# Patient Record
Sex: FEMALE | Race: WHITE | Employment: OTHER | URBAN - METROPOLITAN AREA
[De-identification: names, ages, dates, MRNs, and addresses within clinical notes are randomized per-mention and may not be internally consistent; named-entity substitution may affect disease eponyms.]

---

## 2018-03-12 ENCOUNTER — OFFICE VISIT (OUTPATIENT)
Dept: INTERNAL MEDICINE | Age: 58
End: 2018-03-12
Payer: COMMERCIAL

## 2018-03-12 VITALS
WEIGHT: 135.4 LBS | BODY MASS INDEX: 26.58 KG/M2 | OXYGEN SATURATION: 93 % | HEART RATE: 100 BPM | SYSTOLIC BLOOD PRESSURE: 100 MMHG | HEIGHT: 60 IN | DIASTOLIC BLOOD PRESSURE: 60 MMHG

## 2018-03-12 DIAGNOSIS — L72.3 SEBACEOUS CYST: ICD-10-CM

## 2018-03-12 DIAGNOSIS — F41.1 GAD (GENERALIZED ANXIETY DISORDER): ICD-10-CM

## 2018-03-12 DIAGNOSIS — F17.218 NICOTINE DEPENDENCE, CIGARETTES, WITH OTHER NICOTINE-INDUCED DISORDERS: ICD-10-CM

## 2018-03-12 DIAGNOSIS — G47.00 INSOMNIA, UNSPECIFIED TYPE: ICD-10-CM

## 2018-03-12 DIAGNOSIS — F32.0 MILD SINGLE CURRENT EPISODE OF MAJOR DEPRESSIVE DISORDER (HCC): ICD-10-CM

## 2018-03-12 DIAGNOSIS — Z12.31 SCREENING MAMMOGRAM, ENCOUNTER FOR: ICD-10-CM

## 2018-03-12 DIAGNOSIS — J43.8 OTHER EMPHYSEMA (HCC): Primary | ICD-10-CM

## 2018-03-12 DIAGNOSIS — E78.00 PURE HYPERCHOLESTEROLEMIA: ICD-10-CM

## 2018-03-12 DIAGNOSIS — K21.9 GASTROESOPHAGEAL REFLUX DISEASE WITHOUT ESOPHAGITIS: ICD-10-CM

## 2018-03-12 DIAGNOSIS — G62.9 NEUROPATHY: ICD-10-CM

## 2018-03-12 DIAGNOSIS — Z72.0 TOBACCO ABUSE: ICD-10-CM

## 2018-03-12 PROBLEM — F32.A DEPRESSION: Status: ACTIVE | Noted: 2018-03-12

## 2018-03-12 PROBLEM — M51.37 DDD (DEGENERATIVE DISC DISEASE), LUMBOSACRAL: Status: ACTIVE | Noted: 2018-03-12

## 2018-03-12 PROBLEM — M51.379 DDD (DEGENERATIVE DISC DISEASE), LUMBOSACRAL: Status: ACTIVE | Noted: 2018-03-12

## 2018-03-12 PROCEDURE — G0296 VISIT TO DETERM LDCT ELIG: HCPCS | Performed by: FAMILY MEDICINE

## 2018-03-12 PROCEDURE — 3023F SPIROM DOC REV: CPT | Performed by: FAMILY MEDICINE

## 2018-03-12 PROCEDURE — G8926 SPIRO NO PERF OR DOC: HCPCS | Performed by: FAMILY MEDICINE

## 2018-03-12 PROCEDURE — 3014F SCREEN MAMMO DOC REV: CPT | Performed by: FAMILY MEDICINE

## 2018-03-12 PROCEDURE — G8419 CALC BMI OUT NRM PARAM NOF/U: HCPCS | Performed by: FAMILY MEDICINE

## 2018-03-12 PROCEDURE — G8427 DOCREV CUR MEDS BY ELIG CLIN: HCPCS | Performed by: FAMILY MEDICINE

## 2018-03-12 PROCEDURE — 99204 OFFICE O/P NEW MOD 45 MIN: CPT | Performed by: FAMILY MEDICINE

## 2018-03-12 PROCEDURE — 3017F COLORECTAL CA SCREEN DOC REV: CPT | Performed by: FAMILY MEDICINE

## 2018-03-12 PROCEDURE — 4004F PT TOBACCO SCREEN RCVD TLK: CPT | Performed by: FAMILY MEDICINE

## 2018-03-12 PROCEDURE — G8484 FLU IMMUNIZE NO ADMIN: HCPCS | Performed by: FAMILY MEDICINE

## 2018-03-12 RX ORDER — BUSPIRONE HYDROCHLORIDE 15 MG/1
15 TABLET ORAL 3 TIMES DAILY
COMMUNITY
Start: 2018-01-19 | End: 2018-05-02 | Stop reason: SDUPTHER

## 2018-03-12 RX ORDER — ATORVASTATIN CALCIUM 20 MG/1
20 TABLET, FILM COATED ORAL DAILY
COMMUNITY
Start: 2018-02-05 | End: 2018-05-30 | Stop reason: SDUPTHER

## 2018-03-12 RX ORDER — GABAPENTIN 300 MG/1
300 CAPSULE ORAL 3 TIMES DAILY
COMMUNITY
Start: 2018-02-05 | End: 2018-06-14

## 2018-03-12 RX ORDER — PAROXETINE HYDROCHLORIDE 40 MG/1
40 TABLET, FILM COATED ORAL EVERY MORNING
COMMUNITY
Start: 2018-01-19 | End: 2018-05-02 | Stop reason: SDUPTHER

## 2018-03-12 RX ORDER — BUPROPION HYDROCHLORIDE 100 MG/1
TABLET ORAL
COMMUNITY
Start: 2018-02-05 | End: 2018-03-12

## 2018-03-12 RX ORDER — OMEPRAZOLE 20 MG/1
20 CAPSULE, DELAYED RELEASE ORAL DAILY
COMMUNITY
Start: 2018-02-05 | End: 2018-06-14

## 2018-03-12 RX ORDER — TRAZODONE HYDROCHLORIDE 100 MG/1
100 TABLET ORAL NIGHTLY
COMMUNITY
Start: 2018-01-19 | End: 2018-05-02 | Stop reason: SDUPTHER

## 2018-03-12 ASSESSMENT — PATIENT HEALTH QUESTIONNAIRE - PHQ9
SUM OF ALL RESPONSES TO PHQ QUESTIONS 1-9: 2
SUM OF ALL RESPONSES TO PHQ9 QUESTIONS 1 & 2: 2
2. FEELING DOWN, DEPRESSED OR HOPELESS: 1
1. LITTLE INTEREST OR PLEASURE IN DOING THINGS: 1

## 2018-03-12 ASSESSMENT — ENCOUNTER SYMPTOMS
CHEST TIGHTNESS: 0
COUGH: 1
APNEA: 0
ROS SKIN COMMENTS: CYST

## 2018-03-12 NOTE — PROGRESS NOTES
Assessment:  Dari Carreon was seen today for establish care, cough and cyst.    Diagnoses and all orders for this visit:    Neuropathy ([facility] [address]  Stable, controlled  Plan: continue current medications  Anticipate EMG down the line as needed, patient has history of lumbosacral arthritis, might be related to that as well    Other emphysema (HCC)  Daily inhaler + rescue inhaler  Proair+ Advair  PFT ordered   Chest CT   Advair increased to 500 from 250    Pure hypercholesterolemia  Lipitor 20 mg  Stable, controlled  Plan: continue current medications    Mild single current episode of major depressive disorder ([address]  [address]  Paxil 40 mg + Buspar 15mg  No concerns at this moment. Stable, controlled  Plan: continue current medications    CARTER (generalized anxiety disorder)  [address]  Paxil 40 mg + Buspar 15mg  No concerns at this moment. As above    Insomnia, unspecified type  Trazodone 100 mg  As above    Gastroesophageal reflux disease without esophagitis  Omeprazole 20 mg  No changes in diet  Stable, controlled  Plan: continue current medications  I will request her colonoscopy results, may anticipate endoscopy down the line    Depression, unspecified depression type  [address]   Paxil 40 mg + Buspar 15mg  No concerns at this moment. As above    Sebaceous cyst   had a lengthy discussion with patient about treatment, it's side effects, the diagnosis & etiology of symptoms, along with management and expectations of outcome with the recommended treatment. Patient verbalized understanding. Recommended to monitor for now    Tobacco abuse  Discussed smoking cessation  Discussed lung cancer screening, order placed    Orders Placed This Encounter   Procedures    CT Lung Screening     : 1960, patient is 61 y.o. No order of CT LUNG SCREENING is found. Standing Status:   Future     Standing Expiration Date:   3/12/2019     Order Specific Question:   Patient age is 50-69 years? Answer:    Yes

## 2018-03-13 ENCOUNTER — TELEPHONE (OUTPATIENT)
Dept: INTERNAL MEDICINE | Age: 58
End: 2018-03-13

## 2018-03-13 NOTE — TELEPHONE ENCOUNTER
Pt called about breathing machine for COPD, pt forgot to get it yesterday. Pt wants to know if this is available in the office, and if so when can she come pick it up? Please advise.

## 2018-03-16 DIAGNOSIS — J43.8 OTHER EMPHYSEMA (HCC): Primary | ICD-10-CM

## 2018-03-16 NOTE — TELEPHONE ENCOUNTER
Pt picked up nebulizer in office. Rx needs to be signed off (set to print) and faxed to [facility] (in [place] to-do box). Pt also needs a refill of albuterol for the nebulize (she does have some at home but will run out).  - to Aldair Thomas MD out of office on Fridays

## 2018-04-09 ENCOUNTER — OFFICE VISIT (OUTPATIENT)
Dept: INTERNAL MEDICINE | Age: 58
End: 2018-04-09
Payer: COMMERCIAL

## 2018-04-09 ENCOUNTER — TELEPHONE (OUTPATIENT)
Dept: INTERNAL MEDICINE | Age: 58
End: 2018-04-09

## 2018-04-09 ENCOUNTER — HOSPITAL ENCOUNTER (OUTPATIENT)
Age: 58
Setting detail: SPECIMEN
Discharge: HOME | End: 2018-04-09
Payer: COMMERCIAL

## 2018-04-09 VITALS
SYSTOLIC BLOOD PRESSURE: 108 MMHG | WEIGHT: 139 LBS | BODY MASS INDEX: 27.29 KG/M2 | HEIGHT: 60 IN | HEART RATE: 86 BPM | DIASTOLIC BLOOD PRESSURE: 64 MMHG

## 2018-04-09 DIAGNOSIS — Z23 NEED FOR PNEUMOCOCCAL VACCINATION: ICD-10-CM

## 2018-04-09 DIAGNOSIS — K21.9 GASTROESOPHAGEAL REFLUX DISEASE WITHOUT ESOPHAGITIS: ICD-10-CM

## 2018-04-09 DIAGNOSIS — E78.00 PURE HYPERCHOLESTEROLEMIA: ICD-10-CM

## 2018-04-09 DIAGNOSIS — R10.2 PELVIC PAIN IN FEMALE: ICD-10-CM

## 2018-04-09 DIAGNOSIS — Z00.00 ANNUAL PHYSICAL EXAM: ICD-10-CM

## 2018-04-09 DIAGNOSIS — Z12.4 SCREENING FOR CERVICAL CANCER: ICD-10-CM

## 2018-04-09 DIAGNOSIS — Z12.31 SCREENING MAMMOGRAM, ENCOUNTER FOR: ICD-10-CM

## 2018-04-09 DIAGNOSIS — Z00.00 ANNUAL PHYSICAL EXAM: Primary | ICD-10-CM

## 2018-04-09 LAB
ALBUMIN SERPL-MCNC: 4.6 G/DL (ref 3.9–4.9)
ALP BLD-CCNC: 114 U/L (ref 40–130)
ALT SERPL-CCNC: 19 U/L (ref 0–33)
ANION GAP SERPL CALCULATED.3IONS-SCNC: 13 MEQ/L (ref 7–13)
AST SERPL-CCNC: 16 U/L (ref 0–35)
BASOPHILS ABSOLUTE: 0 K/UL (ref 0–0.2)
BASOPHILS RELATIVE PERCENT: 0.4 %
BILIRUB SERPL-MCNC: 0.3 MG/DL (ref 0–1.2)
BUN BLDV-MCNC: 7 MG/DL (ref 6–20)
CALCIUM SERPL-MCNC: 9.5 MG/DL (ref 8.6–10.2)
CHLORIDE BLD-SCNC: 101 MEQ/L (ref 98–107)
CHOLESTEROL, TOTAL: 173 MG/DL (ref 0–199)
CO2: 29 MEQ/L (ref 22–29)
CREAT SERPL-MCNC: 0.89 MG/DL (ref 0.5–0.9)
EOSINOPHILS ABSOLUTE: 0.3 K/UL (ref 0–0.7)
EOSINOPHILS RELATIVE PERCENT: 3.5 %
GFR AFRICAN AMERICAN: >60
GFR NON-AFRICAN AMERICAN: >60
GLOBULIN: 2 G/DL (ref 2.3–3.5)
GLUCOSE BLD-MCNC: 83 MG/DL (ref 74–109)
HCT VFR BLD CALC: 39 % (ref 37–47)
HDLC SERPL-MCNC: 56 MG/DL (ref 40–59)
HEMOGLOBIN: 12.9 G/DL (ref 12–16)
LDL CHOLESTEROL CALCULATED: 86 MG/DL (ref 0–129)
LYMPHOCYTES ABSOLUTE: 2.5 K/UL (ref 1–4.8)
LYMPHOCYTES RELATIVE PERCENT: 25.9 %
MCH RBC QN AUTO: 30.2 PG (ref 27–31.3)
MCHC RBC AUTO-ENTMCNC: 33.1 % (ref 33–37)
MCV RBC AUTO: 91.2 FL (ref 82–100)
MONOCYTES ABSOLUTE: 0.7 K/UL (ref 0.2–0.8)
MONOCYTES RELATIVE PERCENT: 7.8 %
NEUTROPHILS ABSOLUTE: 5.9 K/UL (ref 1.4–6.5)
NEUTROPHILS RELATIVE PERCENT: 62.4 %
PDW BLD-RTO: 15.9 % (ref 11.5–14.5)
PLATELET # BLD: 244 K/UL (ref 130–400)
POTASSIUM SERPL-SCNC: 4 MEQ/L (ref 3.5–5.1)
RBC # BLD: 4.28 M/UL (ref 4.2–5.4)
SODIUM BLD-SCNC: 143 MEQ/L (ref 132–144)
TOTAL PROTEIN: 6.6 G/DL (ref 6.4–8.1)
TRIGL SERPL-MCNC: 157 MG/DL (ref 0–200)
WBC # BLD: 9.5 K/UL (ref 4.8–10.8)

## 2018-04-09 PROCEDURE — 90732 PPSV23 VACC 2 YRS+ SUBQ/IM: CPT | Performed by: FAMILY MEDICINE

## 2018-04-09 PROCEDURE — G0009 ADMIN PNEUMOCOCCAL VACCINE: HCPCS | Performed by: FAMILY MEDICINE

## 2018-04-09 PROCEDURE — 80061 LIPID PANEL: CPT

## 2018-04-09 PROCEDURE — 36415 COLL VENOUS BLD VENIPUNCTURE: CPT | Performed by: FAMILY MEDICINE

## 2018-04-09 PROCEDURE — 87624 HPV HI-RISK TYP POOLED RSLT: CPT

## 2018-04-09 PROCEDURE — 99396 PREV VISIT EST AGE 40-64: CPT | Performed by: FAMILY MEDICINE

## 2018-04-09 PROCEDURE — 85025 COMPLETE CBC W/AUTO DIFF WBC: CPT

## 2018-04-09 PROCEDURE — 80053 COMPREHEN METABOLIC PANEL: CPT

## 2018-04-09 ASSESSMENT — ENCOUNTER SYMPTOMS
EYE PAIN: 0
EYE ITCHING: 0
CHOKING: 0
CHEST TIGHTNESS: 0
APNEA: 0
EYE DISCHARGE: 0

## 2018-04-16 LAB
HPV COMMENT: NORMAL
HPV TYPE 16: NOT DETECTED
HPV TYPE 18: NOT DETECTED
HPVOH (OTHER TYPES): NOT DETECTED

## 2018-04-23 ENCOUNTER — TELEPHONE (OUTPATIENT)
Dept: CASE MANAGEMENT | Age: 58
End: 2018-04-23

## 2018-04-25 ENCOUNTER — TELEPHONE (OUTPATIENT)
Dept: CASE MANAGEMENT | Age: 58
End: 2018-04-25

## 2018-04-25 RX ORDER — VARENICLINE TARTRATE 1 MG/1
1 TABLET, FILM COATED ORAL 2 TIMES DAILY
Qty: 60 TABLET | Refills: 3 | Status: SHIPPED | OUTPATIENT
Start: 2018-04-25 | End: 2018-10-01

## 2018-04-25 RX ORDER — VARENICLINE TARTRATE 25 MG
KIT ORAL
Qty: 1 EACH | Refills: 0 | Status: SHIPPED | OUTPATIENT
Start: 2018-04-25 | End: 2018-06-14

## 2018-05-03 RX ORDER — PAROXETINE HYDROCHLORIDE 40 MG/1
40 TABLET, FILM COATED ORAL EVERY MORNING
Qty: 30 TABLET | Refills: 3 | Status: SHIPPED | OUTPATIENT
Start: 2018-05-03 | End: 2018-10-01

## 2018-05-03 RX ORDER — BUSPIRONE HYDROCHLORIDE 15 MG/1
15 TABLET ORAL 3 TIMES DAILY
Qty: 90 TABLET | Refills: 3 | Status: SHIPPED | OUTPATIENT
Start: 2018-05-03 | End: 2018-10-01

## 2018-05-03 RX ORDER — TRAZODONE HYDROCHLORIDE 100 MG/1
100 TABLET ORAL NIGHTLY
Qty: 60 TABLET | Refills: 3 | Status: SHIPPED | OUTPATIENT
Start: 2018-05-03 | End: 2018-10-01 | Stop reason: SDUPTHER

## 2018-05-23 ENCOUNTER — HOSPITAL ENCOUNTER (OUTPATIENT)
Dept: PULMONOLOGY | Age: 58
Discharge: HOME | End: 2018-05-23
Payer: COMMERCIAL

## 2018-05-23 ENCOUNTER — TELEPHONE (OUTPATIENT)
Dept: INTERNAL MEDICINE | Age: 58
End: 2018-05-23

## 2018-05-23 DIAGNOSIS — M54.2 NECK PAIN: ICD-10-CM

## 2018-05-23 DIAGNOSIS — M54.9 BACK PAIN, UNSPECIFIED BACK LOCATION, UNSPECIFIED BACK PAIN LATERALITY, UNSPECIFIED CHRONICITY: Primary | ICD-10-CM

## 2018-05-23 PROCEDURE — 6360000002 HC RX W HCPCS: Performed by: FAMILY MEDICINE

## 2018-05-23 PROCEDURE — 94726 PLETHYSMOGRAPHY LUNG VOLUMES: CPT

## 2018-05-23 PROCEDURE — 94060 EVALUATION OF WHEEZING: CPT | Performed by: INTERNAL MEDICINE

## 2018-05-23 PROCEDURE — 94729 DIFFUSING CAPACITY: CPT | Performed by: INTERNAL MEDICINE

## 2018-05-23 PROCEDURE — 94726 PLETHYSMOGRAPHY LUNG VOLUMES: CPT | Performed by: INTERNAL MEDICINE

## 2018-05-23 PROCEDURE — 94060 EVALUATION OF WHEEZING: CPT

## 2018-05-23 PROCEDURE — 94729 DIFFUSING CAPACITY: CPT

## 2018-05-23 RX ORDER — ALBUTEROL SULFATE 2.5 MG/3ML
2.5 SOLUTION RESPIRATORY (INHALATION) ONCE
Status: COMPLETED | OUTPATIENT
Start: 2018-05-23 | End: 2018-05-23

## 2018-05-23 RX ADMIN — ALBUTEROL SULFATE 2.5 MG: 2.5 SOLUTION RESPIRATORY (INHALATION) at 10:13

## 2018-05-30 ENCOUNTER — HOSPITAL ENCOUNTER (OUTPATIENT)
Dept: WOMENS IMAGING | Age: 58
Discharge: HOME | End: 2018-06-01
Payer: COMMERCIAL

## 2018-05-30 ENCOUNTER — HOSPITAL ENCOUNTER (OUTPATIENT)
Dept: ULTRASOUND IMAGING | Age: 58
Discharge: HOME | End: 2018-06-01
Payer: COMMERCIAL

## 2018-05-30 DIAGNOSIS — Z12.31 SCREENING MAMMOGRAM, ENCOUNTER FOR: ICD-10-CM

## 2018-05-30 DIAGNOSIS — R10.2 PELVIC PAIN IN FEMALE: ICD-10-CM

## 2018-05-30 PROCEDURE — 76830 TRANSVAGINAL US NON-OB: CPT

## 2018-05-30 PROCEDURE — 76856 US EXAM PELVIC COMPLETE: CPT

## 2018-05-30 PROCEDURE — 77067 SCR MAMMO BI INCL CAD: CPT

## 2018-05-30 RX ORDER — ATORVASTATIN CALCIUM 20 MG/1
20 TABLET, FILM COATED ORAL DAILY
Qty: 90 TABLET | Refills: 3 | Status: SHIPPED | OUTPATIENT
Start: 2018-05-30 | End: 2019-05-31 | Stop reason: SDUPTHER

## 2018-05-30 RX ORDER — BUDESONIDE AND FORMOTEROL FUMARATE DIHYDRATE 160; 4.5 UG/1; UG/1
2 AEROSOL RESPIRATORY (INHALATION) 2 TIMES DAILY
Qty: 1 INHALER | Refills: 3 | Status: SHIPPED | OUTPATIENT
Start: 2018-05-30 | End: 2018-06-14

## 2018-05-31 ENCOUNTER — TELEPHONE (OUTPATIENT)
Dept: INTERNAL MEDICINE | Age: 58
End: 2018-05-31

## 2018-05-31 DIAGNOSIS — N83.202 LEFT OVARIAN CYST: Primary | ICD-10-CM

## 2018-06-14 ENCOUNTER — OFFICE VISIT (OUTPATIENT)
Dept: OBGYN CLINIC | Age: 58
End: 2018-06-14
Payer: COMMERCIAL

## 2018-06-14 ENCOUNTER — HOSPITAL ENCOUNTER (OUTPATIENT)
Dept: LAB | Age: 58
Discharge: HOME | End: 2018-06-14
Payer: COMMERCIAL

## 2018-06-14 VITALS
WEIGHT: 131 LBS | SYSTOLIC BLOOD PRESSURE: 114 MMHG | DIASTOLIC BLOOD PRESSURE: 82 MMHG | HEIGHT: 60 IN | BODY MASS INDEX: 25.72 KG/M2

## 2018-06-14 DIAGNOSIS — R10.2 PELVIC PAIN IN FEMALE: Primary | ICD-10-CM

## 2018-06-14 DIAGNOSIS — N83.8 OVARIAN MASS: ICD-10-CM

## 2018-06-14 DIAGNOSIS — R10.2 PELVIC PAIN IN FEMALE: ICD-10-CM

## 2018-06-14 DIAGNOSIS — R19.09 OTHER INTRA-ABDOMINAL AND PELVIC SWELLING, MASS AND LUMP: ICD-10-CM

## 2018-06-14 LAB — CA 125: 30.8 U/ML (ref 0–35)

## 2018-06-14 PROCEDURE — 99203 OFFICE O/P NEW LOW 30 MIN: CPT | Performed by: OBSTETRICS & GYNECOLOGY

## 2018-06-14 PROCEDURE — 4004F PT TOBACCO SCREEN RCVD TLK: CPT | Performed by: OBSTETRICS & GYNECOLOGY

## 2018-06-14 PROCEDURE — 3017F COLORECTAL CA SCREEN DOC REV: CPT | Performed by: OBSTETRICS & GYNECOLOGY

## 2018-06-14 PROCEDURE — 36415 COLL VENOUS BLD VENIPUNCTURE: CPT

## 2018-06-14 PROCEDURE — G8419 CALC BMI OUT NRM PARAM NOF/U: HCPCS | Performed by: OBSTETRICS & GYNECOLOGY

## 2018-06-14 PROCEDURE — G8427 DOCREV CUR MEDS BY ELIG CLIN: HCPCS | Performed by: OBSTETRICS & GYNECOLOGY

## 2018-06-14 PROCEDURE — 86304 IMMUNOASSAY TUMOR CA 125: CPT

## 2018-06-14 RX ORDER — GABAPENTIN 300 MG/1
CAPSULE ORAL
COMMUNITY
Start: 2018-05-03 | End: 2019-01-27 | Stop reason: SDUPTHER

## 2018-06-14 RX ORDER — BUDESONIDE AND FORMOTEROL FUMARATE DIHYDRATE 160; 4.5 UG/1; UG/1
AEROSOL RESPIRATORY (INHALATION)
COMMUNITY
Start: 2018-05-30 | End: 2018-10-01

## 2018-06-14 RX ORDER — OMEPRAZOLE 20 MG/1
CAPSULE, DELAYED RELEASE ORAL
COMMUNITY
Start: 2018-05-03 | End: 2019-06-24 | Stop reason: SDUPTHER

## 2018-06-14 RX ORDER — VARENICLINE TARTRATE 1 MG/1
TABLET, FILM COATED ORAL
COMMUNITY
Start: 2018-04-25 | End: 2018-10-01

## 2018-06-14 RX ORDER — ATORVASTATIN CALCIUM 20 MG/1
TABLET, FILM COATED ORAL
COMMUNITY
Start: 2018-05-03 | End: 2018-06-14

## 2018-06-18 ASSESSMENT — ENCOUNTER SYMPTOMS
ABDOMINAL PAIN: 1
SHORTNESS OF BREATH: 0
DIARRHEA: 0
NAUSEA: 0
CONSTIPATION: 0
APNEA: 0

## 2018-06-27 ENCOUNTER — HOSPITAL ENCOUNTER (OUTPATIENT)
Dept: MRI IMAGING | Age: 58
Discharge: HOME | End: 2018-06-29
Payer: COMMERCIAL

## 2018-06-27 DIAGNOSIS — R10.2 PELVIC PAIN IN FEMALE: ICD-10-CM

## 2018-06-27 PROCEDURE — 72197 MRI PELVIS W/O & W/DYE: CPT

## 2018-06-27 PROCEDURE — A9579 GAD-BASE MR CONTRAST NOS,1ML: HCPCS | Performed by: FAMILY MEDICINE

## 2018-06-27 PROCEDURE — 6360000004 HC RX CONTRAST MEDICATION: Performed by: FAMILY MEDICINE

## 2018-06-27 RX ORDER — SODIUM CHLORIDE 0.9 % (FLUSH) 0.9 %
10 SYRINGE (ML) INJECTION 2 TIMES DAILY
Status: DISCONTINUED | OUTPATIENT
Start: 2018-06-27 | End: 2018-06-30 | Stop reason: HOSPADM

## 2018-06-27 RX ADMIN — GADOTERIDOL 15 ML: 279.3 INJECTION, SOLUTION INTRAVENOUS at 20:02

## 2018-07-05 ENCOUNTER — OFFICE VISIT (OUTPATIENT)
Dept: OBGYN CLINIC | Age: 58
End: 2018-07-05
Payer: COMMERCIAL

## 2018-07-05 VITALS
SYSTOLIC BLOOD PRESSURE: 102 MMHG | BODY MASS INDEX: 26.31 KG/M2 | DIASTOLIC BLOOD PRESSURE: 74 MMHG | HEIGHT: 60 IN | WEIGHT: 134 LBS

## 2018-07-05 DIAGNOSIS — N83.201 CYST OF RIGHT OVARY: Primary | ICD-10-CM

## 2018-07-05 PROCEDURE — G8419 CALC BMI OUT NRM PARAM NOF/U: HCPCS | Performed by: OBSTETRICS & GYNECOLOGY

## 2018-07-05 PROCEDURE — 3017F COLORECTAL CA SCREEN DOC REV: CPT | Performed by: OBSTETRICS & GYNECOLOGY

## 2018-07-05 PROCEDURE — 99212 OFFICE O/P EST SF 10 MIN: CPT | Performed by: OBSTETRICS & GYNECOLOGY

## 2018-07-05 PROCEDURE — 4004F PT TOBACCO SCREEN RCVD TLK: CPT | Performed by: OBSTETRICS & GYNECOLOGY

## 2018-07-05 PROCEDURE — G8427 DOCREV CUR MEDS BY ELIG CLIN: HCPCS | Performed by: OBSTETRICS & GYNECOLOGY

## 2018-07-05 RX ORDER — ESTRADIOL 0.1 MG/G
1 CREAM VAGINAL DAILY
Qty: 1 TUBE | Refills: 3 | Status: SHIPPED | OUTPATIENT
Start: 2018-07-05 | End: 2019-11-19

## 2018-07-05 ASSESSMENT — ENCOUNTER SYMPTOMS
APNEA: 0
ABDOMINAL PAIN: 0
CONSTIPATION: 0
SHORTNESS OF BREATH: 0

## 2018-07-05 NOTE — PROGRESS NOTES
Subjective:      Patient ID:  Abram Hackett is a 61 y.o. female with chief complaint of:  Chief Complaint   Patient presents with    Results     f/u results from labs and mri        patient presents to review MRI and labs she is now without discomfort. Feeling much better        Past Medical History:   Diagnosis Date    Anxiety     Bone spur     Cervical stenosis of spine     COPD [facility]ch[facility] obstructive pulmonary disease) ([facility] [address]     Depression     Hyperlipidemia      Past Surgical History:   Procedure Laterality Date     SECTION       Family History   Problem Relation Age of Onset    Other Mother         epilepsy     No Known Problems Father     No Known Problems Brother     No Known Problems Brother      Current Outpatient Prescriptions on File Prior to Visit   Medication Sig Dispense Refill    ADVAIR DISKUS 500-50 MCG/DOSE diskus inhaler       CHANTIX 1 MG tablet       gabapentin (NEURONTIN) 300 MG capsule       omeprazole (PRILOSEC) 20 MG delayed release capsule       PROAIR  (90 Base) MCG/ACT inhaler       SYMBICORT 160-4.5 MCG/ACT AERO       atorvastatin (LIPITOR) 20 MG tablet Take 1 tablet by mouth daily 90 tablet 3    busPIRone (BUSPAR) 15 MG tablet Take 15 mg by mouth 3 times daily 90 tablet 3    PARoxetine (PAXIL) 40 MG tablet Take 1 tablet by mouth every morning 30 tablet 3    traZODone (DESYREL) 100 MG tablet Take 1 tablet by mouth nightly 1 to 2 tablets nightly 60 tablet 3    varenicline (CHANTIX CONTINUING MONTH ASHOK) 1 MG tablet Take 1 tablet by mouth 2 times daily 60 tablet 3    albuterol (PROVENTIL) (5 MG/ML) 0.5% nebulizer solution Take 1 mL by nebulization 4 times daily as needed for Wheezing 120 each 3    Misc. Devices MISC 1 each by Does not apply route once for 1 dose Nebulizer 1 Device 0    Cholecalciferol (VITAMIN D3) 5000 units TABS Take 1 tablet by mouth daily       No current facility-administered medications on file prior to visit.     soc negative time 3  Allergies: Other and Codeine    Review of Systems   Constitutional: Negative for fatigue and fever. Respiratory: Negative for apnea and shortness of breath. Cardiovascular: Negative for chest pain and palpitations. Gastrointestinal: Negative for abdominal pain and constipation. Genitourinary: Negative for difficulty urinating, dysuria, pelvic pain, vaginal bleeding and vaginal discharge. Neurological: Negative for dizziness, weakness and light-headedness. Objective:   /74 (Site: Right Arm, Position: Sitting, Cuff Size: Large Adult)   Ht 5' (1.524 m)   Wt 134 lb (60.8 kg)   BMI 26.17 kg/m²     Physical Exam   Constitutional: She is oriented to person, place, and time. She appears well-developed and well-nourished. Abdominal: Soft. Bowel sounds are normal.   Neurological: She is alert and oriented to person, place, and time. Psychiatric: She has a normal mood and affect. Assessment:       Diagnosis Orders   1. Cyst of right ovary           Plan:      No orders of the defined types were placed in this encounter. Orders Placed This Encounter   Medications    estradiol (ESTRACE VAGINAL) 0.1 MG/GM vaginal cream     Sig: Place 1 g vaginally daily 1 gram vaginally for two weeks the 1/2 gram twice weekly     Dispense:  1 Tube     Refill:  3       Return if symptoms worsen or fail to improve.      Belkys Hooper,

## 2018-07-05 NOTE — PATIENT INSTRUCTIONS
Patient Education        Learning About Diverticulosis and Diverticulitis  What are diverticulosis and diverticulitis? In diverticulosis and diverticulitis, pouches called diverticula form in the wall of the large intestine, or colon. · In diverticulosis, the pouches do not cause any pain or other symptoms. · In diverticulitis, the pouches get inflamed or infected and cause symptoms. Doctors aren't sure what causes these pouches in the colon. But they think that a low-fiber diet may play a role. Without fiber to add bulk to the stool, the colon has to work harder than normal to push the stool forward. The pressure from this may cause pouches to form in weak spots along the colon. Some people with diverticulosis get diverticulitis. But experts don't know why this happens. What are the symptoms? · In diverticulosis, most people don't have symptoms. But pouches sometimes bleed. · In diverticulitis, symptoms may last from a few hours to a week or more. They include:  ¨ Belly pain. This is usually in the lower left side. It is sometimes worse when you move. This is the most common symptom. ¨ Fever and chills. ¨ Bloating and gas. ¨ Diarrhea or constipation. ¨ Nausea and sometimes vomiting. ¨ Not feeling like eating. How can you prevent these problems? You may be able to lower your chance of getting diverticulitis. You can do this by taking steps to prevent constipation. · Eat fruits, vegetables, beans, and whole grains every day. These foods are high in fiber. · Drink plenty of fluids (enough so that your urine is light yellow or clear like water). If you have kidney, heart, or liver disease and have to limit fluids, talk with your doctor before you increase the amount of fluids you drink. · Get at least 30 minutes of exercise on most days of the week. Walking is a good choice. You also may want to do other activities, such as running, swimming, cycling, or playing tennis or team sports.   · Take a

## 2018-07-30 NOTE — TELEPHONE ENCOUNTER
Requesting medication refill. Please approve or deny this request.    Rx requested:  Requested Prescriptions      No prescriptions requested or ordered in this encounter       Last Office Visit:   4/9/2018  Pt needs inhaler, asked to have proair called in, cant wait until tomorrow. Please sign off for the proair and send in advair.      Last Labs:       Next Visit Date:  Future Appointments  Date Time Provider Macy Veronica   10/11/2018 11:00 AM Kristin Timmons MD [facility]

## 2018-09-21 NOTE — TELEPHONE ENCOUNTER
Requesting medication refill.  Please approve or deny this request.    Rx requested:  Requested Prescriptions     Pending Prescriptions Disp Refills    PROAIR  (90 Base) MCG/ACT inhaler [Pharmacy Med Name: Yulissa Jefferson 90 MCG HFA AER AD] 8.5 g      Sig: INHALE 2 PUFFS EVERY 4 HOURS AS NEEDED       Last Office Visit:   4/9/2018    Last Labs:  4/9/2018    Next Visit Date:  Future Appointments  Date Time Provider Macy Veronica   10/2/2018 3:15 PM Markie Corcoran MD [facility]

## 2018-10-01 ENCOUNTER — OFFICE VISIT (OUTPATIENT)
Dept: INTERNAL MEDICINE | Age: 58
End: 2018-10-01
Payer: COMMERCIAL

## 2018-10-01 VITALS
BODY MASS INDEX: 26 KG/M2 | DIASTOLIC BLOOD PRESSURE: 80 MMHG | WEIGHT: 132.4 LBS | SYSTOLIC BLOOD PRESSURE: 104 MMHG | HEIGHT: 60 IN | HEART RATE: 78 BPM

## 2018-10-01 DIAGNOSIS — Z12.11 SCREEN FOR COLON CANCER: ICD-10-CM

## 2018-10-01 DIAGNOSIS — F32.A DEPRESSION, UNSPECIFIED DEPRESSION TYPE: ICD-10-CM

## 2018-10-01 DIAGNOSIS — Z72.0 TOBACCO ABUSE: ICD-10-CM

## 2018-10-01 DIAGNOSIS — G62.9 NEUROPATHY: ICD-10-CM

## 2018-10-01 DIAGNOSIS — F41.1 GAD (GENERALIZED ANXIETY DISORDER): ICD-10-CM

## 2018-10-01 DIAGNOSIS — E78.00 PURE HYPERCHOLESTEROLEMIA: Primary | ICD-10-CM

## 2018-10-01 DIAGNOSIS — G47.00 INSOMNIA, UNSPECIFIED TYPE: ICD-10-CM

## 2018-10-01 DIAGNOSIS — K21.9 GASTROESOPHAGEAL REFLUX DISEASE WITHOUT ESOPHAGITIS: ICD-10-CM

## 2018-10-01 DIAGNOSIS — K57.92 DIVERTICULITIS: ICD-10-CM

## 2018-10-01 DIAGNOSIS — J43.8 OTHER EMPHYSEMA (HCC): ICD-10-CM

## 2018-10-01 DIAGNOSIS — M51.37 DDD (DEGENERATIVE DISC DISEASE), LUMBOSACRAL: ICD-10-CM

## 2018-10-01 DIAGNOSIS — F32.0 MILD SINGLE CURRENT EPISODE OF MAJOR DEPRESSIVE DISORDER (HCC): ICD-10-CM

## 2018-10-01 PROCEDURE — 3017F COLORECTAL CA SCREEN DOC REV: CPT | Performed by: FAMILY MEDICINE

## 2018-10-01 PROCEDURE — G8427 DOCREV CUR MEDS BY ELIG CLIN: HCPCS | Performed by: FAMILY MEDICINE

## 2018-10-01 PROCEDURE — 3023F SPIROM DOC REV: CPT | Performed by: FAMILY MEDICINE

## 2018-10-01 PROCEDURE — 99215 OFFICE O/P EST HI 40 MIN: CPT | Performed by: FAMILY MEDICINE

## 2018-10-01 PROCEDURE — G8484 FLU IMMUNIZE NO ADMIN: HCPCS | Performed by: FAMILY MEDICINE

## 2018-10-01 PROCEDURE — G8926 SPIRO NO PERF OR DOC: HCPCS | Performed by: FAMILY MEDICINE

## 2018-10-01 PROCEDURE — G8419 CALC BMI OUT NRM PARAM NOF/U: HCPCS | Performed by: FAMILY MEDICINE

## 2018-10-01 PROCEDURE — 4004F PT TOBACCO SCREEN RCVD TLK: CPT | Performed by: FAMILY MEDICINE

## 2018-10-01 RX ORDER — BUSPIRONE HYDROCHLORIDE 15 MG/1
15 TABLET ORAL 3 TIMES DAILY
Qty: 90 TABLET | Refills: 3 | Status: SHIPPED | OUTPATIENT
Start: 2018-10-01 | End: 2019-02-18 | Stop reason: SDUPTHER

## 2018-10-01 RX ORDER — TRAZODONE HYDROCHLORIDE 100 MG/1
100 TABLET ORAL NIGHTLY
Qty: 60 TABLET | Refills: 3 | Status: SHIPPED | OUTPATIENT
Start: 2018-10-01 | End: 2019-02-18 | Stop reason: SDUPTHER

## 2018-10-01 RX ORDER — PAROXETINE HYDROCHLORIDE 40 MG/1
40 TABLET, FILM COATED ORAL EVERY MORNING
Qty: 30 TABLET | Refills: 3 | Status: SHIPPED | OUTPATIENT
Start: 2018-10-01 | End: 2019-01-13 | Stop reason: SDUPTHER

## 2018-10-01 RX ORDER — AMOXICILLIN AND CLAVULANATE POTASSIUM 875; 125 MG/1; MG/1
1 TABLET, FILM COATED ORAL 2 TIMES DAILY
Qty: 20 TABLET | Refills: 0 | Status: SHIPPED | OUTPATIENT
Start: 2018-10-01 | End: 2018-10-11

## 2018-10-01 ASSESSMENT — ENCOUNTER SYMPTOMS
CHEST TIGHTNESS: 0
CHOKING: 0
EYE ITCHING: 0
EYE DISCHARGE: 0
EYE PAIN: 0
APNEA: 0

## 2018-10-01 NOTE — PROGRESS NOTES
Years: 47.00    Types: Cigarettes    Start date: 5   Smokeless Tobacco    Never Used     PFT 5/23/18:  SUMMARY:  Severe obstructive pulmonary disease. There is significant  response to bronchodilator. Static lung volume study suggests  hyperinflation. Diffusion capacity is mildly impaired. Airway resistance  is increased. Flow volume loop suggests obstructive pulmonary disease. Clinical correlation is requested. Started on Advair, works well for her  She is using albuterol 4-5 times daily      Hx of cone biopsy many years ago, no recent Pap     Up to date apparently with colonoscopy, we have no results. She is not up-to-date with her Pap test, mammogram, other preventative measures. Review of Systems   Constitutional: Negative for activity change, appetite change and chills. HENT: Negative for congestion, dental problem and drooling. Eyes: Negative for pain, discharge and itching. Respiratory: Negative for apnea, choking and chest tightness. Physical Exam  Vitals:    10/01/18 1514   BP: 104/80   Pulse: 78   Body mass index is 25.86 kg/m². Physical Exam  Constitutional:  Appears well-developed and well-nourished. No distress. HENT:   Head: Normocephalic and atraumatic. Right Ear: External ear normal.   Left Ear: External ear normal.   Nose: Nose normal.   Eyes: Conjunctivae and EOM are normal.  Right eye exhibits no discharge. Left eye exhibits no discharge. No scleral icterus. Neck: Normal range of motion. Cardiovascular: Normal rate, regular rhythm and normal heart sounds. Pulmonary/Chest: Effort normal and breath sounds normal. No respiratory distress. no wheezes. no rales. no tenderness. Musculoskeletal: Normal range of motion. Neurological: alert. Skin: not diaphoretic. Psychiatric: normal mood and affect. behavior is normal. Judgment and thought content normal.   Nursing note and vitals reviewed.       Assessment:  Jeannine Murray was seen today for hyperlipidemia,

## 2018-10-12 ENCOUNTER — CLINICAL DOCUMENTATION (OUTPATIENT)
Dept: CASE MANAGEMENT | Age: 58
End: 2018-10-12

## 2018-10-26 ENCOUNTER — HOSPITAL ENCOUNTER (OUTPATIENT)
Dept: CT IMAGING | Age: 58
Discharge: HOME | End: 2018-10-28
Payer: COMMERCIAL

## 2018-10-26 DIAGNOSIS — F17.218 NICOTINE DEPENDENCE, CIGARETTES, WITH OTHER NICOTINE-INDUCED DISORDERS: ICD-10-CM

## 2018-10-26 PROCEDURE — G0297 LDCT FOR LUNG CA SCREEN: HCPCS

## 2018-10-29 DIAGNOSIS — I25.84 CORONARY ARTERY CALCIFICATION: ICD-10-CM

## 2018-10-29 DIAGNOSIS — R91.1 LUNG NODULE: Primary | ICD-10-CM

## 2018-10-29 DIAGNOSIS — I25.10 CORONARY ARTERY CALCIFICATION: ICD-10-CM

## 2018-10-31 ENCOUNTER — OFFICE VISIT (OUTPATIENT)
Dept: CARDIOLOGY CLINIC | Age: 58
End: 2018-10-31
Payer: COMMERCIAL

## 2018-10-31 VITALS
RESPIRATION RATE: 20 BRPM | WEIGHT: 128.6 LBS | BODY MASS INDEX: 25.25 KG/M2 | DIASTOLIC BLOOD PRESSURE: 80 MMHG | OXYGEN SATURATION: 91 % | SYSTOLIC BLOOD PRESSURE: 122 MMHG | HEART RATE: 98 BPM | HEIGHT: 60 IN | TEMPERATURE: 97.2 F

## 2018-10-31 DIAGNOSIS — I25.10 CORONARY ARTERY CALCIFICATION OF NATIVE ARTERY: Primary | ICD-10-CM

## 2018-10-31 DIAGNOSIS — M79.605 PAIN IN BOTH LOWER EXTREMITIES: ICD-10-CM

## 2018-10-31 DIAGNOSIS — E78.00 PURE HYPERCHOLESTEROLEMIA: ICD-10-CM

## 2018-10-31 DIAGNOSIS — Z72.0 TOBACCO ABUSE: ICD-10-CM

## 2018-10-31 DIAGNOSIS — I25.84 CORONARY ARTERY CALCIFICATION OF NATIVE ARTERY: Primary | ICD-10-CM

## 2018-10-31 DIAGNOSIS — R09.89 BILATERAL CAROTID BRUITS: ICD-10-CM

## 2018-10-31 DIAGNOSIS — M79.604 PAIN IN BOTH LOWER EXTREMITIES: ICD-10-CM

## 2018-10-31 DIAGNOSIS — E78.5 DYSLIPIDEMIA: ICD-10-CM

## 2018-10-31 PROBLEM — F32.A DEPRESSION: Status: RESOLVED | Noted: 2018-03-12 | Resolved: 2018-10-31

## 2018-10-31 PROCEDURE — G8484 FLU IMMUNIZE NO ADMIN: HCPCS | Performed by: INTERNAL MEDICINE

## 2018-10-31 PROCEDURE — 99204 OFFICE O/P NEW MOD 45 MIN: CPT | Performed by: INTERNAL MEDICINE

## 2018-10-31 PROCEDURE — 3017F COLORECTAL CA SCREEN DOC REV: CPT | Performed by: INTERNAL MEDICINE

## 2018-10-31 PROCEDURE — G8427 DOCREV CUR MEDS BY ELIG CLIN: HCPCS | Performed by: INTERNAL MEDICINE

## 2018-10-31 PROCEDURE — G8419 CALC BMI OUT NRM PARAM NOF/U: HCPCS | Performed by: INTERNAL MEDICINE

## 2018-10-31 ASSESSMENT — ENCOUNTER SYMPTOMS
DIARRHEA: 0
VOICE CHANGE: 0
CHEST TIGHTNESS: 0
VOMITING: 0
SHORTNESS OF BREATH: 0
ABDOMINAL DISTENTION: 0
BLOOD IN STOOL: 0
TROUBLE SWALLOWING: 0
FACIAL SWELLING: 0
NAUSEA: 0
COLOR CHANGE: 0
WHEEZING: 0
ANAL BLEEDING: 0
APNEA: 0

## 2018-11-02 ENCOUNTER — OFFICE VISIT (OUTPATIENT)
Dept: PULMONOLOGY | Age: 58
End: 2018-11-02
Payer: COMMERCIAL

## 2018-11-02 VITALS
HEART RATE: 97 BPM | HEIGHT: 60 IN | TEMPERATURE: 97.9 F | DIASTOLIC BLOOD PRESSURE: 72 MMHG | WEIGHT: 129.6 LBS | BODY MASS INDEX: 25.45 KG/M2 | OXYGEN SATURATION: 98 % | SYSTOLIC BLOOD PRESSURE: 102 MMHG

## 2018-11-02 DIAGNOSIS — J44.9 CHRONIC OBSTRUCTIVE PULMONARY DISEASE, UNSPECIFIED COPD TYPE (HCC): ICD-10-CM

## 2018-11-02 DIAGNOSIS — Z72.0 TOBACCO ABUSE: ICD-10-CM

## 2018-11-02 DIAGNOSIS — J41.1 MUCOPURULENT CHRONIC BRONCHITIS (HCC): ICD-10-CM

## 2018-11-02 DIAGNOSIS — R91.1 LUNG NODULE: Primary | ICD-10-CM

## 2018-11-02 PROCEDURE — 99214 OFFICE O/P EST MOD 30 MIN: CPT | Performed by: INTERNAL MEDICINE

## 2018-11-02 PROCEDURE — G8599 NO ASA/ANTIPLAT THER USE RNG: HCPCS | Performed by: INTERNAL MEDICINE

## 2018-11-02 PROCEDURE — 3017F COLORECTAL CA SCREEN DOC REV: CPT | Performed by: INTERNAL MEDICINE

## 2018-11-02 PROCEDURE — G8484 FLU IMMUNIZE NO ADMIN: HCPCS | Performed by: INTERNAL MEDICINE

## 2018-11-02 PROCEDURE — 4004F PT TOBACCO SCREEN RCVD TLK: CPT | Performed by: INTERNAL MEDICINE

## 2018-11-02 PROCEDURE — G8427 DOCREV CUR MEDS BY ELIG CLIN: HCPCS | Performed by: INTERNAL MEDICINE

## 2018-11-02 PROCEDURE — G8926 SPIRO NO PERF OR DOC: HCPCS | Performed by: INTERNAL MEDICINE

## 2018-11-02 PROCEDURE — G8419 CALC BMI OUT NRM PARAM NOF/U: HCPCS | Performed by: INTERNAL MEDICINE

## 2018-11-02 PROCEDURE — 3023F SPIROM DOC REV: CPT | Performed by: INTERNAL MEDICINE

## 2018-11-08 NOTE — TELEPHONE ENCOUNTER
pharmacy requesting medication refill.  Please approve or deny this request.    Rx requested:  Requested Prescriptions     Pending Prescriptions Disp Refills    ADVAIR DISKUS 500-50 MCG/DOSE diskus inhaler [Pharmacy Med Name: Felisha Almazan 500-50 MCG BLST W/DEV] 60 each 1     Sig: Inhale 1 puff into the lungs 2 times daily       Last Office Visit:   Visit date not found    Last Labs:      Next Visit Date:  Future Appointments  Date Time Provider Macy Veronica   11/9/2018 8:30 AM ANTHONY/Valeriy Quach Final PET (MOBILE) [address] [place]   12/12/2018 11:15 AM Loco Shaikh MD [address]   12/13/2018 10:30 AM Lukas Kapadia MD [facility]   4/9/2019 2:00 PM Jose Francisco Suarez MD [facility]

## 2018-11-16 ENCOUNTER — HOSPITAL ENCOUNTER (OUTPATIENT)
Dept: CT IMAGING | Age: 58
Discharge: HOME | End: 2018-11-18
Payer: COMMERCIAL

## 2018-11-16 DIAGNOSIS — R91.1 LUNG NODULE: ICD-10-CM

## 2018-11-16 PROCEDURE — 78815 PET IMAGE W/CT SKULL-THIGH: CPT

## 2018-11-16 PROCEDURE — 3430000000 HC RX DIAGNOSTIC RADIOPHARMACEUTICAL: Performed by: INTERNAL MEDICINE

## 2018-11-16 PROCEDURE — A9552 F18 FDG: HCPCS | Performed by: INTERNAL MEDICINE

## 2018-11-16 RX ORDER — FLUDEOXYGLUCOSE F 18 200 MCI/ML
14.7 INJECTION, SOLUTION INTRAVENOUS
Status: COMPLETED | OUTPATIENT
Start: 2018-11-16 | End: 2018-11-16

## 2018-11-16 RX ADMIN — FLUDEOXYGLUCOSE F 18 14.7 MILLICURIE: 200 INJECTION, SOLUTION INTRAVENOUS at 10:02

## 2018-11-19 ENCOUNTER — TELEPHONE (OUTPATIENT)
Dept: PULMONOLOGY | Age: 58
End: 2018-11-19

## 2018-11-19 DIAGNOSIS — R91.1 LUNG NODULE: Primary | ICD-10-CM

## 2018-11-19 NOTE — TELEPHONE ENCOUNTER
Renetta Granger,  1960, clled about getting the results of her PET scan. She has to be at work by 1:00pm today. She said that you can leave a message on her phone if she is not there. Her phone number is [phone].

## 2018-11-26 ENCOUNTER — TELEPHONE (OUTPATIENT)
Dept: PULMONOLOGY | Age: 58
End: 2018-11-26

## 2018-11-27 ENCOUNTER — APPOINTMENT (OUTPATIENT)
Dept: GENERAL RADIOLOGY | Age: 58
End: 2018-11-27
Payer: COMMERCIAL

## 2018-11-27 ENCOUNTER — HOSPITAL ENCOUNTER (EMERGENCY)
Age: 58
Discharge: HOME | End: 2018-11-27
Payer: COMMERCIAL

## 2018-11-27 VITALS
WEIGHT: 130 LBS | HEIGHT: 60 IN | DIASTOLIC BLOOD PRESSURE: 58 MMHG | BODY MASS INDEX: 25.52 KG/M2 | HEART RATE: 95 BPM | OXYGEN SATURATION: 93 % | TEMPERATURE: 98.6 F | SYSTOLIC BLOOD PRESSURE: 103 MMHG | RESPIRATION RATE: 18 BRPM

## 2018-11-27 DIAGNOSIS — J44.1 COPD EXACERBATION (HCC): Primary | ICD-10-CM

## 2018-11-27 LAB
ALBUMIN SERPL-MCNC: 3.8 G/DL (ref 3.9–4.9)
ALP BLD-CCNC: 156 U/L (ref 40–130)
ALT SERPL-CCNC: 9 U/L (ref 0–33)
ANION GAP SERPL CALCULATED.3IONS-SCNC: 12 MEQ/L (ref 7–13)
AST SERPL-CCNC: 11 U/L (ref 0–35)
BILIRUB SERPL-MCNC: 0.4 MG/DL (ref 0–1.2)
BUN BLDV-MCNC: 9 MG/DL (ref 6–20)
CALCIUM SERPL-MCNC: 9.6 MG/DL (ref 8.6–10.2)
CHLORIDE BLD-SCNC: 105 MEQ/L (ref 98–107)
CO2: 25 MEQ/L (ref 22–29)
CREAT SERPL-MCNC: 0.79 MG/DL (ref 0.5–0.9)
D DIMER: 0.29 MG/L FEU (ref 0–0.5)
GFR AFRICAN AMERICAN: >60
GFR NON-AFRICAN AMERICAN: >60
GLOBULIN: 3.1 G/DL (ref 2.3–3.5)
GLUCOSE BLD-MCNC: 68 MG/DL (ref 74–109)
HCT VFR BLD CALC: 34.2 % (ref 37–47)
HEMOGLOBIN: 11.6 G/DL (ref 12–16)
MCH RBC QN AUTO: 28.9 PG (ref 27–31.3)
MCHC RBC AUTO-ENTMCNC: 34 % (ref 33–37)
MCV RBC AUTO: 84.9 FL (ref 82–100)
PDW BLD-RTO: 16.6 % (ref 11.5–14.5)
PLATELET # BLD: 313 K/UL (ref 130–400)
POTASSIUM SERPL-SCNC: 4 MEQ/L (ref 3.5–5.1)
RBC # BLD: 4.02 M/UL (ref 4.2–5.4)
SODIUM BLD-SCNC: 142 MEQ/L (ref 132–144)
TOTAL PROTEIN: 6.9 G/DL (ref 6.4–8.1)
WBC # BLD: 7.9 K/UL (ref 4.8–10.8)

## 2018-11-27 PROCEDURE — 71046 X-RAY EXAM CHEST 2 VIEWS: CPT

## 2018-11-27 PROCEDURE — 2580000003 HC RX 258: Performed by: NURSE PRACTITIONER

## 2018-11-27 PROCEDURE — 6370000000 HC RX 637 (ALT 250 FOR IP): Performed by: NURSE PRACTITIONER

## 2018-11-27 PROCEDURE — 85379 FIBRIN DEGRADATION QUANT: CPT

## 2018-11-27 PROCEDURE — 85027 COMPLETE CBC AUTOMATED: CPT

## 2018-11-27 PROCEDURE — 6360000002 HC RX W HCPCS: Performed by: NURSE PRACTITIONER

## 2018-11-27 PROCEDURE — 96375 TX/PRO/DX INJ NEW DRUG ADDON: CPT

## 2018-11-27 PROCEDURE — 96365 THER/PROPH/DIAG IV INF INIT: CPT

## 2018-11-27 PROCEDURE — 36415 COLL VENOUS BLD VENIPUNCTURE: CPT

## 2018-11-27 PROCEDURE — 80053 COMPREHEN METABOLIC PANEL: CPT

## 2018-11-27 PROCEDURE — 94640 AIRWAY INHALATION TREATMENT: CPT

## 2018-11-27 PROCEDURE — 99284 EMERGENCY DEPT VISIT MOD MDM: CPT

## 2018-11-27 RX ORDER — BENZONATATE 100 MG/1
100 CAPSULE ORAL 3 TIMES DAILY PRN
Qty: 30 CAPSULE | Refills: 0 | Status: SHIPPED | OUTPATIENT
Start: 2018-11-27 | End: 2018-12-04

## 2018-11-27 RX ORDER — BENZONATATE 100 MG/1
100 CAPSULE ORAL 3 TIMES DAILY PRN
Status: DISCONTINUED | OUTPATIENT
Start: 2018-11-27 | End: 2018-11-27 | Stop reason: HOSPADM

## 2018-11-27 RX ORDER — IPRATROPIUM BROMIDE AND ALBUTEROL SULFATE 2.5; .5 MG/3ML; MG/3ML
1 SOLUTION RESPIRATORY (INHALATION)
Status: DISCONTINUED | OUTPATIENT
Start: 2018-11-27 | End: 2018-11-27

## 2018-11-27 RX ORDER — METHYLPREDNISOLONE SODIUM SUCCINATE 125 MG/2ML
125 INJECTION, POWDER, LYOPHILIZED, FOR SOLUTION INTRAMUSCULAR; INTRAVENOUS ONCE
Status: COMPLETED | OUTPATIENT
Start: 2018-11-27 | End: 2018-11-27

## 2018-11-27 RX ORDER — PREDNISONE 50 MG/1
50 TABLET ORAL DAILY
Qty: 5 TABLET | Refills: 0 | Status: SHIPPED | OUTPATIENT
Start: 2018-11-27 | End: 2018-12-02

## 2018-11-27 RX ADMIN — CEFTRIAXONE 1 G: 1 INJECTION, POWDER, FOR SOLUTION INTRAMUSCULAR; INTRAVENOUS at 14:36

## 2018-11-27 RX ADMIN — METHYLPREDNISOLONE SODIUM SUCCINATE 125 MG: 125 INJECTION, POWDER, FOR SOLUTION INTRAMUSCULAR; INTRAVENOUS at 14:57

## 2018-11-27 RX ADMIN — BENZONATATE 100 MG: 100 CAPSULE ORAL at 13:38

## 2018-11-27 RX ADMIN — IPRATROPIUM BROMIDE AND ALBUTEROL SULFATE 1 AMPULE: .5; 3 SOLUTION RESPIRATORY (INHALATION) at 13:45

## 2018-11-27 ASSESSMENT — ENCOUNTER SYMPTOMS
COUGH: 1
TROUBLE SWALLOWING: 0
ABDOMINAL PAIN: 0
DIARRHEA: 0
CONSTIPATION: 0
VOMITING: 0
NAUSEA: 0
SORE THROAT: 0
BACK PAIN: 0
COLOR CHANGE: 0
VOICE CHANGE: 0
SHORTNESS OF BREATH: 0

## 2018-11-27 NOTE — ED PROVIDER NOTES
Neurological: Negative for dizziness, weakness, light-headedness and headaches. Psychiatric/Behavioral: Negative for agitation and behavioral problems. All other systems reviewed and are negative. Except as noted above the remainder of the review of systems was reviewed and negative. PAST MEDICAL HISTORY     Past Medical History:   Diagnosis Date    Bone spur     Cervical stenosis of spine     COPD [facility]chronic obstructive pulmonary disease) ([facility] [address]     Coronary artery calcification of native artery - aortic arch 10/31/2018    On lung screen done 10/26/18    DDD (degenerative disc disease), lumbosacral 3/12/2018    Used to see pain management     CARTER (generalized anxiety disorder) 3/12/2018    [facility]    Gastroesophageal reflux disease without esophagitis 3/12/2018    EGD over 10 years    Hyperlipidemia     Insomnia 3/12/2018    Mild single current episode of major depressive disorder ([address] 3/12/2018    Neuropathy 3/12/2018    Clinical diagnosis, EMG    Other emphysema ([facility] [address] 3/12/2018    No recent PFT    Pure hypercholesterolemia 3/12/2018    SOB (shortness of breath)     Tobacco abuse 3/12/2018       SURGICAL HISTORY       Past Surgical History:   Procedure Laterality Date     SECTION         CURRENT MEDICATIONS       Previous Medications    ADVAIR DISKUS 500-50 MCG/DOSE DISKUS INHALER    Inhale 1 puff into the lungs 2 times daily    ALBUTEROL (PROVENTIL) (5 MG/ML) 0.5% NEBULIZER SOLUTION    Take 1 mL by nebulization 4 times daily as needed for Wheezing    ATORVASTATIN (LIPITOR) 20 MG TABLET    Take 1 tablet by mouth daily    BUSPIRONE (BUSPAR) 15 MG TABLET    Take 15 mg by mouth 3 times daily    ESTRADIOL (ESTRACE VAGINAL) 0.1 MG/GM VAGINAL CREAM    Place 1 g vaginally daily 1 gram vaginally for two weeks the 1/2 gram twice weekly    GABAPENTIN (NEURONTIN) 300 MG CAPSULE        MISC.  DEVICES MISC    1 each by Does not apply route once for 1 dose Nebulizer    OMEPRAZOLE respiratory distress. She has no wheezes. She has no rales. She exhibits no tenderness. Abdominal: Soft. Bowel sounds are normal.   Musculoskeletal: Normal range of motion. Lymphadenopathy:     She has no cervical adenopathy. Neurological: She is alert and oriented to person, place, and time. Skin: Skin is warm and dry. Psychiatric: She has a normal mood and affect. Nursing note and vitals reviewed. DIAGNOSTIC RESULTS     EKG: All EKG's are interpreted by the EmergencyDepartment Physician who either signs or Co-signs this chart in the absence of a cardiologist.      RADIOLOGY:   Non-plain film images such as CT, Ultrasound and MRI are read by the radiologist. Plain radiographic images are visualized and preliminarily interpreted by the emergency physician with the below findings:    No acute cardiopulmonary disease process. Interpretation per the Radiologist below (ifavailable at the time of note entry):    XR CHEST STANDARD (2 VW)    (Results Pending)       LABS:  Labs Reviewed   CBC - Abnormal; Notable for the following:        Result Value    RBC 4.02 (*)     Hemoglobin 11.6 (*)     Hematocrit 34.2 (*)     RDW 16.6 (*)     All other components within normal limits   COMPREHENSIVE METABOLIC PANEL - Abnormal; Notable for the following:     Glucose 68 (*)     Alb 3.8 (*)     Alkaline Phosphatase 156 (*)     All other components within normal limits   D-DIMER, QUANTITATIVE       All other labs were within normal range or not returned as of this dictation. EMERGENCY DEPARTMENT COURSE and DIFFERENTIAL DIAGNOSIS/MDM:   Vitals:    Vitals:    11/27/18 1314 11/27/18 1424   BP: (!) 110/52    Pulse: 105 118   Resp: 20    Temp: 98.6 °F (37 °C)    TempSrc: Oral    SpO2: (!) 89% 92%   Weight: 130 lb (59 kg)    Height: 5' (1.524 m)        MDM    Patient presents to the ER with the above noted complaint. She is non-toxic.  Upon arrival to the ER she is hypoxic and pulse ox is 89% she was provided with

## 2018-11-28 ENCOUNTER — OFFICE VISIT (OUTPATIENT)
Dept: PULMONOLOGY | Age: 58
End: 2018-11-28
Payer: COMMERCIAL

## 2018-11-28 VITALS
OXYGEN SATURATION: 92 % | HEART RATE: 117 BPM | HEIGHT: 60 IN | TEMPERATURE: 98.6 F | SYSTOLIC BLOOD PRESSURE: 122 MMHG | WEIGHT: 131 LBS | BODY MASS INDEX: 25.72 KG/M2 | DIASTOLIC BLOOD PRESSURE: 62 MMHG

## 2018-11-28 DIAGNOSIS — B37.0 THRUSH: ICD-10-CM

## 2018-11-28 DIAGNOSIS — R05.9 COUGH: ICD-10-CM

## 2018-11-28 DIAGNOSIS — Z72.0 TOBACCO ABUSE: ICD-10-CM

## 2018-11-28 DIAGNOSIS — R91.1 LUNG NODULE: Primary | ICD-10-CM

## 2018-11-28 DIAGNOSIS — J44.9 CHRONIC OBSTRUCTIVE PULMONARY DISEASE, UNSPECIFIED COPD TYPE (HCC): ICD-10-CM

## 2018-11-28 PROCEDURE — 3017F COLORECTAL CA SCREEN DOC REV: CPT | Performed by: INTERNAL MEDICINE

## 2018-11-28 PROCEDURE — G8419 CALC BMI OUT NRM PARAM NOF/U: HCPCS | Performed by: INTERNAL MEDICINE

## 2018-11-28 PROCEDURE — G8484 FLU IMMUNIZE NO ADMIN: HCPCS | Performed by: INTERNAL MEDICINE

## 2018-11-28 PROCEDURE — 4004F PT TOBACCO SCREEN RCVD TLK: CPT | Performed by: INTERNAL MEDICINE

## 2018-11-28 PROCEDURE — G8427 DOCREV CUR MEDS BY ELIG CLIN: HCPCS | Performed by: INTERNAL MEDICINE

## 2018-11-28 PROCEDURE — G8599 NO ASA/ANTIPLAT THER USE RNG: HCPCS | Performed by: INTERNAL MEDICINE

## 2018-11-28 PROCEDURE — G8926 SPIRO NO PERF OR DOC: HCPCS | Performed by: INTERNAL MEDICINE

## 2018-11-28 PROCEDURE — 3023F SPIROM DOC REV: CPT | Performed by: INTERNAL MEDICINE

## 2018-11-28 PROCEDURE — 99214 OFFICE O/P EST MOD 30 MIN: CPT | Performed by: INTERNAL MEDICINE

## 2018-11-28 NOTE — PROGRESS NOTES
Used    Alcohol use No    Drug use: No    Sexual activity: Not Currently     Birth control/ protection: Post-menopausal     Other Topics Concern    Not on file     Social History Narrative    No narrative on file         Review of Systems      ROS: 10 organs review of system is done including general, psychological, ENT, hematological, endocrine, respiratory, cardiovascular, gastrointestinal,musculoskeletal, neurological,  allergy and Immunology is done and is otherwise negative. Current Outpatient Prescriptions   Medication Sig Dispense Refill    predniSONE (DELTASONE) 50 MG tablet Take 1 tablet by mouth daily for 5 days 5 tablet 0    benzonatate (TESSALON PERLES) 100 MG capsule Take 1 capsule by mouth 3 times daily as needed for Cough 30 capsule 0    ADVAIR DISKUS 500-50 MCG/DOSE diskus inhaler Inhale 1 puff into the lungs 2 times daily 60 each 1    tiotropium (SPIRIVA HANDIHALER) 18 MCG inhalation capsule Inhale 1 capsule into the lungs daily 30 capsule 3    PARoxetine (PAXIL) 40 MG tablet Take 1 tablet by mouth every morning 30 tablet 3    busPIRone (BUSPAR) 15 MG tablet Take 15 mg by mouth 3 times daily 90 tablet 3    traZODone (DESYREL) 100 MG tablet Take 1 tablet by mouth nightly 1 to 2 tablets nightly 60 tablet 3    PROAIR  (90 Base) MCG/ACT inhaler INHALE 2 PUFFS EVERY 4 HOURS AS NEEDED 8.5 g 1    estradiol (ESTRACE VAGINAL) 0.1 MG/GM vaginal cream Place 1 g vaginally daily 1 gram vaginally for two weeks the 1/2 gram twice weekly 1 Tube 3    gabapentin (NEURONTIN) 300 MG capsule       omeprazole (PRILOSEC) 20 MG delayed release capsule       atorvastatin (LIPITOR) 20 MG tablet Take 1 tablet by mouth daily 90 tablet 3    albuterol (PROVENTIL) (5 MG/ML) 0.5% nebulizer solution Take 1 mL by nebulization 4 times daily as needed for Wheezing 120 each 3    Misc.  Devices MISC 1 each by Does not apply route once for 1 dose Nebulizer 1 Device 0     No current facility-administered to have surgical resection. I did discuss with the patient in details process and cons of each approach, technically if this nodule was inflammatory or infectious then patient would have undergone surgery for no good reason. However malignancy is high on the list and negative biopsy we will certainly indicate proceeding with surgical biopsy. I discussed those options with patient in details, she has referral to Dr. Juanito Orona and she will make up her mind. · Ongoing smoking she is trying to quit and smoking cessation counseling discussed again with the patient,  · COPD with mild exacerbation patient currently on prednisone continue same current inhalers, patient is advised to take flu shot when current exacerbation resolved. Will refer patient to pulmonary rehabilitation  · Cough secondary to COPD exacerbation and chronic bronchitis  · Thrush secondary to inhaled corticosteroids will prescribe nystatin    Orders Placed This Encounter   Procedures    Internal Referral to [facility]     Referral Priority:   Routine     Referral Type:   Eval and Treat     Referral Reason:   Specialty Services Required     Requested Specialty:   Pulmonology     Number of Visits Requested:   1     No orders of the defined types were placed in this encounter. Discussed with patient the importance of exercise and weight control and  overall health and well-being.     Reviewed with the patient: current clinical status, medications, activities and diet.      Side effects, adverse effects of the medication prescribed today, as well as treatment plan and result expectations have been discussed with the patient who expresses understanding and desires to proceed.           Return in about 8 weeks (around 1/23/2019).       Jayden Hughes MD

## 2018-11-29 ENCOUNTER — INITIAL CONSULT (OUTPATIENT)
Dept: INTERVENTIONAL RADIOLOGY/VASCULAR | Age: 58
End: 2018-11-29
Payer: COMMERCIAL

## 2018-11-29 VITALS
SYSTOLIC BLOOD PRESSURE: 118 MMHG | WEIGHT: 131.2 LBS | RESPIRATION RATE: 14 BRPM | BODY MASS INDEX: 25.62 KG/M2 | DIASTOLIC BLOOD PRESSURE: 72 MMHG | OXYGEN SATURATION: 94 % | HEART RATE: 98 BPM

## 2018-11-29 DIAGNOSIS — Z72.0 TOBACCO ABUSE: ICD-10-CM

## 2018-11-29 DIAGNOSIS — R06.02 SOB (SHORTNESS OF BREATH): ICD-10-CM

## 2018-11-29 DIAGNOSIS — R91.1 NODULE OF LEFT LUNG: Primary | ICD-10-CM

## 2018-11-29 PROCEDURE — 3017F COLORECTAL CA SCREEN DOC REV: CPT | Performed by: NURSE PRACTITIONER

## 2018-11-29 PROCEDURE — 99204 OFFICE O/P NEW MOD 45 MIN: CPT | Performed by: NURSE PRACTITIONER

## 2018-11-29 PROCEDURE — G8419 CALC BMI OUT NRM PARAM NOF/U: HCPCS | Performed by: NURSE PRACTITIONER

## 2018-11-29 PROCEDURE — G8484 FLU IMMUNIZE NO ADMIN: HCPCS | Performed by: NURSE PRACTITIONER

## 2018-11-29 PROCEDURE — G8427 DOCREV CUR MEDS BY ELIG CLIN: HCPCS | Performed by: NURSE PRACTITIONER

## 2018-11-29 ASSESSMENT — ENCOUNTER SYMPTOMS
EYE ITCHING: 0
NAUSEA: 0
VOMITING: 0
EYE REDNESS: 0
DIARRHEA: 0
SINUS PAIN: 0
ABDOMINAL PAIN: 0
BACK PAIN: 1
EYE DISCHARGE: 1
WHEEZING: 1
CONSTIPATION: 0
EYE PAIN: 0
SHORTNESS OF BREATH: 1
COUGH: 1
SINUS PRESSURE: 0
SORE THROAT: 0
CHOKING: 1

## 2018-11-29 NOTE — PROGRESS NOTES
Paula Biswas, a female of 61 y.o. came to the office 2018. Chief Complaint   Patient presents with    Pulmonary Nodule     Pt here for consult for lung biopsy       HPI: Patient is here as consultation from Pulmonology for evaluation of left lung nodule abutting the heart. Requesting CT Guided core need Biopsy for diagnosis of mass suspicious for malignancy.      Family History   Problem Relation Age of Onset    Other Mother         epilepsy     No Known Problems Father     No Known Problems Brother     No Known Problems Brother        Past Surgical History:   Procedure Laterality Date     SECTION          Past Medical History:   Diagnosis Date    Bone spur     Cervical stenosis of spine     COPD (chronic obstructive pulmonary disease) ([address]     Coronary artery calcification of native artery - aortic arch 10/31/2018    On lung screen done 10/26/18    DDD (degenerative disc disease), lumbosacral 3/12/2018    Used to see pain management     CARTER (generalized anxiety disorder) 3/12/2018    [facility]    Gastroesophageal reflux disease without esophagitis 3/12/2018    EGD over 10 years    Hyperlipidemia     Insomnia 3/12/2018    Mild single current episode of major depressive disorder ([address] 3/12/2018    Neuropathy 3/12/2018    Clinical diagnosis, EMG    Other emphysema ([address] 3/12/2018    No recent PFT    Pure hypercholesterolemia 3/12/2018    SOB (shortness of breath)     Tobacco abuse 3/12/2018       Social History     Social History    Marital status: Single     Spouse name: N/A    Number of children: N/A    Years of education: N/A     Social History Main Topics    Smoking status: Current Every Day Smoker     Packs/day: 1.00     Years: 47.00     Types: Cigarettes     Start date:     Smokeless tobacco: Never Used    Alcohol use No    Drug use: No    Sexual activity: Not Currently     Birth control/ protection: Post-menopausal     Other Topics Concern    None Social History Narrative    None       Allergies   Allergen Reactions    Other Anaphylaxis     Sun flower seeds     Codeine Nausea And Vomiting       Current Outpatient Prescriptions on File Prior to Visit   Medication Sig Dispense Refill    nystatin (MYCOSTATIN) 951285 UNIT/ML suspension Take 5 mLs by mouth 4 times daily for 10 days Retain in mouth as long as possible 200 mL 0    predniSONE (DELTASONE) 50 MG tablet Take 1 tablet by mouth daily for 5 days 5 tablet 0    benzonatate (TESSALON PERLES) 100 MG capsule Take 1 capsule by mouth 3 times daily as needed for Cough 30 capsule 0    ADVAIR DISKUS 500-50 MCG/DOSE diskus inhaler Inhale 1 puff into the lungs 2 times daily 60 each 1    tiotropium (SPIRIVA HANDIHALER) 18 MCG inhalation capsule Inhale 1 capsule into the lungs daily 30 capsule 3    PARoxetine (PAXIL) 40 MG tablet Take 1 tablet by mouth every morning 30 tablet 3    busPIRone (BUSPAR) 15 MG tablet Take 15 mg by mouth 3 times daily 90 tablet 3    traZODone (DESYREL) 100 MG tablet Take 1 tablet by mouth nightly 1 to 2 tablets nightly 60 tablet 3    PROAIR  (90 Base) MCG/ACT inhaler INHALE 2 PUFFS EVERY 4 HOURS AS NEEDED 8.5 g 1    estradiol (ESTRACE VAGINAL) 0.1 MG/GM vaginal cream Place 1 g vaginally daily 1 gram vaginally for two weeks the 1/2 gram twice weekly 1 Tube 3    gabapentin (NEURONTIN) 300 MG capsule       omeprazole (PRILOSEC) 20 MG delayed release capsule       atorvastatin (LIPITOR) 20 MG tablet Take 1 tablet by mouth daily 90 tablet 3    albuterol (PROVENTIL) (5 MG/ML) 0.5% nebulizer solution Take 1 mL by nebulization 4 times daily as needed for Wheezing 120 each 3    Misc. Devices MISC 1 each by Does not apply route once for 1 dose Nebulizer 1 Device 0     No current facility-administered medications on file prior to visit. Review of Systems   Constitutional: Negative for activity change, appetite change, chills, fever and unexpected weight change. stridor. No respiratory distress. She has no wheezes. She has no rales. She exhibits no tenderness. Abdominal: Soft. Bowel sounds are normal. She exhibits no distension and no mass. There is no tenderness. There is no rebound and no guarding. Musculoskeletal: Normal range of motion. She exhibits no edema, tenderness or deformity. Neurological: She is alert and oriented to person, place, and time. No cranial nerve deficit. Coordination normal.   Skin: Skin is warm and dry. No rash noted. She is not diaphoretic. No erythema. No pallor. Psychiatric: She has a normal mood and affect. Her behavior is normal. Judgment and thought content normal.       ASSESSMENT AND PLAN:    Assessment:   1. Left lingula lung nodule abutting the heart. CT Guided core need Biopsy for diagnosis of mass suspicious for malignancy. Plan:   1. CT Guided core need Biopsy for diagnosis of mass suspicious for malignancy. Conscious sedation. Procedure with risks instructed to pt. Films reviewed with Dr. Alexey Rizo. Meds and labs reviewed. 2. OV follow up one week post biopsy. Thank You Dr. Kenneth Obando for referral of your patient to our practice for care.      Abraham Ash, APRN - CNP

## 2018-11-30 ENCOUNTER — SURG/PROC ORDERS (OUTPATIENT)
Dept: GENERAL RADIOLOGY | Age: 58
End: 2018-11-30

## 2018-11-30 RX ORDER — 0.9 % SODIUM CHLORIDE 0.9 %
250 INTRAVENOUS SOLUTION INTRAVENOUS
Status: CANCELLED | OUTPATIENT
Start: 2018-11-30

## 2018-11-30 RX ORDER — LIDOCAINE HYDROCHLORIDE 20 MG/ML
5 INJECTION, SOLUTION INFILTRATION; PERINEURAL
Status: CANCELLED | OUTPATIENT
Start: 2018-11-30

## 2018-11-30 RX ORDER — MIDAZOLAM HYDROCHLORIDE 1 MG/ML
2 INJECTION INTRAMUSCULAR; INTRAVENOUS
Status: CANCELLED | OUTPATIENT
Start: 2018-11-30

## 2018-11-30 RX ORDER — FENTANYL CITRATE 50 UG/ML
50 INJECTION, SOLUTION INTRAMUSCULAR; INTRAVENOUS
Status: CANCELLED | OUTPATIENT
Start: 2018-11-30

## 2018-11-30 RX ORDER — IBUPROFEN 200 MG
TABLET ORAL ONCE
Status: CANCELLED | OUTPATIENT
Start: 2018-11-30 | End: 2018-11-30

## 2018-12-04 ENCOUNTER — HOSPITAL ENCOUNTER (OUTPATIENT)
Dept: NUCLEAR MEDICINE | Age: 58
Discharge: HOME | End: 2018-12-06
Payer: COMMERCIAL

## 2018-12-04 ENCOUNTER — HOSPITAL ENCOUNTER (OUTPATIENT)
Dept: ULTRASOUND IMAGING | Age: 58
Discharge: HOME | End: 2018-12-06
Payer: COMMERCIAL

## 2018-12-04 DIAGNOSIS — Z72.0 TOBACCO ABUSE: ICD-10-CM

## 2018-12-04 DIAGNOSIS — E78.5 DYSLIPIDEMIA: ICD-10-CM

## 2018-12-04 DIAGNOSIS — R09.89 BILATERAL CAROTID BRUITS: ICD-10-CM

## 2018-12-04 DIAGNOSIS — I25.84 CORONARY ARTERY CALCIFICATION OF NATIVE ARTERY: ICD-10-CM

## 2018-12-04 DIAGNOSIS — I25.10 CORONARY ARTERY CALCIFICATION OF NATIVE ARTERY: ICD-10-CM

## 2018-12-04 DIAGNOSIS — E78.00 PURE HYPERCHOLESTEROLEMIA: ICD-10-CM

## 2018-12-07 ENCOUNTER — HOSPITAL ENCOUNTER (OUTPATIENT)
Dept: GENERAL RADIOLOGY | Age: 58
Discharge: HOME | End: 2018-12-09
Payer: COMMERCIAL

## 2018-12-07 ENCOUNTER — HOSPITAL ENCOUNTER (OUTPATIENT)
Dept: CT IMAGING | Age: 58
Discharge: HOME | End: 2018-12-09
Payer: COMMERCIAL

## 2018-12-07 VITALS
RESPIRATION RATE: 16 BRPM | SYSTOLIC BLOOD PRESSURE: 111 MMHG | DIASTOLIC BLOOD PRESSURE: 58 MMHG | OXYGEN SATURATION: 93 % | HEART RATE: 85 BPM

## 2018-12-07 DIAGNOSIS — R91.1 NODULE OF LEFT LUNG: ICD-10-CM

## 2018-12-07 PROCEDURE — 99214 OFFICE O/P EST MOD 30 MIN: CPT | Performed by: RADIOLOGY

## 2018-12-07 PROCEDURE — 77012 CT SCAN FOR NEEDLE BIOPSY: CPT

## 2018-12-07 PROCEDURE — 32405 CT NEEDLE BIOPSY LUNG PERCUTANEOUS: CPT | Performed by: RADIOLOGY

## 2018-12-07 PROCEDURE — 2500000003 HC RX 250 WO HCPCS: Performed by: RADIOLOGY

## 2018-12-07 PROCEDURE — 71046 X-RAY EXAM CHEST 2 VIEWS: CPT

## 2018-12-07 PROCEDURE — 32405 CT NEEDLE BIOPSY LUNG PERCUTANEOUS: CPT

## 2018-12-07 PROCEDURE — 71046 X-RAY EXAM CHEST 2 VIEWS: CPT | Performed by: RADIOLOGY

## 2018-12-07 PROCEDURE — 2580000003 HC RX 258: Performed by: RADIOLOGY

## 2018-12-07 PROCEDURE — 88305 TISSUE EXAM BY PATHOLOGIST: CPT

## 2018-12-07 PROCEDURE — 6360000002 HC RX W HCPCS: Performed by: RADIOLOGY

## 2018-12-07 PROCEDURE — 77012 CT SCAN FOR NEEDLE BIOPSY: CPT | Performed by: RADIOLOGY

## 2018-12-07 RX ORDER — MIDAZOLAM HYDROCHLORIDE 1 MG/ML
0.5 INJECTION INTRAMUSCULAR; INTRAVENOUS PRN
Status: DISCONTINUED | OUTPATIENT
Start: 2018-12-07 | End: 2018-12-10 | Stop reason: HOSPADM

## 2018-12-07 RX ORDER — LIDOCAINE HYDROCHLORIDE 20 MG/ML
5 INJECTION, SOLUTION INFILTRATION; PERINEURAL PRN
Status: DISCONTINUED | OUTPATIENT
Start: 2018-12-07 | End: 2018-12-10 | Stop reason: HOSPADM

## 2018-12-07 RX ORDER — SODIUM CHLORIDE 9 MG/ML
INJECTION, SOLUTION INTRAVENOUS CONTINUOUS
Status: ACTIVE | OUTPATIENT
Start: 2018-12-07 | End: 2018-12-07

## 2018-12-07 RX ORDER — FENTANYL CITRATE 50 UG/ML
50 INJECTION, SOLUTION INTRAMUSCULAR; INTRAVENOUS PRN
Status: DISCONTINUED | OUTPATIENT
Start: 2018-12-07 | End: 2018-12-10 | Stop reason: HOSPADM

## 2018-12-07 RX ADMIN — FENTANYL CITRATE 50 MCG: 50 INJECTION, SOLUTION INTRAMUSCULAR; INTRAVENOUS at 10:49

## 2018-12-07 RX ADMIN — MIDAZOLAM HYDROCHLORIDE 0.5 MG: 1 INJECTION, SOLUTION INTRAMUSCULAR; INTRAVENOUS at 11:02

## 2018-12-07 RX ADMIN — SODIUM CHLORIDE: 9 INJECTION, SOLUTION INTRAVENOUS at 10:41

## 2018-12-07 RX ADMIN — FENTANYL CITRATE 50 MCG: 50 INJECTION, SOLUTION INTRAMUSCULAR; INTRAVENOUS at 11:03

## 2018-12-07 RX ADMIN — LIDOCAINE HYDROCHLORIDE 10 ML: 20 INJECTION, SOLUTION INFILTRATION; PERINEURAL at 10:55

## 2018-12-07 RX ADMIN — MIDAZOLAM HYDROCHLORIDE 0.5 MG: 1 INJECTION, SOLUTION INTRAMUSCULAR; INTRAVENOUS at 10:50

## 2018-12-07 ASSESSMENT — ENCOUNTER SYMPTOMS
ABDOMINAL PAIN: 0
BACK PAIN: 0
VOMITING: 0
GASTROINTESTINAL NEGATIVE: 1
CONSTIPATION: 0
DIARRHEA: 0
EYES NEGATIVE: 1
PHOTOPHOBIA: 0
WHEEZING: 0
NAUSEA: 0
SHORTNESS OF BREATH: 0
COUGH: 1

## 2018-12-07 ASSESSMENT — PAIN SCALES - GENERAL
PAINLEVEL_OUTOF10: 0
PAINLEVEL_OUTOF10: 0
PAINLEVEL_OUTOF10: 5

## 2018-12-07 NOTE — PRE SEDATION
Sedation Pre-Procedure Note    Patient Name: Galo Dennis   YOB: 1960  Room/Bed: Room/bed info not found  Medical Record Number: [id number]  Date: 2018   Time: 9:17 AM       Indication:  Lung nodule    Consent: I have discussed with the patient and/or the patient representative the indication, alternatives, and the possible risks and/or complications of the planned procedure and the anesthesia methods. The patient and/or patient representative appear to understand and agree to proceed. Vital Signs:   Vitals:    18 0831   BP:    Pulse:    Resp: 15   SpO2: 94%       Past Medical History:   has a past medical history of Bone spur; Cervical stenosis of spine; COPD (chronic obstructive pulmonary disease) ([address] Coronary artery calcification of native artery - aortic arch; DDD (degenerative disc disease), lumbosacral; CARTER (generalized anxiety disorder); Gastroesophageal reflux disease without esophagitis; Hyperlipidemia; Insomnia; Mild single current episode of major depressive disorder ([address] Neuropathy; Other emphysema ([address] Pure hypercholesterolemia; SOB (shortness of breath); and Tobacco abuse. Past Surgical History:   has a past surgical history that includes  section. Medications:   Scheduled Meds:   Continuous Infusions:    sodium chloride       PRN Meds: fentanNYL, midazolam, lidocaine  Home Meds:   Prior to Admission medications    Medication Sig Start Date End Date Taking?  Authorizing Provider   PARoxetine (PAXIL) 40 MG tablet Take 1 tablet by mouth every morning 10/1/18  Yes Angela Guy MD   busPIRone (BUSPAR) 15 MG tablet Take 15 mg by mouth 3 times daily 10/1/18  Yes Angela Guy MD   traZODone (DESYREL) 100 MG tablet Take 1 tablet by mouth nightly 1 to 2 tablets nightly 10/1/18  Yes Angela Guy MD   estradiol (ESTRACE VAGINAL) 0.1 MG/GM vaginal cream Place 1 g vaginally daily 1 gram vaginally for two weeks the 1/2 gram twice weekly 18  Yes Aime Almanzar

## 2018-12-07 NOTE — H&P
Respiratory: Positive for cough. Negative for shortness of breath and wheezing. Cardiovascular: Negative. Negative for chest pain, palpitations and leg swelling. Gastrointestinal: Negative. Negative for abdominal pain, constipation, diarrhea, nausea and vomiting. Genitourinary: Negative. Negative for dysuria, flank pain, frequency, hematuria and urgency. Musculoskeletal: Negative. Negative for back pain and neck pain. Skin: Negative. Negative for rash. Allergic/Immunologic: Negative for environmental allergies. Neurological: Negative. Negative for dizziness, tremors, weakness and headaches. Hematological: Does not bruise/bleed easily. Psychiatric/Behavioral: Negative. Negative for hallucinations and suicidal ideas. The patient is not nervous/anxious. OBJECTIVE:  BP (!) 134/57   Pulse 88   Resp 15   SpO2 94%     Physical Exam   Constitutional: She is oriented to person, place, and time. She appears well-developed and well-nourished. No distress. HENT:   Head: Normocephalic and atraumatic. Right Ear: External ear normal.   Left Ear: External ear normal.   Mouth/Throat: Oropharynx is clear and moist. No oropharyngeal exudate. Eyes: Pupils are equal, round, and reactive to light. Conjunctivae are normal. Right eye exhibits no discharge. Left eye exhibits no discharge. No scleral icterus. Neck: No JVD present. No tracheal deviation present. No thyromegaly present. Cardiovascular: Normal rate, regular rhythm and normal heart sounds. Exam reveals no gallop and no friction rub. No murmur heard. Pulmonary/Chest: Effort normal and breath sounds normal. No stridor. No respiratory distress. She has no wheezes. She has no rales. She exhibits no tenderness. Abdominal: Soft. Bowel sounds are normal. She exhibits no distension and no mass. There is no tenderness. There is no rebound and no guarding. Musculoskeletal: She exhibits no edema, tenderness or deformity.

## 2018-12-07 NOTE — SEDATION DOCUMENTATION
Dr Christy Melgoza reviewed scans. 1045 Time out done      1049 Fentanyl 50mcg and Versed 0.5mg IV given for sedation. 1051 Site marked, prepped with Chloraprep and draped with sterile drapes. 1055 Skin numbed with Lidocaine 2% 5ml. Dr Christy Melgoza coaching pt to take small breath in and hold it, fluoro images taken, numbing needle adjusted to desired trajectory. 1105 Fentanyl 50mcg and Versed 0.5mg IV given for sedation. Pt still very awake and c/o shoulder pain from having arms up. 1104 CT Fluoro guidance being used to place 19 g x 10 cm introducer. Site of left lung nodule is next to the heart, precise placement of the introducer is needed. 200 Dr Christy Melgoza still trying to get introducer into correct position. 1122Patient tolerating well. Introducer placed. Awdiono core biopsy needle 20 g x 15cm used to obtain a total of 1 sample. Sample placed into Formalin. Pt immediately began coughing and did cough up shot glass size of blood onto gown. Pt stayed calm. Blood patch administered as introducer needle was withdrawn. Neosporin and bandaid applied. Post scans done, no immediate complications identified. 1125 Patient tolerated well and moved self back onto cart and taken to CT holding for recovery. Bandaid dry and intact. Denies pain. Pt will have CXR at 36 and may D/C if no complications seen and after Dr Tito Evans reviews images.

## 2018-12-10 ENCOUNTER — TELEPHONE (OUTPATIENT)
Dept: PULMONOLOGY | Age: 58
End: 2018-12-10

## 2018-12-11 ENCOUNTER — HOSPITAL ENCOUNTER (OUTPATIENT)
Dept: NON INVASIVE DIAGNOSTICS | Age: 58
Discharge: HOME | End: 2018-12-11
Payer: COMMERCIAL

## 2018-12-11 DIAGNOSIS — Z72.0 TOBACCO ABUSE: ICD-10-CM

## 2018-12-11 DIAGNOSIS — E78.00 PURE HYPERCHOLESTEROLEMIA: ICD-10-CM

## 2018-12-11 DIAGNOSIS — I25.84 CORONARY ARTERY CALCIFICATION OF NATIVE ARTERY: ICD-10-CM

## 2018-12-11 DIAGNOSIS — E78.5 DYSLIPIDEMIA: ICD-10-CM

## 2018-12-11 DIAGNOSIS — I25.10 CORONARY ARTERY CALCIFICATION OF NATIVE ARTERY: ICD-10-CM

## 2018-12-13 ENCOUNTER — HOSPITAL ENCOUNTER (OUTPATIENT)
Dept: GENERAL RADIOLOGY | Age: 58
Discharge: HOME | End: 2018-12-15
Payer: COMMERCIAL

## 2018-12-13 ENCOUNTER — OFFICE VISIT (OUTPATIENT)
Dept: PULMONOLOGY | Age: 58
End: 2018-12-13
Payer: COMMERCIAL

## 2018-12-13 VITALS
OXYGEN SATURATION: 96 % | SYSTOLIC BLOOD PRESSURE: 114 MMHG | WEIGHT: 131 LBS | DIASTOLIC BLOOD PRESSURE: 72 MMHG | HEART RATE: 98 BPM | BODY MASS INDEX: 25.58 KG/M2 | TEMPERATURE: 98.2 F

## 2018-12-13 DIAGNOSIS — R04.2 HEMOPTYSIS: ICD-10-CM

## 2018-12-13 DIAGNOSIS — K21.9 GASTROESOPHAGEAL REFLUX DISEASE WITHOUT ESOPHAGITIS: ICD-10-CM

## 2018-12-13 DIAGNOSIS — Z72.0 TOBACCO ABUSE: ICD-10-CM

## 2018-12-13 DIAGNOSIS — J44.9 CHRONIC OBSTRUCTIVE PULMONARY DISEASE, UNSPECIFIED COPD TYPE (HCC): ICD-10-CM

## 2018-12-13 DIAGNOSIS — R91.1 LUNG NODULE: Primary | ICD-10-CM

## 2018-12-13 PROCEDURE — G8419 CALC BMI OUT NRM PARAM NOF/U: HCPCS | Performed by: INTERNAL MEDICINE

## 2018-12-13 PROCEDURE — 3017F COLORECTAL CA SCREEN DOC REV: CPT | Performed by: INTERNAL MEDICINE

## 2018-12-13 PROCEDURE — G8427 DOCREV CUR MEDS BY ELIG CLIN: HCPCS | Performed by: INTERNAL MEDICINE

## 2018-12-13 PROCEDURE — 71046 X-RAY EXAM CHEST 2 VIEWS: CPT

## 2018-12-13 PROCEDURE — 4004F PT TOBACCO SCREEN RCVD TLK: CPT | Performed by: INTERNAL MEDICINE

## 2018-12-13 PROCEDURE — G8484 FLU IMMUNIZE NO ADMIN: HCPCS | Performed by: INTERNAL MEDICINE

## 2018-12-13 PROCEDURE — G8599 NO ASA/ANTIPLAT THER USE RNG: HCPCS | Performed by: INTERNAL MEDICINE

## 2018-12-13 PROCEDURE — 99214 OFFICE O/P EST MOD 30 MIN: CPT | Performed by: INTERNAL MEDICINE

## 2018-12-13 PROCEDURE — G8926 SPIRO NO PERF OR DOC: HCPCS | Performed by: INTERNAL MEDICINE

## 2018-12-13 PROCEDURE — 3023F SPIROM DOC REV: CPT | Performed by: INTERNAL MEDICINE

## 2018-12-13 RX ORDER — VARENICLINE TARTRATE 1 MG/1
1 TABLET, FILM COATED ORAL 2 TIMES DAILY
COMMUNITY
End: 2019-11-19

## 2018-12-13 NOTE — PROGRESS NOTES
Current Every Day Smoker     Packs/day: 1.00     Years: 47.00     Types: Cigarettes     Start date: 5    Smokeless tobacco: Never Used    Alcohol use No    Drug use: No    Sexual activity: Not Currently     Birth control/ protection: Post-menopausal     Other Topics Concern    Not on file     Social History Narrative    No narrative on file         Review of Systems      ROS: 10 organs review of system is done including general, psychological, ENT, hematological, endocrine, respiratory, cardiovascular, gastrointestinal,musculoskeletal, neurological,  allergy and Immunology is done and is otherwise negative. Current Outpatient Prescriptions   Medication Sig Dispense Refill    varenicline (CHANTIX) 1 MG tablet Take 1 mg by mouth 2 times daily      ADVAIR DISKUS 500-50 MCG/DOSE diskus inhaler Inhale 1 puff into the lungs 2 times daily 60 each 1    tiotropium (SPIRIVA HANDIHALER) 18 MCG inhalation capsule Inhale 1 capsule into the lungs daily 30 capsule 3    PARoxetine (PAXIL) 40 MG tablet Take 1 tablet by mouth every morning 30 tablet 3    busPIRone (BUSPAR) 15 MG tablet Take 15 mg by mouth 3 times daily 90 tablet 3    traZODone (DESYREL) 100 MG tablet Take 1 tablet by mouth nightly 1 to 2 tablets nightly 60 tablet 3    PROAIR  (90 Base) MCG/ACT inhaler INHALE 2 PUFFS EVERY 4 HOURS AS NEEDED 8.5 g 1    estradiol (ESTRACE VAGINAL) 0.1 MG/GM vaginal cream Place 1 g vaginally daily 1 gram vaginally for two weeks the 1/2 gram twice weekly 1 Tube 3    gabapentin (NEURONTIN) 300 MG capsule       omeprazole (PRILOSEC) 20 MG delayed release capsule       atorvastatin (LIPITOR) 20 MG tablet Take 1 tablet by mouth daily 90 tablet 3    albuterol (PROVENTIL) (5 MG/ML) 0.5% nebulizer solution Take 1 mL by nebulization 4 times daily as needed for Wheezing 120 each 3    Misc.  Devices MISC 1 each by Does not apply route once for 1 dose Nebulizer 1 Device 0     No current facility-administered lesion of the biopsy. I recommended referral to thoracic surgery and consideration for which resection. Other option would be to monitor with serial CT scans. She already missed 2 appointments with Dr. Claudia Bryant. She does not wish to proceed with surgery at this time. She prefers to do close monitoring. I will repeat another CT in January and will follow up at that time and reevaluate. · Smoking cessation counseling more than 10 minutes done again, offered Chantix she has it at home and she will consider resuming it and quit smoking by [place]. I again reviewed with her START steps to quit smoking she understand and will try. · Hemoptysis postbiopsy expect improvement with time  · GERD symptoms controlled      Orders Placed This Encounter   Procedures    CT CHEST WO CONTRAST     Standing Status:   Future     Standing Expiration Date:   12/13/2019     Order Specific Question:   Reason for exam:     Answer:   lung nodule    XR CHEST STANDARD (2 VW)     Standing Status:   Future     Number of Occurrences:   1     Standing Expiration Date:   12/13/2019     Order Specific Question:   Reason for exam:     Answer:   hemoptysis     No orders of the defined types were placed in this encounter. Discussed with patient the importance of exercise and weight control and  overall health and well-being.     Reviewed with the patient: current clinical status, medications, activities and diet.      Side effects, adverse effects of the medication prescribed today, as well as treatment plan and result expectations have been discussed with the patient who expresses understanding and desires to proceed.         Return in about 6 weeks (around 1/24/2019).       Michael Swanson MD

## 2019-01-08 ENCOUNTER — TELEPHONE (OUTPATIENT)
Dept: PULMONOLOGY | Age: 59
End: 2019-01-08

## 2019-01-08 DIAGNOSIS — R91.1 LUNG NODULE: Primary | ICD-10-CM

## 2019-01-13 DIAGNOSIS — F32.0 MILD SINGLE CURRENT EPISODE OF MAJOR DEPRESSIVE DISORDER (HCC): ICD-10-CM

## 2019-01-13 DIAGNOSIS — F32.A DEPRESSION, UNSPECIFIED DEPRESSION TYPE: ICD-10-CM

## 2019-01-13 DIAGNOSIS — F41.1 GAD (GENERALIZED ANXIETY DISORDER): ICD-10-CM

## 2019-01-13 RX ORDER — PAROXETINE HYDROCHLORIDE 40 MG/1
40 TABLET, FILM COATED ORAL EVERY MORNING
Qty: 90 TABLET | Refills: 3 | Status: SHIPPED | OUTPATIENT
Start: 2019-01-13 | End: 2020-01-29

## 2019-01-27 RX ORDER — GABAPENTIN 300 MG/1
CAPSULE ORAL
Qty: 90 CAPSULE | Refills: 0 | Status: SHIPPED | OUTPATIENT
Start: 2019-01-27 | End: 2019-02-18 | Stop reason: SDUPTHER

## 2019-02-08 LAB
ABO/RH: NORMAL
ANION GAP SERPL CALCULATED.3IONS-SCNC: 12 MMOL/L (ref 10–20)
ANION GAP SERPL CALCULATED.3IONS-SCNC: 13 MMOL/L (ref 10–20)
ANTIBODY SCREEN: NORMAL
BICARBONATE: 24 MMOL/L (ref 21–32)
BICARBONATE: 28 MMOL/L (ref 21–32)
BUN / CREAT RATIO: 14 (ref 5–25)
BUN / CREAT RATIO: 16 (ref 5–25)
CALCIUM SERPL-MCNC: 8.6 MG/DL (ref 8.6–10.3)
CALCIUM SERPL-MCNC: 9.4 MG/DL (ref 8.6–10.3)
CHLORIDE BLD-SCNC: 104 MMOL/L (ref 98–107)
CHLORIDE BLD-SCNC: 104 MMOL/L (ref 98–107)
CREAT SERPL-MCNC: 0.9 MG/DL (ref 0.5–1.05)
CREAT SERPL-MCNC: 0.93 MG/DL (ref 0.5–1.05)
ERYTHROCYTE [DISTWIDTH] IN BLOOD BY AUTOMATED COUNT: 14.9 % (ref 12–15.4)
ERYTHROCYTE [DISTWIDTH] IN BLOOD BY AUTOMATED COUNT: 15 % (ref 12–15.4)
ERYTHROCYTE [DISTWIDTH] IN BLOOD BY AUTOMATED COUNT: 48.2 FL (ref 39.3–48.6)
ERYTHROCYTE [DISTWIDTH] IN BLOOD BY AUTOMATED COUNT: 49.9 FL (ref 39.3–48.6)
GFR CALCULATED: >60
GFR CALCULATED: >60
GLUCOSE: 246 MG/DL (ref 70–100)
GLUCOSE: 95 MG/DL (ref 70–100)
HCT VFR BLD CALC: 36.1 % (ref 36.5–46.6)
HCT VFR BLD CALC: 38.2 % (ref 36.5–46.6)
HEMOGLOBIN: 11.6 G/DL (ref 11.8–15.3)
HEMOGLOBIN: 12.5 G/DL (ref 11.8–15.3)
INR BLD: 0.94 (ref 0.9–1.1)
MCH RBC QN AUTO: 28.9 PG (ref 27.5–33)
MCH RBC QN AUTO: 29 PG (ref 27.5–33)
MCHC RBC AUTO-ENTMCNC: 32.1 G/DL (ref 30.1–35)
MCHC RBC AUTO-ENTMCNC: 32.7 G/DL (ref 30.1–35)
MCV RBC AUTO: 88.2 FL (ref 85.4–100)
MCV RBC AUTO: 90.3 FL (ref 85.4–100)
NUCLEATED RBCS: 0 /100{WBCS}
NUCLEATED RBCS: 0 /100{WBCS}
PARTIAL THROMBOPLASTIN TIME: 48.9 SEC (ref 28–38)
PLATELET # BLD: 265 10*3/UL (ref 155–404)
PLATELET # BLD: 277 10*3/UL (ref 155–404)
PMV BLD AUTO: 10.3 FL (ref 9.9–12.1)
PMV BLD AUTO: 10.6 FL (ref 9.9–12.1)
POTASSIUM SERPL-SCNC: 3.9 MMOL/L (ref 3.5–5.1)
POTASSIUM SERPL-SCNC: 4.6 MMOL/L (ref 3.5–5.1)
PROTHROMBIN TIME: 10.6 SEC (ref 9.7–12.7)
RBC: 4 10*6/UL (ref 3.85–5.1)
RBC: 4.33 10*6/UL (ref 3.85–5.1)
RBCS COUNTED: 0 10*3/UL
RBCS COUNTED: 0 10*3/UL
SODIUM BLD-SCNC: 136 MMOL/L (ref 136–145)
SODIUM BLD-SCNC: 140 MMOL/L (ref 136–145)
UREA NITROGEN: 13 MG/DL (ref 6–23)
UREA NITROGEN: 14 MG/DL (ref 6–23)
WBC: 13.4 10*3/UL (ref 4.4–9.9)
WBC: 8.2 10*3/UL (ref 4.4–9.9)

## 2019-02-09 LAB
ANION GAP SERPL CALCULATED.3IONS-SCNC: 12 MMOL/L (ref 10–20)
BICARBONATE: 28 MMOL/L (ref 21–32)
BUN / CREAT RATIO: 16 (ref 5–25)
CALCIUM SERPL-MCNC: 8.8 MG/DL (ref 8.6–10.3)
CHLORIDE BLD-SCNC: 105 MMOL/L (ref 98–107)
CREAT SERPL-MCNC: 1.01 MG/DL (ref 0.5–1.05)
ERYTHROCYTE [DISTWIDTH] IN BLOOD BY AUTOMATED COUNT: 15.3 % (ref 12–15.4)
ERYTHROCYTE [DISTWIDTH] IN BLOOD BY AUTOMATED COUNT: 52.3 FL (ref 39.3–48.6)
GFR CALCULATED: 56
GLUCOSE: 125 MG/DL (ref 70–100)
HCT VFR BLD CALC: 34.2 % (ref 36.5–46.6)
HEMOGLOBIN: 10.5 G/DL (ref 11.8–15.3)
MAGNESIUM: 1.8 MG/DL (ref 1.6–2.4)
MCH RBC QN AUTO: 28.5 PG (ref 27.5–33)
MCHC RBC AUTO-ENTMCNC: 30.7 G/DL (ref 30.1–35)
MCV RBC AUTO: 92.7 FL (ref 85.4–100)
NUCLEATED RBCS: 0 /100{WBCS}
PLATELET # BLD: 215 10*3/UL (ref 155–404)
PMV BLD AUTO: 10.8 FL (ref 9.9–12.1)
POTASSIUM SERPL-SCNC: 4.1 MMOL/L (ref 3.5–5.1)
RBC: 3.69 10*6/UL (ref 3.85–5.1)
RBCS COUNTED: 0 10*3/UL
SODIUM BLD-SCNC: 141 MMOL/L (ref 136–145)
UREA NITROGEN: 16 MG/DL (ref 6–23)
WBC: 9.8 10*3/UL (ref 4.4–9.9)

## 2019-02-10 LAB
A/G RATIO: 1.5 (ref 0.9–2.4)
ALBUMIN: 3.2 G/DL (ref 3.4–5)
ALP BLD-CCNC: 96 U/L (ref 45–117)
ALT SERPL-CCNC: 8 U/L (ref 7–45)
ANION GAP SERPL CALCULATED.3IONS-SCNC: 10 MMOL/L (ref 10–20)
AST SERPL-CCNC: 11 U/L (ref 13–39)
BASOPHILS # BLD: 0.1 % (ref 0.1–1.2)
BASOPHILS ABSOLUTE: 0.01 10*3/UL (ref 0.01–0.07)
BICARBONATE: 28 MMOL/L (ref 21–32)
BILIRUB SERPL-MCNC: 0.5 MG/DL (ref 0–1.2)
BUN / CREAT RATIO: 13 (ref 5–25)
CALCIUM SERPL-MCNC: 8.6 MG/DL (ref 8.6–10.3)
CHLORIDE BLD-SCNC: 106 MMOL/L (ref 98–107)
CREAT SERPL-MCNC: 0.85 MG/DL (ref 0.5–1.05)
EOSINOPHIL # BLD: 3.7 % (ref 0–8.1)
EOSINOPHILS ABSOLUTE: 0.28 10*3/UL (ref 0.04–0.5)
ERYTHROCYTE [DISTWIDTH] IN BLOOD BY AUTOMATED COUNT: 15.3 % (ref 12–15.4)
ERYTHROCYTE [DISTWIDTH] IN BLOOD BY AUTOMATED COUNT: 52.3 FL (ref 39.3–48.6)
GFR CALCULATED: >60
GLUCOSE: 117 MG/DL (ref 70–100)
HCT VFR BLD CALC: 30.7 % (ref 36.5–46.6)
HEMOGLOBIN: 9.6 G/DL (ref 11.8–15.3)
IMMATURE GRANS (ABS): 0.02 10*3/UL (ref 0–0.21)
IMMATURE GRANULOCYTES: 0.3 %
LYMPHOCYTES # BLD: 26 % (ref 15.7–50.5)
LYMPHOCYTES ABSOLUTE: 1.97 10*3/UL (ref 0.4–2.84)
MAGNESIUM: 2.1 MG/DL (ref 1.6–2.4)
MCH RBC QN AUTO: 28.8 PG (ref 27.5–33)
MCHC RBC AUTO-ENTMCNC: 31.3 G/DL (ref 30.1–35)
MCV RBC AUTO: 92.2 FL (ref 85.4–100)
MONOCYTES # BLD: 9 % (ref 4.8–12.7)
MONOCYTES ABSOLUTE: 0.68 10*3/UL (ref 0.25–0.83)
NEUTROPHILS ABSOLUTE: 4.63 10*3/UL (ref 1.95–6.85)
NEUTROPHILS: 60.9 % (ref 36.8–73.2)
NUCLEATED RBCS: 0 /100{WBCS}
PLATELET # BLD: 186 10*3/UL (ref 155–404)
PMV BLD AUTO: 10.3 FL (ref 9.9–12.1)
POTASSIUM SERPL-SCNC: 3.7 MMOL/L (ref 3.5–5.1)
RBC: 3.33 10*6/UL (ref 3.85–5.1)
RBCS COUNTED: 0 10*3/UL
SODIUM BLD-SCNC: 140 MMOL/L (ref 136–145)
TOTAL PROTEIN: 5.4 G/DL (ref 6.4–8.2)
UREA NITROGEN: 11 MG/DL (ref 6–23)
WBC: 7.6 10*3/UL (ref 4.4–9.9)

## 2019-02-18 DIAGNOSIS — F32.A DEPRESSION, UNSPECIFIED DEPRESSION TYPE: ICD-10-CM

## 2019-02-18 DIAGNOSIS — J43.8 OTHER EMPHYSEMA (HCC): ICD-10-CM

## 2019-02-18 DIAGNOSIS — F41.1 GAD (GENERALIZED ANXIETY DISORDER): ICD-10-CM

## 2019-02-18 DIAGNOSIS — G47.00 INSOMNIA, UNSPECIFIED TYPE: ICD-10-CM

## 2019-02-18 DIAGNOSIS — F32.0 MILD SINGLE CURRENT EPISODE OF MAJOR DEPRESSIVE DISORDER (HCC): ICD-10-CM

## 2019-02-18 LAB — PATHOLOGY REPORT: NORMAL

## 2019-02-18 RX ORDER — BUSPIRONE HYDROCHLORIDE 15 MG/1
TABLET ORAL
Qty: 90 TABLET | Refills: 3 | Status: SHIPPED | OUTPATIENT
Start: 2019-02-18 | End: 2019-07-18 | Stop reason: SDUPTHER

## 2019-02-18 RX ORDER — GABAPENTIN 300 MG/1
CAPSULE ORAL
Qty: 90 CAPSULE | Refills: 3 | Status: SHIPPED | OUTPATIENT
Start: 2019-02-18 | End: 2019-08-15 | Stop reason: SDUPTHER

## 2019-02-18 RX ORDER — TRAZODONE HYDROCHLORIDE 100 MG/1
TABLET ORAL
Qty: 60 TABLET | Refills: 3 | Status: SHIPPED | OUTPATIENT
Start: 2019-02-18 | End: 2019-07-18 | Stop reason: SDUPTHER

## 2019-02-18 RX ORDER — TIOTROPIUM BROMIDE 18 UG/1
18 CAPSULE ORAL; RESPIRATORY (INHALATION) DAILY
Qty: 30 CAPSULE | Refills: 3 | Status: SHIPPED | OUTPATIENT
Start: 2019-02-18 | End: 2019-04-11 | Stop reason: ALTCHOICE

## 2019-03-29 LAB — FECAL BLOOD IMMUNOCHEMICAL TEST: NORMAL

## 2019-04-10 ENCOUNTER — OFFICE VISIT (OUTPATIENT)
Dept: INTERNAL MEDICINE | Age: 59
End: 2019-04-10
Payer: COMMERCIAL

## 2019-04-10 VITALS
HEART RATE: 84 BPM | BODY MASS INDEX: 22.71 KG/M2 | WEIGHT: 133 LBS | TEMPERATURE: 98.4 F | OXYGEN SATURATION: 96 % | SYSTOLIC BLOOD PRESSURE: 90 MMHG | HEIGHT: 64 IN | DIASTOLIC BLOOD PRESSURE: 60 MMHG

## 2019-04-10 DIAGNOSIS — Z72.0 TOBACCO ABUSE: ICD-10-CM

## 2019-04-10 DIAGNOSIS — J44.9 CHRONIC OBSTRUCTIVE PULMONARY DISEASE, UNSPECIFIED COPD TYPE (HCC): ICD-10-CM

## 2019-04-10 DIAGNOSIS — D3A.090 CARCINOID TUMOR OF LEFT LUNG: ICD-10-CM

## 2019-04-10 DIAGNOSIS — E78.00 PURE HYPERCHOLESTEROLEMIA: Primary | ICD-10-CM

## 2019-04-10 DIAGNOSIS — K21.9 GASTROESOPHAGEAL REFLUX DISEASE WITHOUT ESOPHAGITIS: ICD-10-CM

## 2019-04-10 DIAGNOSIS — F32.0 MILD SINGLE CURRENT EPISODE OF MAJOR DEPRESSIVE DISORDER (HCC): ICD-10-CM

## 2019-04-10 DIAGNOSIS — Z12.11 SCREEN FOR COLON CANCER: ICD-10-CM

## 2019-04-10 DIAGNOSIS — F41.1 GAD (GENERALIZED ANXIETY DISORDER): ICD-10-CM

## 2019-04-10 DIAGNOSIS — Z12.39 SCREENING FOR BREAST CANCER: ICD-10-CM

## 2019-04-10 DIAGNOSIS — G62.9 NEUROPATHY: ICD-10-CM

## 2019-04-10 PROCEDURE — G8926 SPIRO NO PERF OR DOC: HCPCS | Performed by: FAMILY MEDICINE

## 2019-04-10 PROCEDURE — 4004F PT TOBACCO SCREEN RCVD TLK: CPT | Performed by: FAMILY MEDICINE

## 2019-04-10 PROCEDURE — G8599 NO ASA/ANTIPLAT THER USE RNG: HCPCS | Performed by: FAMILY MEDICINE

## 2019-04-10 PROCEDURE — 3023F SPIROM DOC REV: CPT | Performed by: FAMILY MEDICINE

## 2019-04-10 PROCEDURE — 99215 OFFICE O/P EST HI 40 MIN: CPT | Performed by: FAMILY MEDICINE

## 2019-04-10 PROCEDURE — G8420 CALC BMI NORM PARAMETERS: HCPCS | Performed by: FAMILY MEDICINE

## 2019-04-10 PROCEDURE — G8427 DOCREV CUR MEDS BY ELIG CLIN: HCPCS | Performed by: FAMILY MEDICINE

## 2019-04-10 PROCEDURE — 3017F COLORECTAL CA SCREEN DOC REV: CPT | Performed by: FAMILY MEDICINE

## 2019-04-10 ASSESSMENT — ENCOUNTER SYMPTOMS
CHEST TIGHTNESS: 0
APNEA: 0
CHOKING: 0

## 2019-04-10 NOTE — PROGRESS NOTES
Patient: dEa Condon    YOB: 1960    Date: 4/10/19       Patient Active Problem List    Diagnosis Date Noted    Other emphysema ([facility] [address] 03/12/2018     Priority: High    Pure hypercholesterolemia 03/12/2018     Priority: High    Mild single current episode of major depressive disorder ([facility] 03/12/2018     Priority: Medium     Overview Note:     [address] CARTER (generalized anxiety disorder) 03/12/2018     Priority: Medium     Overview Note:     [facility]      Gastroesophageal reflux disease without esophagitis 03/12/2018     Priority: Medium     Overview Note:     EGD over 10 years      Neuropathy 03/12/2018     Priority: Medium     Overview Note:     Clinical diagnosis, EMG      DDD (degenerative disc disease), lumbosacral 03/12/2018     Priority: Medium     Overview Note:     Used to see pain management       Insomnia 03/12/2018     Priority: Low    Carcinoid tumor of left lung 04/10/2019     Overview Note:     2/2019 Left VATS Wedge resection early stage atypical carcinoid tumor  Pathology T1bn0 atypical carcinoid tumor       Coronary artery calcification of native artery - aortic arch 10/31/2018     Overview Note:     On lung screen done 10/26/18      COPD (chronic obstructive pulmonary disease) (HCC)     Hyperlipidemia     SOB (shortness of breath)     Tobacco abuse 03/12/2018     Past Medical History:   Diagnosis Date    Bone spur     Cervical stenosis of spine     COPD (chronic obstructive pulmonary disease) ([facility]     Coronary artery calcification of native artery - aortic arch 10/31/2018    On lung screen done 10/26/18    DDD (degenerative disc disease), lumbosacral 3/12/2018    Used to see pain management     CARTER (generalized anxiety disorder) 3/12/2018    [facility]    Gastroesophageal reflux disease without esophagitis 3/12/2018    EGD over 10 years    Hyperlipidemia     Insomnia 3/12/2018    Mild single current episode of major depressive disorder ([facility] 3/12/2018    Neuropathy 3/12/2018    Clinical diagnosis, EMG    Other [facility] 3/12/2018    No recent PFT    Pure hypercholesterolemia 3/12/2018    SOB (shortness of breath)     Tobacco abuse 3/12/2018     Past Surgical History:   Procedure Laterality Date     SECTION      LUNG BIOPSY Left 2018    CT guided Left Lung Biopsy by Dr Graciela Fonseca History     Socioeconomic History    Marital status: Single     Spouse name: Not on file    Number of children: Not on file    Years of education: Not on file    Highest education level: Not on file   Occupational History    Not on file   Social Needs    Financial resource strain: Not on file    Food insecurity:     Worry: Not on file     Inability: Not on file    Transportation needs:     Medical: Not on file     Non-medical: Not on file   Tobacco Use    Smoking status: Current Every Day Smoker     Packs/day: 1.00     Years: 47.00     Pack years: 47.00     Types: Cigarettes     Start date:     Smokeless tobacco: Never Used   Substance and Sexual Activity    Alcohol use: No    Drug use: No    Sexual activity: Not Currently     Birth control/protection: Post-menopausal   Lifestyle    Physical activity:     Days per week: Not on file     Minutes per session: Not on file    Stress: Not on file   Relationships    Social connections:     Talks on phone: Not on file     Gets together: Not on file     Attends [employer]oration[employer] service: Not on file     Active member of club or organization: Not on file     Attends meetings of clubs or organizations: Not on file     Relationship status: Not on file    Intimate partner violence:     Fear of current or ex partner: Not on file     Emotionally abused: Not on file     Physically abused: Not on file     Forced sexual activity: Not on file   Other Topics Concern    Not on file   Social History Narrative    Not on file     Family History   Problem Relation Age of Onset    Other Mother         epilepsy     No Known Problems Father     No Known Problems Brother     No Known Problems Brother      Current Outpatient Medications on File Prior to Visit   Medication Sig Dispense Refill    gabapentin (NEURONTIN) 300 MG capsule TAKE 1 CAPSULE BY MOUTH THREE TIMES DAILY 90 capsule 3    traZODone (DESYREL) 100 MG tablet take 1-2 tablets by mouth nightly 60 tablet 3    SPIRIVA HANDIHALER 18 MCG inhalation capsule Inhale 1 capsule into the lungs daily 30 capsule 3    busPIRone (BUSPAR) 15 MG tablet Take 1 tablet by mouth three times daily 90 tablet 3    PROAIR  (90 Base) MCG/ACT inhaler INHALE 2 PUFFS EVERY 4 HOURS AS NEEDED 8.5 g 3    ADVAIR DISKUS 500-50 MCG/DOSE diskus inhaler USE 1 INHALATION by mouth TWICE DAILY- ( RINSE MOUTH AFTER EACH USE) 1 Inhaler 3    PARoxetine (PAXIL) 40 MG tablet Take 1 tablet by mouth every morning 90 tablet 3    varenicline (CHANTIX) 1 MG tablet Take 1 mg by mouth 2 times daily      estradiol (ESTRACE VAGINAL) 0.1 MG/GM vaginal cream Place 1 g vaginally daily 1 gram vaginally for two weeks the 1/2 gram twice weekly 1 Tube 3    omeprazole (PRILOSEC) 20 MG delayed release capsule       atorvastatin (LIPITOR) 20 MG tablet Take 1 tablet by mouth daily 90 tablet 3    albuterol (PROVENTIL) (5 MG/ML) 0.5% nebulizer solution Take 1 mL by nebulization 4 times daily as needed for Wheezing 120 each 3    Misc. Devices MISC 1 each by Does not apply route once for 1 dose Nebulizer 1 Device 0     No current facility-administered medications on file prior to visit. Allergies   Allergen Reactions    Other Anaphylaxis     Sun flower seeds     Codeine Nausea And Vomiting       Chief Complaint   Patient presents with    Hyperlipidemia     6MO. F/U     Anxiety     CARTER f/u    Back Pain     Back, hip, and leg pain    Other     Wants handicap plaq. HPI:    Hyperlipidemia:  No new myalgias or GI upset on atorvastatin (Lipitor).        No results found for: LABA1C  Lab Results   Component Value Date    CREATININE 0.85 02/10/2019     Lab Results   Component Value Date    ALT 8 02/10/2019    AST 11 (L) 02/10/2019     Lab Results   Component Value Date    CHOL 173 04/09/2018    TRIG 157 04/09/2018    HDL 56 04/09/2018    LDLCALC 86 04/09/2018        Diabetes and Hypertension Visit Information    BP Readings from Last 3 Encounters:   04/10/19 90/60   12/13/18 114/72   12/07/18 (!) 111/58          Have you seen any other physician or provider since your last visit? Yes - Records Obtained  Have you had any other diagnostic tests since your last visit? Yes - Records Obtained  Have you been seen in the emergency room and/or had an admission to a hospital since we last saw you? Yes      Patient Care Team:  Hao Steele MD as PCP - General (Family Medicine)  Matilda Rob RN as Nurse Navigator           Health Maintenance   Topic Date Due    Hepatitis C screen  1960    HIV screen  12/31/1974    DTaP/Tdap/Td vaccine (1 - Tdap) 12/31/1978    Colon cancer screen colonoscopy  12/31/2009    Shingles Vaccine (1 of 2) 10/01/2019 (Originally 12/31/2009)    Low dose CT lung screening  12/07/2019    Breast cancer screen  05/30/2020    Lipid screen  04/09/2023    Cervical cancer screen  04/10/2023    Flu vaccine  Completed    Pneumococcal 0-64 years Vaccine  Completed       Lung nodule, carcinoid tumor of left lung  Seeing pulmonary  Left lower Lobe lung nodule PET positive, concerning for malignancy. Biopsy is negative.    Then still sent for resection, and mass was + for malignancy   Repeat CT due in July  Left VATS Wedge resection early stage atypical carcinoid tumor  Pathology T1bn0 atypical carcinoid tumor     Tobacco abuse  Social History     Tobacco Use   Smoking Status Current Every Day Smoker    Packs/day: 1.00    Years: 47.00    Pack years: 47.00    Types: Cigarettes    Start date: 1970   Smokeless Tobacco Never Used   she is still smoking GERD  She does have a past medical history of acid reflux  She is currently on Prilosec which works well for this  She did have an endoscopy possibly over 10 years ago  She has no current symptoms regarding her acid reflux     CARTER, Depression, Insomnia  Patient currently being treated for depression, insomnia, generalized anxiety disorder. Baldemar Hill denies any suicidal ideation or homicidal ideation. Baldemar Hill is not seeing anyone at the [address] for this.  No medication side effects     Neuropathy  Patient also has a history of peripheral neuropathy.  No EMG was done, it was a clinical diagnosis and she was started on gabapentin.  No current medication side effects, medication seems to be working well for her. COPD  Patient has a long-standing history of smoking, she has tried to cut back recently but could only get down to half a pack  She does have a 30+ pack year history of smoking  Productive cough for several months  Coughing up white/clear sputum  Takes Advair / Proair/spiriva daily  Does not feel that inhalers are working all that well  Denies congestion, runny nose, fever or fever like symptoms  Pulmonary function test many many years ago  She is not coughing or spitting up blood and she does not have any unintentional weight loss    PFT 5/23/18:  SUMMARY:  Severe obstructive pulmonary disease. Glenna Herminia is significant  response to bronchodilator.  Static lung volume study suggests  hyperinflation.  Diffusion capacity is mildly impaired.  Airway resistance  is increased.  Flow volume loop suggests obstructive pulmonary disease. Clinical correlation is requested.     Started on Advair, works well for her  She is using albuterol 4-5 times daily     Up to date apparently with colonoscopy, we have no results. Baldemar Hill is not up-to-date with her Pap test, mammogram, other preventative measures. Review of Systems   Constitutional: Negative for activity change, appetite change and chills.    HENT: Negative for congestion, dental problem and drooling. Respiratory: Negative for apnea, choking and chest tightness. Genitourinary: Negative for difficulty urinating, dyspareunia and dysuria. Physical Exam  Vitals:    04/10/19 1420   BP: 90/60   Pulse:    Temp:    SpO2:    Body mass index is 22.83 kg/m². Physical Exam  Constitutional:  Appears well-developed and well-nourished. No distress. HENT:   Head: Normocephalic and atraumatic. Right Ear: External ear normal.   Left Ear: External ear normal.   Nose: Nose normal.   Eyes: Conjunctivae and EOM are normal.  Right eye exhibits no discharge. Left eye exhibits no discharge. No scleral icterus. Neck: Normal range of motion. Cardiovascular: Normal rate, regular rhythm and normal heart sounds. No murmur heard. Pulmonary/Chest: Effort normal and breath sounds normal. No respiratory distress. no wheezes. no rales. no tenderness. Musculoskeletal: Normal range of motion. Neurological: alert. Skin: not diaphoretic. Psychiatric: normal mood and affect. behavior is normal. Judgment and thought content normal.   Nursing note and vitals reviewed. Assessment:  Marti Devi was seen today for hyperlipidemia, anxiety, back pain and other.     Diagnoses and all orders for this visit:    Pure hypercholesterolemia  Stable, controlled  Plan: continue current medications    Carcinoid tumor of left lung  S/p resection  Doing well  Still smoking    Tobacco abuse  Discussed smoking cessation    Gastroesophageal reflux disease without esophagitis  Stable, controlled  Plan: continue current medications    CARTER (generalized anxiety disorder)  Mild single current episode of major depressive disorder (HCC)  Stable, controlled  Plan: continue current medications    Neuropathy  Stable, controlled  Plan: continue current medications    Chronic obstructive pulmonary disease, unspecified COPD type ([address]  Stable, controlled  Plan: continue current medications    Screening for breast cancer  -     BORIS DIGITAL SCREEN W OR WO CAD BILATERAL; Future    Screen for colon cancer  -     COLOGUARD; Future          Orders Placed This Encounter   Procedures    COLOGUARD     This test is performed by an external laboratory and is used for result attachment only. It is required that this order requisition be faxed to: [facility] @@ [phone]. See [email or web address] for further information. Standing Status:   Future     Standing Expiration Date:   4/10/2020    BORIS DIGITAL SCREEN W OR WO CAD BILATERAL     Standing Status:   Future     Standing Expiration Date:   6/10/2020      I personally reviewed all recent labs and imaging pertaining to conditions mentioned above in assessment/plan in [facility] and care everywhere, discussed results with patient in office    HTN / Hyperlipidemia Counseling  Patient was counseled regarding disease risks and adopting healthy behaviors. Patient was provided education materials (or meal plan) to assist with self management. Patient was provided log (or received log during previous visit) to record blood pressure and/or food intake. Patient was instructed to keep log up-to-date and to always bring log to all office visits. Reviewed the following concerns and recommendations with the patient:    Lifestyle modifications in the management of hypertension:  1. Weight reduction    -Maintain normal body weight (BMI, 18.5 to 24.9 kg/m2)  2. Adopt DASH eating plan    - Consume a diet rich in fruits, vegetables, and low-fat dairy products with a  reduced content of saturated and total fat   3. Dietary sodium reduction    - Reduce dietary sodium intake to no more than 100 meq/day (2.4 g sodium or 6  g sodium chloride)   4. Physical activity    - Engage in regular aerobic physical activity such as brisk walking (at least 30  minutes per day, most days of the week)   5.  Moderation of alcohol consumption    - Limit consumption to no more than 2 drinks per day in most men

## 2019-04-11 ENCOUNTER — OFFICE VISIT (OUTPATIENT)
Dept: PULMONOLOGY | Age: 59
End: 2019-04-11
Payer: COMMERCIAL

## 2019-04-11 VITALS
OXYGEN SATURATION: 95 % | WEIGHT: 132.4 LBS | HEART RATE: 79 BPM | SYSTOLIC BLOOD PRESSURE: 120 MMHG | TEMPERATURE: 98.4 F | BODY MASS INDEX: 22.61 KG/M2 | HEIGHT: 64 IN | DIASTOLIC BLOOD PRESSURE: 80 MMHG

## 2019-04-11 DIAGNOSIS — J44.9 CHRONIC OBSTRUCTIVE PULMONARY DISEASE, UNSPECIFIED COPD TYPE (HCC): ICD-10-CM

## 2019-04-11 DIAGNOSIS — C7A.090 ATYPICAL CARCINOID LUNG TUMOR (HCC): Primary | ICD-10-CM

## 2019-04-11 DIAGNOSIS — Z72.0 TOBACCO ABUSE: ICD-10-CM

## 2019-04-11 PROCEDURE — G8427 DOCREV CUR MEDS BY ELIG CLIN: HCPCS | Performed by: INTERNAL MEDICINE

## 2019-04-11 PROCEDURE — G8420 CALC BMI NORM PARAMETERS: HCPCS | Performed by: INTERNAL MEDICINE

## 2019-04-11 PROCEDURE — 3023F SPIROM DOC REV: CPT | Performed by: INTERNAL MEDICINE

## 2019-04-11 PROCEDURE — 99213 OFFICE O/P EST LOW 20 MIN: CPT | Performed by: INTERNAL MEDICINE

## 2019-04-11 PROCEDURE — G8926 SPIRO NO PERF OR DOC: HCPCS | Performed by: INTERNAL MEDICINE

## 2019-04-11 PROCEDURE — G8599 NO ASA/ANTIPLAT THER USE RNG: HCPCS | Performed by: INTERNAL MEDICINE

## 2019-04-11 PROCEDURE — 4004F PT TOBACCO SCREEN RCVD TLK: CPT | Performed by: INTERNAL MEDICINE

## 2019-04-11 PROCEDURE — 3017F COLORECTAL CA SCREEN DOC REV: CPT | Performed by: INTERNAL MEDICINE

## 2019-04-11 NOTE — PROGRESS NOTES
Subjective:     Storm Solorio is a 60 y.o. female whocomplains today of:     Chief Complaint   Patient presents with    Follow-up     Lung Nodule       HPI  Patient presents for lung nodule, S/P wedge resection and found to have atypical carcinoid tumor ,   SOB occasional mainly with exertion, 200 feet she has to stop. No chest pain, and + cough productive of dark yellow phlegm, no fever  No lower ext swelling   Left leg hurts but this is chronic problem     Smoke 6 cig a day     On Spiriva and Advair but not compliant     Allergies:   Other and Codeine     Past Medical History:   Diagnosis Date    Bone spur     Cervical stenosis of spine     COPD (chronic obstructive pulmonary disease) ([address]     Coronary artery calcification of native artery - aortic arch 10/31/2018    On lung screen done 10/26/18    DDD (degenerative disc disease), lumbosacral 3/12/2018    Used to see pain management     CARTER (generalized anxiety disorder) 3/12/2018    [facility]    Gastroesophageal reflux disease without esophagitis 3/12/2018    EGD over 10 years    Hyperlipidemia     Insomnia 3/12/2018    Mild single current episode of major depressive disorder ([address] 3/12/2018    Neuropathy 3/12/2018    Clinical diagnosis, EMG    Other emphysema ([address] 3/12/2018    No recent PFT    Pure hypercholesterolemia 3/12/2018    SOB (shortness of breath)     Tobacco abuse 3/12/2018     Past Surgical History:   Procedure Laterality Date     SECTION      LUNG BIOPSY Left 2018    CT guided Left Lung Biopsy by Dr Jannie Muniz     Family History   Problem Relation Age of Onset    Other Mother         epilepsy     No Known Problems Father     No Known Problems Brother     No Known Problems Brother      Social History     Socioeconomic History    Marital status: Single     Spouse name: Not on file    Number of children: Not on file    Years of education: Not on file    Highest education level: Not on file   Occupational History    Not on file   Social Needs    Financial resource strain: Not on file    Food insecurity:     Worry: Not on file     Inability: Not on file    Transportation needs:     Medical: Not on file     Non-medical: Not on file   Tobacco Use    Smoking status: Current Every Day Smoker     Packs/day: 1.00     Years: 47.00     Pack years: 47.00     Types: Cigarettes     Start date: 1970    Smokeless tobacco: Never Used   Substance and Sexual Activity    Alcohol use: No    Drug use: No    Sexual activity: Not Currently     Birth control/protection: Post-menopausal   Lifestyle    Physical activity:     Days per week: Not on file     Minutes per session: Not on file    Stress: Not on file   Relationships    Social connections:     Talks on phone: Not on file     Gets together: Not on file     Attends [employer] service: Not on file     Active member of club or organization: Not on file     Attends meetings of clubs or organizations: Not on file     Relationship status: Not on file    Intimate partner violence:     Fear of current or ex partner: Not on file     Emotionally abused: Not on file     Physically abused: Not on file     Forced sexual activity: Not on file   Other Topics Concern    Not on file   Social History Narrative    Not on file         Review of Systems      ROS: 10 organs review of system is done including general, psychological, ENT, hematological, endocrine, respiratory, cardiovascular, gastrointestinal,musculoskeletal, neurological,  allergy and Immunology is done and is otherwise negative.     Current Outpatient Medications   Medication Sig Dispense Refill    gabapentin (NEURONTIN) 300 MG capsule TAKE 1 CAPSULE BY MOUTH THREE TIMES DAILY 90 capsule 3    traZODone (DESYREL) 100 MG tablet take 1-2 tablets by mouth nightly 60 tablet 3    SPIRIVA HANDIHALER 18 MCG inhalation capsule Inhale 1 capsule into the lungs daily 30 capsule 3    busPIRone (BUSPAR) 15 MG tablet Take 1 tablet by mouth three times daily 90 tablet 3    PROAIR  (90 Base) MCG/ACT inhaler INHALE 2 PUFFS EVERY 4 HOURS AS NEEDED 8.5 g 3    ADVAIR DISKUS 500-50 MCG/DOSE diskus inhaler USE 1 INHALATION by mouth TWICE DAILY- ( RINSE MOUTH AFTER EACH USE) 1 Inhaler 3    PARoxetine (PAXIL) 40 MG tablet Take 1 tablet by mouth every morning 90 tablet 3    varenicline (CHANTIX) 1 MG tablet Take 1 mg by mouth 2 times daily      estradiol (ESTRACE VAGINAL) 0.1 MG/GM vaginal cream Place 1 g vaginally daily 1 gram vaginally for two weeks the 1/2 gram twice weekly 1 Tube 3    omeprazole (PRILOSEC) 20 MG delayed release capsule       atorvastatin (LIPITOR) 20 MG tablet Take 1 tablet by mouth daily 90 tablet 3    albuterol (PROVENTIL) (5 MG/ML) 0.5% nebulizer solution Take 1 mL by nebulization 4 times daily as needed for Wheezing 120 each 3    Misc. Devices MISC 1 each by Does not apply route once for 1 dose Nebulizer 1 Device 0     No current facility-administered medications for this visit. Objective:     Vitals:    04/11/19 1429   BP: 120/80   Site: Right Upper Arm   Pulse: 79   Temp: 98.4 °F (36.9 °C)   TempSrc: Tympanic   SpO2: 95%   Weight: 132 lb 6.4 oz (60.1 kg)   Height: 5' 4\" (1.626 m)         Physical Exam   Constitutional: She is oriented to person, place, and time. She appears well-developed and well-nourished. No distress. HENT:   Head: Normocephalic and atraumatic. Eyes: Pupils are equal, round, and reactive to light. Conjunctivae are normal.   Neck: Normal range of motion. Neck supple. Cardiovascular: Normal rate and regular rhythm. Exam reveals no gallop and no friction rub. No murmur heard. Pulmonary/Chest: Effort normal and breath sounds normal. No respiratory distress. She has no wheezes. She has no rales. She exhibits no tenderness. Abdominal: Soft. She exhibits no distension. There is no tenderness. There is no rebound. Musculoskeletal: She exhibits no edema or tenderness. Lymphadenopathy:     She has no cervical adenopathy. Neurological: She is alert and oriented to person, place, and time. Skin: Skin is warm and dry. She is not diaphoretic. No erythema. Psychiatric: She has a normal mood and affect. Judgment normal.     Imaging studies reviewed by me CT chest shows left upper lobe lung nodule  Lab results reviewed in chart  PFT FEV1 46%       Assessment and Plan       Diagnosis Orders   1. Atypical carcinoid lung tumor ([facility]     2. Chronic obstructive pulmonary disease, unspecified COPD type ([facility]  Fluticasone-Umeclidin-Vilant 100-62.5-25 MCG/INH AEPB   3. Tobacco abuse       · Lung nodule status post wedge resection, atypical carcinoid tumor currently follows up with thoracic surgery at [facility], she has CAT scan planned for  July this year. I will see patient in August  · COPD symptoms not well controlled, she is noncompliant, I would change inhalers to trelegy, stop Spiriva and Advair for now, patient is instructed to gargle swish and spit after each use of inhaled corticosteroid, compliance emphasized. · Ongoing smoking and smoking cessation counseling 5-10 minutes done again. She will try. No orders of the defined types were placed in this encounter. Orders Placed This Encounter   Medications    Fluticasone-Umeclidin-Vilant 100-62.5-25 MCG/INH AEPB     Sig: Inhale 1 puff into the lungs daily     Dispense:  1 each     Refill:  3     Discussed with patient the importance of exercise and weight control and  overall health and well-being.     Reviewed with the patient: current clinical status, medications, activities and diet.      Side effects, adverse effects of the medication prescribed today, as well as treatment plan and result expectations have been discussed with the patient who expresses understanding and desires to proceed.           Return in about 4 months (around 8/11/2019).       Sven Bryan MD EMS

## 2019-04-19 DIAGNOSIS — Z12.11 COLON CANCER SCREENING: Primary | ICD-10-CM

## 2019-05-01 NOTE — TELEPHONE ENCOUNTER
Requesting medication refill.  Please approve or deny this request.    Rx requested:  Requested Prescriptions     Pending Prescriptions Disp Refills    CHANTIX CONTINUING MONTH ASHOK 1 MG tablet [Pharmacy Med Name: James Goodman 1 MG TABLET] 60 tablet 3     Sig: Take 1 tablet by mouth 2 times daily       Last Office Visit:   4-10-19    Last Labs:       Next Visit Date:  Future Appointments   Date Time Provider Macy Veronica   5/31/2019  1:30 PM [facility] RO[facility] [address]   8/13/2019  2:30 PM Irma Hamlin MD [facility]   10/10/2019  1:00 PM Serenity Johnson MD [facility]

## 2019-05-06 RX ORDER — VARENICLINE TARTRATE 1 MG/1
TABLET, FILM COATED ORAL
Qty: 60 TABLET | Refills: 3 | Status: SHIPPED | OUTPATIENT
Start: 2019-05-06 | End: 2019-11-19

## 2019-05-31 RX ORDER — ATORVASTATIN CALCIUM 20 MG/1
20 TABLET, FILM COATED ORAL DAILY
Qty: 90 TABLET | Refills: 3 | Status: SHIPPED | OUTPATIENT
Start: 2019-05-31 | End: 2020-04-29 | Stop reason: SDUPTHER

## 2019-06-20 ENCOUNTER — TELEPHONE (OUTPATIENT)
Dept: INTERNAL MEDICINE | Age: 59
End: 2019-06-20

## 2019-06-20 DIAGNOSIS — M54.2 CERVICALGIA: Primary | ICD-10-CM

## 2019-06-20 NOTE — TELEPHONE ENCOUNTER
Patient would like a referral to neuro because he neck hurts and is progressively getting worse. She states she has cervical stenosis and she has tried shots and physical therapy but they have not helped.

## 2019-06-26 RX ORDER — OMEPRAZOLE 20 MG/1
20 CAPSULE, DELAYED RELEASE ORAL DAILY
Qty: 30 CAPSULE | Refills: 3 | Status: SHIPPED | OUTPATIENT
Start: 2019-06-26 | End: 2019-11-05 | Stop reason: SDUPTHER

## 2019-07-18 DIAGNOSIS — G47.00 INSOMNIA, UNSPECIFIED TYPE: ICD-10-CM

## 2019-07-18 DIAGNOSIS — F32.A DEPRESSION, UNSPECIFIED DEPRESSION TYPE: ICD-10-CM

## 2019-07-18 DIAGNOSIS — F41.1 GAD (GENERALIZED ANXIETY DISORDER): ICD-10-CM

## 2019-07-18 DIAGNOSIS — F32.0 MILD SINGLE CURRENT EPISODE OF MAJOR DEPRESSIVE DISORDER (HCC): ICD-10-CM

## 2019-07-23 RX ORDER — TRAZODONE HYDROCHLORIDE 100 MG/1
TABLET ORAL
Qty: 60 TABLET | Refills: 3 | Status: SHIPPED | OUTPATIENT
Start: 2019-07-23 | End: 2020-04-13 | Stop reason: SDUPTHER

## 2019-07-23 RX ORDER — BUSPIRONE HYDROCHLORIDE 15 MG/1
TABLET ORAL
Qty: 90 TABLET | Refills: 3 | Status: SHIPPED | OUTPATIENT
Start: 2019-07-23 | End: 2020-06-03

## 2019-08-19 RX ORDER — GABAPENTIN 300 MG/1
CAPSULE ORAL
Qty: 90 CAPSULE | Refills: 3 | Status: SHIPPED | OUTPATIENT
Start: 2019-08-19 | End: 2020-04-29

## 2019-09-11 ENCOUNTER — APPOINTMENT (OUTPATIENT)
Dept: GENERAL RADIOLOGY | Age: 59
End: 2019-09-11
Payer: MEDICARE

## 2019-09-11 ENCOUNTER — HOSPITAL ENCOUNTER (EMERGENCY)
Age: 59
Discharge: HOME | End: 2019-09-11
Attending: EMERGENCY MEDICINE
Payer: MEDICARE

## 2019-09-11 VITALS
TEMPERATURE: 98.2 F | HEART RATE: 91 BPM | WEIGHT: 135 LBS | OXYGEN SATURATION: 95 % | SYSTOLIC BLOOD PRESSURE: 140 MMHG | BODY MASS INDEX: 26.5 KG/M2 | HEIGHT: 60 IN | RESPIRATION RATE: 18 BRPM | DIASTOLIC BLOOD PRESSURE: 65 MMHG

## 2019-09-11 DIAGNOSIS — M79.671 RIGHT FOOT PAIN: Primary | ICD-10-CM

## 2019-09-11 LAB
BASOPHILS ABSOLUTE: 0 K/UL (ref 0–0.2)
BASOPHILS RELATIVE PERCENT: 0.3 %
EOSINOPHILS ABSOLUTE: 0.2 K/UL (ref 0–0.7)
EOSINOPHILS RELATIVE PERCENT: 3.4 %
HCT VFR BLD CALC: 35 % (ref 37–47)
HEMOGLOBIN: 11.7 G/DL (ref 12–16)
LYMPHOCYTES ABSOLUTE: 2 K/UL (ref 1–4.8)
LYMPHOCYTES RELATIVE PERCENT: 36.7 %
MCH RBC QN AUTO: 29.2 PG (ref 27–31.3)
MCHC RBC AUTO-ENTMCNC: 33.4 % (ref 33–37)
MCV RBC AUTO: 87.5 FL (ref 82–100)
MONOCYTES ABSOLUTE: 0.4 K/UL (ref 0.2–0.8)
MONOCYTES RELATIVE PERCENT: 7.6 %
NEUTROPHILS ABSOLUTE: 2.8 K/UL (ref 1.4–6.5)
NEUTROPHILS RELATIVE PERCENT: 52 %
PDW BLD-RTO: 16.5 % (ref 11.5–14.5)
PLATELET # BLD: 257 K/UL (ref 130–400)
RBC # BLD: 4 M/UL (ref 4.2–5.4)
URIC ACID, SERUM: 4.6 MG/DL (ref 2.4–5.7)
WBC # BLD: 5.5 K/UL (ref 4.8–10.8)

## 2019-09-11 PROCEDURE — 99283 EMERGENCY DEPT VISIT LOW MDM: CPT

## 2019-09-11 PROCEDURE — 36415 COLL VENOUS BLD VENIPUNCTURE: CPT

## 2019-09-11 PROCEDURE — 84550 ASSAY OF BLOOD/URIC ACID: CPT

## 2019-09-11 PROCEDURE — 6370000000 HC RX 637 (ALT 250 FOR IP): Performed by: EMERGENCY MEDICINE

## 2019-09-11 PROCEDURE — 6360000002 HC RX W HCPCS: Performed by: EMERGENCY MEDICINE

## 2019-09-11 PROCEDURE — 73630 X-RAY EXAM OF FOOT: CPT

## 2019-09-11 PROCEDURE — 85025 COMPLETE CBC W/AUTO DIFF WBC: CPT

## 2019-09-11 RX ORDER — TRAMADOL HYDROCHLORIDE 50 MG/1
50 TABLET ORAL EVERY 8 HOURS PRN
Qty: 15 TABLET | Refills: 0 | Status: SHIPPED | OUTPATIENT
Start: 2019-09-11 | End: 2019-09-16

## 2019-09-11 RX ORDER — DEXAMETHASONE 4 MG/1
4 TABLET ORAL ONCE
Status: COMPLETED | OUTPATIENT
Start: 2019-09-11 | End: 2019-09-11

## 2019-09-11 RX ORDER — PREDNISONE 10 MG/1
10 TABLET ORAL DAILY
Qty: 5 TABLET | Refills: 0 | Status: SHIPPED | OUTPATIENT
Start: 2019-09-11 | End: 2019-09-16

## 2019-09-11 RX ORDER — INDOMETHACIN 25 MG/1
25 CAPSULE ORAL ONCE
Status: COMPLETED | OUTPATIENT
Start: 2019-09-11 | End: 2019-09-11

## 2019-09-11 RX ADMIN — INDOMETHACIN 25 MG: 25 CAPSULE ORAL at 16:11

## 2019-09-11 RX ADMIN — DEXAMETHASONE 4 MG: 4 TABLET ORAL at 16:11

## 2019-09-11 ASSESSMENT — ENCOUNTER SYMPTOMS
SINUS PRESSURE: 0
EYE DISCHARGE: 0
WHEEZING: 0
CHEST TIGHTNESS: 0
CHOKING: 0
EYE PAIN: 0
BLOOD IN STOOL: 0
TROUBLE SWALLOWING: 0
COUGH: 0
SORE THROAT: 0
SHORTNESS OF BREATH: 0
STRIDOR: 0
ABDOMINAL PAIN: 0
EYE REDNESS: 0
VOMITING: 0
FACIAL SWELLING: 0
CONSTIPATION: 0
DIARRHEA: 0
BACK PAIN: 0
VOICE CHANGE: 0

## 2019-09-11 ASSESSMENT — PAIN DESCRIPTION - PAIN TYPE
TYPE: ACUTE PAIN

## 2019-09-11 ASSESSMENT — PAIN - FUNCTIONAL ASSESSMENT: PAIN_FUNCTIONAL_ASSESSMENT: PREVENTS OR INTERFERES WITH MANY ACTIVE NOT PASSIVE ACTIVITIES

## 2019-09-11 ASSESSMENT — PAIN SCALES - GENERAL
PAINLEVEL_OUTOF10: 7

## 2019-09-11 ASSESSMENT — PAIN DESCRIPTION - DESCRIPTORS
DESCRIPTORS: SHARP;THROBBING
DESCRIPTORS: ACHING
DESCRIPTORS: ACHING

## 2019-09-11 ASSESSMENT — PAIN DESCRIPTION - ONSET
ONSET: GRADUAL
ONSET: GRADUAL

## 2019-09-11 ASSESSMENT — PAIN DESCRIPTION - LOCATION
LOCATION: FOOT

## 2019-09-11 ASSESSMENT — PAIN DESCRIPTION - ORIENTATION
ORIENTATION: RIGHT

## 2019-09-11 ASSESSMENT — PAIN DESCRIPTION - FREQUENCY
FREQUENCY: CONTINUOUS
FREQUENCY: INTERMITTENT
FREQUENCY: CONTINUOUS

## 2019-09-11 ASSESSMENT — PAIN DESCRIPTION - PROGRESSION
CLINICAL_PROGRESSION: NOT CHANGED
CLINICAL_PROGRESSION: GRADUALLY WORSENING

## 2019-09-11 NOTE — ED TRIAGE NOTES
Patient presents to ED with c/o right foot pain that started about a week ago. Denies injury.  Suspects she may have a gout flare which she had approx 20 years ago

## 2019-09-11 NOTE — ED PROVIDER NOTES
[facility] ED  eMERGENCY dEPARTMENT eNCOUnter      Pt Name: Brian Pinto  MRN: [id number]  [place] 1960  Date of evaluation: 9/11/2019  Provider: Haleigh Silvestre MD    [address] [place] [address]       Chief Complaint   Patient presents with    Foot Pain     Rt foot x 1 week       HISTORY OF PRESENT ILLNESS   (Location/Symptom, Timing/Onset,Context/Setting, Quality, Duration, Modifying Factors, Severity)  Note limiting factors. Brian Pinto is a 60 y.o. female who presents to the emergency department patient with a history of COPD anxiety disorder arthritis hyperlipidemia and gout patient comes with increasing pain to the right for the last 1 week no injury no follow-up insect bite, no numbness or tingling to the toes patient has slightly more into the big toe    HPI    NursingNotes were reviewed. REVIEW OF SYSTEMS    (2-9 systems for level 4, 10 or more for level 5)     Review of Systems   Constitutional: Negative. Negative for activity change and fever. HENT: Negative for congestion, drooling, facial swelling, mouth sores, nosebleeds, sinus pressure, sore throat, trouble swallowing and voice change. Eyes: Negative for pain, discharge, redness and visual disturbance. Respiratory: Negative for cough, choking, chest tightness, shortness of breath, wheezing and stridor. Cardiovascular: Negative for chest pain, palpitations and leg swelling. Gastrointestinal: Negative for abdominal pain, blood in stool, constipation, diarrhea and vomiting. Endocrine: Negative for cold intolerance, polyphagia and polyuria. Genitourinary: Negative for dysuria, flank pain, frequency, genital sores and urgency. Musculoskeletal: Positive for arthralgias, joint swelling and myalgias. Negative for back pain, neck pain and neck stiffness. Skin: Negative for pallor and rash. Neurological: Negative for tremors, seizures, syncope, weakness, numbness and headaches. Hematological: Negative for adenopathy.  Does 100-62.5-25 MCG/INH AEPB    Inhale 1 puff into the lungs daily    GABAPENTIN (NEURONTIN) 300 MG CAPSULE    TAKE 1 CAPSULE BY MOUTH THREE TIMES DAILY    MISC.  DEVICES MISC    1 each by Does not apply route once for 1 dose Nebulizer    OMEPRAZOLE (PRILOSEC) 20 MG DELAYED RELEASE CAPSULE    Take 1 capsule by mouth Daily    PAROXETINE (PAXIL) 40 MG TABLET    Take 1 tablet by mouth every morning    PROAIR  (90 BASE) MCG/ACT INHALER    INHALE 2 PUFFS EVERY 4 HOURS AS NEEDED    PROAIR  (90 BASE) MCG/ACT INHALER    INHALE 2 PUFFS EVERY 4 HOURS AS NEEDED    TRAZODONE (DESYREL) 100 MG TABLET    TAKE 1 TO 2 TABLETS BY MOUTH NIGHTLY    VARENICLINE (CHANTIX) 1 MG TABLET    Take 1 mg by mouth 2 times daily       ALLERGIES     Other and Codeine    FAMILY HISTORY       Family History   Problem Relation Age of Onset    Other Mother         epilepsy     No Known Problems Father     No Known Problems Brother     No Known Problems Brother           SOCIAL HISTORY       Social History     Socioeconomic History    Marital status: Single     Spouse name: None    Number of children: None    Years of education: None    Highest education level: None   Occupational History    None   Social Needs    Financial resource strain: None    Food insecurity:     Worry: None     Inability: None    Transportation needs:     Medical: None     Non-medical: None   Tobacco Use    Smoking status: Current Every Day Smoker     Packs/day: 1.00     Years: 47.00     Pack years: 47.00     Types: Cigarettes     Start date: 1970    Smokeless tobacco: Never Used   Substance and Sexual Activity    Alcohol use: No    Drug use: No    Sexual activity: Not Currently     Birth control/protection: Post-menopausal   Lifestyle    Physical activity:     Days per week: None     Minutes per session: None    Stress: None   Relationships    Social connections:     Talks on phone: None     Gets together: None     Attends Yazdanism service: None Active member of club or organization: None     Attends meetings of clubs or organizations: None     Relationship status: None    Intimate partner violence:     Fear of current or ex partner: None     Emotionally abused: None     Physically abused: None     Forced sexual activity: None   Other Topics Concern    None   Social History Narrative    None       SCREENINGS    Elly Coma Scale  Eye Opening: Spontaneous  Best Verbal Response: Oriented  Best Motor Response: Obeys commands  Nichols Coma Scale Score: 15 @FLOW([id number])@      PHYSICAL EXAM    (up to 7 for level 4, 8 or more for level 5)     ED Triage Vitals [09/11/19 1544]   BP Temp Temp Source Pulse Resp SpO2 Height Weight   (!) 140/65 98.2 °F (36.8 °C) Oral 91 18 95 % 5' (1.524 m) 135 lb (61.2 kg)       Physical Exam   Constitutional: She is oriented to person, place, and time. She appears well-developed and well-nourished. Active alert cooperative patient nontoxic appearance afebrile   HENT:   Head: Normocephalic and atraumatic. Right Ear: External ear normal.   Left Ear: External ear normal.   Nose: Nose normal.   Mouth/Throat: Oropharynx is clear and moist.   Eyes: Pupils are equal, round, and reactive to light. EOM are normal.   Neck: Normal range of motion. Neck supple. Cardiovascular: Normal rate, regular rhythm, normal heart sounds and intact distal pulses. Exam reveals no gallop. No murmur heard. Pulmonary/Chest: Breath sounds normal. No respiratory distress. She has no wheezes. Abdominal: Soft. Bowel sounds are normal. She exhibits no mass. There is no rebound. Musculoskeletal: Normal range of motion. She exhibits edema and tenderness. She exhibits no deformity.    Timing of the right foot patient has no cellulitis diffuse tenderness to the dorsum of the fourth slightly more to the right big toe minimal warmth noted the big toe finding consistent with the mild gouty attack Norvasc is intact   Neurological: She is alert and oriented to person, place, and time. No cranial nerve deficit. She exhibits normal muscle tone. Skin: Skin is warm. No rash noted. No erythema. Psychiatric: Her behavior is normal. Thought content normal.   Nursing note and vitals reviewed. DIAGNOSTIC RESULTS     EKG: All EKG's are interpreted by the Emergency Department Physician who either signs or Co-signsthis chart in the absence of a cardiologist.        RADIOLOGY:   Newt Cava such as CT, Ultrasound and MRI are read by the radiologist. Plain radiographic images are visualized and preliminarily interpreted by the emergency physician with the below findings:        Interpretation per the Radiologist below, if available at the time ofthis note:    XR FOOT RIGHT (MIN 3 VIEWS)    (Results Pending)         ED BEDSIDE ULTRASOUND:   Performed by ED Physician - none    LABS:  Labs Reviewed   CBC WITH AUTO DIFFERENTIAL - Abnormal; Notable for the following components:       Result Value    RBC 4.00 (*)     Hemoglobin 11.7 (*)     Hematocrit 35.0 (*)     RDW 16.5 (*)     All other components within normal limits   URIC ACID       All other labs were within normal range or not returned as of this dictation. EMERGENCY DEPARTMENT COURSE and DIFFERENTIAL DIAGNOSIS/MDM:   Vitals:    Vitals:    09/11/19 1544   BP: (!) 140/65   Pulse: 91   Resp: 18   Temp: 98.2 °F (36.8 °C)   TempSrc: Oral   SpO2: 95%   Weight: 135 lb (61.2 kg)   Height: 5' (1.524 m)           MDM    CRITICAL CARE TIME   Total Critical Care time was  minutes, excluding separately reportableprocedures. There was a high probability of clinicallysignificant/life threatening deterioration in the patient's condition which required my urgent intervention. CONSULTS:  None    PROCEDURES:  Unless otherwise noted below, none     Procedures    FINAL IMPRESSION      1. Right foot pain          DISPOSITION/PLAN   DISPOSITION        PATIENT REFERRED TO:  No follow-up provider specified.     DISCHARGE

## 2019-11-05 RX ORDER — OMEPRAZOLE 20 MG/1
20 CAPSULE, DELAYED RELEASE ORAL DAILY
Qty: 30 CAPSULE | Refills: 3 | Status: SHIPPED | OUTPATIENT
Start: 2019-11-05 | End: 2020-03-30

## 2019-11-19 ENCOUNTER — OFFICE VISIT (OUTPATIENT)
Dept: INTERNAL MEDICINE | Age: 59
End: 2019-11-19
Payer: MEDICARE

## 2019-11-19 VITALS
WEIGHT: 138.4 LBS | SYSTOLIC BLOOD PRESSURE: 110 MMHG | OXYGEN SATURATION: 94 % | BODY MASS INDEX: 27.17 KG/M2 | HEIGHT: 60 IN | DIASTOLIC BLOOD PRESSURE: 60 MMHG | HEART RATE: 64 BPM

## 2019-11-19 DIAGNOSIS — Z12.39 SCREENING FOR BREAST CANCER: ICD-10-CM

## 2019-11-19 DIAGNOSIS — R20.2 FACIAL TINGLING SENSATION: Primary | ICD-10-CM

## 2019-11-19 DIAGNOSIS — D3A.090 CARCINOID TUMOR OF LEFT LUNG: ICD-10-CM

## 2019-11-19 DIAGNOSIS — Z12.31 ENCOUNTER FOR SCREENING MAMMOGRAM FOR MALIGNANT NEOPLASM OF BREAST: ICD-10-CM

## 2019-11-19 DIAGNOSIS — Z12.11 SCREEN FOR COLON CANCER: ICD-10-CM

## 2019-11-19 PROCEDURE — 3017F COLORECTAL CA SCREEN DOC REV: CPT | Performed by: FAMILY MEDICINE

## 2019-11-19 PROCEDURE — 99214 OFFICE O/P EST MOD 30 MIN: CPT | Performed by: FAMILY MEDICINE

## 2019-11-19 PROCEDURE — G8419 CALC BMI OUT NRM PARAM NOF/U: HCPCS | Performed by: FAMILY MEDICINE

## 2019-11-19 PROCEDURE — 4004F PT TOBACCO SCREEN RCVD TLK: CPT | Performed by: FAMILY MEDICINE

## 2019-11-19 PROCEDURE — G8427 DOCREV CUR MEDS BY ELIG CLIN: HCPCS | Performed by: FAMILY MEDICINE

## 2019-11-19 PROCEDURE — G8482 FLU IMMUNIZE ORDER/ADMIN: HCPCS | Performed by: FAMILY MEDICINE

## 2019-11-19 PROCEDURE — G8599 NO ASA/ANTIPLAT THER USE RNG: HCPCS | Performed by: FAMILY MEDICINE

## 2019-11-19 ASSESSMENT — ENCOUNTER SYMPTOMS
EYE ITCHING: 0
EYE DISCHARGE: 0
EYE PAIN: 0

## 2019-11-26 ENCOUNTER — HOSPITAL ENCOUNTER (OUTPATIENT)
Dept: WOMENS IMAGING | Age: 59
Discharge: HOME | End: 2019-11-28
Payer: MEDICARE

## 2019-11-26 ENCOUNTER — HOSPITAL ENCOUNTER (OUTPATIENT)
Dept: CT IMAGING | Age: 59
Discharge: HOME | End: 2019-11-28
Payer: MEDICARE

## 2019-11-26 DIAGNOSIS — Z12.39 SCREENING FOR BREAST CANCER: ICD-10-CM

## 2019-11-26 DIAGNOSIS — Z12.31 ENCOUNTER FOR SCREENING MAMMOGRAM FOR MALIGNANT NEOPLASM OF BREAST: ICD-10-CM

## 2019-11-26 DIAGNOSIS — R20.2 FACIAL TINGLING SENSATION: ICD-10-CM

## 2019-11-26 PROCEDURE — 70450 CT HEAD/BRAIN W/O DYE: CPT

## 2019-11-26 PROCEDURE — 77067 SCR MAMMO BI INCL CAD: CPT

## 2019-11-29 PROBLEM — R93.89 ABNORMAL CT OF THE CHEST: Status: ACTIVE | Noted: 2019-11-29

## 2019-12-16 DIAGNOSIS — Z12.11 SCREEN FOR COLON CANCER: ICD-10-CM

## 2019-12-30 ENCOUNTER — OFFICE VISIT (OUTPATIENT)
Dept: INTERNAL MEDICINE | Age: 59
End: 2019-12-30
Payer: MEDICARE

## 2019-12-30 VITALS
WEIGHT: 135 LBS | SYSTOLIC BLOOD PRESSURE: 100 MMHG | HEART RATE: 90 BPM | DIASTOLIC BLOOD PRESSURE: 70 MMHG | BODY MASS INDEX: 26.37 KG/M2 | OXYGEN SATURATION: 94 %

## 2019-12-30 DIAGNOSIS — J44.9 COPD WITH CHRONIC BRONCHITIS AND EMPHYSEMA (HCC): ICD-10-CM

## 2019-12-30 DIAGNOSIS — E78.00 PURE HYPERCHOLESTEROLEMIA: Primary | ICD-10-CM

## 2019-12-30 DIAGNOSIS — E78.00 PURE HYPERCHOLESTEROLEMIA: ICD-10-CM

## 2019-12-30 DIAGNOSIS — I25.84 CORONARY ARTERY CALCIFICATION OF NATIVE ARTERY: ICD-10-CM

## 2019-12-30 DIAGNOSIS — Z72.0 TOBACCO ABUSE: ICD-10-CM

## 2019-12-30 DIAGNOSIS — D3A.090 CARCINOID TUMOR OF LEFT LUNG: ICD-10-CM

## 2019-12-30 DIAGNOSIS — F51.01 PRIMARY INSOMNIA: ICD-10-CM

## 2019-12-30 DIAGNOSIS — G62.9 NEUROPATHY: ICD-10-CM

## 2019-12-30 DIAGNOSIS — F32.0 MILD SINGLE CURRENT EPISODE OF MAJOR DEPRESSIVE DISORDER (HCC): ICD-10-CM

## 2019-12-30 DIAGNOSIS — K21.9 GASTROESOPHAGEAL REFLUX DISEASE WITHOUT ESOPHAGITIS: ICD-10-CM

## 2019-12-30 DIAGNOSIS — M51.37 DDD (DEGENERATIVE DISC DISEASE), LUMBOSACRAL: ICD-10-CM

## 2019-12-30 DIAGNOSIS — I25.10 CORONARY ARTERY CALCIFICATION OF NATIVE ARTERY: ICD-10-CM

## 2019-12-30 DIAGNOSIS — F41.1 GAD (GENERALIZED ANXIETY DISORDER): ICD-10-CM

## 2019-12-30 LAB
ALBUMIN SERPL-MCNC: 4.4 G/DL (ref 3.5–4.6)
ALP BLD-CCNC: 135 U/L (ref 40–130)
ALT SERPL-CCNC: 13 U/L (ref 0–33)
ANION GAP SERPL CALCULATED.3IONS-SCNC: 14 MEQ/L (ref 9–15)
AST SERPL-CCNC: 15 U/L (ref 0–35)
BILIRUB SERPL-MCNC: 0.4 MG/DL (ref 0.2–0.7)
BUN BLDV-MCNC: 7 MG/DL (ref 6–20)
CALCIUM SERPL-MCNC: 9.5 MG/DL (ref 8.5–9.9)
CHLORIDE BLD-SCNC: 103 MEQ/L (ref 95–107)
CHOLESTEROL, TOTAL: 155 MG/DL (ref 0–199)
CO2: 27 MEQ/L (ref 20–31)
CREAT SERPL-MCNC: 0.91 MG/DL (ref 0.5–0.9)
GFR AFRICAN AMERICAN: >60
GFR NON-AFRICAN AMERICAN: >60
GLOBULIN: 2.5 G/DL (ref 2.3–3.5)
GLUCOSE BLD-MCNC: 96 MG/DL (ref 70–99)
HDLC SERPL-MCNC: 54 MG/DL (ref 40–59)
LDL CHOLESTEROL CALCULATED: 78 MG/DL (ref 0–129)
POTASSIUM SERPL-SCNC: 4.6 MEQ/L (ref 3.4–4.9)
SODIUM BLD-SCNC: 144 MEQ/L (ref 135–144)
TOTAL PROTEIN: 6.9 G/DL (ref 6.3–8)
TRIGL SERPL-MCNC: 113 MG/DL (ref 0–150)

## 2019-12-30 PROCEDURE — G8427 DOCREV CUR MEDS BY ELIG CLIN: HCPCS | Performed by: FAMILY MEDICINE

## 2019-12-30 PROCEDURE — G8419 CALC BMI OUT NRM PARAM NOF/U: HCPCS | Performed by: FAMILY MEDICINE

## 2019-12-30 PROCEDURE — 3023F SPIROM DOC REV: CPT | Performed by: FAMILY MEDICINE

## 2019-12-30 PROCEDURE — G8926 SPIRO NO PERF OR DOC: HCPCS | Performed by: FAMILY MEDICINE

## 2019-12-30 PROCEDURE — G8599 NO ASA/ANTIPLAT THER USE RNG: HCPCS | Performed by: FAMILY MEDICINE

## 2019-12-30 PROCEDURE — G8482 FLU IMMUNIZE ORDER/ADMIN: HCPCS | Performed by: FAMILY MEDICINE

## 2019-12-30 PROCEDURE — 3017F COLORECTAL CA SCREEN DOC REV: CPT | Performed by: FAMILY MEDICINE

## 2019-12-30 PROCEDURE — 99214 OFFICE O/P EST MOD 30 MIN: CPT | Performed by: FAMILY MEDICINE

## 2019-12-30 PROCEDURE — 4004F PT TOBACCO SCREEN RCVD TLK: CPT | Performed by: FAMILY MEDICINE

## 2019-12-30 ASSESSMENT — ENCOUNTER SYMPTOMS
CHEST TIGHTNESS: 0
APNEA: 0
CHOKING: 0

## 2020-01-07 ENCOUNTER — OFFICE VISIT (OUTPATIENT)
Dept: INTERNAL MEDICINE | Age: 60
End: 2020-01-07
Payer: MEDICARE

## 2020-01-07 VITALS
OXYGEN SATURATION: 95 % | SYSTOLIC BLOOD PRESSURE: 103 MMHG | DIASTOLIC BLOOD PRESSURE: 60 MMHG | WEIGHT: 134.2 LBS | HEART RATE: 84 BPM | BODY MASS INDEX: 26.35 KG/M2 | HEIGHT: 60 IN

## 2020-01-07 DIAGNOSIS — Z11.59 NEED FOR HEPATITIS C SCREENING TEST: ICD-10-CM

## 2020-01-07 LAB — HEPATITIS C ANTIBODY INTERPRETATION: NORMAL

## 2020-01-07 PROCEDURE — G8431 POS CLIN DEPRES SCRN F/U DOC: HCPCS | Performed by: FAMILY MEDICINE

## 2020-01-07 PROCEDURE — G8482 FLU IMMUNIZE ORDER/ADMIN: HCPCS | Performed by: FAMILY MEDICINE

## 2020-01-07 PROCEDURE — G0296 VISIT TO DETERM LDCT ELIG: HCPCS | Performed by: FAMILY MEDICINE

## 2020-01-07 PROCEDURE — G0438 PPPS, INITIAL VISIT: HCPCS | Performed by: FAMILY MEDICINE

## 2020-01-07 PROCEDURE — 3017F COLORECTAL CA SCREEN DOC REV: CPT | Performed by: FAMILY MEDICINE

## 2020-01-07 ASSESSMENT — PATIENT HEALTH QUESTIONNAIRE - PHQ9: SUM OF ALL RESPONSES TO PHQ QUESTIONS 1-9: 20

## 2020-01-07 ASSESSMENT — LIFESTYLE VARIABLES: HOW OFTEN DO YOU HAVE A DRINK CONTAINING ALCOHOL: 0

## 2020-01-07 NOTE — PROGRESS NOTES
reveals recent and remote memory intact. Patient's complete Health Risk Assessment and screening values have been reviewed and are found in Flowsheets. The following problems were reviewed today and where indicated follow up appointments were made and/or referrals ordered. Positive Risk Factor Screenings with Interventions:     Substance Abuse:  Social History     Tobacco History     Smoking Status  Current Every Day Smoker Smoking Start Date  1/1/1970 Smoking Frequency  1 pack/day for 52 years (52 pk yrs) Smoking Tobacco Type  Cigarettes    Smokeless Tobacco Use  Never Used          Alcohol History     Alcohol Use Status  No          Drug Use     Drug Use Status  No          Sexual Activity     Sexually Active  Not Currently Birth Control/Protection  Post-menopausal               Audit Questionnaire: Screen for Alcohol Misuse  How often do you have a drink containing alcohol?: Never  Substance Abuse Interventions:  · Tobacco abuse:  tobacco cessation tips and resources provided    General Health:  General  In general, how would you say your health is?: Fair  In the past 7 days, have you experienced any of the following?  New or Increased Pain, New or Increased Fatigue, Loneliness, Social Isolation, Stress or Anger?: (!) Loneliness, Social Isolation, Stress  Do you get the social and emotional support that you need?: Yes(medication)  Do you have a Living Will?: (!) No  General Health Risk Interventions:  · Loneliness: patient declines any further intervention for this issue  · Social isolation: patient declines any further intervention for this issue  · Stress: relaxation techniques discussed  · No Living Will: patient declined    Health Habits/Nutrition:  Health Habits/Nutrition  Do you exercise for at least 20 minutes 2-3 times per week?: (!) No  Have you lost any weight without trying in the past 3 months?: No  Do you eat fewer than 2 meals per day?: (!) Yes  Have you seen a dentist within the past year?:  Influenza, Quadv, IM, PF (6 mo and older Fluzone, Flulaval, Fluarix, and 3 yrs and older Afluria) 09/02/2015, 12/04/2018    Influenza, Margie Drilling, Recombinant, IM PF (Flublok 18 yrs and older) 10/22/2019    Pneumococcal Polysaccharide ([id number]) 04/09/2018        Health Maintenance   Topic Date Due    Annual Wellness Visit (AWV)  05/29/2019    Low dose CT lung screening  12/07/2019    DTaP/Tdap/Td vaccine (1 - Tdap) 11/19/2020 (Originally 12/31/1970)    Shingles Vaccine (1 of 2) 11/19/2020 (Originally 12/31/2009)    Hepatitis C screen  11/19/2020 (Originally 1960)    HIV screen  11/19/2020 (Originally 12/31/1974)    Lipid screen  12/30/2020    Breast cancer screen  11/26/2021    Colon cancer screen fecal DNA test (Cologuard)  12/12/2022    Cervical cancer screen  04/10/2023    Flu vaccine  Completed    Pneumococcal 0-64 years Vaccine  Completed     Recommendations for Preventive Services Due: see orders and patient instructions/AVS.  . Recommended screening schedule for the next 5-10 years is provided to the patient in written form: see Patient Ruel Ayers was seen today for medicare awv. Diagnoses and all orders for this visit:    Positive depression screening  -     Positive Screen for Clinical Depression with a Documented Follow-up Plan     Routine general medical examination at a health care facility                  Cardiovascular Disease Risk Counseling: Assessed the patient's risk to develop cardiovascular disease and reviewed main risk factors.    Reviewed steps to reduce disease risk including:   · Quitting tobacco use, reducing amount smoked, or not starting the habit  · Making healthy food choices  · Being physically active and gradualy increasing activity levels   · Reduce weight and determine a healthy BMI goal  · Monitor blood pressure and treat if higher than 140/90 mmHg  · Maintain blood total cholesterol levels under 5 mmol/l or 190 mg/dl  · Maintain LDL cholesterol levels under 3.0 mmol/l or 115 mg/dl   · Control blood glucose levels  · Consider taking aspirin (75 mg daily), once blood pressure is controlled   Provided a follow up plan. Time spent (minutes): 2    Low Dose CT (LDCT) Lung Screening criteria met   Age 50-69   Pack year smoking >30   Still smoking or less than 15 year since quit   No sign or symptoms of lung cancer   > 11 months since last LDCT     Risks and benefits of lung cancer screening with LDCT scans discussed:    Significance of positive screen - False-positive LDCT results often occur. 95% of all positive results do not lead to a diagnosis of cancer. Usually further imaging can resolve most false-positive results; however, some patients may require invasive procedures. Over diagnosis risk - 10% to 12% of screen-detected lung cancer cases are over diagnosed--that is, the cancer would not have been detected in the patient's lifetime without the screening. Need for follow up screens annually to continue lung cancer screening effectiveness     Risks associated with radiation from annual LDCT- Radiation exposure is about the same as for a mammogram, which is about 1/3 of the annual background radiation exposure from everyday life. Starting screening at age 54 is not likely to increase cancer risk from radiation exposure. Patients with comorbidities resulting in life expectancy of < 10 years, or that would preclude treatment of an abnormality identified on CT, should not be screened due to lack of benefit.     To obtain maximal benefit from this screening, smoking cessation and long-term abstinence from smoking is critical

## 2020-01-07 NOTE — PATIENT INSTRUCTIONS
Personalized Preventive Plan for Miguel Tyler - 1/7/2020  Medicare offers a range of preventive health benefits. Some of the tests and screenings are paid in full while other may be subject to a deductible, co-insurance, and/or copay. Some of these benefits include a comprehensive review of your medical history including lifestyle, illnesses that may run in your family, and various assessments and screenings as appropriate. After reviewing your medical record and screening and assessments performed today your provider may have ordered immunizations, labs, imaging, and/or referrals for you. A list of these orders (if applicable) as well as your Preventive Care list are included within your After Visit Summary for your review. Other Preventive Recommendations:    · A preventive eye exam performed by an eye specialist is recommended every 1-2 years to screen for glaucoma; cataracts, macular degeneration, and other eye disorders. · A preventive dental visit is recommended every 6 months. · Try to get at least 150 minutes of exercise per week or 10,000 steps per day on a pedometer . · Order or download the FREE \"Exercise & Physical Activity: Your Everyday Guide\" from [employer]. Call 8-598.694.4141 or search [employer] online. · You need 0288-8788 mg of calcium and 3290-4483 IU of vitamin D per day. It is possible to meet your calcium requirement with diet alone, but a vitamin D supplement is usually necessary to meet this goal.  · When exposed to the sun, use a sunscreen that protects against both UVA and UVB radiation with an SPF of 30 or greater. Reapply every 2 to 3 hours or after sweating, drying off with a towel, or swimming. · Always wear a seat belt when traveling in a car. Always wear a helmet when riding a bicycle or motorcycle. What is lung cancer screening?   Lung cancer screening is a way in which doctors check the lungs for early signs of cancer in people who have no symptoms of lung cancer. A low-dose CT scan uses much less radiation than a normal CT scan and shows a more detailed image of the lungs than a standard X-ray. The goal of lung cancer screening is to find cancer early, before it has a chance to grow, spread, or cause problems. One large study found that smokers who were screened with low-dose CT scans were less likely to die of lung cancer than those who were screened with standard X-ray. Below is a summary of the things you need to know regarding screening for lung cancer with low-dose computed tomography (LDCT). This is a screening program that involves routine annual screening with LDCT studies of the lung. The LDCTs are done using low-dose radiation that is not thought to increase your cancer risk. If you have other serious medical conditions (other cancers, congestive heart failure) that limit your life expectancy to less than 10 years, you should not undergo lung cancer screening with LDCT. The chance is 20%-60% that the LDCT result will show abnormalities. This would require additional testing which could include repeat imaging or even invasive procedures. Most (about 95%) of \"abnormal\" LDCT results are false in the sense that no lung cancer is ultimately found. Additionally, some (about 10%) of the cancers found would not affect your life expectancy, even if undetected and untreated. If you are still smoking, the single most important thing that you can do to reduce your risk of dying of lung cancer is to quit. For this screening to be covered by Medicare and most other insurers, strict criteria must be met. If you do not meet these criteria, but still wish to undergo LDCT testing, you will be required to sign a waiver indicating your willingness to pay for the scan.

## 2020-01-15 ENCOUNTER — TELEPHONE (OUTPATIENT)
Dept: CASE MANAGEMENT | Age: 60
End: 2020-01-15

## 2020-01-29 RX ORDER — PAROXETINE HYDROCHLORIDE 40 MG/1
40 TABLET, FILM COATED ORAL EVERY MORNING
Qty: 90 TABLET | Refills: 1 | Status: SHIPPED | OUTPATIENT
Start: 2020-01-29 | End: 2020-10-01

## 2020-02-07 ENCOUNTER — HOSPITAL ENCOUNTER (EMERGENCY)
Age: 60
Discharge: HOME | End: 2020-02-07
Attending: EMERGENCY MEDICINE
Payer: MEDICARE

## 2020-02-07 ENCOUNTER — APPOINTMENT (OUTPATIENT)
Dept: GENERAL RADIOLOGY | Age: 60
End: 2020-02-07
Payer: MEDICARE

## 2020-02-07 VITALS
RESPIRATION RATE: 16 BRPM | BODY MASS INDEX: 26.5 KG/M2 | WEIGHT: 135 LBS | HEIGHT: 60 IN | SYSTOLIC BLOOD PRESSURE: 120 MMHG | DIASTOLIC BLOOD PRESSURE: 64 MMHG | OXYGEN SATURATION: 96 % | HEART RATE: 81 BPM | TEMPERATURE: 98.5 F

## 2020-02-07 PROCEDURE — 71046 X-RAY EXAM CHEST 2 VIEWS: CPT

## 2020-02-07 PROCEDURE — 99283 EMERGENCY DEPT VISIT LOW MDM: CPT

## 2020-02-07 PROCEDURE — 6370000000 HC RX 637 (ALT 250 FOR IP): Performed by: EMERGENCY MEDICINE

## 2020-02-07 PROCEDURE — 96372 THER/PROPH/DIAG INJ SC/IM: CPT

## 2020-02-07 PROCEDURE — 6360000002 HC RX W HCPCS: Performed by: EMERGENCY MEDICINE

## 2020-02-07 RX ORDER — KETOROLAC TROMETHAMINE 30 MG/ML
30 INJECTION, SOLUTION INTRAMUSCULAR; INTRAVENOUS ONCE
Status: COMPLETED | OUTPATIENT
Start: 2020-02-07 | End: 2020-02-07

## 2020-02-07 RX ORDER — AZITHROMYCIN 250 MG/1
TABLET, FILM COATED ORAL
Qty: 1 PACKET | Refills: 0 | Status: SHIPPED | OUTPATIENT
Start: 2020-02-07 | End: 2020-04-29 | Stop reason: ALTCHOICE

## 2020-02-07 RX ORDER — DIAZEPAM 5 MG/1
5 TABLET ORAL EVERY 12 HOURS PRN
Qty: 6 TABLET | Refills: 0 | Status: SHIPPED | OUTPATIENT
Start: 2020-02-07 | End: 2020-02-10

## 2020-02-07 RX ORDER — PREDNISONE 20 MG/1
60 TABLET ORAL ONCE
Status: COMPLETED | OUTPATIENT
Start: 2020-02-07 | End: 2020-02-07

## 2020-02-07 RX ORDER — PREDNISONE 50 MG/1
50 TABLET ORAL DAILY
Qty: 4 TABLET | Refills: 0 | Status: SHIPPED | OUTPATIENT
Start: 2020-02-07 | End: 2020-02-11

## 2020-02-07 RX ADMIN — PREDNISONE 60 MG: 20 TABLET ORAL at 19:14

## 2020-02-07 RX ADMIN — KETOROLAC TROMETHAMINE 30 MG: 30 INJECTION, SOLUTION INTRAMUSCULAR at 19:14

## 2020-02-07 ASSESSMENT — PAIN DESCRIPTION - DESCRIPTORS
DESCRIPTORS: ACHING
DESCRIPTORS: ACHING

## 2020-02-07 ASSESSMENT — PAIN DESCRIPTION - LOCATION
LOCATION: RIB CAGE
LOCATION: RIB CAGE

## 2020-02-07 ASSESSMENT — PAIN DESCRIPTION - PROGRESSION: CLINICAL_PROGRESSION: GRADUALLY IMPROVING

## 2020-02-07 ASSESSMENT — PAIN SCALES - GENERAL
PAINLEVEL_OUTOF10: 10
PAINLEVEL_OUTOF10: 8

## 2020-02-07 ASSESSMENT — PAIN DESCRIPTION - PAIN TYPE: TYPE: ACUTE PAIN

## 2020-02-07 ASSESSMENT — PAIN DESCRIPTION - ORIENTATION
ORIENTATION: LEFT
ORIENTATION: LEFT

## 2020-02-07 ASSESSMENT — ENCOUNTER SYMPTOMS
VOMITING: 0
COUGH: 1
SHORTNESS OF BREATH: 1
NAUSEA: 0

## 2020-02-08 NOTE — ED PROVIDER NOTES
[facility] ED  [address] [place] [address] Dr [address] [id number]  Phone: [id number] [phone] ED  eMERGENCY dEPARTMENT eNCOUnter      Pt Name: Silverio Santos  MRN: [id number]  Geetagflorena 1960  Date of evaluation: 2/7/2020  Provider: Lazaro Rashid DO    CHIEF COMPLAINT       Chief Complaint   Patient presents with    Rib Pain     felt something pop on left side with coughing today had lobe resection r/t Cancer 1 year ago    Cough         HISTORY OF PRESENT ILLNESS   (Location/Symptom, Timing/Onset,Context/Setting, Quality, Duration, Modifying Factors, Severity)  Note limiting factors. Silverio Santos is a 60 y.o. female who presents to the emergency department for the evaluation of cough and left-sided chest pain. Patient states she has had a cough for approximately 1 month. Patient states that over the course the last 1 week she developed some left-sided chest pain, it is much worse with cough but it is also present at rest.  She states it feels like it is a rib that is moved or broken. She denies hemoptysis. She denies fever. She does smoke cigarettes and she does have history of lung cancer status post partial lobectomy on the left side that was done about 1 year ago. Patient states she is not taking any medicine at home for this. Patient denies any shortness of breath that is new for her, no dyspnea on exertion. No nausea or vomiting. Nursing Notes were reviewed. REVIEW OF SYSTEMS    (2-9systems for level 4, 10 or more for level 5)     Review of Systems   Constitutional: Negative for fever. Respiratory: Positive for cough and shortness of breath. Gastrointestinal: Negative for nausea and vomiting. Musculoskeletal:        Left chest wall/rib pain   Skin: Negative for rash. Neurological: Negative for weakness. Except asnoted above the remainder of the review of systems was reviewed and negative.        PAST MEDICAL HISTORY     Past Medical History: Diagnosis Date    Abnormal CT of the chest 2019    Bone spur     Cervical stenosis of spine     COPD (chronic obstructive pulmonary disease) (HCC)     Coronary artery calcification of native artery - aortic arch 10/31/2018    On lung screen done 10/26/18    DDD (degenerative disc disease), lumbosacral 3/12/2018    Used to see pain management     CARTER (generalized anxiety disorder) 3/12/2018    [facility]    Gastroesophageal reflux disease without esophagitis 3/12/2018    EGD over 10 years    Gout     Hyperlipidemia     Insomnia 3/12/2018    Mild single current episode of major depressive disorder ([address] 3/12/2018    Neuropathy 3/12/2018    Clinical diagnosis, EMG    Other emphysema ([facility] [address] 3/12/2018    No recent PFT    Pure hypercholesterolemia 3/12/2018    SOB (shortness of breath)     Tobacco abuse 3/12/2018         SURGICAL HISTORY       Past Surgical History:   Procedure Laterality Date     SECTION      LUNG BIOPSY Left 2018    CT guided Left Lung Biopsy by Dr Julia Rehman     Previous Medications    ALBUTEROL (PROVENTIL) (5 MG/ML) 0.5% NEBULIZER SOLUTION    Take 1 mL by nebulization 4 times daily as needed for Wheezing    ATORVASTATIN (LIPITOR) 20 MG TABLET    Take 1 tablet by mouth daily    BUSPIRONE (BUSPAR) 15 MG TABLET    TAKE 1 TABLET BY MOUTH THREE TIMES DAILY    FLUTICASONE-SALMETEROL (ADVAIR) 500-50 MCG/DOSE DISKUS INHALER    Inhale 1 puff into the lungs every 12 hours    GABAPENTIN (NEURONTIN) 300 MG CAPSULE    TAKE 1 CAPSULE BY MOUTH THREE TIMES DAILY    OMEPRAZOLE (PRILOSEC) 20 MG DELAYED RELEASE CAPSULE    TAKE 1 CAPSULE BY MOUTH DAILY    PAROXETINE (PAXIL) 40 MG TABLET    TAKE 1 TABLET BY MOUTH EVERY MORNING    PROAIR  (90 BASE) MCG/ACT INHALER    INHALE 2 PUFFS EVERY 4 HOURS AS NEEDED    TRAZODONE (DESYREL) 100 MG TABLET    TAKE 1 TO 2 TABLETS BY MOUTH NIGHTLY       ALLERGIES     Other and Codeine    FAMILY HISTORY Family History   Problem Relation Age of Onset    Other Mother         epilepsy     No Known Problems Father     No Known Problems Brother     No Known Problems Brother           SOCIAL HISTORY       Social History     Socioeconomic History    Marital status: Single     Spouse name: None    Number of children: None    Years of education: None    Highest education level: None   Occupational History    None   Social Needs    Financial resource strain: None    Food insecurity:     Worry: None     Inability: None    Transportation needs:     Medical: None     Non-medical: None   Tobacco Use    Smoking status: Current Every Day Smoker     Packs/day: 1.00     Years: 47.00     Pack years: 47.00     Types: Cigarettes     Start date: 1970    Smokeless tobacco: Never Used   Substance and Sexual Activity    Alcohol use: No    Drug use: No    Sexual activity: Not Currently     Birth control/protection: Post-menopausal   Lifestyle    Physical activity:     Days per week: None     Minutes per session: None    Stress: None   Relationships    Social connections:     Talks on phone: None     Gets together: None     Attends Buddhist service: None     Active member of club or organization: None     Attends meetings of clubs or organizations: None     Relationship status: None    Intimate partner violence:     Fear of current or ex partner: None     Emotionally abused: None     Physically abused: None     Forced sexual activity: None   Other Topics Concern    None   Social History Narrative    None       SCREENINGS    Elly Coma Scale  Eye Opening: Spontaneous  Best Verbal Response: Oriented  Best Motor Response: Obeys commands  Elly Coma Scale Score: 15        PHYSICAL EXAM    (up to 7 for level 4, 8 or more for level 5)     ED Triage Vitals [02/07/20 1842]   BP Temp Temp Source Pulse Resp SpO2 Height Weight   132/64 98.5 °F (36.9 °C) Oral 86 16 98 % 5' (1.524 m) 135 lb (61.2 kg)       Physical normal and she is nontoxic. Physical examination shows that she is resting comfortable with no acute abnormalities, does have reproducible left chest wall pain. At this time I very low suspicion this represents acute coronary syndrome, pulmonary embolus, dissection, suspect most likely she has musculoskeletal chest wall pain and possibly underlying bronchitis. X-ray has been ordered and medications did get been given      DIAGNOSTIC RESULTS     LABS:  Labs Reviewed - No data to display    All other labs were within normal range or not returned as of this dictation. RADIOLOGY:  XR CHEST STANDARD (2 VW)   ED Interpretation   2 view chest x-ray shows no displaced rib fracture, no pneumothorax or pleural effusion. No obvious cardiopulmonary pathophysiology        X-ray revealed no acute cardiopulmonary pathophysiology. No displaced rib fracture. I discussed results with her, discussed antibiotics and steroids at home as well as using Motrin and Tylenol for the pain. Discussed follow-up and return to ER information    At this time the patient is without objective evidence of an acute process requiring hospitalization or inpatient management. They have remained hemodynamically stable and are stable for discharge with outpatient follow-up. Standard anticipatory guidance given to patient upon discharge. Have given them a specific time frame in which to follow-up and who to follow-up with. I have also advised them that they should return to the emergency department if they get worse, or not getting better or develop any new or concerning symptoms. Patient demonstrates understanding. PROCEDURES:  Unless otherwise noted below, none     Procedures    FINAL IMPRESSION      1.  Acute bronchitis, unspecified organism          DISPOSITION/PLAN   DISPOSITION Decision To Discharge 02/07/2020 07:14:32 PM      PATIENT REFERRED TO:  Kevin Tsang MD  [address] [phone]    In 1 week        DISCHARGE MEDICATIONS:  New Prescriptions    AZITHROMYCIN (ZITHROMAX Z-ASHOK) 250 MG TABLET    Follow directions on package labelling    PREDNISONE (DELTASONE) 50 MG TABLET    Take 1 tablet by mouth daily for 4 days          (Please note that portions of this note were completed with a voice recognition program.  Efforts were made to edit the dictations but occasionally words are mis-transcribed.)    Arcelia Beyer DO (electronically signed)  Board Certified Emergency Physician          Arcelia Beyer DO  02/07/20 1935

## 2020-03-30 RX ORDER — OMEPRAZOLE 20 MG/1
CAPSULE, DELAYED RELEASE ORAL
Qty: 30 CAPSULE | Refills: 3 | Status: SHIPPED | OUTPATIENT
Start: 2020-03-30 | End: 2020-04-29 | Stop reason: SDUPTHER

## 2020-04-14 RX ORDER — TRAZODONE HYDROCHLORIDE 100 MG/1
TABLET ORAL
Qty: 60 TABLET | Refills: 3 | Status: SHIPPED | OUTPATIENT
Start: 2020-04-14 | End: 2021-01-15 | Stop reason: SDUPTHER

## 2020-04-17 ENCOUNTER — TELEPHONE (OUTPATIENT)
Dept: PULMONOLOGY | Age: 60
End: 2020-04-17

## 2020-04-29 ENCOUNTER — VIRTUAL VISIT (OUTPATIENT)
Dept: INTERNAL MEDICINE | Age: 60
End: 2020-04-29
Payer: MEDICARE

## 2020-04-29 VITALS — BODY MASS INDEX: 26.37 KG/M2 | HEIGHT: 60 IN

## 2020-04-29 PROCEDURE — 3017F COLORECTAL CA SCREEN DOC REV: CPT | Performed by: FAMILY MEDICINE

## 2020-04-29 PROCEDURE — G8427 DOCREV CUR MEDS BY ELIG CLIN: HCPCS | Performed by: FAMILY MEDICINE

## 2020-04-29 PROCEDURE — 99214 OFFICE O/P EST MOD 30 MIN: CPT | Performed by: FAMILY MEDICINE

## 2020-04-29 RX ORDER — ATORVASTATIN CALCIUM 20 MG/1
20 TABLET, FILM COATED ORAL DAILY
Qty: 90 TABLET | Refills: 1 | Status: SHIPPED | OUTPATIENT
Start: 2020-04-29 | End: 2020-06-03

## 2020-04-29 RX ORDER — MELOXICAM 7.5 MG/1
7.5 TABLET ORAL DAILY
Qty: 30 TABLET | Refills: 0 | Status: SHIPPED | OUTPATIENT
Start: 2020-04-29 | End: 2021-07-12 | Stop reason: ALTCHOICE

## 2020-04-29 RX ORDER — OMEPRAZOLE 20 MG/1
CAPSULE, DELAYED RELEASE ORAL
Qty: 90 CAPSULE | Refills: 1 | Status: SHIPPED | OUTPATIENT
Start: 2020-04-29 | End: 2021-03-29

## 2020-04-29 NOTE — PROGRESS NOTES
abused: Not on file     Physically abused: Not on file     Forced sexual activity: Not on file   Other Topics Concern    Not on file   Social History Narrative    Not on file     Current Outpatient Medications on File Prior to Visit   Medication Sig Dispense Refill    traZODone (DESYREL) 100 MG tablet TAKE 1 TO 2 TABLETS BY MOUTH NIGHTLY 60 tablet 3    PROAIR  (90 Base) MCG/ACT inhaler INHALE 2 PUFFS EVERY 4 HOURS AS NEEDED 8.5 g 3    PARoxetine (PAXIL) 40 MG tablet TAKE 1 TABLET BY MOUTH EVERY MORNING 90 tablet 1    fluticasone-salmeterol (ADVAIR) 500-50 MCG/DOSE diskus inhaler Inhale 1 puff into the lungs every 12 hours 1 Inhaler 6    busPIRone (BUSPAR) 15 MG tablet TAKE 1 TABLET BY MOUTH THREE TIMES DAILY 90 tablet 3    albuterol (PROVENTIL) (5 MG/ML) 0.5% nebulizer solution Take 1 mL by nebulization 4 times daily as needed for Wheezing 120 each 3     No current facility-administered medications on file prior to visit. Allergies   Allergen Reactions    Other Anaphylaxis     Sun flower seeds     Codeine Nausea And Vomiting       Chief Complaint   Patient presents with    Depression     follow up    Anxiety    Discuss Medications     would like to discuss restarting gabapentin or something else to help with back pain, arthritis worsening       HPI    TELEHEALTH EVALUATION -- Audio/Visual (During XXNQM-51 public health emergency)    Due to COVID 19 outbreak, patient's office visit was converted to a virtual visit. Patient was contacted and agreed to proceed with a virtual visit via [email or web address]  The risks and benefits of converting to a virtual visit were discussed in light of the current infectious disease epidemic. Patient also understood that insurance coverage and co-pays are up to their individual insurance plans.       Pursuant to the emergency declaration under the [facility], 1135 waiver authority and the [employer] Supplemental Appropriations Act, this Virtual  Visit was conducted, with patient's consent, to reduce the patient's risk of exposure to COVID-19 and provide continuity of care for an established patient. Services were provided through a video discussion virtually to substitute for in-person clinic visit. Hyperlipidemia:  No new myalgias or GI upset on atorvastatin (Lipitor). Medication compliance: compliant all of the time. Patient is  following a low fat, low cholesterol diet. She is  exercising regularly.      Lab Results   Component Value Date    CHOL 155 12/30/2019    TRIG 113 12/30/2019    HDL 54 12/30/2019    LDLCALC 78 12/30/2019     Lab Results   Component Value Date    ALT 13 12/30/2019    AST 15 12/30/2019          Lung nodule, carcinoid tumor of left lung  Seeing pulmonary  Left lower Lobe lung nodule PET positive, concerning for malignancy.  Biopsy is negative.   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  Pathology [id number] atypical carcinoid tumor      Tobacco abuse  Social History     Tobacco Use   Smoking Status Current Every Day Smoker    Packs/day: 1.00    Years: 47.00    Pack years: 47.00    Types: Cigarettes    Start date: 1970   Smokeless Tobacco Never Used       GERD  She does have a past medical history of acid reflux  She is currently on Prilosec which works well for this  She did have an endoscopy possibly over 10 years ago  She has no current symptoms regarding her acid reflux     CARTER, Depression, Insomnia  Patient currently being treated for depression, insomnia, generalized anxiety disorder.  She denies any suicidal ideation or homicidal ideation.  She is not seeing anyone at the [facility] for this.  No medication side effects     Neuropathy  Patient also has a history of peripheral neuropathy.  No EMG was done, it was a clinical diagnosis and she was started on gabapentin.  since last visit with me and no noted difference with gabapentin use - medication was stopped.     COPD  Patient has a long-standing history of smoking, she has tried to cut back recently but could only get down to half a pack  She does have a 30+ pack year history of smoking  Productive cough for several months  Coughing up white/clear sputum  Takes Advair / Proair/spiriva daily  Does not feel that inhalers are working all that well  Denies congestion, runny nose, fever or fever like symptoms  Pulmonary function test many many years ago  She is not coughing or spitting up blood and she does not have any unintentional weight loss     PFT 5/23/18:  SUMMARY:  Severe obstructive pulmonary disease. Ariadne Dustman is significant  response to bronchodilator.  Static lung volume study suggests  hyperinflation.  Diffusion capacity is mildly impaired.  Airway resistance  is increased.  Flow volume loop suggests obstructive pulmonary disease. Clinical correlation is requested.       Symptoms:pain in middle of back, lower back, and neck  Location:as above  Quality:ache, sore  Severity:varies  Duration:years  Timing:on and off, can be constant  Context:tried PT in the past  No imaging in the past  Alleviating/aggravting factors:stiffness in the AM  Associated signs & symptoms:  Numbness/tingling in the hands and feet  It is her OA  No foot drop  No leg or arm weakness  No urinary retention  No fecal incontinence      Review of Systems  Metabolic/Endocrine: Negative for cold intolerance, heat intolerance, polydipsia and polyphagia. No unintended weight loss or weight gain. Neuro/Psychiatric: Negative for gait disturbance. Negative for psychiatric symptoms. Dermatologic: Negative for pruritus and rash. Musculoskeletal:  for bone/joint symptoms. + numbness or tingling in hands,legs and feet. Physical Exam    Nursing note reviewed. Constitutional:       General: He is not in acute distress. Appearance: Normal appearance. He is normal weight. controlled  Plan: continue current medications    Mild single current episode of major depressive disorder (HCC)  CARTER (generalized anxiety disorder)  Primary insomnia  Stable, controlled  Plan: continue current medications    Neuropathy  DDD (degenerative disc disease), lumbosacral  -     meloxicam (MOBIC) 7.5 MG tablet; Take 1 tablet by mouth daily  Off of gabapentin now  Trial of mobic      I personally reviewed all recent labs and imaging pertaining to conditions mentioned above in assessment/plan in [facility] and care everywhere, discussed results with patient in office    Return in about 3 months (around 7/29/2020) for Hyperlipidemia. Patient is aware this encounter is billable to insurance. This encounter occurred with patient at home while provider was at the office.

## 2020-06-03 NOTE — TELEPHONE ENCOUNTER
Requesting medication refill.  Please approve or deny this request.    Rx requested:  Requested Prescriptions     Pending Prescriptions Disp Refills    busPIRone (BUSPAR) 15 MG tablet [Pharmacy Med Name: buspirone 15 mg tablet] 90 tablet 1     Sig: TAKE 1 TABLET BY MOUTH THREE TIMES DAILY       Last Office Visit:   4/29/20    Last Labs:  1/7/20    Next Visit Date:  Future Appointments   Date Time Provider Macy Veronica   8/3/2020  9:45 AM Morales Means MD [facility]

## 2020-06-04 RX ORDER — BUSPIRONE HYDROCHLORIDE 15 MG/1
TABLET ORAL
Qty: 90 TABLET | Refills: 1 | Status: SHIPPED | OUTPATIENT
Start: 2020-06-04 | End: 2020-07-17

## 2020-06-04 RX ORDER — ATORVASTATIN CALCIUM 20 MG/1
20 TABLET, FILM COATED ORAL DAILY
Qty: 90 TABLET | Refills: 1 | Status: SHIPPED | OUTPATIENT
Start: 2020-06-04 | End: 2021-06-10

## 2020-07-03 NOTE — TELEPHONE ENCOUNTER
Requesting medication refill.  Please approve or deny this request.    Rx requested:  Requested Prescriptions     Pending Prescriptions Disp Refills    albuterol sulfate  (90 Base) MCG/ACT inhaler [Pharmacy Med Name: albuterol sulfate HFA 90 mcg/actuation aerosol inhaler] 8.5 g 3     Sig: INHALE 2 PUFFS EVERY 4 HOURS AS NEEDED       Last Office Visit:   4/29/20    Last Filled:  3/18    Last Labs:  1/7/2020    Next Visit Date:  Future Appointments   Date Time Provider Macy Veronica   8/3/2020  9:45 AM Do Cosby MD [facility]

## 2020-07-06 RX ORDER — ALBUTEROL SULFATE 90 UG/1
AEROSOL, METERED RESPIRATORY (INHALATION)
Qty: 8.5 G | Refills: 3 | Status: SHIPPED | OUTPATIENT
Start: 2020-07-06 | End: 2020-10-05

## 2020-07-17 RX ORDER — BUSPIRONE HYDROCHLORIDE 15 MG/1
TABLET ORAL
Qty: 270 TABLET | Refills: 1 | Status: SHIPPED | OUTPATIENT
Start: 2020-07-17 | End: 2021-02-04

## 2020-07-17 NOTE — TELEPHONE ENCOUNTER
Requesting medication refill.  Please approve or deny this request.    Rx requested:  Requested Prescriptions     Pending Prescriptions Disp Refills    busPIRone (BUSPAR) 15 MG tablet [Pharmacy Med Name: buspirone 15 mg tablet] 90 tablet 1     Sig: TAKE 1 TABLET BY MOUTH THREE TIMES DAILY       Last Office Visit:   4/29/2020    Last Filled:  6-4-20    Last Labs:  1/7/20    Next Visit Date:  Future Appointments   Date Time Provider Macy Veronica   8/3/2020  9:45 AM Mark Walker MD [facility]

## 2020-08-24 ENCOUNTER — HOSPITAL ENCOUNTER (OUTPATIENT)
Dept: CT IMAGING | Age: 60
Discharge: HOME | End: 2020-08-26
Payer: MEDICARE

## 2020-08-24 PROCEDURE — 71250 CT THORAX DX C-: CPT

## 2020-08-25 ENCOUNTER — TELEPHONE (OUTPATIENT)
Dept: CARDIOTHORACIC SURGERY | Age: 60
End: 2020-08-25

## 2020-08-28 ENCOUNTER — OFFICE VISIT (OUTPATIENT)
Dept: CARDIOTHORACIC SURGERY | Age: 60
End: 2020-08-28
Payer: MEDICARE

## 2020-08-28 VITALS
WEIGHT: 141 LBS | BODY MASS INDEX: 27.68 KG/M2 | TEMPERATURE: 96.8 F | OXYGEN SATURATION: 94 % | HEART RATE: 97 BPM | HEIGHT: 60 IN

## 2020-08-28 PROCEDURE — 4004F PT TOBACCO SCREEN RCVD TLK: CPT | Performed by: THORACIC SURGERY (CARDIOTHORACIC VASCULAR SURGERY)

## 2020-08-28 PROCEDURE — G8427 DOCREV CUR MEDS BY ELIG CLIN: HCPCS | Performed by: THORACIC SURGERY (CARDIOTHORACIC VASCULAR SURGERY)

## 2020-08-28 PROCEDURE — G8419 CALC BMI OUT NRM PARAM NOF/U: HCPCS | Performed by: THORACIC SURGERY (CARDIOTHORACIC VASCULAR SURGERY)

## 2020-08-28 PROCEDURE — 3017F COLORECTAL CA SCREEN DOC REV: CPT | Performed by: THORACIC SURGERY (CARDIOTHORACIC VASCULAR SURGERY)

## 2020-08-28 PROCEDURE — 99205 OFFICE O/P NEW HI 60 MIN: CPT | Performed by: THORACIC SURGERY (CARDIOTHORACIC VASCULAR SURGERY)

## 2020-08-28 ASSESSMENT — ENCOUNTER SYMPTOMS
WHEEZING: 0
COUGH: 0
CHOKING: 0
VOICE CHANGE: 0
TROUBLE SWALLOWING: 1
ABDOMINAL PAIN: 0
CHEST TIGHTNESS: 0
SORE THROAT: 0
VOMITING: 0
ABDOMINAL DISTENTION: 0
SHORTNESS OF BREATH: 0
STRIDOR: 0
DIARRHEA: 0
NAUSEA: 0

## 2020-08-28 NOTE — PROGRESS NOTES
Subjective:      Patient ID: Radha Tejada is a 60 y.o. female who presents today for: Follow-up carcinoid tumor  Chief Complaint   Patient presents with    New Patient       HPI  Patient is 1 year and 6 months out from a left upper lobe wedge for resection of an atypical carcinoid tumor. This was staged as a T1bN0 atypical carcinoid. She is here for her scheduled surveillance CAT scan. At her last visit she was having some pain over her left rib cage after a bout of bronchitis. This pain is still present but is lessened. She is not taking any medication for it. Over the last couple months she has noticed a fullness in her throat when she yawns or opens her mouth wide. She says it feels like things are being pushed to the right side of her neck. She also describes that when she takes her pills it feels like they go into a pouch in her throat and she has to drink water to eventually wash them down.     Past Medical History:   Diagnosis Date    Abnormal CT of the chest 2019    Bone spur     Cervical stenosis of spine     COPD (chronic obstructive pulmonary disease) (HCC)     Coronary artery calcification of native artery - aortic arch 10/31/2018    On lung screen done 10/26/18    DDD (degenerative disc disease), lumbosacral 3/12/2018    Used to see pain management     CARTER [facility]generalized anxiety disorder) 3/12/2018    [facility]    Gastroesophageal reflux disease without esophagitis 3/12/2018    EGD over 10 years    Gout     Hyperlipidemia     Insomnia 3/12/2018    Mild single current episode of major depressive disorder ([address] 3/12/2018    Neuropathy 3/12/2018    Clinical diagnosis, EMG    Other emphysema ([address] 3/12/2018    No recent PFT    Pure hypercholesterolemia 3/12/2018    SOB (shortness of breath)     Tobacco abuse 3/12/2018      Past Surgical History:   Procedure Laterality Date     SECTION      LUNG BIOPSY Left 2018    CT guided Left Lung Biopsy by Dr Cristal Cornell Yvette Pollock     Social History     Socioeconomic History    Marital status: Single     Spouse name: Not on file    Number of children: Not on file    Years of education: Not on file    Highest education level: Not on file   Occupational History    Not on file   Social Needs    Financial resource strain: Not on file    Food insecurity     Worry: Not on file     Inability: Not on file    Transportation needs     Medical: Not on file     Non-medical: Not on file   Tobacco Use    Smoking status: Current Every Day Smoker     Packs/day: 1.00     Years: 47.00     Pack years: 47.00     Types: Cigarettes     Start date: 1970    Smokeless tobacco: Never Used   Substance and Sexual Activity    Alcohol use: No    Drug use: No    Sexual activity: Not Currently     Birth control/protection: Post-menopausal   Lifestyle    Physical activity     Days per week: Not on file     Minutes per session: Not on file    Stress: Not on file   Relationships    Social connections     Talks on phone: Not on file     Gets together: Not on file     Attends Jainism service: Not on file     Active member of club or organization: Not on file     Attends meetings of clubs or organizations: Not on file     Relationship status: Not on file    Intimate partner violence     Fear of current or ex partner: Not on file     Emotionally abused: Not on file     Physically abused: Not on file     Forced sexual activity: Not on file   Other Topics Concern    Not on file   Social History Narrative    Not on file     Family History   Problem Relation Age of Onset    Other Mother         epilepsy     No Known Problems Father     No Known Problems Brother     No Known Problems Brother      Allergies   Allergen Reactions    Other Anaphylaxis     Sun flower seeds     Codeine Nausea And Vomiting     Current Outpatient Medications on File Prior to Visit   Medication Sig Dispense Refill    busPIRone (BUSPAR) 15 MG tablet TAKE 1 TABLET BY MOUTH THREE TIMES DAILY 270 tablet 1    albuterol sulfate  (90 Base) MCG/ACT inhaler INHALE 2 PUFFS EVERY 4 HOURS AS NEEDED 8.5 g 3    atorvastatin (LIPITOR) 20 MG tablet Take 1 tablet by mouth daily 90 tablet 1    meloxicam (MOBIC) 7.5 MG tablet Take 1 tablet by mouth daily 30 tablet 0    omeprazole (PRILOSEC) 20 MG delayed release capsule TAKE 1 CAPSULE BY MOUTH EVERY DAY 90 capsule 1    traZODone (DESYREL) 100 MG tablet TAKE 1 TO 2 TABLETS BY MOUTH NIGHTLY 60 tablet 3    PARoxetine (PAXIL) 40 MG tablet TAKE 1 TABLET BY MOUTH EVERY MORNING 90 tablet 1    fluticasone-salmeterol (ADVAIR) 500-50 MCG/DOSE diskus inhaler Inhale 1 puff into the lungs every 12 hours 1 Inhaler 6    albuterol (PROVENTIL) (5 MG/ML) 0.5% nebulizer solution Take 1 mL by nebulization 4 times daily as needed for Wheezing 120 each 3     No current facility-administered medications on file prior to visit. Review of Systems   Constitutional: Negative for activity change, appetite change, chills, diaphoresis, fatigue, fever and unexpected weight change. HENT: Positive for trouble swallowing. Negative for sore throat and voice change. Eyes: Negative for visual disturbance. Respiratory: Negative for cough, choking, chest tightness, shortness of breath, wheezing and stridor. Cardiovascular: Negative for chest pain, palpitations and leg swelling. Gastrointestinal: Negative for abdominal distention, abdominal pain, diarrhea, nausea and vomiting. Genitourinary: Negative for difficulty urinating. Musculoskeletal: Negative for gait problem and joint swelling. Skin: Negative for rash and wound. Neurological: Negative for dizziness, seizures and light-headedness. Hematological: Negative for adenopathy. Does not bruise/bleed easily. Psychiatric/Behavioral: Negative for behavioral problems and confusion.          Objective:   Pulse 97   Temp 96.8 °F (36 °C) (Temporal)   Ht 5' (1.524 m)   Wt 141 lb (64 kg)   LMP  (LMP Unknown)   SpO2 94%   BMI 27.54 kg/m²     Physical Exam  Constitutional:       General: She is not in acute distress. Appearance: She is not ill-appearing, toxic-appearing or diaphoretic. HENT:      Head: Normocephalic and atraumatic. Nose: Nose normal.   Eyes:      Extraocular Movements: Extraocular movements intact. Conjunctiva/sclera: Conjunctivae normal.      Pupils: Pupils are equal, round, and reactive to light. Neck:      Musculoskeletal: Neck supple. No muscular tenderness. Vascular: No carotid bruit. Cardiovascular:      Rate and Rhythm: Normal rate and regular rhythm. Heart sounds: Normal heart sounds. No murmur. No gallop. Pulmonary:      Effort: Pulmonary effort is normal. No respiratory distress. Breath sounds: Normal breath sounds. No wheezing or rales. Abdominal:      General: Abdomen is flat. Bowel sounds are normal.      Palpations: Abdomen is soft. There is no mass. Tenderness: There is no abdominal tenderness. Musculoskeletal:         General: No swelling. Right lower leg: No edema. Left lower leg: No edema. Lymphadenopathy:      Cervical: No cervical adenopathy. Skin:     General: Skin is warm and dry. Neurological:      General: No focal deficit present. Mental Status: She is alert and oriented to person, place, and time. Psychiatric:         Mood and Affect: Mood normal.         Behavior: Behavior normal.         Thought Content: Thought content normal.         Judgment: Judgment normal.               Radiographs and Laboratory Studies:     Diagnostic Imaging Studies:    I personally viewed the CAT scan of her chest and compared to her last CAT scan. I see stable postoperative changes in the left hemithorax. There is a new healed with nonunion rib fracture of the left sixth rib laterally. There is a new right lower lobe nodule posteriorly just above the diaphragm. Assessment/Plan     Patient has multiple issues. 1 she has a recent rib fracture on the left side which is directly over where she is having pain. I told her there is nothing to do at this time and less the pain worsens to where it is making her life unbearable. At that point we could discuss possible partial rib resection. New right lower lobe nodule of unknown significance. We will follow-up in 4 months with a CAT scan. New feeling of fullness in her throat with displacement of structures to the right when she opens her mouth wide. We will get a CAT scan of her neck. If appropriate we will refer her to an ENT physician. Feelings of her pills going into a pouch in her throat when she swallows. We will obtain an esophagram to rule out an esophageal diverticulum. Diagnosis Orders   1. Pulmonary nodule  CT CHEST WO CONTRAST   2. Carcinoid tumor determined by biopsy of lung  CT CHEST WO CONTRAST   3. Lump in neck  FL ESOPHAGRAM    CT SOFT TISSUE NECK W CONTRAST     Orders Placed This Encounter   Procedures    CT CHEST WO CONTRAST     Standing Status:   Future     Standing Expiration Date:   2/28/2021     Order Specific Question:   Reason for exam:     Answer:   Surveillance CAT scan for resection of left lung carcinoid, new right lower lobe nodule follow-up    FL ESOPHAGRAM     Standing Status:   Future     Standing Expiration Date:   10/28/2020     Order Specific Question:   Reason for exam:     Answer:   When patient swallows her pills she describes them going into a \" pouch\" in her throat and she has to drink water to wash it down.  CT SOFT TISSUE NECK W CONTRAST     Standing Status:   Future     Standing Expiration Date:   8/28/2021     Order Specific Question:   Reason for exam:     Answer:   When patient yawns or opens her mouth she feels a fullness in her throat that pushes things to the right. This is new over the last month. No orders of the defined types were placed in this encounter.       Return in about 4 months (around 12/28/2020) for Surveillance CAT scan.       Liz Donnelly MD

## 2020-08-28 NOTE — PATIENT INSTRUCTIONS
Patient will follow-up in 4 months with a CAT scan for a new right lower lobe nodule. She is describing fullness in her throat as well as pills sticking. We will order an esophagram and a CAT scan of the neck.

## 2020-09-04 ENCOUNTER — HOSPITAL ENCOUNTER (OUTPATIENT)
Dept: GENERAL RADIOLOGY | Age: 60
Discharge: HOME | End: 2020-09-06
Payer: MEDICARE

## 2020-09-04 PROCEDURE — 6370000000 HC RX 637 (ALT 250 FOR IP): Performed by: THORACIC SURGERY (CARDIOTHORACIC VASCULAR SURGERY)

## 2020-09-04 PROCEDURE — A4641 RADIOPHARM DX AGENT NOC: HCPCS | Performed by: THORACIC SURGERY (CARDIOTHORACIC VASCULAR SURGERY)

## 2020-09-04 PROCEDURE — 74220 X-RAY XM ESOPHAGUS 1CNTRST: CPT

## 2020-09-04 PROCEDURE — 6360000004 HC RX CONTRAST MEDICATION: Performed by: THORACIC SURGERY (CARDIOTHORACIC VASCULAR SURGERY)

## 2020-09-04 PROCEDURE — 2500000003 HC RX 250 WO HCPCS: Performed by: THORACIC SURGERY (CARDIOTHORACIC VASCULAR SURGERY)

## 2020-09-04 RX ADMIN — BARIUM SULFATE 135 ML: 960 POWDER, FOR SUSPENSION ORAL at 09:09

## 2020-09-04 RX ADMIN — BARIUM SULFATE 1 TABLET: 700 TABLET ORAL at 09:14

## 2020-09-04 RX ADMIN — ANTACID/ANTIFLATULENT 1 EACH: 380; 550; 10; 10 GRANULE, EFFERVESCENT ORAL at 09:09

## 2020-09-08 ENCOUNTER — OFFICE VISIT (OUTPATIENT)
Dept: INTERNAL MEDICINE | Age: 60
End: 2020-09-08
Payer: MEDICARE

## 2020-09-08 VITALS
WEIGHT: 142.4 LBS | HEART RATE: 87 BPM | DIASTOLIC BLOOD PRESSURE: 74 MMHG | TEMPERATURE: 98.2 F | SYSTOLIC BLOOD PRESSURE: 128 MMHG | BODY MASS INDEX: 27.96 KG/M2 | OXYGEN SATURATION: 95 % | HEIGHT: 60 IN

## 2020-09-08 PROCEDURE — 3017F COLORECTAL CA SCREEN DOC REV: CPT | Performed by: FAMILY MEDICINE

## 2020-09-08 PROCEDURE — 99214 OFFICE O/P EST MOD 30 MIN: CPT | Performed by: FAMILY MEDICINE

## 2020-09-08 PROCEDURE — 3023F SPIROM DOC REV: CPT | Performed by: FAMILY MEDICINE

## 2020-09-08 PROCEDURE — G8926 SPIRO NO PERF OR DOC: HCPCS | Performed by: FAMILY MEDICINE

## 2020-09-08 PROCEDURE — 4004F PT TOBACCO SCREEN RCVD TLK: CPT | Performed by: FAMILY MEDICINE

## 2020-09-08 PROCEDURE — G8427 DOCREV CUR MEDS BY ELIG CLIN: HCPCS | Performed by: FAMILY MEDICINE

## 2020-09-08 PROCEDURE — G8419 CALC BMI OUT NRM PARAM NOF/U: HCPCS | Performed by: FAMILY MEDICINE

## 2020-09-08 RX ORDER — NEBULIZER ACCESSORIES
1 KIT MISCELLANEOUS DAILY PRN
Qty: 1 KIT | Refills: 0 | Status: SHIPPED | OUTPATIENT
Start: 2020-09-08 | End: 2021-03-15 | Stop reason: SDUPTHER

## 2020-09-08 ASSESSMENT — ENCOUNTER SYMPTOMS
EYE PAIN: 0
CHOKING: 0
EYE ITCHING: 0
CHEST TIGHTNESS: 0
EYE DISCHARGE: 0
APNEA: 0

## 2020-09-08 NOTE — PROGRESS NOTES
Patient: Valentine Pacheco    YOB: 1960    Date: 9/8/20       Patient Active Problem List    Diagnosis Date Noted    Carcinoid tumor of left lung 04/10/2019     Overview Note:     2/2019 Left VATS Wedge resection early stage atypical carcinoid tumor  Pathology T1bn0 atypical carcinoid tumor       Coronary artery calcification of native artery - aortic arch 10/31/2018     Overview Note:     On lung screen done 10/26/18      COPD with chronic bronchitis and emphysema (HCC)     Hyperlipidemia     SOB (shortness of breath)     Pure hypercholesterolemia 03/12/2018    Mild single current episode of major depressive disorder ([address] 03/12/2018     Overview Note:     [address] CARTER (generalized anxiety disorder) 03/12/2018    Insomnia 03/12/2018    Gastroesophageal reflux disease without esophagitis 03/12/2018     Overview Note:     EGD over 10 years      Neuropathy 03/12/2018     Overview Note:     Clinical diagnosis, EMG      DDD (degenerative disc disease), lumbosacral 03/12/2018     Overview Note:     Used to see pain management       Tobacco abuse 03/12/2018     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 10/31/2018    On lung screen done 10/26/18    DDD (degenerative disc disease), lumbosacral 3/12/2018    Used to see pain management     CARTER (generalized anxiety disorder) 3/12/2018    [facility]    Gastroesophageal reflux disease without esophagitis 3/12/2018    EGD over 10 years    Gout     Hyperlipidemia     Insomnia 3/12/2018    Mild single current episode of major depressive disorder ([address] 3/12/2018    Neuropathy 3/12/2018    Clinical diagnosis, EMG    Other emphysema ([address] 3/12/2018    No recent PFT    Pure hypercholesterolemia 3/12/2018    SOB (shortness of breath)  Tobacco abuse 3/12/2018     Past Surgical History:   Procedure Laterality Date     SECTION      LUNG BIOPSY Left 2018    CT guided Left Lung Biopsy by Dr Christiano Whitley     Family History   Problem Relation Age of Onset    Other Mother         epilepsy     No Known Problems Father     No Known Problems Brother     No Known Problems Brother      Social History     Socioeconomic History    Marital status: Single     Spouse name: Not on file    Number of children: Not on file    Years of education: Not on file    Highest education level: Not on file   Occupational History    Not on file   Social Needs    Financial resource strain: Not on file    Food insecurity     Worry: Not on file     Inability: Not on file    Transportation needs     Medical: Not on file     Non-medical: Not on file   Tobacco Use    Smoking status: Current Every Day Smoker     Packs/day: 1.00     Years: 47.00     Pack years: 47.00     Types: Cigarettes     Start date:     Smokeless tobacco: Never Used   Substance and Sexual Activity    Alcohol use: No    Drug use: No    Sexual activity: Not Currently     Birth control/protection: Post-menopausal   Lifestyle    Physical activity     Days per week: Not on file     Minutes per session: Not on file    Stress: Not on file   Relationships    Social connections     Talks on phone: Not on file     Gets together: Not on file     Attends Christian service: Not on file     Active member of club or organization: Not on file     Attends meetings of clubs or organizations: Not on file     Relationship status: Not on file    Intimate partner violence     Fear of current or ex partner: Not on file     Emotionally abused: Not on file     Physically abused: Not on file     Forced sexual activity: Not on file   Other Topics Concern    Not on file   Social History Narrative    Not on file     Current Outpatient Medications on File Prior to Visit   Medication Sig Dispense Imaging Studies:    I personally viewed the CAT scan of her chest and compared to her last CAT scan. I see stable postoperative changes in the left hemithorax. There is a new healed with nonunion rib fracture of the left sixth rib laterally. There is a new right lower lobe nodule posteriorly just above the diaphragm. Repeat Ct scan in 4 months     1. THERE APPEARS TO BE DIFFICULTY WITH INITIATION OF THE SWALLOWING. CONSIDER MODIFIED BARIUM SWALLOW TO FURTHER EVALUATE.     2. THE REMAINDER OF THE BIPHASIC ESOPHAGRAM IS OTHERWISE UNREMARKABLE.      + coughing more  She feels fullness in her neck  When she yawns she feels everything move to the right   She has felt a lump in her left neck area   Ongoing for a few months  Gradually worse  Trouble swallowing liquids and solids  No fevers, no weight loss  No hematochezia or  Hematemesis      Tobacco abuse  Social History     Tobacco Use   Smoking Status Current Every Day Smoker    Packs/day: 1.00    Years: 47.00    Pack years: 47.00    Types: Cigarettes    Start date: 1970   Smokeless Tobacco Never Used       GERD  She does have a past medical history of acid reflux  She is currently on Prilosec which works well for this  She did have an endoscopy possibly over 10 years ago  She has no current symptoms regarding her acid reflux     CARTER, Depression, Insomnia  Patient currently being treated for depression, insomnia, generalized anxiety disorder.  She denies any suicidal ideation or homicidal ideation.  She is not seeing anyone at [facility] for this.  No medication side effects     Neuropathy  Patient also has a history of peripheral neuropathy.  No EMG was done, it was a clinical diagnosis and she was started on gabapentin.  since last visit with me and no noted difference with gabapentin use - medication was stopped.     COPD  Patient has a long-standing history of smoking, she has tried to cut back recently but could only get down to half a pack  She does have a 30+ pack year history of smoking  Productive cough for several months  Coughing up white/clear sputum  Takes Advair / Proair/spiriva daily  Does not feel that inhalers are working all that well  Denies congestion, runny nose, fever or fever like symptoms  Pulmonary function test many many years ago  She is not coughing or spitting up blood and she does not have any unintentional weight loss     PFT 5/23/18:  SUMMARY:  Severe obstructive pulmonary disease. Lovina Brandi is significant  response to bronchodilator.  Static lung volume study suggests  hyperinflation.  Diffusion capacity is mildly impaired.  Airway resistance  is increased.  Flow volume loop suggests obstructive pulmonary disease. Clinical correlation is requested.          Review of Systems   Constitutional: Negative for activity change, appetite change and chills. HENT: Negative for congestion, dental problem and drooling. Eyes: Negative for pain, discharge and itching. Respiratory: Negative for apnea, choking and chest tightness. Physical Exam  Vitals:    09/08/20 1453   BP: 128/74   Pulse: 87   Temp: 98.2 °F (36.8 °C)   SpO2: 95%     Constitutional: appears well-developed and well-nourished. No distress. HENT:   Head: Normocephalic and atraumatic. Right Ear: Tympanic membrane, external ear and ear canal normal.   Left Ear: Tympanic membrane, external ear and ear canal normal.   Nose: Nose normal.   Mouth/Throat: Oropharynx is clear and moist. No oropharyngeal exudate. Eyes: Conjunctivae and EOM are normal. Right eye exhibits no discharge. No scleral icterus. Neck: Normal range of motion. Neck supple. Cardiovascular: Normal rate, regular rhythm and normal heart sounds. Pulmonary/Chest: Effort normal and breath sounds normal. No respiratory distress. no wheezes. no rales. Lymphadenopathy:      no cervical adenopathy. Skin:  not diaphoretic.    Nursing note and vitals reviewed.       Assessment/Plan:  Julio was seen today for hyperlipidemia. Diagnoses and all orders for this visit:    Hyperlipidemia, unspecified hyperlipidemia type  Stable, controlled  Plan: continue current medications    Atypical carcinoid lung tumor ([address]  Cont f/u pulmonary    Neck swelling  -     CT SOFT TISSUE NECK W CONTRAST; Future  Cough  -     CT SOFT TISSUE NECK W CONTRAST; Future  Dysphagia, unspecified type  -     CT SOFT TISSUE NECK W CONTRAST; Future  Localized swelling, mass and lump, neck  -     CT SOFT TISSUE NECK W CONTRAST; Future  Check Ct neck for swelling and symptoms pt noted    Tobacco abuse  I advised patient stop smoking and counseled patient on the bad effects of tobacco use. COPD with chronic bronchitis and emphysema (HCC)  -     Respiratory Therapy Supplies (NEBULIZER/TUBING/MOUTHPIECE) KIT; 1 kit by Does not apply route daily as needed (wheezing)  Stable, controlled  Plan: continue current medications    Gastroesophageal reflux disease without esophagitis  Stable, controlled  Plan: continue current medications    CARTER (generalized anxiety disorder)  Primary insomnia  Stable, controlled  Plan: continue current medications    Neuropathy  Stable, controlled  Plan: continue current medications    I personally reviewed all recent labs and imaging pertaining to conditions mentioned above in assessment/plan in [facility] and care everywhere, discussed results with patient     HTN / Hyperlipidemia Counseling  Patient was counseled regarding disease risks and adopting healthy behaviors. Patient was provided education materials (or meal plan) to assist with self management. Patient was provided log (or received log during previous visit) to record blood pressure and/or food intake. Patient was instructed to keep log up-to-date and to always bring log to all office visits. Reviewed the following concerns and recommendations with the patient:    Lifestyle modifications in the management of hypertension:  1.  Weight reduction    -Maintain normal body weight (BMI, 18.5 to 24.9 kg/m2)  2. Adopt DASH eating plan    - Consume a diet rich in fruits, vegetables, and low-fat dairy products with a  reduced content of saturated and total fat   3. Dietary sodium reduction    - Reduce dietary sodium intake to no more than 100 meq/day (2.4 g sodium or 6  g sodium chloride)   4. Physical activity    - Engage in regular aerobic physical activity such as brisk walking (at least 30  minutes per day, most days of the week)   5. Moderation of alcohol consumption    - Limit consumption to no more than 2 drinks per day in most men and no more  than 1 drink per day in women and lighter-weight persons       Return in about 6 months (around 3/8/2021) for Hyperlipidemia, CARTER/Depression. Patient is aware this encounter is billable to insurance. This encounter occurred with patient at home while provider was at the office.

## 2020-09-11 ENCOUNTER — TELEPHONE (OUTPATIENT)
Dept: INTERNAL MEDICINE | Age: 60
End: 2020-09-11

## 2020-09-15 ENCOUNTER — HOSPITAL ENCOUNTER (OUTPATIENT)
Dept: CT IMAGING | Age: 60
Discharge: HOME | End: 2020-09-17
Payer: MEDICARE

## 2020-09-15 ENCOUNTER — HOSPITAL ENCOUNTER (OUTPATIENT)
Dept: LAB | Age: 60
Discharge: HOME | End: 2020-09-15
Payer: MEDICARE

## 2020-09-15 LAB
CREAT SERPL-MCNC: 0.9 MG/DL (ref 0.5–0.9)
GFR AFRICAN AMERICAN: >60
GFR NON-AFRICAN AMERICAN: >60

## 2020-09-15 PROCEDURE — 82565 ASSAY OF CREATININE: CPT

## 2020-09-15 PROCEDURE — 6360000004 HC RX CONTRAST MEDICATION: Performed by: FAMILY MEDICINE

## 2020-09-15 PROCEDURE — 70491 CT SOFT TISSUE NECK W/DYE: CPT

## 2020-09-15 PROCEDURE — 36415 COLL VENOUS BLD VENIPUNCTURE: CPT

## 2020-09-15 RX ADMIN — IOPAMIDOL 100 ML: 755 INJECTION, SOLUTION INTRAVENOUS at 14:15

## 2020-10-01 RX ORDER — PAROXETINE HYDROCHLORIDE 40 MG/1
40 TABLET, FILM COATED ORAL EVERY MORNING
Qty: 90 TABLET | Refills: 1 | Status: SHIPPED | OUTPATIENT
Start: 2020-10-01 | End: 2021-03-29

## 2020-10-01 NOTE — TELEPHONE ENCOUNTER
Requesting medication refill. Please approve or deny this request.    Rx requested:  Requested Prescriptions     Pending Prescriptions Disp Refills    PARoxetine (PAXIL) 40 MG tablet [Pharmacy Med Name: paroxetine 40 mg tablet] 90 tablet 1     Sig: TAKE 1 TABLET BY MOUTH EVERY MORNING       Last Office Visit:   9/8/2020        REASON LAST SEEN AND BY WHO:    HITESH Freeman    FOLLOW UP PLAN FROM LAST PCP VISIT: COPY AND PASTE FROM LAST PCP NOTE     Return in about 6 months (around 3/8/2021) for Hyperlipidemia, CARTER/Depression.          PATIENT CONTACTED FOR A FOLLOW UP APPT: YES OR NO    no    Next Visit Date:  Future Appointments   Date Time Provider Macy Veronica   12/30/2020  8:30 AM [place] [facility] ROOM 1 [facility]   12/30/2020  9:00 AM [place] [facility] ROOM 1 [facility]   12/30/2020  9:30 AM Flora Song MD [address]

## 2020-10-04 NOTE — TELEPHONE ENCOUNTER
Requesting medication refill. Please approve or deny this request.    Rx requested:  Requested Prescriptions     Pending Prescriptions Disp Refills    PROAIR  (90 Base) MCG/ACT inhaler [Pharmacy Med Name: Erik Cruz HFA 90 mcg/actuation aerosol inhaler] 8.5 g 3     Sig: INHALE 2 PUFFS EVERY 4 HOURS AS NEEDED       Last Office Visit:   9/8/2020        REASON LAST SEEN AND BY WHO:    Hyperlipidemia, ARELIS     FOLLOW UP PLAN FROM LAST PCP VISIT: COPY AND PASTE FROM LAST PCP NOTE     Return in about 6 months (around 3/8/2021) for Hyperlipidemia, CARTER/Depression.          PATIENT CONTACTED FOR A FOLLOW UP APPT: YES OR NO    no    Next Visit Date:  Future Appointments   Date Time Provider Macy Veronica   12/30/2020  8:30 AM [place] [facility] ROOM 1 [facility]   12/30/2020  9:00 AM [place] [facility] ROOM 1 [facility] CT [facility]   12/30/2020  9:30 AM Pushpa Palomo MD [address]

## 2020-12-20 NOTE — TELEPHONE ENCOUNTER
Requesting medication refill.  Please approve or deny this request.    Rx requested:  Requested Prescriptions     Pending Prescriptions Disp Refills    PROAIR  (90 Base) MCG/ACT inhaler [Pharmacy Med Name: Bob Osborne 90 mcg/actuation aerosol inhaler] 8.5 g 3     Sig: INHALE 2 PUFFS EVERY 4 HOURS AS NEEDED       Last Office Visit:   9/8/2020        REASON LAST SEEN AND BY WHO:    Dr Chapin Tovar Hyperlipidemia, unspecified hyperlipidemia type +11 more     FOLLOW UP PLAN FROM LAST PCP VISIT: COPY AND PASTE FROM LAST PCP NOTE    Return in about 6 months (around 3/8/2021) for Hyperlipidemia, CARTER/Depression.          PATIENT CONTACTED FOR A FOLLOW UP APPT: YES OR NO    no    Next Visit Date:  Future Appointments   Date Time Provider Macy Veronica   12/30/2020  8:30 AM [place] [facility] ROOM 1 [facility] CT [facility]   12/30/2020  9:00 AM [place] [facility] ROOM 1 [facility] CT [facility]   12/30/2020  9:30 AM Kel Balderas MD [address]   3/9/2021 10:45 AM Shanna Condon MD [facility]

## 2021-01-04 ENCOUNTER — OFFICE VISIT (OUTPATIENT)
Dept: CARDIOTHORACIC SURGERY | Age: 61
End: 2021-01-04
Payer: MEDICARE

## 2021-01-04 ENCOUNTER — HOSPITAL ENCOUNTER (OUTPATIENT)
Dept: CT IMAGING | Age: 61
Discharge: HOME | End: 2021-01-06
Payer: MEDICARE

## 2021-01-04 VITALS
TEMPERATURE: 96.8 F | HEIGHT: 60 IN | HEART RATE: 74 BPM | WEIGHT: 142 LBS | BODY MASS INDEX: 27.88 KG/M2 | OXYGEN SATURATION: 94 %

## 2021-01-04 DIAGNOSIS — D3A.090 CARCINOID TUMOR OF LEFT LUNG: Primary | ICD-10-CM

## 2021-01-04 DIAGNOSIS — Z85.118 HISTORY OF LUNG CANCER: Primary | ICD-10-CM

## 2021-01-04 DIAGNOSIS — Z85.118 HISTORY OF LUNG CANCER: ICD-10-CM

## 2021-01-04 PROCEDURE — G8419 CALC BMI OUT NRM PARAM NOF/U: HCPCS | Performed by: THORACIC SURGERY (CARDIOTHORACIC VASCULAR SURGERY)

## 2021-01-04 PROCEDURE — 99203 OFFICE O/P NEW LOW 30 MIN: CPT | Performed by: THORACIC SURGERY (CARDIOTHORACIC VASCULAR SURGERY)

## 2021-01-04 PROCEDURE — 3017F COLORECTAL CA SCREEN DOC REV: CPT | Performed by: THORACIC SURGERY (CARDIOTHORACIC VASCULAR SURGERY)

## 2021-01-04 PROCEDURE — 4004F PT TOBACCO SCREEN RCVD TLK: CPT | Performed by: THORACIC SURGERY (CARDIOTHORACIC VASCULAR SURGERY)

## 2021-01-04 PROCEDURE — 71250 CT THORAX DX C-: CPT

## 2021-01-04 PROCEDURE — G8484 FLU IMMUNIZE NO ADMIN: HCPCS | Performed by: THORACIC SURGERY (CARDIOTHORACIC VASCULAR SURGERY)

## 2021-01-04 PROCEDURE — G8427 DOCREV CUR MEDS BY ELIG CLIN: HCPCS | Performed by: THORACIC SURGERY (CARDIOTHORACIC VASCULAR SURGERY)

## 2021-01-04 ASSESSMENT — ENCOUNTER SYMPTOMS
DIARRHEA: 0
SHORTNESS OF BREATH: 0
VOICE CHANGE: 0
SORE THROAT: 0
VOMITING: 0
CHOKING: 0
WHEEZING: 0
NAUSEA: 0
COUGH: 1
CHEST TIGHTNESS: 0
ABDOMINAL DISTENTION: 0
TROUBLE SWALLOWING: 0
STRIDOR: 0
ABDOMINAL PAIN: 0

## 2021-01-04 NOTE — PROGRESS NOTES
Subjective:      Patient ID: Rogelio Parnell is a 63 y.o. female who presents today for: Surveillance CAT scan  Chief Complaint   Patient presents with    Follow-up       HPI  Patient is 1 year and 10 months out from a left upper lobe wedge resection for a T1bN0 atypical carcinoid. At her last visit she was complaining of some left rib pain after a bout of bronchitis. She is also complaining of some difficulty swallowing as well as a feeling of fullness in her neck when she would yawn. Since then an esophagram as well as a CAT scan of her neck did not show any evidence of masses or any other abnormality. Today in the clinic she still has that same sensation in her throat but it is not getting any worse. She denies any aspiration. Her rib pain has essentially resolved. She reports no other changes to her health. She still has her chronic unchanged cough.     Past Medical History:   Diagnosis Date    Abnormal CT of the chest 2019    Bone spur     Cervical stenosis of spine     COPD (chronic obstructive pulmonary disease) (HCC)     Coronary artery calcification of native artery - aortic arch 10/31/2018    On lung screen done 10/26/18    DDD (degenerative disc disease), lumbosacral 3/12/2018    Used to see pain management     CARTER (generalized anxiety disorder) 3/12/2018    [facility]    Gastroesophageal reflux disease without esophagitis 3/12/2018    EGD over 10 years    Gout     Hyperlipidemia     Insomnia 3/12/2018    Mild single current episode of major depressive disorder ([address] 3/12/2018    Neuropathy 3/12/2018    Clinical diagnosis, EMG    Other emphysema ([address] 3/12/2018    No recent PFT    Pure hypercholesterolemia 3/12/2018    SOB (shortness of breath)     Tobacco abuse 3/12/2018      Past Surgical History:   Procedure Laterality Date     SECTION      LUNG BIOPSY Left 2018    CT guided Left Lung Biopsy by Dr Jennifer Solorzano DISKUS 500-50 MCG/DOSE diskus inhaler Inhale 1 puff into the lungs every 12 hours 1 Inhaler 6    PARoxetine (PAXIL) 40 MG tablet TAKE 1 TABLET BY MOUTH EVERY MORNING 90 tablet 1    Respiratory Therapy Supplies (NEBULIZER/TUBING/MOUTHPIECE) KIT 1 kit by Does not apply route daily as needed (wheezing) 1 kit 0    busPIRone (BUSPAR) 15 MG tablet TAKE 1 TABLET BY MOUTH THREE TIMES DAILY 270 tablet 1    atorvastatin (LIPITOR) 20 MG tablet Take 1 tablet by mouth daily 90 tablet 1    meloxicam (MOBIC) 7.5 MG tablet Take 1 tablet by mouth daily 30 tablet 0    omeprazole (PRILOSEC) 20 MG delayed release capsule TAKE 1 CAPSULE BY MOUTH EVERY DAY 90 capsule 1    traZODone (DESYREL) 100 MG tablet TAKE 1 TO 2 TABLETS BY MOUTH NIGHTLY 60 tablet 3    albuterol (PROVENTIL) (5 MG/ML) 0.5% nebulizer solution Take 1 mL by nebulization 4 times daily as needed for Wheezing 120 each 3     No current facility-administered medications on file prior to visit. Review of Systems   Constitutional: Negative for activity change, appetite change, chills, diaphoresis, fatigue, fever and unexpected weight change. HENT: Negative for sore throat, trouble swallowing and voice change. Eyes: Negative for visual disturbance. Respiratory: Positive for cough. Negative for choking, chest tightness, shortness of breath, wheezing and stridor. Cardiovascular: Negative for chest pain, palpitations and leg swelling. Gastrointestinal: Negative for abdominal distention, abdominal pain, diarrhea, nausea and vomiting. Genitourinary: Negative for difficulty urinating. Musculoskeletal: Negative for gait problem and joint swelling. Skin: Negative for rash and wound. Neurological: Negative for dizziness, seizures and light-headedness. Hematological: Negative for adenopathy. Does not bruise/bleed easily. Psychiatric/Behavioral: Negative for behavioral problems and confusion.        Objective:   Pulse 74   Temp 96.8 °F (36 °C) (Temporal)   Ht 5' (1.524 m)   Wt 142 lb (64.4 kg)   LMP  (LMP Unknown)   SpO2 94%   BMI 27.73 kg/m²     Physical Exam  Constitutional:       General: She is not in acute distress. Appearance: She is not ill-appearing, toxic-appearing or diaphoretic. HENT:      Head: Normocephalic and atraumatic. Nose: Nose normal.   Eyes:      Extraocular Movements: Extraocular movements intact. Conjunctiva/sclera: Conjunctivae normal.      Pupils: Pupils are equal, round, and reactive to light. Neck:      Musculoskeletal: Neck supple. Vascular: No carotid bruit. Cardiovascular:      Rate and Rhythm: Normal rate and regular rhythm. Heart sounds: Normal heart sounds. No murmur. No gallop. Pulmonary:      Effort: Pulmonary effort is normal. No respiratory distress. Breath sounds: Normal breath sounds. No wheezing or rales. Abdominal:      General: Abdomen is flat. Bowel sounds are normal.      Palpations: Abdomen is soft. There is no mass. Tenderness: There is no abdominal tenderness. Musculoskeletal:         General: No swelling. Right lower leg: No edema. Left lower leg: No edema. Lymphadenopathy:      Cervical: No cervical adenopathy. Skin:     General: Skin is warm and dry. Neurological:      General: No focal deficit present. Mental Status: She is alert and oriented to person, place, and time. Psychiatric:         Mood and Affect: Mood normal.         Behavior: Behavior normal.         Thought Content: Thought content normal.         Judgment: Judgment normal.               Radiographs and Laboratory Studies:     Diagnostic Imaging Studies:    Unfortunately she was unable to get her CAT scan prior to the visit. This will be performed later today at another institution. Assessment/Plan     Stable symptoms in her neck with negative CAT scan and esophagram.  No further work-up will be needed and we will just follow her symptoms.   If at any point worsens then I would refer her to an ear nose and throat doctor. No physical signs of recurrence of her carcinoid tumor. We will await the CAT scan of her chest.  As long as this does not show any changes then I would see her in 4 months with another surveillance CAT scan. Diagnosis Orders   1. Carcinoid tumor of left lung       No orders of the defined types were placed in this encounter. No orders of the defined types were placed in this encounter. Return in about 4 months (around 5/4/2021) for Surveillance CAT scan.       Carrie Almendarez MD

## 2021-01-06 DIAGNOSIS — Z85.118 HISTORY OF LUNG CANCER: Primary | ICD-10-CM

## 2021-01-15 DIAGNOSIS — G47.00 INSOMNIA, UNSPECIFIED TYPE: ICD-10-CM

## 2021-01-15 RX ORDER — TRAZODONE HYDROCHLORIDE 100 MG/1
TABLET ORAL
Qty: 60 TABLET | Refills: 3 | Status: SHIPPED | OUTPATIENT
Start: 2021-01-15 | End: 2021-07-09

## 2021-01-15 NOTE — TELEPHONE ENCOUNTER
Requesting medication refill.  Please approve or deny this request.    Rx requested:  Requested Prescriptions     Pending Prescriptions Disp Refills    traZODone (DESYREL) 100 MG tablet 60 tablet 3     Sig: TAKE 1 TO 2 TABLETS BY MOUTH NIGHTLY       Last Office Visit, reason seen and by who:   9/8/2020 follow up Dr. Dianna Peters: Albin Levine    Return in about 6 months (around 3/8/2021) for Hyperlipidemia, CARTER/Depression      PATIENT CONTACTED FOR A FOLLOW UP APPT: YES OR NO    See below    Next Visit Date:  Future Appointments   Date Time Provider Macy Veronica   3/9/2021 10:45 AM Shannan Gold MD [address]   5/4/2021  2:00 PM [facility] ROOM 1 [facility]   5/5/2021  9:00 AM Lalo Kaur MD [address] [facility]

## 2021-02-03 DIAGNOSIS — F32.A DEPRESSION, UNSPECIFIED DEPRESSION TYPE: ICD-10-CM

## 2021-02-03 DIAGNOSIS — F32.0 MILD SINGLE CURRENT EPISODE OF MAJOR DEPRESSIVE DISORDER (HCC): ICD-10-CM

## 2021-02-03 DIAGNOSIS — F41.1 GAD (GENERALIZED ANXIETY DISORDER): ICD-10-CM

## 2021-02-03 NOTE — TELEPHONE ENCOUNTER
Requesting medication refill. Please approve or deny this request.    Rx requested:  Requested Prescriptions     Pending Prescriptions Disp Refills    busPIRone (BUSPAR) 15 MG tablet [Pharmacy Med Name: buspirone 15 mg tablet] 270 tablet 1     Sig: TAKE 1 TABLET BY Mandy Office Visit, reason seen and by who:   9/8/2020  HTN    FOLLOW UP PLAN FROM LAST VISIT: COPY AND PASTE FROM LAST NOTE       Return in about 6 months (around 3/8/2021) for Hyperlipidemia, CARTER/Depression.         PATIENT CONTACTED FOR A FOLLOW UP APPT: YES OR NO    See below    Next Visit Date:  Future Appointments   Date Time Provider Macy Veronica   3/9/2021 10:45 AM Meño Farnsworth MD [address]   3/16/2021  1:00 PM Mary Ellen Ann MD [address]   5/4/2021  2:00 PM STACY CT ROOM 1 [facility]   5/5/2021  9:00 AM Aubree Marc MD [address] [facility]

## 2021-02-04 RX ORDER — BUSPIRONE HYDROCHLORIDE 15 MG/1
TABLET ORAL
Qty: 270 TABLET | Refills: 1 | Status: SHIPPED | OUTPATIENT
Start: 2021-02-04 | End: 2021-11-05 | Stop reason: SDUPTHER

## 2021-02-22 ENCOUNTER — TELEPHONE (OUTPATIENT)
Dept: INTERNAL MEDICINE | Age: 61
End: 2021-02-22

## 2021-03-09 ENCOUNTER — OFFICE VISIT (OUTPATIENT)
Dept: INTERNAL MEDICINE | Age: 61
End: 2021-03-09
Payer: MEDICARE

## 2021-03-09 VITALS
BODY MASS INDEX: 26.5 KG/M2 | TEMPERATURE: 97.6 F | DIASTOLIC BLOOD PRESSURE: 60 MMHG | SYSTOLIC BLOOD PRESSURE: 100 MMHG | WEIGHT: 135 LBS | HEIGHT: 60 IN | HEART RATE: 97 BPM | OXYGEN SATURATION: 92 %

## 2021-03-09 DIAGNOSIS — R20.2 NUMBNESS AND TINGLING OF BOTH LOWER EXTREMITIES: ICD-10-CM

## 2021-03-09 DIAGNOSIS — F41.1 GAD (GENERALIZED ANXIETY DISORDER): ICD-10-CM

## 2021-03-09 DIAGNOSIS — R20.0 NUMBNESS AND TINGLING OF BOTH LOWER EXTREMITIES: ICD-10-CM

## 2021-03-09 DIAGNOSIS — E78.5 HYPERLIPIDEMIA, UNSPECIFIED HYPERLIPIDEMIA TYPE: ICD-10-CM

## 2021-03-09 DIAGNOSIS — F32.0 MILD SINGLE CURRENT EPISODE OF MAJOR DEPRESSIVE DISORDER (HCC): ICD-10-CM

## 2021-03-09 DIAGNOSIS — Z72.0 TOBACCO ABUSE: ICD-10-CM

## 2021-03-09 DIAGNOSIS — J44.9 COPD WITH CHRONIC BRONCHITIS AND EMPHYSEMA (HCC): ICD-10-CM

## 2021-03-09 DIAGNOSIS — K21.9 GASTROESOPHAGEAL REFLUX DISEASE WITHOUT ESOPHAGITIS: ICD-10-CM

## 2021-03-09 DIAGNOSIS — E78.5 HYPERLIPIDEMIA, UNSPECIFIED HYPERLIPIDEMIA TYPE: Primary | ICD-10-CM

## 2021-03-09 DIAGNOSIS — C7A.090 ATYPICAL CARCINOID LUNG TUMOR (HCC): ICD-10-CM

## 2021-03-09 LAB
ALBUMIN SERPL-MCNC: 4.4 G/DL (ref 3.5–4.6)
ALP BLD-CCNC: 138 U/L (ref 40–130)
ALT SERPL-CCNC: 14 U/L (ref 0–33)
ANION GAP SERPL CALCULATED.3IONS-SCNC: 14 MEQ/L (ref 9–15)
AST SERPL-CCNC: 18 U/L (ref 0–35)
BASOPHILS ABSOLUTE: 0 K/UL (ref 0–0.2)
BASOPHILS RELATIVE PERCENT: 0.4 %
BILIRUB SERPL-MCNC: 0.6 MG/DL (ref 0.2–0.7)
BUN BLDV-MCNC: 8 MG/DL (ref 8–23)
CALCIUM SERPL-MCNC: 9.5 MG/DL (ref 8.5–9.9)
CHLORIDE BLD-SCNC: 106 MEQ/L (ref 95–107)
CHOLESTEROL, TOTAL: 144 MG/DL (ref 0–199)
CO2: 24 MEQ/L (ref 20–31)
CREAT SERPL-MCNC: 0.86 MG/DL (ref 0.5–0.9)
EOSINOPHILS ABSOLUTE: 0.3 K/UL (ref 0–0.7)
EOSINOPHILS RELATIVE PERCENT: 4.7 %
FOLATE: 3.2 NG/ML (ref 7.3–26.1)
GFR AFRICAN AMERICAN: >60
GFR NON-AFRICAN AMERICAN: >60
GLOBULIN: 2.4 G/DL (ref 2.3–3.5)
GLUCOSE BLD-MCNC: 83 MG/DL (ref 70–99)
HCT VFR BLD CALC: 43 % (ref 37–47)
HDLC SERPL-MCNC: 48 MG/DL (ref 40–59)
HEMOGLOBIN: 13.9 G/DL (ref 12–16)
LDL CHOLESTEROL CALCULATED: 69 MG/DL (ref 0–129)
LYMPHOCYTES ABSOLUTE: 2.1 K/UL (ref 1–4.8)
LYMPHOCYTES RELATIVE PERCENT: 30.2 %
MCH RBC QN AUTO: 30.4 PG (ref 27–31.3)
MCHC RBC AUTO-ENTMCNC: 32.3 % (ref 33–37)
MCV RBC AUTO: 94 FL (ref 82–100)
MONOCYTES ABSOLUTE: 0.5 K/UL (ref 0.2–0.8)
MONOCYTES RELATIVE PERCENT: 8 %
NEUTROPHILS ABSOLUTE: 3.9 K/UL (ref 1.4–6.5)
NEUTROPHILS RELATIVE PERCENT: 56.7 %
PDW BLD-RTO: 16.6 % (ref 11.5–14.5)
PLATELET # BLD: 266 K/UL (ref 130–400)
POTASSIUM SERPL-SCNC: 4.2 MEQ/L (ref 3.4–4.9)
RBC # BLD: 4.58 M/UL (ref 4.2–5.4)
SODIUM BLD-SCNC: 144 MEQ/L (ref 135–144)
TOTAL PROTEIN: 6.8 G/DL (ref 6.3–8)
TRIGL SERPL-MCNC: 135 MG/DL (ref 0–150)
VITAMIN B-12: 226 PG/ML (ref 232–1245)
WBC # BLD: 6.8 K/UL (ref 4.8–10.8)

## 2021-03-09 PROCEDURE — G8484 FLU IMMUNIZE NO ADMIN: HCPCS | Performed by: FAMILY MEDICINE

## 2021-03-09 PROCEDURE — G8419 CALC BMI OUT NRM PARAM NOF/U: HCPCS | Performed by: FAMILY MEDICINE

## 2021-03-09 PROCEDURE — 4004F PT TOBACCO SCREEN RCVD TLK: CPT | Performed by: FAMILY MEDICINE

## 2021-03-09 PROCEDURE — 3017F COLORECTAL CA SCREEN DOC REV: CPT | Performed by: FAMILY MEDICINE

## 2021-03-09 PROCEDURE — 3023F SPIROM DOC REV: CPT | Performed by: FAMILY MEDICINE

## 2021-03-09 PROCEDURE — G8427 DOCREV CUR MEDS BY ELIG CLIN: HCPCS | Performed by: FAMILY MEDICINE

## 2021-03-09 PROCEDURE — G8926 SPIRO NO PERF OR DOC: HCPCS | Performed by: FAMILY MEDICINE

## 2021-03-09 PROCEDURE — 99214 OFFICE O/P EST MOD 30 MIN: CPT | Performed by: FAMILY MEDICINE

## 2021-03-09 ASSESSMENT — ENCOUNTER SYMPTOMS
APNEA: 0
CHEST TIGHTNESS: 0
EYE PAIN: 0
EYE ITCHING: 0
EYE DISCHARGE: 0
CHOKING: 0

## 2021-03-10 DIAGNOSIS — E53.8 FOLIC ACID DEFICIENCY: ICD-10-CM

## 2021-03-10 DIAGNOSIS — E53.8 VITAMIN B12 DEFICIENCY: Primary | ICD-10-CM

## 2021-03-10 RX ORDER — CYANOCOBALAMIN 1000 UG/ML
1000 INJECTION INTRAMUSCULAR; SUBCUTANEOUS
Status: SHIPPED | OUTPATIENT
Start: 2021-03-11 | End: 2021-12-06

## 2021-03-10 RX ORDER — FOLIC ACID 1 MG/1
1 TABLET ORAL DAILY
Qty: 90 TABLET | Refills: 0 | Status: SHIPPED | OUTPATIENT
Start: 2021-03-10 | End: 2022-08-01 | Stop reason: ALTCHOICE

## 2021-03-10 RX ORDER — CYANOCOBALAMIN 1000 UG/ML
1000 INJECTION INTRAMUSCULAR; SUBCUTANEOUS
Status: DISCONTINUED | OUTPATIENT
Start: 2021-03-10 | End: 2021-03-10

## 2021-03-11 ENCOUNTER — NURSE ONLY (OUTPATIENT)
Dept: INTERNAL MEDICINE | Age: 61
End: 2021-03-11
Payer: MEDICARE

## 2021-03-11 DIAGNOSIS — E53.8 VITAMIN B12 DEFICIENCY: Primary | ICD-10-CM

## 2021-03-11 PROCEDURE — 96372 THER/PROPH/DIAG INJ SC/IM: CPT | Performed by: FAMILY MEDICINE

## 2021-03-11 RX ORDER — CYANOCOBALAMIN 1000 UG/ML
1000 INJECTION INTRAMUSCULAR; SUBCUTANEOUS ONCE
Status: COMPLETED | OUTPATIENT
Start: 2021-03-11 | End: 2021-03-11

## 2021-03-11 RX ADMIN — CYANOCOBALAMIN 1000 MCG: 1000 INJECTION INTRAMUSCULAR; SUBCUTANEOUS at 10:45

## 2021-03-15 ENCOUNTER — TELEPHONE (OUTPATIENT)
Dept: INTERNAL MEDICINE | Age: 61
End: 2021-03-15

## 2021-03-15 DIAGNOSIS — J44.9 COPD WITH CHRONIC BRONCHITIS AND EMPHYSEMA (HCC): ICD-10-CM

## 2021-03-15 RX ORDER — NEBULIZER ACCESSORIES
1 KIT MISCELLANEOUS DAILY PRN
Qty: 1 KIT | Refills: 0 | Status: SHIPPED | OUTPATIENT
Start: 2021-03-15

## 2021-03-15 NOTE — TELEPHONE ENCOUNTER
Pt needs new tubing through for nebulizer DMW  She does not have anymore tubing  Requesting medication refill.  Please approve or deny this request.    Rx requested:  Requested Prescriptions     Pending Prescriptions Disp Refills    Respiratory Therapy Supplies (NEBULIZER/TUBING/MOUTHPIECE) KIT 1 kit 0     Si kit by Does not apply route daily as needed (wheezing)       Last Office Visit:   3/9/2021        REASON LAST SEEN AND BY WHO:    HYPERLIPIDEMIA     FOLLOW UP PLAN FROM LAST PCP VISIT: COPY AND PASTE FROM LAST PCP NOTE     Return in about 6 months (around 2021) for Hypertension, Hyperlipidemia        PATIENT CONTACTED FOR A FOLLOW UP APPT: YES OR NO    no    Next Visit Date:  Future Appointments   Date Time Provider Macy Veronica   3/16/2021  1:00 PM Elder Jesus MD [address]   3/24/2021  2:00 PM Cherylene Mulch, MD [address]   2021  2:00 PM [facility] ROOM 1 [facility]   2021  9:00 AM Vishnu More MD [address]   2021 10:00 AM Yasmine Otoole MD [facility]

## 2021-03-16 ENCOUNTER — OFFICE VISIT (OUTPATIENT)
Dept: PULMONOLOGY | Age: 61
End: 2021-03-16
Payer: MEDICARE

## 2021-03-16 VITALS
OXYGEN SATURATION: 94 % | WEIGHT: 134.8 LBS | SYSTOLIC BLOOD PRESSURE: 127 MMHG | RESPIRATION RATE: 16 BRPM | HEIGHT: 60 IN | DIASTOLIC BLOOD PRESSURE: 80 MMHG | TEMPERATURE: 97.3 F | HEART RATE: 90 BPM | BODY MASS INDEX: 26.46 KG/M2

## 2021-03-16 DIAGNOSIS — J44.9 CHRONIC OBSTRUCTIVE PULMONARY DISEASE, UNSPECIFIED COPD TYPE (HCC): ICD-10-CM

## 2021-03-16 DIAGNOSIS — F17.200 SMOKING: ICD-10-CM

## 2021-03-16 DIAGNOSIS — G47.30 SLEEP APNEA, UNSPECIFIED TYPE: Primary | ICD-10-CM

## 2021-03-16 DIAGNOSIS — C7A.090 ATYPICAL CARCINOID LUNG TUMOR (HCC): ICD-10-CM

## 2021-03-16 DIAGNOSIS — R09.81 NASAL CONGESTION: ICD-10-CM

## 2021-03-16 PROCEDURE — 99214 OFFICE O/P EST MOD 30 MIN: CPT | Performed by: INTERNAL MEDICINE

## 2021-03-16 PROCEDURE — G8926 SPIRO NO PERF OR DOC: HCPCS | Performed by: INTERNAL MEDICINE

## 2021-03-16 PROCEDURE — 4004F PT TOBACCO SCREEN RCVD TLK: CPT | Performed by: INTERNAL MEDICINE

## 2021-03-16 PROCEDURE — 3023F SPIROM DOC REV: CPT | Performed by: INTERNAL MEDICINE

## 2021-03-16 PROCEDURE — 3017F COLORECTAL CA SCREEN DOC REV: CPT | Performed by: INTERNAL MEDICINE

## 2021-03-16 PROCEDURE — G8427 DOCREV CUR MEDS BY ELIG CLIN: HCPCS | Performed by: INTERNAL MEDICINE

## 2021-03-16 PROCEDURE — G8484 FLU IMMUNIZE NO ADMIN: HCPCS | Performed by: INTERNAL MEDICINE

## 2021-03-16 PROCEDURE — G8419 CALC BMI OUT NRM PARAM NOF/U: HCPCS | Performed by: INTERNAL MEDICINE

## 2021-03-16 RX ORDER — NICOTINE 21 MG/24HR
1 PATCH, TRANSDERMAL 24 HOURS TRANSDERMAL EVERY 24 HOURS
Qty: 30 PATCH | Refills: 3 | Status: SHIPPED | OUTPATIENT
Start: 2021-03-16 | End: 2021-03-19 | Stop reason: SDUPTHER

## 2021-03-16 RX ORDER — FLUTICASONE FUROATE, UMECLIDINIUM BROMIDE AND VILANTEROL TRIFENATATE 200; 62.5; 25 UG/1; UG/1; UG/1
1 POWDER RESPIRATORY (INHALATION) DAILY
Qty: 1 EACH | Refills: 3 | Status: SHIPPED | OUTPATIENT
Start: 2021-03-16 | End: 2021-03-19 | Stop reason: SDUPTHER

## 2021-03-16 RX ORDER — FLUTICASONE PROPIONATE 50 MCG
1 SPRAY, SUSPENSION (ML) NASAL DAILY
Qty: 1 BOTTLE | Refills: 3 | Status: SHIPPED | OUTPATIENT
Start: 2021-03-16 | End: 2021-03-19 | Stop reason: SDUPTHER

## 2021-03-16 NOTE — PROGRESS NOTES
Subjective:     Pritesh Bonilla is a 63 y.o. female who complains today of:     Chief Complaint   Patient presents with    Follow-up     Carcinoid tumor of left lung       HPI  Patient presents for patient presents for COPD follow-up,    She has not seen me since 2019, she is currently taking Advair, she reported dyspnea on exertion, cough productive of clear/yellow phlegm, no hemoptysis, no chest pain, she has nasal congestion, no fever or chills, no lower extremity edema, she reports snoring, her significant other reports possible apnea-like events, she currently follows up with thoracic surgery for history of carcinoid tumor with atypical cells, she continues to smoke 10 cigarettes/day. No heartburn, and weight is stable. Allergies:   Other and Codeine  Past Medical History:   Diagnosis Date    Abnormal CT of the chest 2019    Bone spur     Cervical stenosis of spine     COPD (chronic obstructive pulmonary disease) (HCC)     Coronary artery calcification of native artery - aortic arch 10/31/2018    On lung screen done 10/26/18    DDD (degenerative disc disease), lumbosacral 3/12/2018    Used to see pain management     CARTER (generalized anxiety disorder) 3/12/2018    [facility]    Gastroesophageal reflux disease without esophagitis 3/12/2018    EGD over 10 years    Gout     Hyperlipidemia     Insomnia 3/12/2018    Mild single current episode of major depressive disorder ([address] 3/12/2018    Neuropathy 3/12/2018    Clinical diagnosis, EMG    Other emphysema ([address] 3/12/2018    No recent PFT    Pure hypercholesterolemia 3/12/2018    SOB (shortness of breath)     Tobacco abuse 3/12/2018     Past Surgical History:   Procedure Laterality Date     SECTION      LUNG BIOPSY Left 2018    CT guided Left Lung Biopsy by Dr Janki Núñez     Family History   Problem Relation Age of Onset    Other Mother         epilepsy     No Known Problems Father     No Known Problems Brother     No Known Problems Brother      Social History     Socioeconomic History    Marital status: Single     Spouse name: Not on file    Number of children: Not on file    Years of education: Not on file    Highest education level: Not on file   Occupational History    Not on file   Social Needs    Financial resource strain: Not on file    Food insecurity     Worry: Not on file     Inability: Not on file    Transportation needs     Medical: Not on file     Non-medical: Not on file   Tobacco Use    Smoking status: Current Every Day Smoker     Packs/day: 1.00     Years: 47.00     Pack years: 47.00     Types: Cigarettes     Start date: 1970    Smokeless tobacco: Never Used   Substance and Sexual Activity    Alcohol use: No    Drug use: No    Sexual activity: Not Currently     Birth control/protection: Post-menopausal   Lifestyle    Physical activity     Days per week: Not on file     Minutes per session: Not on file    Stress: Not on file   Relationships    Social connections     Talks on phone: Not on file     Gets together: Not on file     Attends Christianity service: Not on file     Active member of club or organization: Not on file     Attends meetings of clubs or organizations: Not on file     Relationship status: Not on file    Intimate partner violence     Fear of current or ex partner: Not on file     Emotionally abused: Not on file     Physically abused: Not on file     Forced sexual activity: Not on file   Other Topics Concern    Not on file   Social History Narrative    Not on file         Review of Systems      ROS: 10 organs review of system is done including general, psychological, ENT, hematological, endocrine, respiratory, cardiovascular, gastrointestinal,musculoskeletal, neurological,  allergy and Immunology is done and is otherwise negative.     Current Outpatient Medications   Medication Sig Dispense Refill    Fluticasone-Umeclidin-Vilant (Saqib Miller) 200-62.5-25 MCG/INH AEPB Inhale 1 puff into the lungs daily 1 each 3    fluticasone (FLONASE) 50 MCG/ACT nasal spray 1 spray by Each Nostril route daily 1 Bottle 3    nicotine (NICODERM CQ) 14 MG/24HR Place 1 patch onto the skin every 24 hours 30 patch 3    Respiratory Therapy Supplies (NEBULIZER/TUBING/MOUTHPIECE) KIT 1 kit by Does not apply route daily as needed (wheezing) 1 kit 0    folic acid (FOLVITE) 1 MG tablet Take 1 tablet by mouth daily 90 tablet 0    busPIRone (BUSPAR) 15 MG tablet TAKE 1 TABLET BY MOUTH THREE TIMES DAILY 270 tablet 1    traZODone (DESYREL) 100 MG tablet TAKE 1 TO 2 TABLETS BY MOUTH NIGHTLY 60 tablet 3    PROAIR  (90 Base) MCG/ACT inhaler INHALE 2 PUFFS EVERY 4 HOURS AS NEEDED 8.5 g 3    PARoxetine (PAXIL) 40 MG tablet TAKE 1 TABLET BY MOUTH EVERY MORNING 90 tablet 1    atorvastatin (LIPITOR) 20 MG tablet Take 1 tablet by mouth daily 90 tablet 1    meloxicam (MOBIC) 7.5 MG tablet Take 1 tablet by mouth daily 30 tablet 0    omeprazole (PRILOSEC) 20 MG delayed release capsule TAKE 1 CAPSULE BY MOUTH EVERY DAY 90 capsule 1    albuterol (PROVENTIL) (5 MG/ML) 0.5% nebulizer solution Take 1 mL by nebulization 4 times daily as needed for Wheezing 120 each 3     Current Facility-Administered Medications   Medication Dose Route Frequency Provider Last Rate Last Admin    cyanocobalamin injection 1,000 mcg  1,000 mcg Intramuscular Q30 Days Heidi Owens MD           Objective:     Vitals:    03/16/21 1304   BP: 127/80   Site: Left Upper Arm   Position: Sitting   Cuff Size: Medium Adult   Pulse: 90   Resp: 16   Temp: 97.3 °F (36.3 °C)   TempSrc: Tympanic   SpO2: 94%   Weight: 134 lb 12.8 oz (61.1 kg)   Height: 5' (1.524 m)         Physical Exam  Constitutional:       General: She is not in acute distress. Appearance: She is well-developed. She is not diaphoretic. HENT:      Head: Normocephalic and atraumatic.    Eyes:      Conjunctiva/sclera: Conjunctivae normal.      Pupils: Pupils are equal, round, and reactive to light. Neck:      Musculoskeletal: Normal range of motion and neck supple. Cardiovascular:      Rate and Rhythm: Normal rate and regular rhythm. Heart sounds: No murmur. No friction rub. No gallop. Pulmonary:      Effort: Pulmonary effort is normal. No respiratory distress. Breath sounds: Normal breath sounds. No wheezing or rales. Chest:      Chest wall: No tenderness. Abdominal:      General: There is no distension. Palpations: Abdomen is soft. Tenderness: There is no abdominal tenderness. There is no rebound. Musculoskeletal:         General: No tenderness. Right lower leg: No edema. Left lower leg: No edema. Lymphadenopathy:      Cervical: No cervical adenopathy. Skin:     General: Skin is warm and dry. Findings: No erythema. Neurological:      Mental Status: She is alert and oriented to person, place, and time. Psychiatric:         Judgment: Judgment normal.         Imaging studies reviewed by me CT chest reviewed by me stable right lower lobe lung nodules with calcification  Lab results reviewed in chart  PFT 2018, reviewed by me, FEV1 46%       Assessment and Plan       Diagnosis Orders   1. Sleep apnea, unspecified type  Sleep Study with PAP Titration   2. Chronic obstructive pulmonary disease, unspecified COPD type ([facility]  Fluticasone-Umeclidin-Vilant (TRELEGY ELLIPTA) 200-62.5-25 MCG/INH AEPB   3. Smoking  nicotine (NICODERM CQ) 14 MG/24HR   4. Atypical carcinoid lung tumor ([facility] [address]     5. Nasal congestion  fluticasone (FLONASE) 50 MCG/ACT nasal spray     · Probable LIZA, will obtain sleep study, this will allow evaluation for sleep apnea and nocturnal hypoxia.   · Severe COPD, symptoms are not controlled, will change inhalers to Trelegy Ellipta, continue as needed albuterol, and quit smoking  · Smoking cessation counseling done, will prescribe nicotine patches, patient has history of suicidal ideation, will avoid Chantix due to that.  · History of carcinoid tumor, she follows up with thoracic surgery, she has CT scheduled for July of this year  · Nasal congestion, will start Flonase      Orders Placed This Encounter   Procedures    Sleep Study with PAP Titration     Standing Status:   Future     Standing Expiration Date:   2022     Order Specific Question:   Sleep Study Titration Type     Answer:   Split Night Study (Baseline followed by PAP Titration)     Order Specific Question:   Location For Sleep Study     Answer:   [place]     Order Specific Question:   Select Sleep Lab Location     Answer:   [facility]     Orders Placed This Encounter   Medications    Fluticasone-Umeclidin-Vilant [facility] 196-78.4-12 MCG/INH AEPB     Sig: Inhale 1 puff into the lungs daily     Dispense:  1 each     Refill:  3    fluticasone (FLONASE) 50 MCG/ACT nasal spray     Si spray by Each Nostril route daily     Dispense:  1 Bottle     Refill:  3    nicotine (NICODERM CQ) 14 MG/24HR     Sig: Place 1 patch onto the skin every 24 hours     Dispense:  30 patch     Refill:  3            Discussed with patient the importance of exercise and weight control and  overall health and well-being. Reviewed with the patient: current clinical status, medications, activities and diet. Side effects, adverse effects of the medication prescribed today, as well as treatment plan and result expectations have been discussed with the patient who expresses understanding and desires to proceed. Return in about 3 months (around 2021).       Joselin Schmitt MD

## 2021-03-17 NOTE — TELEPHONE ENCOUNTER
Requesting medication refill.  Please approve or deny this request.    Rx requested:  Requested Prescriptions     Pending Prescriptions Disp Refills    PROAIR  (90 Base) MCG/ACT inhaler [Pharmacy Med Name: Angelina Artis 90 mcg/actuation aerosol inhaler] 8.5 g 1     Sig: INHALE 2 PUFFS EVERY 4 HOURS AS NEEDED       Last Office Visit, reason seen and by who:   3/9/2021   Hyperlipidemia    Depression     torrey    FOLLOW UP PLAN FROM LAST VISIT: COPY AND PASTE FROM LAST NOTE       Return in about 6 months (around 9/9/2021) for Hypertension, Hyperlipidemia      PATIENT CONTACTED FOR A FOLLOW UP APPT:   YES OR NO    See below    Next Visit Date:  Future Appointments   Date Time Provider Macy Veronica   3/24/2021  2:00 PM Shahriar Armenta  [address]   5/4/2021  2:00 PM [facility] ROOM 1 [facility]   5/5/2021  9:00 AM Bushra Horvath MD [facility]   6/16/2021  2:00 PM Magdiel Patel MD [address]   9/7/2021 10:00 AM Shena Patel MD [facility]

## 2021-03-19 DIAGNOSIS — R09.81 NASAL CONGESTION: ICD-10-CM

## 2021-03-19 DIAGNOSIS — J44.9 CHRONIC OBSTRUCTIVE PULMONARY DISEASE, UNSPECIFIED COPD TYPE (HCC): ICD-10-CM

## 2021-03-19 DIAGNOSIS — F17.200 SMOKING: ICD-10-CM

## 2021-03-19 RX ORDER — NICOTINE 21 MG/24HR
1 PATCH, TRANSDERMAL 24 HOURS TRANSDERMAL EVERY 24 HOURS
Qty: 30 PATCH | Refills: 3 | Status: SHIPPED | OUTPATIENT
Start: 2021-03-19 | End: 2022-08-01 | Stop reason: ALTCHOICE

## 2021-03-19 RX ORDER — FLUTICASONE PROPIONATE 50 MCG
1 SPRAY, SUSPENSION (ML) NASAL DAILY
Qty: 1 BOTTLE | Refills: 3 | Status: SHIPPED | OUTPATIENT
Start: 2021-03-19 | End: 2021-12-15 | Stop reason: ALTCHOICE

## 2021-03-19 RX ORDER — FLUTICASONE FUROATE, UMECLIDINIUM BROMIDE AND VILANTEROL TRIFENATATE 200; 62.5; 25 UG/1; UG/1; UG/1
1 POWDER RESPIRATORY (INHALATION) DAILY
Qty: 1 EACH | Refills: 3 | Status: SHIPPED | OUTPATIENT
Start: 2021-03-19 | End: 2022-05-09 | Stop reason: SDUPTHER

## 2021-03-24 ENCOUNTER — HOSPITAL ENCOUNTER (OUTPATIENT)
Dept: NEUROLOGY | Age: 61
Discharge: HOME | End: 2021-03-24
Payer: MEDICARE

## 2021-03-24 DIAGNOSIS — R20.2 NUMBNESS AND TINGLING OF BOTH LOWER EXTREMITIES: ICD-10-CM

## 2021-03-24 DIAGNOSIS — R20.0 NUMBNESS AND TINGLING OF BOTH LOWER EXTREMITIES: ICD-10-CM

## 2021-03-24 PROCEDURE — 95886 MUSC TEST DONE W/N TEST COMP: CPT

## 2021-03-24 PROCEDURE — 95910 NRV CNDJ TEST 7-8 STUDIES: CPT

## 2021-03-24 NOTE — PROCEDURES
[address]                      [address] [facility], [address] [facility]                             ELECTROMYOGRAM REPORT    PATIENT NAME: Herbert Cortez                     :        1960  MED REC NO:   [id number]                            ROOM:  ACCOUNT NO:   [de-identified]                           ADMIT DATE: 2021  PROVIDER:     Reina Vital MD    DATE OF EM2021    REFERRING PROVIDER:  Dr. Stevie Camacho. REASON FOR STUDY:  The patient was having pain in the neck, back, hips,  knees, and ankles. She was having paresthesia in the feet bilaterally and in the hands  also. Motor nerve conduction velocities and F-wave latencies are normal in all  the nerves tested. Distal motor latencies are normal in all the nerves tested. Distal sensory latencies are delayed in the right sural nerve and normal  in other nerves tested. Amplitude of motor response is decreased in all the nerves tested. On the concentric needle electrode examination, mild denervation changes  are present in the extensor digitorum brevis muscles bilaterally. CLINICAL INTERPRETATION:  EMG studies are showing changes of peripheral  neuropathic process, which seems axonal type . The patient needs to be  watched for causes of peripheral neuropathy such as diabetes mellitus,  hypothyroidism, exposure to toxins, autoimmune disorder, etc.    For the paresthesia, she could be tried on medications such as  topiramate, gabapentin, Topamax, etc.    If clinically indicated, we could repeat the study in a year. At this time, she does not have evidence of lumbar radiculopathy. Thank you Dr. Stevie Camacho for allowing me to see the patient. Please feel  free to call me if I can be of any further assistance regarding this  patient's evaluation.         Jasper Meigs, MD    D: 2021 14:41:14       T: 2021 14:48:44     [email or web address]  Job#: [id number]     Doc#: [id number]    CC:

## 2021-03-27 DIAGNOSIS — F32.A DEPRESSION, UNSPECIFIED DEPRESSION TYPE: ICD-10-CM

## 2021-03-27 DIAGNOSIS — F41.1 GAD (GENERALIZED ANXIETY DISORDER): ICD-10-CM

## 2021-03-27 DIAGNOSIS — K21.9 GASTROESOPHAGEAL REFLUX DISEASE WITHOUT ESOPHAGITIS: ICD-10-CM

## 2021-03-27 DIAGNOSIS — F32.0 MILD SINGLE CURRENT EPISODE OF MAJOR DEPRESSIVE DISORDER (HCC): ICD-10-CM

## 2021-03-27 NOTE — TELEPHONE ENCOUNTER
Requesting medication refill. Please approve or deny this request.    Rx requested:  Requested Prescriptions     Pending Prescriptions Disp Refills    PARoxetine (PAXIL) 40 MG tablet [Pharmacy Med Name: paroxetine 40 mg tablet] 90 tablet 1     Sig: TAKE 1 TABLET BY MOUTH EVERY MORNING    omeprazole ([address]) 20 MG delayed release capsule [Pharmacy Med Name: omeprazole 20 mg capsule,delayed release] 90 capsule 1     Sig: TAKE 1 CAPSULE BY MOUTH EVERY DAY       Last Office Visit, reason seen and by who:   3/9/2021  Depression, torrey     FOLLOW UP PLAN FROM LAST VISIT: COPY AND PASTE FROM LAST NOTE   Return in about 6 months (around 9/9/2021) for Hypertension, Hyperlipidemia.           PATIENT CONTACTED FOR A FOLLOW UP APPT:   YES OR NO    See below    Next Visit Date:  Future Appointments   Date Time Provider Macy Veronica   5/4/2021  2:00 PM [facility]   5/5/2021  9:00 AM Lalo Kaur MD [facility]   6/16/2021  2:00 PM Anselmo Hartley MD [facility]   9/7/2021 10:00 AM Shannan Gold MD [facility]

## 2021-03-29 RX ORDER — PAROXETINE HYDROCHLORIDE 40 MG/1
40 TABLET, FILM COATED ORAL EVERY MORNING
Qty: 90 TABLET | Refills: 1 | Status: SHIPPED | OUTPATIENT
Start: 2021-03-29 | End: 2021-11-10 | Stop reason: SDUPTHER

## 2021-03-29 RX ORDER — OMEPRAZOLE 20 MG/1
CAPSULE, DELAYED RELEASE ORAL
Qty: 90 CAPSULE | Refills: 1 | Status: SHIPPED | OUTPATIENT
Start: 2021-03-29 | End: 2021-06-03 | Stop reason: SDUPTHER

## 2021-04-13 ENCOUNTER — HOSPITAL ENCOUNTER (OUTPATIENT)
Dept: SLEEP CENTER | Age: 61
Discharge: HOME | End: 2021-04-15
Payer: MEDICARE

## 2021-04-13 PROCEDURE — 95810 POLYSOM 6/> YRS 4/> PARAM: CPT

## 2021-04-13 PROCEDURE — 95810 POLYSOM 6/> YRS 4/> PARAM: CPT | Performed by: INTERNAL MEDICINE

## 2021-04-22 DIAGNOSIS — G47.30 SLEEP APNEA, UNSPECIFIED TYPE: ICD-10-CM

## 2021-05-04 ENCOUNTER — HOSPITAL ENCOUNTER (OUTPATIENT)
Dept: CT IMAGING | Age: 61
Discharge: HOME | End: 2021-05-06
Payer: MEDICARE

## 2021-05-04 DIAGNOSIS — Z85.118 HISTORY OF LUNG CANCER: ICD-10-CM

## 2021-05-04 PROCEDURE — 71250 CT THORAX DX C-: CPT

## 2021-05-05 ENCOUNTER — OFFICE VISIT (OUTPATIENT)
Dept: CARDIOTHORACIC SURGERY | Age: 61
End: 2021-05-05
Payer: MEDICARE

## 2021-05-05 DIAGNOSIS — C7A.090 ATYPICAL CARCINOID LUNG TUMOR (HCC): Primary | ICD-10-CM

## 2021-05-05 DIAGNOSIS — Z85.118 HISTORY OF LUNG CANCER: Primary | ICD-10-CM

## 2021-05-05 PROCEDURE — 99422 OL DIG E/M SVC 11-20 MIN: CPT | Performed by: THORACIC SURGERY (CARDIOTHORACIC VASCULAR SURGERY)

## 2021-05-05 ASSESSMENT — ENCOUNTER SYMPTOMS
TROUBLE SWALLOWING: 0
STRIDOR: 0
COUGH: 0
WHEEZING: 0
CHOKING: 0
SHORTNESS OF BREATH: 0
VOMITING: 0
CHEST TIGHTNESS: 0
VOICE CHANGE: 0
NAUSEA: 0
ABDOMINAL PAIN: 0
SORE THROAT: 0
ABDOMINAL DISTENTION: 0
DIARRHEA: 0

## 2021-05-05 NOTE — PROGRESS NOTES
hypercholesterolemia 3/12/2018    SOB (shortness of breath)     Tobacco abuse 3/12/2018      Past Surgical History:   Procedure Laterality Date     SECTION      LUNG BIOPSY Left 2018    CT guided Left Lung Biopsy by Dr Rhonda Wick History     Socioeconomic History    Marital status: Single     Spouse name: Not on file    Number of children: Not on file    Years of education: Not on file    Highest education level: Not on file   Occupational History    Not on file   Social Needs    Financial resource strain: Not on file    Food insecurity     Worry: Not on file     Inability: Not on file    Transportation needs     Medical: Not on file     Non-medical: Not on file   Tobacco Use    Smoking status: Current Every Day Smoker     Packs/day: 1.00     Years: 47.00     Pack years: 47.00     Types: Cigarettes     Start date:     Smokeless tobacco: Never Used   Substance and Sexual Activity    Alcohol use: No    Drug use: No    Sexual activity: Not Currently     Birth control/protection: Post-menopausal   Lifestyle    Physical activity     Days per week: Not on file     Minutes per session: Not on file    Stress: Not on file   Relationships    Social connections     Talks on phone: Not on file     Gets together: Not on file     Attends Hoahaoism service: Not on file     Active member of club or organization: Not on file     Attends meetings of clubs or organizations: Not on file     Relationship status: Not on file    Intimate partner violence     Fear of current or ex partner: Not on file     Emotionally abused: Not on file     Physically abused: Not on file     Forced sexual activity: Not on file   Other Topics Concern    Not on file   Social History Narrative    Not on file     Family History   Problem Relation Age of Onset    Other Mother         epilepsy     No Known Problems Father     No Known Problems Brother     No Known Problems Brother      Allergies Allergen Reactions    Other Anaphylaxis     Sun flower seeds     Codeine Nausea And Vomiting     Current Outpatient Medications on File Prior to Visit   Medication Sig Dispense Refill    PROAIR  (90 Base) MCG/ACT inhaler INHALE 2 PUFFS EVERY 4 HOURS AS NEEDED 8.5 g 3    PARoxetine (PAXIL) 40 MG tablet TAKE 1 TABLET BY MOUTH EVERY MORNING 90 tablet 1    omeprazole (PRILOSEC) 20 MG delayed release capsule TAKE 1 CAPSULE BY MOUTH EVERY DAY 90 capsule 1    Fluticasone-Umeclidin-Vilant (TRELEGY ELLIPTA) 200-62.5-25 MCG/INH AEPB Inhale 1 puff into the lungs daily 1 each 3    fluticasone (FLONASE) 50 MCG/ACT nasal spray 1 spray by Each Nostril route daily 1 Bottle 3    nicotine (NICODERM CQ) 14 MG/24HR Place 1 patch onto the skin every 24 hours 30 patch 3    Respiratory Therapy Supplies (NEBULIZER/TUBING/MOUTHPIECE) KIT 1 kit by Does not apply route daily as needed (wheezing) 1 kit 0    folic acid (FOLVITE) 1 MG tablet Take 1 tablet by mouth daily 90 tablet 0    busPIRone (BUSPAR) 15 MG tablet TAKE 1 TABLET BY MOUTH THREE TIMES DAILY 270 tablet 1    traZODone (DESYREL) 100 MG tablet TAKE 1 TO 2 TABLETS BY MOUTH NIGHTLY 60 tablet 3    atorvastatin (LIPITOR) 20 MG tablet Take 1 tablet by mouth daily 90 tablet 1    meloxicam (MOBIC) 7.5 MG tablet Take 1 tablet by mouth daily 30 tablet 0    albuterol (PROVENTIL) (5 MG/ML) 0.5% nebulizer solution Take 1 mL by nebulization 4 times daily as needed for Wheezing 120 each 3     Current Facility-Administered Medications on File Prior to Visit   Medication Dose Route Frequency Provider Last Rate Last Admin    cyanocobalamin injection 1,000 mcg  1,000 mcg Intramuscular Q30 Days Kathy Le MD             Review of Systems   Constitutional: Negative for activity change, appetite change, chills, diaphoresis, fatigue, fever and unexpected weight change. HENT: Negative for sore throat, trouble swallowing and voice change.     Eyes: Negative for visual disturbance. Respiratory: Negative for cough, choking, chest tightness, shortness of breath, wheezing and stridor. Cardiovascular: Negative for chest pain, palpitations and leg swelling. Gastrointestinal: Negative for abdominal distention, abdominal pain, diarrhea, nausea and vomiting. Genitourinary: Negative for difficulty urinating. Musculoskeletal: Negative for gait problem and joint swelling. Skin: Negative for rash and wound. Neurological: Negative for dizziness, seizures and light-headedness. Hematological: Negative for adenopathy. Does not bruise/bleed easily. Psychiatric/Behavioral: Negative for behavioral problems and confusion. Objective:   LMP  (LMP Unknown)     Physical Exam  Not performed due to the telephone visit only. Radiographs and Laboratory Studies:     Diagnostic Imaging Studies:    I personally viewed her CAT scan and compared it to her last CAT scan. The calcified nodule in the right lower lobe is still present and unchanged. There are stable postoperative changes in the left upper lobe. I do not appreciate any chest wall or left upper quadrant abdominal wall defect. I see no evidence of new nodules or adenopathy to suggest recurrence of disease. Assessment/Plan     No evidence of recurrent atypical carcinoid in 2 years and 2 months of follow-up. I recommended follow-up CAT scan in 6 months. Bulging of the abdominal wall of unknown etiology or significance. I have told her that if this gets worse or starts having pain that she will need to come to the office for a face-to-face visit so that I can perform a physical examination. Diagnosis Orders   1. Atypical carcinoid lung tumor ([facility] [address]       No orders of the defined types were placed in this encounter. No orders of the defined types were placed in this encounter. Return in about 6 months (around 11/5/2021) for Surveillance CAT scan.       Harjeet Guillermo MD

## 2021-05-12 ENCOUNTER — VIRTUAL VISIT (OUTPATIENT)
Dept: INTERNAL MEDICINE | Age: 61
End: 2021-05-12
Payer: MEDICARE

## 2021-05-12 DIAGNOSIS — R90.89 OTHER ABNORMAL FINDINGS ON DIAGNOSTIC IMAGING OF CENTRAL NERVOUS SYSTEM: ICD-10-CM

## 2021-05-12 DIAGNOSIS — E53.8 B12 DEFICIENCY: ICD-10-CM

## 2021-05-12 DIAGNOSIS — E53.8 FOLIC ACID DEFICIENCY: ICD-10-CM

## 2021-05-12 DIAGNOSIS — G62.9 NEUROPATHY: ICD-10-CM

## 2021-05-12 DIAGNOSIS — G47.34 NOCTURNAL HYPOXIA: Primary | ICD-10-CM

## 2021-05-12 PROCEDURE — 99442 PR PHYS/QHP TELEPHONE EVALUATION 11-20 MIN: CPT | Performed by: FAMILY MEDICINE

## 2021-05-12 ASSESSMENT — ENCOUNTER SYMPTOMS
WHEEZING: 0
STRIDOR: 0
CHEST TIGHTNESS: 0
COUGH: 1
CHOKING: 0
APNEA: 0
SHORTNESS OF BREATH: 1

## 2021-05-12 NOTE — PROGRESS NOTES
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     Overview Note:     2/2019 Left VATS Wedge resection early stage atypical carcinoid tumor  Pathology [id number] atypical carcinoid tumor       Coronary artery calcification of native artery - aortic arch 10/31/2018     Overview Note:     On lung screen done 10/26/18      COPD with chronic bronchitis and emphysema (HCC)     Hyperlipidemia     SOB (shortness of breath)     Pure hypercholesterolemia 03/12/2018    Mild single current episode of major depressive disorder ([address] 03/12/2018     Overview Note:     [address] CARTER (generalized anxiety disorder) 03/12/2018    Insomnia 03/12/2018    Gastroesophageal reflux disease without esophagitis 03/12/2018     Overview Note:     EGD over 10 years      Neuropathy 03/12/2018     Overview Note:     Clinical diagnosis, EMG      DDD (degenerative disc disease), lumbosacral 03/12/2018     Overview Note:     Used to see pain management       Tobacco abuse 03/12/2018     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 10/31/2018    On lung screen done 10/26/18    DDD (degenerative disc disease), lumbosacral 3/12/2018    Used to see pain management     CARTER (generalized anxiety disorder) 3/12/2018    [facility]    Gastroesophageal reflux disease without esophagitis 3/12/2018    EGD over 10 years    Gout     Hyperlipidemia     Insomnia 3/12/2018    Mild single current episode of major depressive disorder ([address] 3/12/2018    Neuropathy 3/12/2018    Clinical diagnosis, EMG    Other emphysema ([address] 3/12/2018    No recent PFT    Pure hypercholesterolemia 3/12/2018    SOB (shortness of breath)     Tobacco abuse 3/12/2018     Past Surgical History:   Procedure Laterality Date     SECTION      LUNG BIOPSY Left 2018    CT guided Left Lung Biopsy by Dr Dennise Leos     Family History   Problem Relation Age of Onset    Other Mother         epilepsy     No Known Problems Father     No Known Problems Brother     No Known Problems Brother      Social History     Socioeconomic History    Marital status: Single     Spouse name: Not on file    Number of children: Not on file    Years of education: Not on file    Highest education level: Not on file   Occupational History    Not on file   Social Needs    Financial resource strain: Not on file    Food insecurity     Worry: Not on file     Inability: Not on file    Transportation needs     Medical: Not on file     Non-medical: Not on file   Tobacco Use    Smoking status: Current Every Day Smoker     Packs/day: 1.00     Years: 47.00     Pack years: 47.00     Types: Cigarettes     Start date:     Smokeless tobacco: Never Used   Substance and Sexual Activity    Alcohol use: No    Drug use: No    Sexual activity: Not Currently     Birth control/protection: Post-menopausal   Lifestyle    Physical activity     Days per week: Not on file     Minutes per session: Not on file    Stress: Not on file   Relationships    Social connections     Talks on phone: Not on file     Gets together: Not on file     Attends Oriental orthodox service: Not on file     Active member of club or organization: Not on file     Attends meetings of clubs or organizations: Not on file     Relationship status: Not on file    Intimate partner violence     Fear of current or ex partner: Not on file     Emotionally abused: Not on file     Physically abused: Not on file     Forced sexual activity: Not on file   Other Topics Concern    Not on file   Social History Narrative    Not on file     Current Outpatient Medications on File Prior to Visit Medication Sig Dispense Refill    WIXELA INHUB 500-50 MCG/DOSE diskus inhaler inhale 1 puff by mouth and INTO THE LUNGS every 12 hours      PROAIR  (90 Base) MCG/ACT inhaler INHALE 2 PUFFS EVERY 4 HOURS AS NEEDED 8.5 g 3    PARoxetine (PAXIL) 40 MG tablet TAKE 1 TABLET BY MOUTH EVERY MORNING 90 tablet 1    omeprazole (PRILOSEC) 20 MG delayed release capsule TAKE 1 CAPSULE BY MOUTH EVERY DAY 90 capsule 1    Fluticasone-Umeclidin-Vilant (TRELEGY ELLIPTA) 200-62.5-25 MCG/INH AEPB Inhale 1 puff into the lungs daily 1 each 3    fluticasone (FLONASE) 50 MCG/ACT nasal spray 1 spray by Each Nostril route daily 1 Bottle 3    nicotine (NICODERM CQ) 14 MG/24HR Place 1 patch onto the skin every 24 hours 30 patch 3    Respiratory Therapy Supplies (NEBULIZER/TUBING/MOUTHPIECE) KIT 1 kit by Does not apply route daily as needed (wheezing) 1 kit 0    folic acid (FOLVITE) 1 MG tablet Take 1 tablet by mouth daily 90 tablet 0    busPIRone (BUSPAR) 15 MG tablet TAKE 1 TABLET BY MOUTH THREE TIMES DAILY 270 tablet 1    traZODone (DESYREL) 100 MG tablet TAKE 1 TO 2 TABLETS BY MOUTH NIGHTLY 60 tablet 3    atorvastatin (LIPITOR) 20 MG tablet Take 1 tablet by mouth daily 90 tablet 1    meloxicam (MOBIC) 7.5 MG tablet Take 1 tablet by mouth daily 30 tablet 0    albuterol (PROVENTIL) (5 MG/ML) 0.5% nebulizer solution Take 1 mL by nebulization 4 times daily as needed for Wheezing 120 each 3     Current Facility-Administered Medications on File Prior to Visit   Medication Dose Route Frequency Provider Last Rate Last Admin    cyanocobalamin injection 1,000 mcg  1,000 mcg Intramuscular Q30 Days Yamileth Bradley MD         Allergies   Allergen Reactions    Other Anaphylaxis     Sun flower seeds     Codeine Nausea And Vomiting       Chief Complaint   Patient presents with    Results     discuss test results      TELEHEALTH EVALUATION -- Audio/Visual (During ORGJN-44 public health emergency)    Due to COVID 19 outbreak, patient's office visit was converted to a virtual visit. Patient was contacted and agreed to proceed with a virtual visit via Telephone Visit  The risks and benefits of converting to a virtual visit were discussed in light of the current infectious disease epidemic. Patient also understood that insurance coverage and co-pays are up to their individual insurance plans. Time spent with patient on the phone 13 mins    Pursuant to the emergency declaration under the [address] [place] [address] authority and the [employer] Act, this Virtual  Visit was conducted, with patient's consent, to reduce the patient's risk of exposure to COVID-19 and provide continuity of care for an established patient. Services were provided through a telephone discussion virtually to substitute for in-person clinic visit. HPI  Symptoms:leg always feel like they want to move  Location:both legs   Quality:achy  Severity:moderate  Duration:ongoing for a few years, getting worse  Timing:comes and goes, mostly at night   Context:none  Alleviating/aggravting factors:worse at night  Associated signs & symptoms:  No numbness   Tingling in left leg   She gets pain in her shins or behind her knee        EMG LE:  CLINICAL INTERPRETATION:  EMG studies are showing changes of peripheral  neuropathic process, which seems axonal type . The patient needs to be  watched for causes of peripheral neuropathy such as diabetes mellitus,  hypothyroidism, exposure to toxins, autoimmune disorder, etc.     For the paresthesia, she could be tried on medications such as  topiramate, gabapentin, Topamax, etc.     If clinically indicated, we could repeat the study in a year.     At this time, she does not have evidence of lumbar radiculopathy.     Component      Latest Ref Rng & Units 3/9/2021 3/9/2021 3/9/2021 3/9/2021          11:40 AM 11:40 AM 11:40 AM 11:40 AM   WBC      4.8 - 10.8 K/uL    6.8   RBC      4.20 - 5.40 M/uL    4.58   Hemoglobin Quant      12.0 - 16.0 g/dL    13.9   Hematocrit      37.0 - 47.0 %    43.0   MCV      82.0 - 100.0 fL    94.0   MCH      27.0 - 31.3 pg    30.4   MCHC      33.0 - 37.0 %    32.3 (L)   RDW      11.5 - 14.5 %    16.6 (H)   Platelet Count      473 - 400 K/uL    266   Neutrophils %      %    56.7   Lymphocyte %      %    30.2   Monocytes %      %    8.0   Eosinophils %      %    4.7   Basophils %      %    0.4   Neutrophils Absolute      1.4 - 6.5 K/uL    3.9   Lymphocytes Absolute      1.0 - 4.8 K/uL    2.1   Monocytes Absolute      0.2 - 0.8 K/uL    0.5   Eosinophils Absolute      0.0 - 0.7 K/uL    0.3   Basophils Absolute      0.0 - 0.2 K/uL    0.0   Sodium      135 - 144 mEq/L   144    Potassium      3.4 - 4.9 mEq/L   4.2    Chloride      95 - 107 mEq/L   106    CO2      20 - 31 mEq/L   24    Anion Gap      9 - 15 mEq/L   14    Glucose      70 - 99 mg/dL   83    BUN      8 - 23 mg/dL   8    Creatinine      0.50 - 0.90 mg/dL   0.86    GFR Non-      >60   >60.0    GFR       >60   >60.0    Calcium      8.5 - 9.9 mg/dL   9.5    Total Protein      6.3 - 8.0 g/dL   6.8    Albumin      3.5 - 4.6 g/dL   4.4    Bilirubin      0.2 - 0.7 mg/dL   0.6    Alk Phos      40 - 130 U/L   138 (H)    ALT      0 - 33 U/L   14    AST      0 - 35 U/L   18    Globulin      2.3 - 3.5 g/dL   2.4    Cholesterol, Total      0 - 199 mg/dL  144     Triglycerides      0 - 150 mg/dL  135     HDL Cholesterol      40 - 59 mg/dL  48     LDL Calculated      0 - 129 mg/dL  69     Vitamin B-12      232 - 1245 pg/mL 226 (L)      Folate      7.3 - 26.1 ng/mL 3.2 (L)        Sleep study showed no sleep apnea but severe hypoxia at night   She does not have oxygen at night at this time     Review of Systems   Constitutional: Negative for appetite change, chills, diaphoresis, fever and unexpected weight change.    Respiratory: Positive for cough and shortness of breath. Negative for apnea, choking, chest tightness, wheezing and stridor. Cardiovascular: Negative for chest pain, palpitations and leg swelling. Physical Exam    Due to this being a TeleHealth encounter, evaluation of the following organ systems is limited: Vitals/Constitutional/EENT/Resp/CV/GI//MS/Neuro/Skin/Heme-Lymph-Imm. [x] Alert  [] Oriented to person/place/time    [x] No apparent distress  [] Toxic appearing    [] Face flushed appearing [] Sclera clear  [] Lips are cyanotic      [x] Breathing appears normal  [] Appears tachypneic      [] Rash on visible skin    [] Cranial Nerves II-XII grossly intact    [] Motor grossly intact in visible upper extremities    [] Motor grossly intact in visible lower extremities    [x] Normal Mood  [] Anxious appearing    [] Depressed appearing  [] Confused appearing      [] Poor short term memory  [] Poor long term memory    [] OTHER: Patient is aware of today's time and date  Patient is aware of current pandemic situation with Covid-19  Patient is able to speaking full sentences  Patient is not SOB during speech    Assessment:  Azeb Dunham was seen today for results. Diagnoses and all orders for this visit:    Nocturnal hypoxia  Reviewed and discussed sleep study results  Start night time oxygen at 3 L    I70 deficiency  Folic acid deficiency  cont with replacement therapy  Recheck levels in 6 months    Neuropathy  -     TSH with Reflex; Future  -     T4, Free; Future  -     NEIL Screen With Reflex; Future  Other abnormal findings on diagnostic imaging of central nervous system   -     TSH with Reflex;  Future  -     T4, Free; Future  R/u thyroid or autoimmune conditions  She is already on B12/folic acid replacement therapy         Orders Placed This Encounter   Procedures    TSH with Reflex     Standing Status:   Future     Standing Expiration Date:   5/12/2022    T4, Free     Standing Status:   Future     Standing Expiration Date:   5/12/2022    NEIL Screen With Reflex     Standing Status:   Future     Standing Expiration Date:   5/12/2022         Diana Rosales MD

## 2021-05-17 ENCOUNTER — NURSE ONLY (OUTPATIENT)
Dept: INTERNAL MEDICINE | Age: 61
End: 2021-05-17
Payer: MEDICARE

## 2021-05-17 DIAGNOSIS — E53.8 B12 DEFICIENCY: Primary | ICD-10-CM

## 2021-05-17 PROCEDURE — 96372 THER/PROPH/DIAG INJ SC/IM: CPT | Performed by: PHYSICIAN ASSISTANT

## 2021-05-17 RX ADMIN — CYANOCOBALAMIN 1000 MCG: 1000 INJECTION INTRAMUSCULAR; SUBCUTANEOUS at 09:11

## 2021-06-03 ENCOUNTER — TELEPHONE (OUTPATIENT)
Dept: INTERNAL MEDICINE | Age: 61
End: 2021-06-03

## 2021-06-03 DIAGNOSIS — K21.9 GASTROESOPHAGEAL REFLUX DISEASE WITHOUT ESOPHAGITIS: ICD-10-CM

## 2021-06-03 NOTE — TELEPHONE ENCOUNTER
Called into pharmacy     Ov: 5/12/21 discuss test results.  Dr. Aileen Woods    No follow up instructions

## 2021-06-03 NOTE — TELEPHONE ENCOUNTER
Patient states her OMEPRAZOLE was sent to wrong pharmacy. Can you have it sent to [facility]    Thank you.

## 2021-06-04 RX ORDER — OMEPRAZOLE 20 MG/1
CAPSULE, DELAYED RELEASE ORAL
Qty: 90 CAPSULE | Refills: 1 | OUTPATIENT
Start: 2021-06-04 | End: 2022-06-23

## 2021-06-16 ENCOUNTER — OFFICE VISIT (OUTPATIENT)
Dept: PULMONOLOGY | Age: 61
End: 2021-06-16

## 2021-06-16 VITALS
OXYGEN SATURATION: 95 % | HEART RATE: 93 BPM | RESPIRATION RATE: 16 BRPM | SYSTOLIC BLOOD PRESSURE: 110 MMHG | DIASTOLIC BLOOD PRESSURE: 62 MMHG | BODY MASS INDEX: 25.4 KG/M2 | HEIGHT: 60 IN | TEMPERATURE: 97.1 F | WEIGHT: 129.4 LBS

## 2021-06-16 DIAGNOSIS — R09.81 NASAL CONGESTION: ICD-10-CM

## 2021-06-16 DIAGNOSIS — F17.200 SMOKING: ICD-10-CM

## 2021-06-16 DIAGNOSIS — J44.9 CHRONIC OBSTRUCTIVE PULMONARY DISEASE, UNSPECIFIED COPD TYPE (HCC): Primary | ICD-10-CM

## 2021-06-16 DIAGNOSIS — K21.9 GASTROESOPHAGEAL REFLUX DISEASE WITHOUT ESOPHAGITIS: ICD-10-CM

## 2021-06-16 PROCEDURE — 99214 OFFICE O/P EST MOD 30 MIN: CPT | Performed by: INTERNAL MEDICINE

## 2021-06-16 PROCEDURE — G8427 DOCREV CUR MEDS BY ELIG CLIN: HCPCS | Performed by: INTERNAL MEDICINE

## 2021-06-16 PROCEDURE — 3023F SPIROM DOC REV: CPT | Performed by: INTERNAL MEDICINE

## 2021-06-16 PROCEDURE — 3017F COLORECTAL CA SCREEN DOC REV: CPT | Performed by: INTERNAL MEDICINE

## 2021-06-16 PROCEDURE — 4004F PT TOBACCO SCREEN RCVD TLK: CPT | Performed by: INTERNAL MEDICINE

## 2021-06-16 PROCEDURE — G8926 SPIRO NO PERF OR DOC: HCPCS | Performed by: INTERNAL MEDICINE

## 2021-06-16 PROCEDURE — G8419 CALC BMI OUT NRM PARAM NOF/U: HCPCS | Performed by: INTERNAL MEDICINE

## 2021-06-16 RX ORDER — AZELASTINE 1 MG/ML
1 SPRAY, METERED NASAL 2 TIMES DAILY
Qty: 2 BOTTLE | Refills: 1 | Status: SHIPPED | OUTPATIENT
Start: 2021-06-16

## 2021-06-16 RX ORDER — FLUTICASONE PROPIONATE 110 UG/1
2 AEROSOL, METERED RESPIRATORY (INHALATION) 2 TIMES DAILY
Qty: 1 INHALER | Refills: 3 | Status: SHIPPED | OUTPATIENT
Start: 2021-06-16 | End: 2022-04-04

## 2021-06-16 NOTE — PROGRESS NOTES
Weight: 129 lb 6.4 oz (58.7 kg)   Height: 5' (1.524 m)         Physical Exam  Constitutional:       General: She is not in acute distress. Appearance: She is well-developed. She is not diaphoretic. HENT:      Head: Normocephalic and atraumatic. Eyes:      Conjunctiva/sclera: Conjunctivae normal.      Pupils: Pupils are equal, round, and reactive to light. Cardiovascular:      Rate and Rhythm: Normal rate and regular rhythm. Heart sounds: No murmur heard. No friction rub. No gallop. Pulmonary:      Effort: Pulmonary effort is normal. No respiratory distress. Breath sounds: Normal breath sounds. No wheezing or rales. Chest:      Chest wall: No tenderness. Abdominal:      General: There is no distension. Palpations: Abdomen is soft. Tenderness: There is no abdominal tenderness. There is no rebound. Musculoskeletal:         General: No tenderness. Cervical back: Normal range of motion and neck supple. Right lower leg: No edema. Left lower leg: No edema. Lymphadenopathy:      Cervical: No cervical adenopathy. Skin:     General: Skin is warm and dry. Findings: No erythema. Neurological:      Mental Status: She is alert and oriented to person, place, and time. Psychiatric:         Judgment: Judgment normal.         Imaging studies reviewed by me CT chest reviewed by me, central upper emphysema, atelectatic changes inferior lingula   Lab results reviewed in chart  PFT 2018, FEV1 46%      Assessment and Plan       Diagnosis Orders   1. Chronic obstructive pulmonary disease, unspecified COPD type ([facility])  fluticasone (FLOVENT HFA) 110 MCG/ACT inhaler    Spirometry Without Bronchodilator    Pulse oximetry, overnight   2. Nasal congestion  azelastine (ASTELIN) 0.1 % nasal spray   3. Smoking     4.  Gastroesophageal reflux disease without esophagitis       · COPD, moderately severe, symptoms not controlled, mainly due to smoking, will continue Trelegy Euniec, will add Flovent, smoking cessation strongly recommended, will repeat spirometry, and will check nocturnal pulse oximetry. · Nasal congestion, symptoms not controlled, continue Flonase and add azelastine  · Smoking cessation strongly recommended  · GERD symptoms controlled continue Prilosec      Orders Placed This Encounter   Procedures    Pulse oximetry, overnight     On 2 L /min O2     Standing Status:   Future     Standing Expiration Date:   2022     Scheduling Instructions: On 2 L/min O2    Spirometry Without Bronchodilator     Standing Status:   Future     Standing Expiration Date:   2022     Orders Placed This Encounter   Medications    azelastine (ASTELIN) 0.1 % nasal spray     Si spray by Nasal route 2 times daily Use in each nostril as directed     Dispense:  2 Bottle     Refill:  1    fluticasone (FLOVENT HFA) 110 MCG/ACT inhaler     Sig: Inhale 2 puffs into the lungs 2 times daily     Dispense:  1 Inhaler     Refill:  3            Discussed with patient the importance of exercise and weight control and  overall health and well-being. Reviewed with the patient: current clinical status, medications, activities and diet. Side effects, adverse effects of the medication prescribed today, as well as treatment plan and result expectations have been discussed with the patient who expresses understanding and desires to proceed. Return in about 3 months (around 2021).       Keon Dang MD

## 2021-06-17 ENCOUNTER — NURSE TRIAGE (OUTPATIENT)
Dept: OTHER | Facility: CLINIC | Age: 61
End: 2021-06-17

## 2021-06-17 ENCOUNTER — TELEPHONE (OUTPATIENT)
Dept: PULMONOLOGY | Age: 61
End: 2021-06-17

## 2021-06-17 NOTE — TELEPHONE ENCOUNTER
Pt called wanting to let you know she is scheduled 9/10/2021 for the CT Dr. Marlene Lopes ordered on 5/5/2021.

## 2021-06-17 NOTE — TELEPHONE ENCOUNTER
Received call from [facility] at [facility] with [employer]. Brief description of triage: caller was sent to me because she stated that she was SOB. Writer asked patient about this and she stated that she is no more SOB then her everyday life. She is a COPD patient. No triage needed at this time.            Reason for Disposition   Requesting regular office appointment    Protocols used: INFORMATION ONLY CALL - NO TRIAGE-ADULT-

## 2021-06-18 ENCOUNTER — NURSE ONLY (OUTPATIENT)
Dept: INTERNAL MEDICINE | Age: 61
End: 2021-06-18

## 2021-06-18 DIAGNOSIS — E53.8 B12 DEFICIENCY: Primary | ICD-10-CM

## 2021-06-18 DIAGNOSIS — G62.9 NEUROPATHY: ICD-10-CM

## 2021-06-18 DIAGNOSIS — R90.89 OTHER ABNORMAL FINDINGS ON DIAGNOSTIC IMAGING OF CENTRAL NERVOUS SYSTEM: ICD-10-CM

## 2021-06-18 LAB
T4 FREE: 1.14 NG/DL (ref 0.84–1.68)
TSH REFLEX: 3.45 UIU/ML (ref 0.44–3.86)

## 2021-06-18 RX ORDER — CYANOCOBALAMIN 1000 UG/ML
1000 INJECTION INTRAMUSCULAR; SUBCUTANEOUS ONCE
Status: SHIPPED | OUTPATIENT
Start: 2021-06-18

## 2021-06-21 LAB — ANA IGG, ELISA: NORMAL

## 2021-06-23 ENCOUNTER — HOSPITAL ENCOUNTER (OUTPATIENT)
Dept: PULMONOLOGY | Age: 61
Discharge: HOME | End: 2021-06-23
Payer: MEDICARE

## 2021-06-23 DIAGNOSIS — J44.9 CHRONIC OBSTRUCTIVE PULMONARY DISEASE, UNSPECIFIED COPD TYPE (HCC): ICD-10-CM

## 2021-06-23 PROCEDURE — 94010 BREATHING CAPACITY TEST: CPT

## 2021-06-28 NOTE — PROCEDURES
[address]                      [address], [address] [facility]                               PULMONARY FUNCTION    PATIENT NAME: Tere Gonzalez                     :        1960  MED REC NO:   [id number]                            ROOM:  ACCOUNT NO:   [de-identified]                           ADMIT DATE: 2021  PROVIDER:     Juan Roberts MD    DATE OF PROCEDURE:  2021    REQUESTING PROVIDER:  Anai Shankar MD    INTERPRETING PROVIDER:  Hany Hess. Jo Encarnacion MD    REASON FOR STUDY:  Shortness of breath and cough. INTERPRETATION:  FVC is 2.11, 75% of predicted. FEV1 is 1.32, 61% of  predicted. FEV1/FVC is 62%. FEF 25-75% is 0.76, 36% of predicted. SUMMARY:  Moderate obstructive pulmonary disease. Clinically indicated  the patient to have a complete lung volume study and diffusion capacity  for further evaluation. Clinical correlation is requested.         Joce Kelley MD    D: 2021 16:04:58       T: 2021 2:06:38     [email or web address]  Job#: [id number]     Doc#: [id number]    CC:

## 2021-06-29 PROCEDURE — 94010 BREATHING CAPACITY TEST: CPT | Performed by: INTERNAL MEDICINE

## 2021-06-29 PROCEDURE — 94729 DIFFUSING CAPACITY: CPT | Performed by: INTERNAL MEDICINE

## 2021-06-29 PROCEDURE — 94726 PLETHYSMOGRAPHY LUNG VOLUMES: CPT | Performed by: INTERNAL MEDICINE

## 2021-07-12 ENCOUNTER — VIRTUAL VISIT (OUTPATIENT)
Dept: INTERNAL MEDICINE | Age: 61
End: 2021-07-12
Payer: MEDICARE

## 2021-07-12 DIAGNOSIS — K52.9 ACUTE GASTROENTERITIS: Primary | ICD-10-CM

## 2021-07-12 PROCEDURE — G8427 DOCREV CUR MEDS BY ELIG CLIN: HCPCS | Performed by: FAMILY MEDICINE

## 2021-07-12 PROCEDURE — 99213 OFFICE O/P EST LOW 20 MIN: CPT | Performed by: FAMILY MEDICINE

## 2021-07-12 PROCEDURE — 3017F COLORECTAL CA SCREEN DOC REV: CPT | Performed by: FAMILY MEDICINE

## 2021-07-12 RX ORDER — ONDANSETRON 4 MG/1
4 TABLET, ORALLY DISINTEGRATING ORAL 3 TIMES DAILY PRN
Qty: 21 TABLET | Refills: 0 | Status: SHIPPED | OUTPATIENT
Start: 2021-07-12 | End: 2022-04-04

## 2021-07-12 SDOH — ECONOMIC STABILITY: FOOD INSECURITY: WITHIN THE PAST 12 MONTHS, THE FOOD YOU BOUGHT JUST DIDN'T LAST AND YOU DIDN'T HAVE MONEY TO GET MORE.: NEVER TRUE

## 2021-07-12 SDOH — ECONOMIC STABILITY: FOOD INSECURITY: WITHIN THE PAST 12 MONTHS, YOU WORRIED THAT YOUR FOOD WOULD RUN OUT BEFORE YOU GOT MONEY TO BUY MORE.: SOMETIMES TRUE

## 2021-07-12 ASSESSMENT — ENCOUNTER SYMPTOMS
EYE DISCHARGE: 0
EYE PAIN: 0
DIARRHEA: 1
EYE REDNESS: 0
SINUS PAIN: 0
NAUSEA: 1
VOMITING: 1

## 2021-07-12 ASSESSMENT — SOCIAL DETERMINANTS OF HEALTH (SDOH): HOW HARD IS IT FOR YOU TO PAY FOR THE VERY BASICS LIKE FOOD, HOUSING, MEDICAL CARE, AND HEATING?: SOMEWHAT HARD

## 2021-07-12 NOTE — PROGRESS NOTES
Patient: Attila Rider    YOB: 1960    Date: 7/12/21       Patient Active Problem List    Diagnosis Date Noted    Nocturnal hypoxia 53/38/0787    Folic acid deficiency 45/73/1001    Atypical carcinoid lung tumor ([address] 04/10/2019     Overview Note:     2/2019 Left VATS Wedge resection early stage atypical carcinoid tumor  Pathology T1bn0 atypical carcinoid tumor       Coronary artery calcification of native artery - aortic arch 10/31/2018     Overview Note:     On lung screen done 10/26/18      Chronic obstructive pulmonary disease (HCC)     Hyperlipidemia     SOB (shortness of breath)     Pure hypercholesterolemia 03/12/2018    Mild single current episode of major depressive disorder ([address] 03/12/2018     Overview Note:     [address] CARTER (generalized anxiety disorder) 03/12/2018    Insomnia 03/12/2018    Gastroesophageal reflux disease without esophagitis 03/12/2018     Overview Note:     EGD over 10 years      Neuropathy 03/12/2018     Overview Note:     Clinical diagnosis, EMG      DDD (degenerative disc disease), lumbosacral 03/12/2018     Overview Note:     Used to see pain management       Tobacco abuse 03/12/2018     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 10/31/2018    On lung screen done 10/26/18    DDD (degenerative disc disease), lumbosacral 3/12/2018    Used to see pain management     CARTER (generalized anxiety disorder) 3/12/2018    [facility]    Gastroesophageal reflux disease without esophagitis 3/12/2018    EGD over 10 years    Gout     Hyperlipidemia     Insomnia 3/12/2018    Mild single current episode of major depressive disorder ([address] 3/12/2018    Neuropathy 3/12/2018    Clinical diagnosis, EMG    Other emphysema ([address] 3/12/2018    No recent PFT  Fear of Current or Ex-Partner:     Emotionally Abused:     Physically Abused:     Sexually Abused:      Current Outpatient Medications on File Prior to Visit   Medication Sig Dispense Refill    traZODone (DESYREL) 100 MG tablet take 1 to 2 tablets by mouth nightly 60 tablet 0    azelastine (ASTELIN) 0.1 % nasal spray 1 spray by Nasal route 2 times daily Use in each nostril as directed 2 Bottle 1    fluticasone (FLOVENT HFA) 110 MCG/ACT inhaler Inhale 2 puffs into the lungs 2 times daily 1 Inhaler 3    atorvastatin (LIPITOR) 20 MG tablet take 1 tablet by mouth daily 90 tablet 0    omeprazole (PRILOSEC) 20 MG delayed release capsule 1 tab PO QD 90 capsule 1    PROAIR  (90 Base) MCG/ACT inhaler inhale 2 puffs by mouth and INTO THE LUNGS every 4 hours if needed 8.5 g 3    PARoxetine (PAXIL) 40 MG tablet TAKE 1 TABLET BY MOUTH EVERY MORNING 90 tablet 1    Fluticasone-Umeclidin-Vilant (TRELEGY ELLIPTA) 200-62.5-25 MCG/INH AEPB Inhale 1 puff into the lungs daily 1 each 3    fluticasone (FLONASE) 50 MCG/ACT nasal spray 1 spray by Each Nostril route daily 1 Bottle 3    nicotine (NICODERM CQ) 14 MG/24HR Place 1 patch onto the skin every 24 hours 30 patch 3    Respiratory Therapy Supplies (NEBULIZER/TUBING/MOUTHPIECE) KIT 1 kit by Does not apply route daily as needed (wheezing) 1 kit 0    folic acid (FOLVITE) 1 MG tablet Take 1 tablet by mouth daily 90 tablet 0    busPIRone (BUSPAR) 15 MG tablet TAKE 1 TABLET BY MOUTH THREE TIMES DAILY 270 tablet 1    meloxicam (MOBIC) 7.5 MG tablet Take 1 tablet by mouth daily 30 tablet 0    albuterol (PROVENTIL) (5 MG/ML) 0.5% nebulizer solution Take 1 mL by nebulization 4 times daily as needed for Wheezing 120 each 3     Current Facility-Administered Medications on File Prior to Visit   Medication Dose Route Frequency Provider Last Rate Last Admin    cyanocobalamin injection 1,000 mcg  1,000 mcg Intramuscular Once Yasmani Go MD        cyanocobalamin injection 1,000 mcg  1,000 mcg Intramuscular Q30 Days Mireya Gonzalez MD   1,000 mcg at 05/17/21 0911     Allergies   Allergen Reactions    Other Anaphylaxis     Sun flower seeds     Codeine Nausea And Vomiting       Chief Complaint   Patient presents with    Nausea     since this morning. concerned about food poisoning.  Emesis    Diarrhea     TELEHEALTH EVALUATION -- Audio/Visual (During KLSNG-05 public health emergency)    Due to COVID 19 outbreak, patient's office visit was converted to a virtual visit. Patient was contacted and agreed to proceed with a virtual visit via "[employer]y. me  The risks and benefits of converting to a virtual visit were discussed in light of the current infectious disease epidemic. Patient also understood that insurance coverage and co-pays are up to their individual insurance plans. Pursuant to the emergency declaration under the [address] [place] [address] waiver authority and the [employer] Act, this Virtual  Visit was conducted, with patient's consent, to reduce the patient's risk of exposure to COVID-19 and provide continuity of care for an established patient. Services were provided through a video discussion virtually to substitute for in-person clinic visit. HPI    Pt says when she woke up at 10 Am this morning she had nausea, vomiting, diarrhea. Some belly pain - mild cramping with BM, no fevers, no blood in stool. No sick contacts. No inciting event. Had pre-made salad for dinner last night - might have been old/bad. She is able to keep down sips of 7-up. Review of Systems   Constitutional: Positive for chills, diaphoresis and fatigue. HENT: Negative for congestion, ear pain and sinus pain. Eyes: Negative for pain, discharge and redness. Gastrointestinal: Positive for diarrhea, nausea and vomiting. Physical Exam    Nursing note reviewed.    Constitutional: General: no acute distress. Appearance: Normal appearance. normal weight. not ill-appearing, toxic-appearing or diaphoretic. HENT:      Head: Normocephalic and atraumatic. Right Ear: External ear normal.      Left Ear: External ear normal.      Nose: Nose normal.   Eyes:      General: No scleral icterus. Right eye: No discharge. Left eye: No discharge. Extraocular Movements: Extraocular movements intact. Conjunctiva/sclera: Conjunctivae normal.   Neck:      Musculoskeletal: Normal range of motion. Pulmonary:      Effort: Pulmonary effort is normal.   Musculoskeletal: Normal range of motion. Neurological:      General: No focal deficit present. Mental Status: alert. Mental status is at baseline. Psychiatric:         Attention and Perception: Attention and perception normal.         Mood and Affect: Mood and affect normal.         Speech: Speech normal.         Behavior: Behavior normal. Behavior is cooperative. Thought Content: Thought content normal.         Cognition and Memory: Memory normal.     Due to this being a TeleHealth encounter, evaluation of the following organ systems is limited: Vitals/Constitutional/EENT/Resp/CV/GI//MS/Neuro/Skin/Heme-Lymph-Imm. Assessment:  Keegan Burns was seen today for nausea, emesis, diarrhea and sweats. Diagnoses and all orders for this visit:    Acute gastroenteritis  -     ondansetron (ZOFRAN-ODT) 4 MG disintegrating tablet; Take 1 tablet by mouth 3 times daily as needed for Nausea or Vomiting   had a lengthy discussion with patient about treatment, it's side effects, the diagnosis & etiology of symptoms, along with management and expectations of outcome with the recommended treatment. Patient verbalized understanding.   Explained viral etiology to patient and parent and advised supportive care, over the counter analgesics for symptomatic therapy, tylenol/motrin for fevers, Fluids, rest  Signs of worsening infection explained, signs of dehydration explained, to seek medical attention if they occur  Given zofran for nausea  Discussed BRAT diet  Symptoms worsen -Salome Soto MD    Patient is aware this encounter is billable to insurance. This encounter occurred with patient at home while provider was at the office.

## 2021-07-12 NOTE — PATIENT INSTRUCTIONS
Family and Housing Resources    POINT [employer] of [address] [place] [address]  Call 211  or - www. Ar[employer] Consulting GroupSt. Luke's Wood River Medical CenterScores Media Group. [address] [place] [address])  Call - [phone]  www.[employer]. [employer]    [facility]  [address] [facility] [address]  Annette, [address] [place] [address] 82  [address] [facility] [address]  [phone] /[phone]    Medicaid Application  Https://benefits. [place].gov/  [phone]    Home Energy Assistance Programs (HEAP)  [address]. Lizz, 100[address] [facility]  [phone]    [facility] ( [facility])  [address] [facility], [address] [facility] ([facility])  [address], 1850 [facility]  [address] [facility]  www. [employer]    [employer]  [address] [place] [address], [address]  [address] [facility]  4215 [facility] [address] 8029 [facility] at [address] [facility]  Food and Meal Resources    [employer] of [address] [place] [address]  Call 211 or  www. [employer]Pet    [facility] Clinic[facility]. [address] [facility])  Call - [phone]  www.[employer]. net      [employer] / [employer] on [facility]  [address]   [phone]  *regular & [address]. [address], [address]  [phone]  *regular & special diets  60+ [facility] on [address]  1400 [facility]. [place], [facility] [address]  [phone], ext. [phone] [facility] on 7450 [facility].   [place], [address] [facility]  [phone]  *regular & special diets  [address], [address] Vicente and Ember only    [address] [facility] on [facility] 40  [address]  [place], [place] Dejon Hou 1154 100 [facility], [address] [place] [address] Road  [phone]  [address]isha [address] only    [address] on [address] [place]. [place], [address] [facility]  [phone]  61 + and/or disables      [address] [facility] [address] [facility])  Call - [phone]  www.[employer]. Passman    [employer] of [address] [place] [address]  Call 211 or  wwwChartio. [facility] [address]. [place], [address] [facility]  [phone]  *adults only with no insurance    [address]  [address] [place] [address] [address]  [phone]    Medicaid Application  https://benefits. [place].gov/  1-794-586-833-703-5321      [facility] [address] [place] [address], [address] [place] [address]  [phone]    Patient Assistance Programs(PAP)  www.needLogoproedsBitfone Corporation  *search by medication to see if assistance is available  Elderly Care    [employer] of [address] [place] [address]  Call 211 or  www. [employer]. [address] [place] [address])  Call - [phone]  www.[employer]. Passman    Adult Protective Services   [address]  Direct Line - [phone]  24/7 [facility] Hospital[facility] hotline - [phone]    [address] [place] [address] on 900 [facility] ([facility]GF[facility]Q)  [phone]      [address] [place] [address])   Assistance with accurate and unbiased information about Medicare  [phone]    [facility] on [address]  [phone], ext. [phone] [facility] [address] [facility]  [address] [facility], [address] [facility]  [address]. Lizz, 1001 [facility]    1400 [facility]  Pur[address]. Tawanna, [address] [place] [address]. [address]  [address] [place] [address].   [place], [place] [address], [address]  vincenzoûs [facility] 2.  Michelle Aguillonen, [address] [place] [address] Road  [phone]

## 2021-08-09 NOTE — TELEPHONE ENCOUNTER
Requesting medication refill.  Please approve or deny this request.    Rx requested:  Requested Prescriptions     Pending Prescriptions Disp Refills    PROAIR  (90 Base) MCG/ACT inhaler [Pharmacy Med Name: Nola Smiley 90 MCG INHALER] 8.5 g 3     Sig: inhale 2 puffs by mouth and INTO THE LUNGS every 4 hours if needed       Last Office Visit, reason seen and by who:   7/12/2021 gastroenteritis Dr. Lopez Bran: Jose Cne Argue      none  PATIENT CONTACTED FOR A FOLLOW UP APPT:   YES OR NO    See below    Next Visit Date:  Future Appointments   Date Time Provider Macy Veronica   9/7/2021 10:00 AM Julieta Crocker, [address] [place] [address]   9/20/2021  1:00 PM Skye Lui MD [address]   11/8/2021  1:00 PM [facility] ROOM 1 [facility]   11/10/2021  9:00 AM Kacey Gray MD [address]

## 2021-08-22 NOTE — TELEPHONE ENCOUNTER
Requesting medication refill.  Please approve or deny this request.    Rx requested:  Requested Prescriptions     Pending Prescriptions Disp Refills    PROAIR  (90 Base) MCG/ACT inhaler [Pharmacy Med Name: Yvon Leventhal 90 MCG INHALER] 8.5 g 0     Sig: inhale 2 puffs by mouth every 4 hours if needed       Last Office Visit:   7/12/2021        REASON LAST SEEN AND BY WHO:    Acute gastroenteritis       FOLLOW UP PLAN FROM LAST PCP VISIT: COPY AND PASTE FROM LAST PCP NOTE    none      PATIENT CONTACTED FOR A FOLLOW UP APPT: YES OR NO    no    Next Visit Date:  Future Appointments   Date Time Provider Macy Veronica   9/7/2021 10:00 AM Dimitrios De La Cruz MD [address]   9/20/2021  1:00 PM Daniele Ribera MD [address]   11/8/2021  1:00 PM STACY CT ROOM 1 [facility] RAD   11/10/2021  9:00 AM Fredy Baer MD [address] [facility]

## 2021-09-10 ENCOUNTER — NURSE ONLY (OUTPATIENT)
Dept: INTERNAL MEDICINE | Age: 61
End: 2021-09-10
Payer: MEDICARE

## 2021-09-10 PROCEDURE — 96372 THER/PROPH/DIAG INJ SC/IM: CPT | Performed by: PHYSICIAN ASSISTANT

## 2021-09-10 RX ADMIN — CYANOCOBALAMIN 1000 MCG: 1000 INJECTION INTRAMUSCULAR; SUBCUTANEOUS at 11:06

## 2021-09-20 ENCOUNTER — OFFICE VISIT (OUTPATIENT)
Dept: PULMONOLOGY | Age: 61
End: 2021-09-20
Payer: MEDICARE

## 2021-09-20 VITALS
TEMPERATURE: 96.8 F | OXYGEN SATURATION: 96 % | SYSTOLIC BLOOD PRESSURE: 112 MMHG | HEIGHT: 60 IN | WEIGHT: 130.6 LBS | RESPIRATION RATE: 18 BRPM | DIASTOLIC BLOOD PRESSURE: 70 MMHG | BODY MASS INDEX: 25.64 KG/M2 | HEART RATE: 102 BPM

## 2021-09-20 DIAGNOSIS — J44.9 CHRONIC OBSTRUCTIVE PULMONARY DISEASE, UNSPECIFIED COPD TYPE (HCC): Primary | ICD-10-CM

## 2021-09-20 DIAGNOSIS — K21.9 GASTROESOPHAGEAL REFLUX DISEASE WITHOUT ESOPHAGITIS: ICD-10-CM

## 2021-09-20 DIAGNOSIS — C7A.090 ATYPICAL CARCINOID LUNG TUMOR (HCC): ICD-10-CM

## 2021-09-20 DIAGNOSIS — F17.200 SMOKING: ICD-10-CM

## 2021-09-20 PROCEDURE — G8926 SPIRO NO PERF OR DOC: HCPCS | Performed by: INTERNAL MEDICINE

## 2021-09-20 PROCEDURE — 3017F COLORECTAL CA SCREEN DOC REV: CPT | Performed by: INTERNAL MEDICINE

## 2021-09-20 PROCEDURE — G8419 CALC BMI OUT NRM PARAM NOF/U: HCPCS | Performed by: INTERNAL MEDICINE

## 2021-09-20 PROCEDURE — 99214 OFFICE O/P EST MOD 30 MIN: CPT | Performed by: INTERNAL MEDICINE

## 2021-09-20 PROCEDURE — G8427 DOCREV CUR MEDS BY ELIG CLIN: HCPCS | Performed by: INTERNAL MEDICINE

## 2021-09-20 PROCEDURE — 4004F PT TOBACCO SCREEN RCVD TLK: CPT | Performed by: INTERNAL MEDICINE

## 2021-09-20 PROCEDURE — 3023F SPIROM DOC REV: CPT | Performed by: INTERNAL MEDICINE

## 2021-09-20 RX ORDER — VARENICLINE TARTRATE
KIT
Qty: 1 BOX | Refills: 0 | Status: SHIPPED | OUTPATIENT
Start: 2021-09-20 | End: 2022-01-25 | Stop reason: ALTCHOICE

## 2021-09-20 NOTE — PROGRESS NOTES
Subjective:     Halie Bhatti is a 63 y.o. female who complains today of:     Chief Complaint   Patient presents with    Follow-up     3 Month F/U for COPD    Results     Spirometry    Cough       HPI  Patient presents for OPD follow-up    She is doing good, shortness of breath is minimal, minimal cough with clear phlegm, no chest pain, no fever, weight is stable, no lower extremity edema, no joint swelling or pain, she sleeps well, currently on 3 L O2 while asleep, no fever or chills, she continues to smoke unfortunately almost half pack per day, she is trying to quit she did have multiple quit date scheduled in the past but she never made it, she is willing to keep trying, and she is agreeable to try Chantix. Allergies:   Other and Codeine  Past Medical History:   Diagnosis Date    Abnormal CT of the chest 2019    Bone spur     Cervical stenosis of spine     COPD (chronic obstructive pulmonary disease) (HCC)     Coronary artery calcification of native artery - aortic arch 10/31/2018    On lung screen done 10/26/18    DDD (degenerative disc disease), lumbosacral 3/12/2018    Used to see pain management     CARTER (generalized anxiety disorder) 3/12/2018    [facility]    Gastroesophageal reflux disease without esophagitis 3/12/2018    EGD over 10 years    Gout     Hyperlipidemia     Insomnia 3/12/2018    Mild single current episode of major depressive disorder ([address] 3/12/2018    Neuropathy 3/12/2018    Clinical diagnosis, EMG    Other emphysema ([address] 3/12/2018    No recent PFT    Pure hypercholesterolemia 3/12/2018    SOB (shortness of breath)     Tobacco abuse 3/12/2018     Past Surgical History:   Procedure Laterality Date     SECTION      LUNG BIOPSY Left 2018    CT guided Left Lung Biopsy by Dr Jake Nelson     Family History   Problem Relation Age of Onset    Other Mother         epilepsy     No Known Problems Father     No Known Problems Brother  No Known Problems Brother      Social History     Socioeconomic History    Marital status: Single     Spouse name: Not on file    Number of children: Not on file    Years of education: Not on file    Highest education level: Not on file   Occupational History    Not on file   Tobacco Use    Smoking status: Current Every Day Smoker     Packs/day: 1.00     Years: 47.00     Pack years: 47.00     Types: Cigarettes     Start date: 1970    Smokeless tobacco: Never Used   Vaping Use    Vaping Use: Former    Substances: Never   Substance and Sexual Activity    Alcohol use: No    Drug use: No    Sexual activity: Not Currently     Birth control/protection: Post-menopausal   Other Topics Concern    Not on file   Social History Narrative    Not on file     Social Determinants of Health     Financial Resource Strain: Medium Risk    Difficulty of Paying Living Expenses: Somewhat hard   Food Insecurity: Food Insecurity Present    Worried About Running Out of Food in the Last Year: Sometimes true    Clau of Food in the Last Year: Never true   Transportation Needs:     Lack of Transportation (Medical):      Lack of Transportation (Non-Medical):    Physical Activity:     Days of Exercise per Week:     Minutes of Exercise per Session:    Stress:     Feeling of Stress :    Social Connections:     Frequency of Communication with Friends and Family:     Frequency of Social Gatherings with Friends and Family:     Attends Islam Services:     Active Member of Clubs or Organizations:     Attends Club or Organization Meetings:     Marital Status:    Intimate Partner Violence:     Fear of Current or Ex-Partner:     Emotionally Abused:     Physically Abused:     Sexually Abused:          Review of Systems      ROS: 10 organs review of system is done including general, psychological, ENT, hematological, endocrine, respiratory, cardiovascular, gastrointestinal,musculoskeletal, neurological,  allergy and Immunology is done and is otherwise negative. Current Outpatient Medications   Medication Sig Dispense Refill    varenicline (CHANTIX STARTING MONTH ASHOK) 0.5 MG X 11 & 1 MG X 42 tablet Take by mouth.  1 box 0    traZODone (DESYREL) 100 MG tablet Take 1 tablet by mouth nightly as needed for Sleep take 1 to 2 tablets by mouth every evening 30 tablet 0    PROAIR  (90 Base) MCG/ACT inhaler inhale 2 puffs by mouth every 4 hours if needed 8.5 g 0    ondansetron (ZOFRAN-ODT) 4 MG disintegrating tablet Take 1 tablet by mouth 3 times daily as needed for Nausea or Vomiting 21 tablet 0    azelastine (ASTELIN) 0.1 % nasal spray 1 spray by Nasal route 2 times daily Use in each nostril as directed 2 Bottle 1    fluticasone (FLOVENT HFA) 110 MCG/ACT inhaler Inhale 2 puffs into the lungs 2 times daily 1 Inhaler 3    atorvastatin (LIPITOR) 20 MG tablet take 1 tablet by mouth daily 90 tablet 0    omeprazole (PRILOSEC) 20 MG delayed release capsule 1 tab PO QD 90 capsule 1    PARoxetine (PAXIL) 40 MG tablet TAKE 1 TABLET BY MOUTH EVERY MORNING 90 tablet 1    Fluticasone-Umeclidin-Vilant (TRELEGY ELLIPTA) 200-62.5-25 MCG/INH AEPB Inhale 1 puff into the lungs daily 1 each 3    fluticasone (FLONASE) 50 MCG/ACT nasal spray 1 spray by Each Nostril route daily 1 Bottle 3    nicotine (NICODERM CQ) 14 MG/24HR Place 1 patch onto the skin every 24 hours 30 patch 3    Respiratory Therapy Supplies (NEBULIZER/TUBING/MOUTHPIECE) KIT 1 kit by Does not apply route daily as needed (wheezing) 1 kit 0    folic acid (FOLVITE) 1 MG tablet Take 1 tablet by mouth daily 90 tablet 0    busPIRone (BUSPAR) 15 MG tablet TAKE 1 TABLET BY MOUTH THREE TIMES DAILY 270 tablet 1    albuterol (PROVENTIL) (5 MG/ML) 0.5% nebulizer solution Take 1 mL by nebulization 4 times daily as needed for Wheezing 120 each 3     Current Facility-Administered Medications   Medication Dose Route Frequency Provider Last Rate Last Admin    cyanocobalamin injection 1,000 mcg  1,000 mcg IntraMUSCular Once Shanna Condon MD        cyanocobalamin injection 1,000 mcg  1,000 mcg IntraMUSCular Q30 Days Shanna Condon MD   1,000 mcg at 09/10/21 1106       Objective:     Vitals:    09/20/21 1321   BP: 112/70   Site: Right Upper Arm   Position: Sitting   Cuff Size: Medium Adult   Pulse: 102   Resp: 18   Temp: 96.8 °F (36 °C)   TempSrc: Tympanic   SpO2: 96%   Weight: 130 lb 9.6 oz (59.2 kg)   Height: 5' (1.524 m)         Physical Exam  Constitutional:       General: She is not in acute distress. Appearance: She is well-developed. She is not diaphoretic. HENT:      Head: Normocephalic and atraumatic. Eyes:      Conjunctiva/sclera: Conjunctivae normal.      Pupils: Pupils are equal, round, and reactive to light. Cardiovascular:      Rate and Rhythm: Normal rate and regular rhythm. Heart sounds: No murmur heard. No friction rub. No gallop. Pulmonary:      Effort: Pulmonary effort is normal. No respiratory distress. Breath sounds: Normal breath sounds. No wheezing or rales. Chest:      Chest wall: No tenderness. Abdominal:      General: There is no distension. Palpations: Abdomen is soft. Tenderness: There is no abdominal tenderness. There is no rebound. Musculoskeletal:         General: No tenderness. Cervical back: Normal range of motion and neck supple. Right lower leg: No edema. Left lower leg: No edema. Lymphadenopathy:      Cervical: No cervical adenopathy. Skin:     General: Skin is warm and dry. Findings: No erythema. Neurological:      Mental Status: She is alert and oriented to person, place, and time. Psychiatric:         Judgment: Judgment normal.         Imaging studies reviewed by me CT chest May 4, 2021 shows no mass or nodule  Lab results reviewed in chart  PFT June 2021, FEV1 61% improved compared to 2018 FEV1 46%     Sleep study shows AHI 1.1  Assessment and Plan       Diagnosis Orders   1. Chronic obstructive pulmonary disease, unspecified COPD type ([facility]     2. Smoking  varenicline (CHANTIX STARTING MONTH PAK) 0.5 MG X 11 & 1 MG X 42 tablet   3. Atypical carcinoid lung tumor ([facility]     4. Gastroesophageal reflux disease without esophagitis       · COPD, symptoms are controlled, continue Trelegy Ellipta, continue Flovent, continue O2 while asleep, smoking cessation strongly recommended  · Smoking cessation counseling done, she is agreeable to try again, she would like to try Chantix again which I prescribed, reviewed with the patient risk of suicidal ideation, and vivid dreams, she is to stop the medication and call me if this occurred, also reviewed with her more common side effect of nausea and vomiting. · History of atypical carcinoid tumor of the lung   status post left upper lobe wedge resection T1b N0, she has follow-up CAT scan ordered for later this year and she follows up with thoracic surgery  · GERD symptoms controlled continue PPI      No orders of the defined types were placed in this encounter. Orders Placed This Encounter   Medications    varenicline (CHANTIX STARTING MONTH PAK) 0.5 MG X 11 & 1 MG X 42 tablet     Sig: Take by mouth. Dispense:  1 box     Refill:  0            Discussed with patient the importance of exercise and weight control and  overall health and well-being. Reviewed with the patient: current clinical status, medications, activities and diet. Side effects, adverse effects of the medication prescribed today, as well as treatment plan and result expectations have been discussed with the patient who expresses understanding and desires to proceed. Return in about 4 months (around 1/20/2022).       Ventura Delgado MD

## 2021-09-30 ENCOUNTER — OFFICE VISIT (OUTPATIENT)
Dept: INTERNAL MEDICINE | Age: 61
End: 2021-09-30
Payer: MEDICARE

## 2021-09-30 VITALS
WEIGHT: 129 LBS | OXYGEN SATURATION: 96 % | DIASTOLIC BLOOD PRESSURE: 72 MMHG | BODY MASS INDEX: 25.32 KG/M2 | SYSTOLIC BLOOD PRESSURE: 114 MMHG | HEIGHT: 60 IN | TEMPERATURE: 97.1 F | HEART RATE: 74 BPM

## 2021-09-30 DIAGNOSIS — Z87.891 PERSONAL HISTORY OF TOBACCO USE, PRESENTING HAZARDS TO HEALTH: ICD-10-CM

## 2021-09-30 DIAGNOSIS — Z72.0 TOBACCO ABUSE: ICD-10-CM

## 2021-09-30 DIAGNOSIS — R07.9 CHEST PAIN, UNSPECIFIED TYPE: ICD-10-CM

## 2021-09-30 DIAGNOSIS — Z12.31 ENCOUNTER FOR SCREENING MAMMOGRAM FOR BREAST CANCER: ICD-10-CM

## 2021-09-30 DIAGNOSIS — F32.0 MILD SINGLE CURRENT EPISODE OF MAJOR DEPRESSIVE DISORDER (HCC): ICD-10-CM

## 2021-09-30 DIAGNOSIS — Z00.00 ROUTINE GENERAL MEDICAL EXAMINATION AT A HEALTH CARE FACILITY: Primary | ICD-10-CM

## 2021-09-30 DIAGNOSIS — J44.9 CHRONIC OBSTRUCTIVE PULMONARY DISEASE, UNSPECIFIED COPD TYPE (HCC): ICD-10-CM

## 2021-09-30 DIAGNOSIS — C7A.090 ATYPICAL CARCINOID LUNG TUMOR (HCC): ICD-10-CM

## 2021-09-30 PROCEDURE — 3017F COLORECTAL CA SCREEN DOC REV: CPT | Performed by: PHYSICIAN ASSISTANT

## 2021-09-30 PROCEDURE — 93000 ELECTROCARDIOGRAM COMPLETE: CPT | Performed by: PHYSICIAN ASSISTANT

## 2021-09-30 PROCEDURE — G0439 PPPS, SUBSEQ VISIT: HCPCS | Performed by: PHYSICIAN ASSISTANT

## 2021-09-30 PROCEDURE — 99406 BEHAV CHNG SMOKING 3-10 MIN: CPT | Performed by: PHYSICIAN ASSISTANT

## 2021-09-30 ASSESSMENT — PATIENT HEALTH QUESTIONNAIRE - PHQ9
SUM OF ALL RESPONSES TO PHQ9 QUESTIONS 1 & 2: 2
2. FEELING DOWN, DEPRESSED OR HOPELESS: 1
1. LITTLE INTEREST OR PLEASURE IN DOING THINGS: 1
SUM OF ALL RESPONSES TO PHQ QUESTIONS 1-9: 2

## 2021-09-30 ASSESSMENT — LIFESTYLE VARIABLES: HOW OFTEN DO YOU HAVE A DRINK CONTAINING ALCOHOL: 0

## 2021-09-30 NOTE — PATIENT INSTRUCTIONS
Stopping Smoking: Care Instructions  Your Care Instructions     Cigarette smokers crave the nicotine in cigarettes. Giving it up is much harder than simply changing a habit. Your body has to stop craving the nicotine. It is hard to quit, but you can do it. There are many tools that people use to quit smoking. You may find that combining tools works best for you. There are several steps to quitting. First you get ready to quit. Then you get support to help you. After that, you learn new skills and behaviors to become a nonsmoker. For many people, a necessary step is getting and using medicine. Your doctor will help you set up the plan that best meets your needs. You may want to attend a smoking cessation program to help you quit smoking. When you choose a program, look for one that has proven success. Ask your doctor for ideas. You will greatly increase your chances of success if you take medicine as well as get counseling or join a cessation program.  Some of the changes you feel when you first quit tobacco are uncomfortable. Your body will miss the nicotine at first, and you may feel short-tempered and grumpy. You may have trouble sleeping or concentrating. Medicine can help you deal with these symptoms. You may struggle with changing your smoking habits and rituals. The last step is the tricky one: Be prepared for the smoking urge to continue for a time. This is a lot to deal with, but keep at it. You will feel better. Follow-up care is a key part of your treatment and safety. Be sure to make and go to all appointments, and call your doctor if you are having problems. It's also a good idea to know your test results and keep a list of the medicines you take. How can you care for yourself at home? · Ask your family, friends, and coworkers for support. You have a better chance of quitting if you have help and support.   · Join a support group, such as Nicotine Anonymous, for people who are trying to quit smoking. · Consider signing up for a smoking cessation program, such as the American Lung Association's Freedom from Smoking program.  · Get text messaging support. Go to the website at www.smokefree. gov to sign up for the [facility] program.  · Set a quit date. Pick your date carefully so that it is not right in the middle of a big deadline or stressful time. Once you quit, do not even take a puff. Get rid of all ashtrays and lighters after your last cigarette. Clean your house and your clothes so that they do not smell of smoke. · Learn how to be a nonsmoker. Think about ways you can avoid those things that make you reach for a cigarette. ? Avoid situations that put you at greatest risk for smoking. For some people, it is hard to have a drink with friends without smoking. For others, they might skip a coffee break with coworkers who smoke. ? Change your daily routine. Take a different route to work or eat a meal in a different place. · Cut down on stress. Calm yourself or release tension by doing an activity you enjoy, such as reading a book, taking a hot bath, or gardening. · Talk to your doctor or pharmacist about nicotine replacement therapy, which replaces the nicotine in your body. You still get nicotine but you do not use tobacco. Nicotine replacement products help you slowly reduce the amount of nicotine you need. These products come in several forms, many of them available over-the-counter:  ? Nicotine patches  ? Nicotine gum and lozenges  ? Nicotine inhaler  · Ask your doctor about bupropion (Wellbutrin) or varenicline (Chantix), which are prescription medicines. They do not contain nicotine. They help you by reducing withdrawal symptoms, such as stress and anxiety. · Some people find hypnosis, acupuncture, and massage helpful for ending the smoking habit. · Eat a healthy diet and get regular exercise. Having healthy habits will help your body move past its craving for nicotine.   · Be prepared get at least 150 minutes of exercise per week or 10,000 steps per day on a pedometer . · Order or download the FREE \"Exercise & Physical Activity: Your Everyday Guide\" from [employer]. Call 2-878.987.6020 or search [employer] online. · You need 9379-2864 mg of calcium and 8754-1241 IU of vitamin D per day. It is possible to meet your calcium requirement with diet alone, but a vitamin D supplement is usually necessary to meet this goal.  · When exposed to the sun, use a sunscreen that protects against both UVA and UVB radiation with an SPF of 30 or greater. Reapply every 2 to 3 hours or after sweating, drying off with a towel, or swimming. · Always wear a seat belt when traveling in a car. Always wear a helmet when riding a bicycle or motorcycle.

## 2021-09-30 NOTE — PROGRESS NOTES
Medicare Annual Wellness Visit  Name: Rashaad Hartley Date: 2021   MRN: [id number] Sex: Female   Age: 63 y.o. Ethnicity: Non- / Non    : 1960 Race: White (non-)      sAhley Jones is here for Medicare AWV and Chest Pain    Screenings for behavioral, psychosocial and functional/safety risks, and cognitive dysfunction are all negative except as indicated below. These results, as well as other patient data from the [address] form, are documented in Flowsheets linked to this Encounter. Allergies   Allergen Reactions    Other Anaphylaxis     Sun flower seeds     Codeine Nausea And Vomiting         Prior to Visit Medications    Medication Sig Taking? Authorizing Provider   varenicline (CHANTIX STARTING MONTH ) 0.5 MG X 11 & 1 MG X 42 tablet Take by mouth.  Yes Zehra Mcdonald MD   traZODone (DESYREL) 100 MG tablet Take 1 tablet by mouth nightly as needed for Sleep take 1 to 2 tablets by mouth every evening Yes SEGUNDO Villegas   PROAIR  (90 Base) MCG/ACT inhaler inhale 2 puffs by mouth every 4 hours if needed Yes Shanna Condon MD   ondansetron (ZOFRAN-ODT) 4 MG disintegrating tablet Take 1 tablet by mouth 3 times daily as needed for Nausea or Vomiting Yes Shanna Condon MD   azelastine (ASTELIN) 0.1 % nasal spray 1 spray by Nasal route 2 times daily Use in each nostril as directed Yes Zehra Mcdonald MD   fluticasone (FLOVENT HFA) 110 MCG/ACT inhaler Inhale 2 puffs into the lungs 2 times daily Yes Zehra Mcdonald MD   atorvastatin (LIPITOR) 20 MG tablet take 1 tablet by mouth daily Yes Shanna Condon MD   omeprazole (PRILOSEC) 20 MG delayed release capsule 1 tab PO QD Yes Shanna Condon MD   PARoxetine (PAXIL) 40 MG tablet TAKE 1 TABLET BY MOUTH EVERY MORNING Yes Shanna Condon MD   Fluticasone-Umeclidin-Vilant (TRELEGY ELLIPTA) 200-62.5-25 MCG/INH AEPB Inhale 1 puff into the lungs daily Yes Zehra Mcdonald MD   fluticasone (FLONASE) 50 MCG/ACT nasal spray 1 spray by Each Nostril route daily Yes Kaitlyn Jara MD   nicotine (NICODERM CQ) 14 MG/24HR Place 1 patch onto the skin every 24 hours Yes Kaitlyn Jara MD   Respiratory Therapy Supplies (NEBULIZER/TUBING/MOUTHPIECE) KIT 1 kit by Does not apply route daily as needed (wheezing) Yes Magalis Blankenship MD   folic acid (FOLVITE) 1 MG tablet Take 1 tablet by mouth daily Yes Collin Shook MD   busPIRone (BUSPAR) 15 MG tablet TAKE 1 TABLET BY MOUTH THREE TIMES DAILY Yes Collin Shook MD   albuterol (PROVENTIL) (5 MG/ML) 0.5% nebulizer solution Take 1 mL by nebulization 4 times daily as needed for Wheezing Yes Collin Shook MD         Past Medical History:   Diagnosis Date    Abnormal CT of the chest 2019    Bone spur     Cervical stenosis of spine     [facility] obstructive [facility] disease) [facility]     Coronary artery calcification of native artery - aortic arch 10/31/2018    On lung screen done 10/26/18    DDD (degenerative disc disease), lumbosacral 3/12/2018    Used to see pain management     CARTER [facility]generalized anxiety disorder) 3/12/2018    [facility]    Gastroesophageal reflux disease without esophagitis 3/12/2018    EGD over 10 years    Gout     Hyperlipidemia     Insomnia 3/12/2018    Mild single current episode of major depressive disorder ([address] 3/12/2018    Neuropathy 3/12/2018    Clinical diagnosis, EMG    Other emphysema ([address] 3/12/2018    No recent PFT    Pure hypercholesterolemia 3/12/2018    SOB (shortness of breath)     Tobacco abuse 3/12/2018       Past Surgical History:   Procedure Laterality Date     SECTION      LUNG BIOPSY Left 2018    CT guided Left Lung Biopsy by Dr Amna Aguirre         Family History   Problem Relation Age of Onset    Other Mother         epilepsy     No Known Problems Father     No Known Problems Brother     No Known Problems Brother        CareTeam (Including outside providers/suppliers regularly involved to follow-up in this office for further evaluation and treatment of lungs within 3 month(s)  · Loneliness: due to covid  · No Living Will: Patient declines ACP discussion/assistance    Health Habits/Nutrition:  Health Habits/Nutrition  Do you exercise for at least 20 minutes 2-3 times per week?: (!) No  Have you lost any weight without trying in the past 3 months?: No  Do you eat only one meal per day?: No  Have you seen the dentist within the past year?: N/A - wear dentures  Body mass index: (!) 25.19  Health Habits/Nutrition Interventions:  · Inadequate physical activity:  educational materials provided to promote increased physical activity    Hearing/Vision:  No exam data present  Hearing/Vision  Do you or your family notice any trouble with your hearing that hasn't been managed with hearing aids?: No  Do you have difficulty driving, watching TV, or doing any of your daily activities because of your eyesight?: No  Have you had an eye exam within the past year?: (!) No  Hearing/Vision Interventions:  · Vision concerns:  patient encouraged to make appointment with his/her eye specialist    Safety:  Safety  Do you have working smoke detectors?: Yes  Have all throw rugs been removed or fastened?: Yes  Do you have non-slip mats or surfaces in all bathtubs/showers?: (!) No  Do all of your stairways have a railing or banister?: Yes  Are your doorways, halls and stairs free of clutter?: Yes  Do you always fasten your seatbelt when you are in a car?: Yes  Safety Interventions:  · Home safety tips provided     Personalized Preventive Plan   Current Health Maintenance Status  Immunization History   Administered Date(s) Administered    COVID-19, J&J, PF, 0.5 mL 03/04/2021    COVID-19, Moderna, PF, 100mcg/0.5mL 03/19/2021, 04/20/2021    Hepatitis B 10/05/2018    Influenza Virus Vaccine 09/11/2011, 09/02/2015, 12/04/2018    Influenza, Quadv, IM, PF (6 mo and older Fluzone, Flulaval, Fluarix, and 3 yrs and older Afluria) 09/02/2015, 12/04/2018    Influenza, Aime Starch, Recombinant, IM PF (Flublok 18 yrs and older) 10/22/2019    MMR 10/05/2018    Pneumococcal Polysaccharide ([id number]) 04/09/2018    Tdap (Boostrix, Adacel) 10/05/2018    Unknown Immunization 10/05/2018        Health Maintenance   Topic Date Due    HIV screen  Never done    Shingles Vaccine (1 of 2) Never done   [employer] Visit (AWV)  Never done    Flu vaccine (1) 09/01/2021    Breast cancer screen  11/26/2021    Lipid screen  03/09/2022    Low dose CT lung screening  05/04/2022    Colon cancer screen fecal DNA test (Cologuard)  12/12/2022    Cervical cancer screen  04/09/2023    Pneumococcal 0-64 years Vaccine (2 of 2 - PPSV23) 12/31/2024    DTaP/Tdap/Td vaccine (2 - Td or Tdap) 10/05/2028    COVID-19 Vaccine  Completed    Hepatitis C screen  Completed    Hepatitis A vaccine  Aged Out    Hepatitis B vaccine  Aged Out    Hib vaccine  Aged Out    Meningococcal (ACWY) vaccine  Aged Out     Recommendations for ticckle Due: see orders and patient instructions/AVS.  . Recommended screening schedule for the next 5-10 years is provided to the patient in written form: see Patient Instructions/AVS.    Mireya Wise was seen today for medicare awv and chest pain. Diagnoses and all orders for this visit:    Routine general medical examination at a health care facility        -    Reviewed labs done already       Chest pain, unspecified type  -     EKG 12 lead; Future- nsr  -     Likely muscular due to coughing   -     NSAIDS for pain       Chronic obstructive pulmonary disease, unspecified COPD type (HCC)        -     Continue current inhalers    Atypical carcinoid lung tumor (Hcc        -     Repeat CT of the lung in December, seeing pulmonology    Mild single current episode of major depressive disorder ([facility]        -     Stable on Paxil and BuSpar    Tobacco abuse        -     Using Chantix, coughing a lot more since cutting down on smoking. Advised daily Mucinex    Personal history of tobacco use, presenting hazards to health  -     LA TOBACCO USE CESSATION INTERMEDIATE 3-10 MINUTES [35141]    Encounter for screening mammogram for breast cancer  -     [facility] DIGITAL SCREEN W OR WO CAD BILATERAL;  Future

## 2021-10-04 DIAGNOSIS — E78.5 HYPERLIPIDEMIA, UNSPECIFIED HYPERLIPIDEMIA TYPE: ICD-10-CM

## 2021-10-04 NOTE — TELEPHONE ENCOUNTER
Comments: none    Last Office Visit (last PCP visit):   9/30/2021    Next Visit Date:  Future Appointments   Date Time Provider Macy Veronica   10/6/2021  3:00 PM STACY MAMMOGRAPHY ROOM 1 [facility]Z  [facility] Fac RAD   11/8/2021  1:00 PM STACY CT ROOM 1 MALZ  CT [facility] Fac RAD   11/10/2021  9:00 AM Dhruv Galeas MD [facility] THORACIC [facility] AT [place]       **If hasn't been seen in over a year OR hasn't followed up according to last diabetes/ADHD visit, make appointment for patient before sending refill to provider.     Rx requested:  Requested Prescriptions     Pending Prescriptions Disp Refills    atorvastatin (LIPITOR) 20 MG tablet 90 tablet 0     Sig: Take 1 tablet by mouth daily

## 2021-10-06 ENCOUNTER — HOSPITAL ENCOUNTER (OUTPATIENT)
Dept: WOMENS IMAGING | Age: 61
Discharge: HOME | End: 2021-10-08
Payer: MEDICARE

## 2021-10-06 DIAGNOSIS — Z12.31 ENCOUNTER FOR SCREENING MAMMOGRAM FOR BREAST CANCER: ICD-10-CM

## 2021-10-06 PROCEDURE — 77063 BREAST TOMOSYNTHESIS BI: CPT

## 2021-10-07 RX ORDER — ATORVASTATIN CALCIUM 20 MG/1
20 TABLET, FILM COATED ORAL DAILY
Qty: 90 TABLET | Refills: 0 | Status: SHIPPED | OUTPATIENT
Start: 2021-10-07 | End: 2022-01-27

## 2021-10-12 ENCOUNTER — TELEPHONE (OUTPATIENT)
Dept: INTERNAL MEDICINE | Age: 61
End: 2021-10-12

## 2021-10-12 NOTE — TELEPHONE ENCOUNTER
Patients insurance company is requesting a prescription be written for oxygen so they will cover the expense. She said she is out.     Thank you

## 2021-10-13 NOTE — TELEPHONE ENCOUNTER
Spoke with pt. She is going to get more information, and call back. She may need to get documentation from the O2 supplier. She does not need a refill.

## 2021-10-15 ENCOUNTER — NURSE ONLY (OUTPATIENT)
Dept: INTERNAL MEDICINE | Age: 61
End: 2021-10-15
Payer: MEDICARE

## 2021-10-15 DIAGNOSIS — E53.8 B12 DEFICIENCY: Primary | ICD-10-CM

## 2021-10-15 PROCEDURE — 96372 THER/PROPH/DIAG INJ SC/IM: CPT | Performed by: PHYSICIAN ASSISTANT

## 2021-10-15 RX ORDER — CYANOCOBALAMIN 1000 UG/ML
1000 INJECTION INTRAMUSCULAR; SUBCUTANEOUS ONCE
Status: COMPLETED | OUTPATIENT
Start: 2021-10-15 | End: 2021-10-15

## 2021-10-15 RX ADMIN — CYANOCOBALAMIN 1000 MCG: 1000 INJECTION INTRAMUSCULAR; SUBCUTANEOUS at 10:00

## 2021-11-05 DIAGNOSIS — F41.1 GAD (GENERALIZED ANXIETY DISORDER): ICD-10-CM

## 2021-11-05 DIAGNOSIS — F32.A DEPRESSION, UNSPECIFIED DEPRESSION TYPE: ICD-10-CM

## 2021-11-05 DIAGNOSIS — F32.0 MILD SINGLE CURRENT EPISODE OF MAJOR DEPRESSIVE DISORDER (HCC): ICD-10-CM

## 2021-11-05 NOTE — TELEPHONE ENCOUNTER
Comments: none    Last Office Visit (last PCP visit):   9/30/2021    Next Visit Date:  Future Appointments   Date Time Provider Macy Veronica   11/8/2021  1:00 PM STACY CT ROOM 1 RENAY  CT [facility] Fac RAD   11/10/2021  9:00 AM Jhonatan Murphy MD CAROLYN THORACIC [facility] EMERGENCY [facility] AT [place]       **If hasn't been seen in over a year OR hasn't followed up according to last diabetes/ADHD visit, make appointment for patient before sending refill to provider.     Rx requested:  Requested Prescriptions     Pending Prescriptions Disp Refills    busPIRone (BUSPAR) 15 MG tablet 270 tablet 1

## 2021-11-08 ENCOUNTER — HOSPITAL ENCOUNTER (OUTPATIENT)
Dept: CT IMAGING | Age: 61
Discharge: HOME | End: 2021-11-10
Payer: MEDICARE

## 2021-11-08 DIAGNOSIS — Z85.118 HISTORY OF LUNG CANCER: ICD-10-CM

## 2021-11-08 PROCEDURE — 71250 CT THORAX DX C-: CPT

## 2021-11-08 RX ORDER — BUSPIRONE HYDROCHLORIDE 15 MG/1
TABLET ORAL
Qty: 270 TABLET | Refills: 1 | Status: SHIPPED | OUTPATIENT
Start: 2021-11-08 | End: 2021-11-10 | Stop reason: SDUPTHER

## 2021-11-09 NOTE — TELEPHONE ENCOUNTER
Comments: none    Last Office Visit (last PCP visit):   9/30/2021    Next Visit Date:  Future Appointments   Date Time Provider Macy Veronica   11/10/2021  9:00 AM Sneha Barahona MD [address]       **If hasn't been seen in over a year OR hasn't followed up according to last diabetes/ADHD visit, make appointment for patient before sending refill to provider.     Rx requested:  Requested Prescriptions     Pending Prescriptions Disp Refills    albuterol sulfate  (90 Base) MCG/ACT inhaler [Pharmacy Med Name: ALBUTEROL HFA 90 MCG INHALER] 17 g      Sig: inhale 2 puffs by mouth and INTO THE LUNGS every 4 hours if needed

## 2021-11-10 ENCOUNTER — VIRTUAL VISIT (OUTPATIENT)
Dept: INTERNAL MEDICINE | Age: 61
End: 2021-11-10
Payer: MEDICARE

## 2021-11-10 ENCOUNTER — OFFICE VISIT (OUTPATIENT)
Dept: CARDIOTHORACIC SURGERY | Age: 61
End: 2021-11-10
Payer: MEDICARE

## 2021-11-10 VITALS — HEART RATE: 78 BPM | WEIGHT: 127 LBS | HEIGHT: 60 IN | OXYGEN SATURATION: 95 % | BODY MASS INDEX: 24.94 KG/M2

## 2021-11-10 DIAGNOSIS — F32.A DEPRESSION, UNSPECIFIED DEPRESSION TYPE: ICD-10-CM

## 2021-11-10 DIAGNOSIS — C7A.090 ATYPICAL CARCINOID LUNG TUMOR (HCC): Primary | ICD-10-CM

## 2021-11-10 DIAGNOSIS — Z85.118 HISTORY OF LUNG CANCER: Primary | ICD-10-CM

## 2021-11-10 DIAGNOSIS — J44.9 CHRONIC OBSTRUCTIVE PULMONARY DISEASE, UNSPECIFIED COPD TYPE (HCC): Primary | ICD-10-CM

## 2021-11-10 DIAGNOSIS — F41.1 GAD (GENERALIZED ANXIETY DISORDER): ICD-10-CM

## 2021-11-10 DIAGNOSIS — F32.0 MILD SINGLE CURRENT EPISODE OF MAJOR DEPRESSIVE DISORDER (HCC): ICD-10-CM

## 2021-11-10 PROCEDURE — 99213 OFFICE O/P EST LOW 20 MIN: CPT | Performed by: FAMILY MEDICINE

## 2021-11-10 PROCEDURE — G8427 DOCREV CUR MEDS BY ELIG CLIN: HCPCS | Performed by: THORACIC SURGERY (CARDIOTHORACIC VASCULAR SURGERY)

## 2021-11-10 PROCEDURE — G8420 CALC BMI NORM PARAMETERS: HCPCS | Performed by: THORACIC SURGERY (CARDIOTHORACIC VASCULAR SURGERY)

## 2021-11-10 PROCEDURE — G8926 SPIRO NO PERF OR DOC: HCPCS | Performed by: FAMILY MEDICINE

## 2021-11-10 PROCEDURE — 3023F SPIROM DOC REV: CPT | Performed by: FAMILY MEDICINE

## 2021-11-10 PROCEDURE — G8420 CALC BMI NORM PARAMETERS: HCPCS | Performed by: FAMILY MEDICINE

## 2021-11-10 PROCEDURE — G8427 DOCREV CUR MEDS BY ELIG CLIN: HCPCS | Performed by: FAMILY MEDICINE

## 2021-11-10 PROCEDURE — 4004F PT TOBACCO SCREEN RCVD TLK: CPT | Performed by: THORACIC SURGERY (CARDIOTHORACIC VASCULAR SURGERY)

## 2021-11-10 PROCEDURE — G8484 FLU IMMUNIZE NO ADMIN: HCPCS | Performed by: THORACIC SURGERY (CARDIOTHORACIC VASCULAR SURGERY)

## 2021-11-10 PROCEDURE — 4004F PT TOBACCO SCREEN RCVD TLK: CPT | Performed by: FAMILY MEDICINE

## 2021-11-10 PROCEDURE — 3017F COLORECTAL CA SCREEN DOC REV: CPT | Performed by: THORACIC SURGERY (CARDIOTHORACIC VASCULAR SURGERY)

## 2021-11-10 PROCEDURE — 3017F COLORECTAL CA SCREEN DOC REV: CPT | Performed by: FAMILY MEDICINE

## 2021-11-10 PROCEDURE — 99213 OFFICE O/P EST LOW 20 MIN: CPT | Performed by: THORACIC SURGERY (CARDIOTHORACIC VASCULAR SURGERY)

## 2021-11-10 PROCEDURE — G8484 FLU IMMUNIZE NO ADMIN: HCPCS | Performed by: FAMILY MEDICINE

## 2021-11-10 RX ORDER — PAROXETINE HYDROCHLORIDE 40 MG/1
40 TABLET, FILM COATED ORAL EVERY MORNING
Qty: 90 TABLET | Refills: 3 | Status: SHIPPED | OUTPATIENT
Start: 2021-11-10

## 2021-11-10 RX ORDER — BUSPIRONE HYDROCHLORIDE 15 MG/1
TABLET ORAL
Qty: 270 TABLET | Refills: 3 | Status: SHIPPED | OUTPATIENT
Start: 2021-11-10

## 2021-11-10 ASSESSMENT — ENCOUNTER SYMPTOMS
ABDOMINAL PAIN: 0
CONSTIPATION: 0
CHEST TIGHTNESS: 0
COUGH: 0
STRIDOR: 0
WHEEZING: 0
VOICE CHANGE: 0
TROUBLE SWALLOWING: 0
SORE THROAT: 0
WHEEZING: 0
SHORTNESS OF BREATH: 0
NAUSEA: 0
SHORTNESS OF BREATH: 0
ABDOMINAL DISTENTION: 0
RHINORRHEA: 0
SORE THROAT: 0
COUGH: 0
DIARRHEA: 0
VOMITING: 0
CHOKING: 0
DIARRHEA: 0
ABDOMINAL PAIN: 0

## 2021-11-10 NOTE — PROGRESS NOTES
[address] Virtual Visit  [address] [facility] [address] [place] [address]  PRIMARY CARE  [address]  [address] [address]  Dept: [phone]  Dept Fax: [phone]: [phone]     Due to COVID 23 outbreak, patient's office visit was converted to a virtual visit. Patient was contacted and agreed to proceed with a virtual visit via Doxy. me  The risks and benefits of converting to a virtual visit were discussed in light of the current infectious disease epidemic. Patient also understood that insurance coverage and co-pays are up to their individual insurance plans. Chief Complaint  Chief Complaint   Patient presents with    COPD     needs refill       HPI:  63 y. o.female who presents for the following:      COPD: feels it is controlled but needs the albuterol inhaler most days    Mood: feels controlled on the buspar and paxil for the anxiety      Review of Systems   Constitutional: Negative for chills and fever. HENT: Negative for congestion, rhinorrhea and sore throat. Respiratory: Negative for cough, shortness of breath and wheezing. Gastrointestinal: Negative for abdominal pain, constipation and diarrhea. Endocrine: Negative for polydipsia and polyuria. Genitourinary: Negative for dysuria, frequency and urgency. Neurological: Negative for syncope, light-headedness, numbness and headaches. Psychiatric/Behavioral: Positive for dysphoric mood. Negative for sleep disturbance. The patient is not nervous/anxious.         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 10/31/2018    On lung screen done 10/26/18    DDD (degenerative disc disease), lumbosacral 3/12/2018    Used to see pain management     CARTER (generalized anxiety disorder) 3/12/2018    [facility]  Gastroesophageal reflux disease without esophagitis 3/12/2018    EGD over 10 years    Gout     Hyperlipidemia     Insomnia 3/12/2018    Mild single current episode of major depressive disorder ([facility] [address] 3/12/2018    Neuropathy 3/12/2018    Clinical diagnosis, EMG    Other emphysema ([facility] 3/12/2018    No recent PFT    Pure hypercholesterolemia 3/12/2018    SOB (shortness of breath)     Tobacco abuse 3/12/2018     Past Surgical History:   Procedure Laterality Date     SECTION      LUNG BIOPSY Left 2018    CT guided Left Lung Biopsy by Dr Anjelica Lea History     Socioeconomic History    Marital status: Single     Spouse name: Not on file    Number of children: Not on file    Years of education: Not on file    Highest education level: Not on file   Occupational History    Not on file   Tobacco Use    Smoking status: Current Every Day Smoker     Packs/day: 1.00     Years: 47.00     Pack years: 47.00     Types: Cigarettes     Start date:     Smokeless tobacco: Never Used   Vaping Use    Vaping Use: Former    Substances: Never   Substance and Sexual Activity    Alcohol use: No    Drug use: No    Sexual activity: Not Currently     Birth control/protection: Post-menopausal   Other Topics Concern    Not on file   Social History Narrative    Not on file     Social Determinants of Health     Financial Resource Strain: Medium Risk    Difficulty of Paying Living Expenses: Somewhat hard   Food Insecurity: Food Insecurity Present    Worried About Running Out of Food in the Last Year: Sometimes true    Clau of Food in the Last Year: Never true   Transportation Needs:     Lack of Transportation (Medical): Not on file    Lack of Transportation (Non-Medical):  Not on file   Physical Activity:     Days of Exercise per Week: Not on file    Minutes of Exercise per Session: Not on file   Stress:     Feeling of Stress : Not on file   Social Connections:     Frequency of Communication with Friends and Family: Not on file    Frequency of Social Gatherings with Friends and Family: Not on file    Attends Christian Services: Not on file    Active Member of Clubs or Organizations: Not on file    Attends Club or Organization Meetings: Not on file    Marital Status: Not on file   Intimate Partner Violence:     Fear of Current or Ex-Partner: Not on file    Emotionally Abused: Not on file    Physically Abused: Not on file    Sexually Abused: Not on file   Housing Stability:     Unable to Pay for Housing in the Last Year: Not on file    Number of [place] in the Last Year: Not on file    Unstable Housing in the Last Year: Not on file     Family History   Problem Relation Age of Onset    Other Mother         epilepsy     No Known Problems Father     No Known Problems Brother     No Known Problems Brother       Allergies   Allergen Reactions    Other Anaphylaxis     Sun flower seeds     Codeine Nausea And Vomiting     Current Outpatient Medications   Medication Sig Dispense Refill    PROAIR  (90 Base) MCG/ACT inhaler inhale 2 puffs by mouth every 4 hours if needed 2 each 11    busPIRone (BUSPAR) 15 MG tablet TAKE 1 TABLET BY MOUTH THREE TIMES DAILY 270 tablet 3    PARoxetine (PAXIL) 40 MG tablet Take 1 tablet by mouth every morning 90 tablet 3    atorvastatin (LIPITOR) 20 MG tablet Take 1 tablet by mouth daily 90 tablet 0    varenicline (CHANTIX STARTING MONTH ASHOK) 0.5 MG X 11 & 1 MG X 42 tablet Take by mouth.  1 box 0    traZODone (DESYREL) 100 MG tablet Take 1 tablet by mouth nightly as needed for Sleep take 1 to 2 tablets by mouth every evening 30 tablet 0    ondansetron (ZOFRAN-ODT) 4 MG disintegrating tablet Take 1 tablet by mouth 3 times daily as needed for Nausea or Vomiting 21 tablet 0    azelastine (ASTELIN) 0.1 % nasal spray 1 spray by Nasal route 2 times daily Use in each nostril as directed 2 Bottle 1    fluticasone (FLOVENT HFA) 110 MCG/ACT inhaler Inhale 2 puffs into the lungs 2 times daily 1 Inhaler 3    omeprazole (PRILOSEC) 20 MG delayed release capsule 1 tab PO QD 90 capsule 1    Fluticasone-Umeclidin-Vilant (TRELEGY ELLIPTA) 200-62.5-25 MCG/INH AEPB Inhale 1 puff into the lungs daily 1 each 3    fluticasone (FLONASE) 50 MCG/ACT nasal spray 1 spray by Each Nostril route daily 1 Bottle 3    nicotine (NICODERM CQ) 14 MG/24HR Place 1 patch onto the skin every 24 hours 30 patch 3    Respiratory Therapy Supplies (NEBULIZER/TUBING/MOUTHPIECE) KIT 1 kit by Does not apply route daily as needed (wheezing) 1 kit 0    folic acid (FOLVITE) 1 MG tablet Take 1 tablet by mouth daily 90 tablet 0    albuterol (PROVENTIL) (5 MG/ML) 0.5% nebulizer solution Take 1 mL by nebulization 4 times daily as needed for Wheezing 120 each 3     Current Facility-Administered Medications   Medication Dose Route Frequency Provider Last Rate Last Admin    cyanocobalamin injection 1,000 mcg  1,000 mcg IntraMUSCular Once Andreia Fang MD        cyanocobalamin injection 1,000 mcg  1,000 mcg IntraMUSCular Q30 Days Andreia Fang MD   1,000 mcg at 09/10/21 1106         Physical exam:  Physical Exam  Constitutional:       General: She is not in acute distress. Appearance: Normal appearance. She is not ill-appearing. HENT:      Head: Normocephalic and atraumatic. Pulmonary:      Effort: Pulmonary effort is normal. No respiratory distress. Musculoskeletal:      Cervical back: Normal range of motion. Neurological:      Mental Status: She is alert. Assessment/Plan:  63 y.o. female here mainly for COPD:  - COPD: meds refilled  - Mood: controlled; will continue on current regimen      Diagnosis Orders   1. Chronic obstructive pulmonary disease, unspecified COPD type (HCC)  PROAIR  (90 Base) MCG/ACT inhaler   2. Mild single current episode of major depressive disorder (HCC)  busPIRone (BUSPAR) 15 MG tablet    PARoxetine (PAXIL) 40 MG tablet   3.  CARTER (generalized anxiety disorder)  busPIRone (BUSPAR) 15 MG tablet    PARoxetine (PAXIL) 40 MG tablet   4. Depression, unspecified depression type  busPIRone (BUSPAR) 15 MG tablet    PARoxetine (PAXIL) 40 MG tablet        Return if symptoms worsen or fail to improve. Brittni Bojorquez MD       Pursuant to the emergency declaration under the [address] [place] [address] [facility] authority and the [employer] Act, this Virtual  Visit was conducted, with patient's consent, to reduce the patient's risk of exposure to COVID-19 and provide continuity of care for an established patient. Services were provided through a video synchronous discussion virtually to substitute for in-person clinic visit.

## 2021-11-10 NOTE — PROGRESS NOTES
Subjective:      Patient ID: Yoana Bell is a 63 y.o. female who presents today for:  Chief Complaint   Patient presents with    6 Month Follow-Up       HPI  Patient is 2 years and 8 months out from a left upper lobe wedge for aT1bN0 atypical carcinoid. She is here for her 6-month surveillance CAT scan. Since her last visit she reports no changes to her health. She denies any change in cough, shortness of breath, hemoptysis or unexpected weight loss.     Past Medical History:   Diagnosis Date    Abnormal CT of the chest 2019    Bone spur     Cervical stenosis of spine     COPD (chronic obstructive pulmonary disease) (HCC)     Coronary artery calcification of native artery - aortic arch 10/31/2018    On lung screen done 10/26/18    DDD (degenerative disc disease), lumbosacral 3/12/2018    Used to see pain management     CARTER (generalized anxiety disorder) 3/12/2018    [facility]    Gastroesophageal reflux disease without esophagitis 3/12/2018    EGD over 10 years    Gout     Hyperlipidemia     Insomnia 3/12/2018    Mild single current episode of major depressive disorder ([address] 3/12/2018    Neuropathy 3/12/2018    Clinical diagnosis, EMG    Other emphysema ([address] 3/12/2018    No recent PFT    Pure hypercholesterolemia 3/12/2018    SOB (shortness of breath)     Tobacco abuse 3/12/2018      Past Surgical History:   Procedure Laterality Date     SECTION      LUNG BIOPSY Left 2018    CT guided Left Lung Biopsy by Dr Aparna Steele History     Socioeconomic History    Marital status: Single     Spouse name: Not on file    Number of children: Not on file    Years of education: Not on file    Highest education level: Not on file   Occupational History    Not on file   Tobacco Use    Smoking status: Current Every Day Smoker     Packs/day: 1.00     Years: 47.00     Pack years: 47.00     Types: Cigarettes     Start date:     Smokeless tobacco: Never Used Vaping Use    Vaping Use: Former    Substances: Never   Substance and Sexual Activity    Alcohol use: No    Drug use: No    Sexual activity: Not Currently     Birth control/protection: Post-menopausal   Other Topics Concern    Not on file   Social History Narrative    Not on file     Social Determinants of Health     Financial Resource Strain: Medium Risk    Difficulty of Paying Living Expenses: Somewhat hard   Food Insecurity: Food Insecurity Present    Worried About Running Out of Food in the Last Year: Sometimes true    Clau of Food in the Last Year: Never true   Transportation Needs:     Lack of Transportation (Medical): Not on file    Lack of Transportation (Non-Medical):  Not on file   Physical Activity:     Days of Exercise per Week: Not on file    Minutes of Exercise per Session: Not on file   Stress:     Feeling of Stress : Not on file   Social Connections:     Frequency of Communication with Friends and Family: Not on file    Frequency of Social Gatherings with Friends and Family: Not on file    Attends Sabianism Services: Not on file    Active Member of [address] [place] [address] or Organizations: Not on file    Attends Club or Organization Meetings: Not on file    Marital Status: Not on file   Intimate Partner Violence:     Fear of Current or Ex-Partner: Not on file    Emotionally Abused: Not on file    Physically Abused: Not on file    Sexually Abused: Not on file   Housing Stability:     Unable to Pay for Housing in the Last Year: Not on file    Number of [place] in the Last Year: Not on file    Unstable Housing in the Last Year: Not on file     Family History   Problem Relation Age of Onset    Other Mother         epilepsy     No Known Problems Father     No Known Problems Brother     No Known Problems Brother      Allergies   Allergen Reactions    Other Anaphylaxis     Sun flower seeds     Codeine Nausea And Vomiting     Current Outpatient Medications on File Prior to Visit Medication Sig Dispense Refill    busPIRone (BUSPAR) 15 MG tablet TAKE 1 TABLET BY MOUTH THREE TIMES DAILY 270 tablet 1    atorvastatin (LIPITOR) 20 MG tablet Take 1 tablet by mouth daily 90 tablet 0    varenicline (CHANTIX STARTING MONTH ASHOK) 0.5 MG X 11 & 1 MG X 42 tablet Take by mouth.  1 box 0    traZODone (DESYREL) 100 MG tablet Take 1 tablet by mouth nightly as needed for Sleep take 1 to 2 tablets by mouth every evening 30 tablet 0    PROAIR  (90 Base) MCG/ACT inhaler inhale 2 puffs by mouth every 4 hours if needed 8.5 g 0    ondansetron (ZOFRAN-ODT) 4 MG disintegrating tablet Take 1 tablet by mouth 3 times daily as needed for Nausea or Vomiting 21 tablet 0    azelastine (ASTELIN) 0.1 % nasal spray 1 spray by Nasal route 2 times daily Use in each nostril as directed 2 Bottle 1    fluticasone (FLOVENT HFA) 110 MCG/ACT inhaler Inhale 2 puffs into the lungs 2 times daily 1 Inhaler 3    omeprazole (PRILOSEC) 20 MG delayed release capsule 1 tab PO QD 90 capsule 1    PARoxetine (PAXIL) 40 MG tablet TAKE 1 TABLET BY MOUTH EVERY MORNING 90 tablet 1    Fluticasone-Umeclidin-Vilant (TRELEGY ELLIPTA) 200-62.5-25 MCG/INH AEPB Inhale 1 puff into the lungs daily 1 each 3    fluticasone (FLONASE) 50 MCG/ACT nasal spray 1 spray by Each Nostril route daily 1 Bottle 3    nicotine (NICODERM CQ) 14 MG/24HR Place 1 patch onto the skin every 24 hours 30 patch 3    Respiratory Therapy Supplies (NEBULIZER/TUBING/MOUTHPIECE) KIT 1 kit by Does not apply route daily as needed (wheezing) 1 kit 0    folic acid (FOLVITE) 1 MG tablet Take 1 tablet by mouth daily 90 tablet 0    albuterol (PROVENTIL) (5 MG/ML) 0.5% nebulizer solution Take 1 mL by nebulization 4 times daily as needed for Wheezing 120 each 3     Current Facility-Administered Medications on File Prior to Visit   Medication Dose Route Frequency Provider Last Rate Last Admin    cyanocobalamin injection 1,000 mcg  1,000 mcg IntraMUSCular Once Lesly Anni Martinez MD        cyanocobalamin injection 1,000 mcg  1,000 mcg IntraMUSCular Q30 Days Tisha Wellington MD   1,000 mcg at 09/10/21 1106         Review of Systems   Constitutional: Negative for activity change, appetite change, chills, diaphoresis, fatigue, fever and unexpected weight change. HENT: Negative for sore throat, trouble swallowing and voice change. Eyes: Negative for visual disturbance. Respiratory: Negative for cough, choking, chest tightness, shortness of breath, wheezing and stridor. Cardiovascular: Negative for chest pain, palpitations and leg swelling. Gastrointestinal: Negative for abdominal distention, abdominal pain, diarrhea, nausea and vomiting. Genitourinary: Negative for difficulty urinating. Musculoskeletal: Negative for gait problem and joint swelling. Skin: Negative for rash and wound. Neurological: Negative for dizziness, seizures and light-headedness. Hematological: Negative for adenopathy. Does not bruise/bleed easily. Psychiatric/Behavioral: Negative for behavioral problems and confusion. Objective:   Pulse 78   Ht 5' (1.524 m)   Wt 127 lb (57.6 kg)   LMP  (LMP Unknown)   SpO2 95%   BMI 24.80 kg/m²     Physical Exam  Constitutional:       General: She is not in acute distress. Appearance: She is not ill-appearing, toxic-appearing or diaphoretic. HENT:      Head: Normocephalic and atraumatic. Nose: Nose normal.   Eyes:      Extraocular Movements: Extraocular movements intact. Conjunctiva/sclera: Conjunctivae normal.      Pupils: Pupils are equal, round, and reactive to light. Neck:      Vascular: No carotid bruit. Cardiovascular:      Rate and Rhythm: Normal rate and regular rhythm. Heart sounds: Normal heart sounds. No murmur heard. No gallop. Pulmonary:      Effort: Pulmonary effort is normal. No respiratory distress. Breath sounds: Normal breath sounds. No wheezing or rales.    Abdominal:      General: Abdomen is

## 2021-11-11 RX ORDER — ALBUTEROL SULFATE 90 UG/1
AEROSOL, METERED RESPIRATORY (INHALATION)
Qty: 17 G | Refills: 2 | Status: SHIPPED | OUTPATIENT
Start: 2021-11-11 | End: 2022-04-04

## 2021-11-22 DIAGNOSIS — F17.200 SMOKING: ICD-10-CM

## 2021-11-23 RX ORDER — VARENICLINE TARTRATE
KIT
Qty: 1 BOX | Refills: 0 | OUTPATIENT
Start: 2021-11-23

## 2021-11-23 NOTE — TELEPHONE ENCOUNTER
Please find out if she still needs Chantix I prefer to minimize the use of Chantix if patient is able to continue smoking cessation without using it.

## 2021-12-15 ENCOUNTER — OFFICE VISIT (OUTPATIENT)
Dept: INTERNAL MEDICINE | Age: 61
End: 2021-12-15
Payer: MEDICARE

## 2021-12-15 VITALS
WEIGHT: 132 LBS | OXYGEN SATURATION: 95 % | HEART RATE: 96 BPM | TEMPERATURE: 97.2 F | DIASTOLIC BLOOD PRESSURE: 70 MMHG | BODY MASS INDEX: 25.78 KG/M2 | SYSTOLIC BLOOD PRESSURE: 122 MMHG

## 2021-12-15 DIAGNOSIS — J01.40 ACUTE PANSINUSITIS, RECURRENCE NOT SPECIFIED: Primary | ICD-10-CM

## 2021-12-15 DIAGNOSIS — E53.8 B12 DEFICIENCY: ICD-10-CM

## 2021-12-15 DIAGNOSIS — B34.9 VIRAL ILLNESS: ICD-10-CM

## 2021-12-15 PROCEDURE — 3017F COLORECTAL CA SCREEN DOC REV: CPT | Performed by: NURSE PRACTITIONER

## 2021-12-15 PROCEDURE — 87426 SARSCOV CORONAVIRUS AG IA: CPT | Performed by: NURSE PRACTITIONER

## 2021-12-15 PROCEDURE — 99213 OFFICE O/P EST LOW 20 MIN: CPT | Performed by: NURSE PRACTITIONER

## 2021-12-15 PROCEDURE — G8419 CALC BMI OUT NRM PARAM NOF/U: HCPCS | Performed by: NURSE PRACTITIONER

## 2021-12-15 PROCEDURE — G8484 FLU IMMUNIZE NO ADMIN: HCPCS | Performed by: NURSE PRACTITIONER

## 2021-12-15 PROCEDURE — G8427 DOCREV CUR MEDS BY ELIG CLIN: HCPCS | Performed by: NURSE PRACTITIONER

## 2021-12-15 PROCEDURE — 96372 THER/PROPH/DIAG INJ SC/IM: CPT | Performed by: NURSE PRACTITIONER

## 2021-12-15 PROCEDURE — 4004F PT TOBACCO SCREEN RCVD TLK: CPT | Performed by: NURSE PRACTITIONER

## 2021-12-15 RX ORDER — AZITHROMYCIN 250 MG/1
TABLET, FILM COATED ORAL
Qty: 6 TABLET | Refills: 0 | Status: SHIPPED | OUTPATIENT
Start: 2021-12-15 | End: 2021-12-20

## 2021-12-15 RX ORDER — CYANOCOBALAMIN 1000 UG/ML
1000 INJECTION INTRAMUSCULAR; SUBCUTANEOUS ONCE
Status: COMPLETED | OUTPATIENT
Start: 2021-12-15 | End: 2021-12-15

## 2021-12-15 RX ORDER — CETIRIZINE HYDROCHLORIDE 10 MG/1
10 TABLET ORAL DAILY
Qty: 30 TABLET | Refills: 0 | Status: SHIPPED | OUTPATIENT
Start: 2021-12-15 | End: 2022-01-14

## 2021-12-15 RX ORDER — FLUTICASONE PROPIONATE 50 MCG
1 SPRAY, SUSPENSION (ML) NASAL DAILY
Qty: 1 EACH | Refills: 0 | Status: SHIPPED | OUTPATIENT
Start: 2021-12-15 | End: 2022-02-07

## 2021-12-15 RX ADMIN — CYANOCOBALAMIN 1000 MCG: 1000 INJECTION INTRAMUSCULAR; SUBCUTANEOUS at 16:36

## 2021-12-15 ASSESSMENT — ENCOUNTER SYMPTOMS
WHEEZING: 0
EYE DISCHARGE: 0
SORE THROAT: 1
EYE REDNESS: 0
COUGH: 0
SHORTNESS OF BREATH: 0
EYE ITCHING: 0
RHINORRHEA: 1

## 2021-12-15 NOTE — PROGRESS NOTES
Zeus Dutton (:  1960) is a 63 y.o. female, Established patient, here for evaluation of the following chief complaint(s):  Concern For COVID-19 ( Weakness; Severe headache;Cough; Sore throat;Runny nose, sx started )      Vitals:    12/15/21 1622   BP: 122/70   Pulse: 96   Temp: 97.2 °F (36.2 °C)   SpO2: 95%       ASSESSMENT/PLAN:  1. Acute pansinusitis, recurrence not specified  -     azithromycin (ZITHROMAX) 250 MG tablet; 500 mg on day 1, then 250 mg days 2-5., Disp-6 tablet, R-0Normal  -     fluticasone (FLONASE) 50 MCG/ACT nasal spray; 1 spray by Nasal route daily, Disp-1 each, R-0Normal  -     cetirizine (ZYRTEC) 10 MG tablet; Take 1 tablet by mouth daily, Disp-30 tablet, R-0Normal  2. Viral illness  -     POCT COVID-19, Antigen  3. B12 deficiency  -     cyanocobalamin injection 1,000 mcg; 1,000 mcg, IntraMUSCular, ONCE, On Wed 12/15/21 at 1700, For 1 dose        Return if symptoms worsen or fail to improve. SUBJECTIVE/OBJECTIVE:    Sinusitis  This is a new problem. The problem is unchanged. There has been no fever. Her pain is at a severity of 3/10. The pain is mild. Associated symptoms include congestion, ear pain (bilateral), sinus pressure (frontal and maxillary) and a sore throat. Pertinent negatives include no chills, coughing, headaches, neck pain or shortness of breath. Past treatments include oral decongestants. The treatment provided mild relief. Review of Systems   Constitutional: Negative for chills, fatigue and fever. HENT: Positive for congestion, ear pain (bilateral), postnasal drip, rhinorrhea, sinus pressure (frontal and maxillary) and sore throat. Eyes: Negative for discharge, redness and itching. Respiratory: Negative for cough, shortness of breath and wheezing. Cardiovascular: Negative for chest pain and palpitations. Musculoskeletal: Negative for arthralgias, myalgias and neck pain.    Neurological: Negative for dizziness, light-headedness and headaches. Physical Exam  Vitals reviewed. Constitutional:       General: She is not in acute distress. Appearance: Normal appearance. HENT:      Right Ear: A middle ear effusion is present. Tympanic membrane is not erythematous. Left Ear: A middle ear effusion is present. Tympanic membrane is not erythematous. Nose: Mucosal edema present. Right Turbinates: Swollen. Left Turbinates: Swollen. Right Sinus: Maxillary sinus tenderness and frontal sinus tenderness present. Left Sinus: Maxillary sinus tenderness and frontal sinus tenderness present. Mouth/Throat:      Lips: Pink. Mouth: Mucous membranes are moist.      Pharynx: Oropharynx is clear. No oropharyngeal exudate or posterior oropharyngeal erythema. Cardiovascular:      Rate and Rhythm: Normal rate and regular rhythm. Heart sounds: Normal heart sounds, S1 normal and S2 normal.   Pulmonary:      Effort: Pulmonary effort is normal. No respiratory distress. Breath sounds: Normal air entry. No decreased breath sounds, wheezing, rhonchi or rales. Skin:     General: Skin is warm and dry. Neurological:      Mental Status: She is alert and oriented to person, place, and time. Psychiatric:         Mood and Affect: Mood normal.         Behavior: Behavior is cooperative. An electronic signature was used to authenticate this note.     --ANA De Guzman

## 2021-12-17 ASSESSMENT — ENCOUNTER SYMPTOMS: SINUS PRESSURE: 1

## 2022-01-25 ENCOUNTER — VIRTUAL VISIT (OUTPATIENT)
Dept: INTERNAL MEDICINE | Age: 62
End: 2022-01-25
Payer: MEDICARE

## 2022-01-25 DIAGNOSIS — K43.9 VENTRAL HERNIA WITHOUT OBSTRUCTION OR GANGRENE: ICD-10-CM

## 2022-01-25 DIAGNOSIS — R51.9 NONINTRACTABLE EPISODIC HEADACHE, UNSPECIFIED HEADACHE TYPE: Primary | ICD-10-CM

## 2022-01-25 DIAGNOSIS — R10.9 ACUTE FLANK PAIN: ICD-10-CM

## 2022-01-25 PROCEDURE — G8427 DOCREV CUR MEDS BY ELIG CLIN: HCPCS | Performed by: PHYSICIAN ASSISTANT

## 2022-01-25 PROCEDURE — 99214 OFFICE O/P EST MOD 30 MIN: CPT | Performed by: PHYSICIAN ASSISTANT

## 2022-01-25 PROCEDURE — 3017F COLORECTAL CA SCREEN DOC REV: CPT | Performed by: PHYSICIAN ASSISTANT

## 2022-01-25 RX ORDER — TIZANIDINE 2 MG/1
2 TABLET ORAL 4 TIMES DAILY PRN
Qty: 40 TABLET | Refills: 0 | Status: SHIPPED | OUTPATIENT
Start: 2022-01-25 | End: 2022-08-01 | Stop reason: ALTCHOICE

## 2022-01-25 RX ORDER — METHYLPREDNISOLONE 4 MG/1
TABLET ORAL
Qty: 1 KIT | Refills: 0 | Status: SHIPPED | OUTPATIENT
Start: 2022-01-25 | End: 2022-01-31

## 2022-01-25 ASSESSMENT — ENCOUNTER SYMPTOMS
RESPIRATORY NEGATIVE: 1
ABDOMINAL PAIN: 0
BACK PAIN: 1

## 2022-01-25 NOTE — PROGRESS NOTES
Cassie Ardon (: 1960) is a 57 y.o. female, Established patient, here for evaluation of the following chief complaint(s):  Headache (x's cpl months.), Back Pain (x's a long time), and Hernia (wants referral)        ASSESSMENT/PLAN:  1. Nonintractable episodic headache, unspecified headache type  - advised a full exam if no improvement, or call for referral to neurology  - advised eye exam     2. Acute flank pain  -Likely musculoskeletal, will treat accordingly  - methylPREDNISolone (MEDROL DOSEPACK) 4 MG tablet; Take by mouth. Dispense: 1 kit; Refill: 0  - tiZANidine (ZANAFLEX) 2 MG tablet; Take 1 tablet by mouth 4 times daily as needed (back pain)  Dispense: 40 tablet; Refill: 0    3. Ventral hernia without obstruction or gangrene  - Jerilyn Mccann MD, General Surgery, [place]      No follow-ups on file. SUBJECTIVE/OBJECTIVE:  HPI      HA for months   States behind the eyes or unilateral  No prior h/o HA's    No FH of migraines   B/p is not elevated   Motrin helps     BP Readings from Last 3 Encounters:   12/15/21 122/70   21 114/72   21 112/70         Back Pain for a few days  No injury   Denies hematuria or dysuria     Hernia  Wants a referral       Review of Systems   Constitutional: Negative. HENT: Negative. Respiratory: Negative. Cardiovascular: Negative. Gastrointestinal: Negative for abdominal pain. Musculoskeletal: Positive for back pain and myalgias. Negative for gait problem, joint swelling, neck pain and neck stiffness. Neurological: Positive for headaches. Negative for dizziness, light-headedness and numbness. Patient-Reported Vitals 11/10/2021   Patient-Reported Weight 128   Patient-Reported Height 5         Physical Exam  Pulmonary:      Effort: Pulmonary effort is normal.   Neurological:      Mental Status: She is oriented to person, place, and time.    Psychiatric:         Mood and Affect: Mood normal.         Behavior: Behavior normal. Thought Content: Thought content normal.         Judgment: Judgment normal.         There were no vitals filed for this visit. Anthony Rubio is a 57 y.o. female being evaluated by a Virtual Visit (video visit) encounter to address concerns as mentioned above. A caregiver was present when appropriate. Due to this being a TeleHealth encounter (During [facility]- public health emergency), evaluation of the following organ systems was limited: Vitals/Constitutional/EENT/Resp/CV/GI//MS/Neuro/Skin/Heme-Lymph-Imm. Pursuant to the emergency declaration under the [address] [place] [address], [address] [place] [address] authority and the [place] and [employer] Act, this Virtual Visit was conducted with patient's (and/or legal guardian's) consent, to reduce the patient's risk of exposure to COVID-19 and provide necessary medical care. The patient (and/or legal guardian) has also been advised to contact this office for worsening conditions or problems, and seek emergency medical treatment and/or call 911 if deemed necessary. Patient identification was verified at the start of the visit: Yes    Services were provided through a video synchronous discussion virtually to substitute for in-person clinic visit. Patient was located at home and provider was located in office or at home. An electronic signature was used to authenticate this note.     --SEGUNDO Newell

## 2022-02-07 DIAGNOSIS — J01.40 ACUTE PANSINUSITIS, RECURRENCE NOT SPECIFIED: ICD-10-CM

## 2022-02-07 RX ORDER — FLUTICASONE PROPIONATE 50 MCG
SPRAY, SUSPENSION (ML) NASAL
Qty: 16 G | Refills: 3 | Status: SHIPPED | OUTPATIENT
Start: 2022-02-07 | End: 2022-09-29

## 2022-02-07 NOTE — TELEPHONE ENCOUNTER
Comments:     Last Office Visit (last PCP visit):   12/15/2021    Next Visit Date:  Future Appointments   Date Time Provider Macy Veronica   5/11/2022  9:00 AM LIVE CT ROOM 1 [facility] CT [facility] Fac RAD   5/11/2022  9:30 AM Hannah Rouse MD [address]       **If hasn't been seen in over a year OR hasn't followed up according to last diabetes/ADHD visit, make appointment for patient before sending refill to provider.     Rx requested:  Requested Prescriptions     Pending Prescriptions Disp Refills    fluticasone (FLONASE) 50 MCG/ACT nasal spray [Pharmacy Med Name: FLUTICASONE PROP 50 MCG SPRAY] 16 g      Sig: instill 1 spray INTRANASALLY once daily

## 2022-03-02 ENCOUNTER — OFFICE VISIT (OUTPATIENT)
Dept: SURGERY | Age: 62
End: 2022-03-02
Payer: MEDICARE

## 2022-03-02 VITALS
SYSTOLIC BLOOD PRESSURE: 120 MMHG | HEIGHT: 60 IN | TEMPERATURE: 97.7 F | DIASTOLIC BLOOD PRESSURE: 74 MMHG | WEIGHT: 125 LBS | BODY MASS INDEX: 24.54 KG/M2

## 2022-03-02 DIAGNOSIS — G47.00 INSOMNIA, UNSPECIFIED TYPE: ICD-10-CM

## 2022-03-02 DIAGNOSIS — K43.9 VENTRAL HERNIA WITHOUT OBSTRUCTION OR GANGRENE: Primary | ICD-10-CM

## 2022-03-02 PROCEDURE — 99203 OFFICE O/P NEW LOW 30 MIN: CPT | Performed by: SURGERY

## 2022-03-02 PROCEDURE — G8427 DOCREV CUR MEDS BY ELIG CLIN: HCPCS | Performed by: SURGERY

## 2022-03-02 PROCEDURE — 4004F PT TOBACCO SCREEN RCVD TLK: CPT | Performed by: SURGERY

## 2022-03-02 PROCEDURE — G8420 CALC BMI NORM PARAMETERS: HCPCS | Performed by: SURGERY

## 2022-03-02 PROCEDURE — 3017F COLORECTAL CA SCREEN DOC REV: CPT | Performed by: SURGERY

## 2022-03-02 PROCEDURE — G8484 FLU IMMUNIZE NO ADMIN: HCPCS | Performed by: SURGERY

## 2022-03-02 ASSESSMENT — ENCOUNTER SYMPTOMS
SHORTNESS OF BREATH: 0
RHINORRHEA: 0
COLOR CHANGE: 0
RECTAL PAIN: 0
ALLERGIC/IMMUNOLOGIC NEGATIVE: 1
BLOOD IN STOOL: 0
DIARRHEA: 1
NAUSEA: 0
ABDOMINAL PAIN: 1
ABDOMINAL DISTENTION: 0
CHEST TIGHTNESS: 0

## 2022-03-02 NOTE — PROGRESS NOTES
Venancio Esteves (:  1960) is a 57 y.o. female,New patient, here for evaluation of the following chief complaint(s):  New Patient (np, Ventral hernia )         ASSESSMENT/PLAN:  Ventral hernia       CT scan abdomen/pelvis to better define the anatomy  Return visit after CT      Subjective   SUBJECTIVE/OBJECTIVE:  HPI  Venancio Esteves is seen at the request of SEGUNDO Alvares for possible hernia. Patient notes mild discomfort in the abdomen. The pain is located periumbilical. She denies nausea and/or vomiting. There is a mass associated with the area of concern. The mass is getting larger. There is a history of surgery in proximity to the area of discomfort. The hernia is reducible. The patient has known about the hernia for 8 year(s). She is not on anticoagulants. Bowel function is abnormal with chronic diarrhea. Review of Systems   Constitutional: Negative for activity change, appetite change and unexpected weight change. HENT: Negative for congestion, nosebleeds, rhinorrhea and sneezing. Eyes: Negative for visual disturbance. Respiratory: Negative for chest tightness and shortness of breath. Cardiovascular: Negative for chest pain and leg swelling. Gastrointestinal: Positive for abdominal pain and diarrhea. Negative for abdominal distention, blood in stool, nausea and rectal pain. Endocrine: Negative. Genitourinary: Negative for difficulty urinating. Musculoskeletal: Negative. Skin: Negative for color change. Allergic/Immunologic: Negative. Neurological: Negative for seizures, light-headedness, numbness and headaches. Hematological: Does not bruise/bleed easily. Psychiatric/Behavioral: Negative for sleep disturbance. Objective   Physical Exam  Constitutional:       General: She is not in acute distress. Appearance: Normal appearance. HENT:      Mouth/Throat:      Mouth: Mucous membranes are moist.      Pharynx: Oropharynx is clear.    Eyes: Pupils: Pupils are equal, round, and reactive to light. Neck:      Comments: Neck is supple with out masses, no thyromegaly, trachea midline  Abdominal:      Palpations: There is no hepatomegaly or splenomegaly. Tenderness: There is abdominal tenderness in the periumbilical area. Hernia: A hernia is present. Hernia is present in the ventral area. Musculoskeletal:      Comments: Normal gait   Skin:     Findings: No bruising, lesion or rash. Neurological:      Mental Status: She is alert and oriented to person, place, and time. Psychiatric:         Mood and Affect: Mood normal.         Judgment: Judgment normal.         /74   Temp 97.7 °F (36.5 °C)   Ht 5' (1.524 m)   Wt 125 lb (56.7 kg)   LMP  (LMP Unknown)   BMI 24.41 kg/m²           An electronic signature was used to authenticate this note.     --Soumya Brown MD

## 2022-03-03 NOTE — TELEPHONE ENCOUNTER
Comments:      Last Office Visit (last PCP visit):   1/25/2022    Next Visit Date:  Future Appointments   Date Time Provider Macy Glenys   3/9/2022 10:00 AM STACY CT ROOM 1 [facility]  [facility] Fac RAD   3/16/2022  2:00 PM Althea Rdz MD [address]   5/11/2022  9:00 AM LIVE CT ROOM 1 [facility] CT [facility] RAD   5/11/2022  9:30 AM Hannah Rouse MD [address]       **If hasn't been seen in over a year OR hasn't followed up according to last diabetes/ADHD visit, make appointment for patient before sending refill to provider.     Rx requested:  Requested Prescriptions     Pending Prescriptions Disp Refills    traZODone (DESYREL) 100 MG tablet 30 tablet 2

## 2022-03-04 DIAGNOSIS — K43.9 VENTRAL HERNIA WITHOUT OBSTRUCTION OR GANGRENE: Primary | ICD-10-CM

## 2022-03-04 RX ORDER — TRAZODONE HYDROCHLORIDE 100 MG/1
100 TABLET ORAL NIGHTLY
Qty: 30 TABLET | Refills: 3 | Status: SHIPPED | OUTPATIENT
Start: 2022-03-04 | End: 2022-08-08

## 2022-03-28 ENCOUNTER — HOSPITAL ENCOUNTER (OUTPATIENT)
Dept: LAB | Age: 62
Discharge: HOME | End: 2022-03-28
Payer: MEDICARE

## 2022-03-28 ENCOUNTER — HOSPITAL ENCOUNTER (OUTPATIENT)
Dept: CT IMAGING | Age: 62
Discharge: HOME | End: 2022-03-30
Payer: MEDICARE

## 2022-03-28 DIAGNOSIS — K43.9 VENTRAL HERNIA WITHOUT OBSTRUCTION OR GANGRENE: ICD-10-CM

## 2022-03-28 LAB
CREAT SERPL-MCNC: 1.09 MG/DL (ref 0.5–0.9)
GFR AFRICAN AMERICAN: >60
GFR NON-AFRICAN AMERICAN: 50.8

## 2022-03-28 PROCEDURE — 82565 ASSAY OF CREATININE: CPT

## 2022-03-28 PROCEDURE — 2500000003 HC RX 250 WO HCPCS: Performed by: SURGERY

## 2022-03-28 PROCEDURE — 36415 COLL VENOUS BLD VENIPUNCTURE: CPT

## 2022-03-28 PROCEDURE — 74177 CT ABD & PELVIS W/CONTRAST: CPT

## 2022-03-28 PROCEDURE — 6360000004 HC RX CONTRAST MEDICATION: Performed by: SURGERY

## 2022-03-28 RX ADMIN — IOPAMIDOL 100 ML: 755 INJECTION, SOLUTION INTRAVENOUS at 08:29

## 2022-03-28 RX ADMIN — BARIUM SULFATE 450 ML: 20 SUSPENSION ORAL at 07:29

## 2022-03-30 ENCOUNTER — OFFICE VISIT (OUTPATIENT)
Dept: SURGERY | Age: 62
End: 2022-03-30
Payer: MEDICARE

## 2022-03-30 ENCOUNTER — PREP FOR PROCEDURE (OUTPATIENT)
Dept: SURGERY | Age: 62
End: 2022-03-30

## 2022-03-30 VITALS
HEIGHT: 60 IN | SYSTOLIC BLOOD PRESSURE: 120 MMHG | TEMPERATURE: 97.7 F | BODY MASS INDEX: 25.91 KG/M2 | DIASTOLIC BLOOD PRESSURE: 68 MMHG | WEIGHT: 132 LBS

## 2022-03-30 DIAGNOSIS — K43.9 VENTRAL HERNIA WITHOUT OBSTRUCTION OR GANGRENE: Primary | ICD-10-CM

## 2022-03-30 PROCEDURE — 99213 OFFICE O/P EST LOW 20 MIN: CPT | Performed by: SURGERY

## 2022-03-30 PROCEDURE — 3017F COLORECTAL CA SCREEN DOC REV: CPT | Performed by: SURGERY

## 2022-03-30 PROCEDURE — G8427 DOCREV CUR MEDS BY ELIG CLIN: HCPCS | Performed by: SURGERY

## 2022-03-30 PROCEDURE — G8419 CALC BMI OUT NRM PARAM NOF/U: HCPCS | Performed by: SURGERY

## 2022-03-30 PROCEDURE — G8484 FLU IMMUNIZE NO ADMIN: HCPCS | Performed by: SURGERY

## 2022-03-30 PROCEDURE — 4004F PT TOBACCO SCREEN RCVD TLK: CPT | Performed by: SURGERY

## 2022-03-30 ASSESSMENT — ENCOUNTER SYMPTOMS
BLOOD IN STOOL: 0
ABDOMINAL DISTENTION: 0
ABDOMINAL PAIN: 1
ALLERGIC/IMMUNOLOGIC NEGATIVE: 1
RECTAL PAIN: 0
RHINORRHEA: 0
COLOR CHANGE: 0
CHEST TIGHTNESS: 0
NAUSEA: 0
SHORTNESS OF BREATH: 0
DIARRHEA: 1

## 2022-03-30 NOTE — H&P
Avery Caballero (:  1960) is a 57 y.o. female,New patient, here for evaluation of the following chief complaint(s): Other (go over CT scan)         ASSESSMENT/PLAN:  Ventral hernia        Ventral hernia repair    The options of therapy were discussed with the patient. The procedure of the repair with the probable use of mesh was discussed. The potential risks and complications including but not exclusive to infection, blood loss, damage to surrounding structures and chronic pain. If any of these occur it could require another surgery. The expected recovery was discussed. The patient's questions were answered and has decided to proceed with hernia repair. It will be scheduled at their convenience. The patient was counseled at length about the risks of belia Covid-19 in the perioperative period and any recovery window from their procedure. The patient was made aware that belia Covid-19  may worsen their prognosis for recovering from their procedure  and lend to a higher morbidity and/or mortality risk. The patient was given the options of postponing their procedure. All material risks, benefits, and alternatives were discussed. The patient does wish to proceed with the procedure at this time. Please note this report has been partially produced using speech recognition software and may cause contain errors related to that system including grammar, punctuation and spelling as well as words and phrases that may seem inappropriate. If there are questions or concerns please feel free to contact me to clarify        Subjective   SUBJECTIVE/OBJECTIVE:  Avery Caballero is seen today in follow-up for possible hernia. Patient notes mild discomfort in the abdomen. The pain is located periumbilical. She denies nausea and/or vomiting. There is a mass associated with the area of concern. The mass is getting larger. There is a history of surgery in proximity to the area of discomfort.  The hernia is reducible. The patient has known about the hernia for 8 year(s). She is not on anticoagulants. CT scan done on 3/28/2022 shows a periumbilical ventral hernia with mesenteric fat. Bowel function is abnormal with chronic diarrhea. Review of Systems   Constitutional: Negative for activity change, appetite change and unexpected weight change. HENT: Negative for congestion, nosebleeds, rhinorrhea and sneezing. Eyes: Negative for visual disturbance. Respiratory: Negative for chest tightness and shortness of breath. Cardiovascular: Negative for chest pain and leg swelling. Gastrointestinal: Positive for abdominal pain and diarrhea. Negative for abdominal distention, blood in stool, nausea and rectal pain. Endocrine: Negative. Genitourinary: Negative for difficulty urinating. Musculoskeletal: Negative. Skin: Negative for color change. Allergic/Immunologic: Negative. Neurological: Negative for seizures, light-headedness, numbness and headaches. Hematological: Does not bruise/bleed easily. Psychiatric/Behavioral: Negative for sleep disturbance.          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 10/31/2018    On lung screen done 10/26/18    DDD (degenerative disc disease), lumbosacral 3/12/2018    Used to see pain management     [facility]ized [facility]12/2018    [facility]    Gastroesophageal reflux disease without esophagitis 3/12/2018    EGD over 10 years    Gout     Hyperlipidemia     Insomnia 3/12/2018    Mild single current episode of major depressive disorder ([address] 3/12/2018    Neuropathy 3/12/2018    Clinical diagnosis, EMG    Other emphysema ([address] 3/12/2018    No recent PFT    Pure hypercholesterolemia 3/12/2018    SOB (shortness of breath)     Tobacco abuse 3/12/2018     Past Surgical History:   Procedure Laterality Date     SECTION      LUNG BIOPSY Left 2018    CT guided Left Lung Biopsy by Dr Yahaira Pruitt     Social History     Tobacco Use    Smoking status: Current Every Day Smoker     Packs/day: 1.00     Years: 47.00     Pack years: 47.00     Types: Cigarettes     Start date:     Smokeless tobacco: Never Used   Vaping Use    Vaping Use: Former    Substances: Never   Substance Use Topics    Alcohol use: No    Drug use: No     Family History   Problem Relation Age of Onset    Other Mother         epilepsy     No Known Problems Father     No Known Problems Brother     No Known Problems Brother      Other and Codeine  Allergies   Allergen Reactions    Other Anaphylaxis     Sun flower seeds     Codeine Nausea And Vomiting     Current Outpatient Medications   Medication Sig Dispense Refill    traZODone (DESYREL) 100 MG tablet Take 1 tablet by mouth nightly 30 tablet 3    fluticasone (FLONASE) 50 MCG/ACT nasal spray instill 1 spray INTRANASALLY once daily 16 g 3    atorvastatin (LIPITOR) 20 MG tablet take 1 tablet by mouth once daily 90 tablet 3    tiZANidine (ZANAFLEX) 2 MG tablet Take 1 tablet by mouth 4 times daily as needed (back pain) 40 tablet 0    albuterol sulfate  (90 Base) MCG/ACT inhaler inhale 2 puffs by mouth and INTO THE LUNGS every 4 hours if needed 17 g 2    PROAIR  (90 Base) MCG/ACT inhaler inhale 2 puffs by mouth every 4 hours if needed 2 each 11    busPIRone (BUSPAR) 15 MG tablet TAKE 1 TABLET BY MOUTH THREE TIMES DAILY 270 tablet 3    PARoxetine (PAXIL) 40 MG tablet Take 1 tablet by mouth every morning 90 tablet 3    ondansetron (ZOFRAN-ODT) 4 MG disintegrating tablet Take 1 tablet by mouth 3 times daily as needed for Nausea or Vomiting 21 tablet 0    azelastine (ASTELIN) 0.1 % nasal spray 1 spray by Nasal route 2 times daily Use in each nostril as directed 2 Bottle 1    fluticasone (FLOVENT HFA) 110 MCG/ACT inhaler Inhale 2 puffs into the lungs 2 times daily 1 Inhaler 3    omeprazole (PRILOSEC) 20 MG delayed release capsule 1 tab PO QD 90 capsule 1    Fluticasone-Umeclidin-Vilant (TRELEGY ELLIPTA) 200-62.5-25 MCG/INH AEPB Inhale 1 puff into the lungs daily 1 each 3    nicotine (NICODERM CQ) 14 MG/24HR Place 1 patch onto the skin every 24 hours 30 patch 3    Respiratory Therapy Supplies (NEBULIZER/TUBING/MOUTHPIECE) KIT 1 kit by Does not apply route daily as needed (wheezing) 1 kit 0    folic acid (FOLVITE) 1 MG tablet Take 1 tablet by mouth daily 90 tablet 0    albuterol (PROVENTIL) (5 MG/ML) 0.5% nebulizer solution Take 1 mL by nebulization 4 times daily as needed for Wheezing 120 each 3     Current Facility-Administered Medications   Medication Dose Route Frequency Provider Last Rate Last Admin    cyanocobalamin injection 1,000 mcg  1,000 mcg IntraMUSCular Once Julia Howard MD         /68   Temp 97.7 °F (36.5 °C)   Ht 5' (1.524 m)   Wt 132 lb (59.9 kg)   LMP  (LMP Unknown)   BMI 25.78 kg/m²     Objective   Physical Exam  Constitutional:       General: She is not in acute distress. Appearance: Normal appearance. HENT:      Mouth/Throat:      Mouth: Mucous membranes are moist.      Pharynx: Oropharynx is clear. Eyes:      Pupils: Pupils are equal, round, and reactive to light. Neck:      Comments: Neck is supple with out masses, no thyromegaly, trachea midline  Cardiovascular:      Rate and Rhythm: Normal rate. Heart sounds: No murmur heard. Pulmonary:      Effort: Pulmonary effort is normal. No respiratory distress. Breath sounds: Normal breath sounds. Abdominal:      Palpations: There is no hepatomegaly or splenomegaly. Tenderness: There is abdominal tenderness in the periumbilical area. Hernia: A hernia is present. Hernia is present in the ventral area.        Musculoskeletal:      Comments: Normal gait   Skin:     Findings: No bruising, lesion or rash. Neurological:      Mental Status: She is alert and oriented to person, place, and time. Psychiatric:         Mood and Affect: Mood normal.         Judgment: Judgment normal.         /68   Temp 97.7 °F (36.5 °C)   Ht 5' (1.524 m)   Wt 132 lb (59.9 kg)   LMP  (LMP Unknown)   BMI 25.78 kg/m²           An electronic signature was used to authenticate this note.     --Robert Jovel MD

## 2022-03-30 NOTE — H&P (VIEW-ONLY)
Leo Parker (:  1960) is a 57 y.o. female,New patient, here for evaluation of the following chief complaint(s): Other (go over CT scan)         ASSESSMENT/PLAN:  Ventral hernia        Ventral hernia repair    The options of therapy were discussed with the patient. The procedure of the repair with the probable use of mesh was discussed. The potential risks and complications including but not exclusive to infection, blood loss, damage to surrounding structures and chronic pain. If any of these occur it could require another surgery. The expected recovery was discussed. The patient's questions were answered and has decided to proceed with hernia repair. It will be scheduled at their convenience. The patient was counseled at length about the risks of belia Covid-19 in the perioperative period and any recovery window from their procedure. The patient was made aware that belia Covid-19  may worsen their prognosis for recovering from their procedure  and lend to a higher morbidity and/or mortality risk. The patient was given the options of postponing their procedure. All material risks, benefits, and alternatives were discussed. The patient does wish to proceed with the procedure at this time. Please note this report has been partially produced using speech recognition software and may cause contain errors related to that system including grammar, punctuation and spelling as well as words and phrases that may seem inappropriate. If there are questions or concerns please feel free to contact me to clarify        Subjective   SUBJECTIVE/OBJECTIVE:  Leo Parker is seen today in follow-up for possible hernia. Patient notes mild discomfort in the abdomen. The pain is located periumbilical. She denies nausea and/or vomiting. There is a mass associated with the area of concern. The mass is getting larger. There is a history of surgery in proximity to the area of discomfort.  The hernia is reducible. The patient has known about the hernia for 8 year(s). She is not on anticoagulants. CT scan done on 3/28/2022 shows a periumbilical ventral hernia with mesenteric fat. Bowel function is abnormal with chronic diarrhea. Review of Systems   Constitutional: Negative for activity change, appetite change and unexpected weight change. HENT: Negative for congestion, nosebleeds, rhinorrhea and sneezing. Eyes: Negative for visual disturbance. Respiratory: Negative for chest tightness and shortness of breath. Cardiovascular: Negative for chest pain and leg swelling. Gastrointestinal: Positive for abdominal pain and diarrhea. Negative for abdominal distention, blood in stool, nausea and rectal pain. Endocrine: Negative. Genitourinary: Negative for difficulty urinating. Musculoskeletal: Negative. Skin: Negative for color change. Allergic/Immunologic: Negative. Neurological: Negative for seizures, light-headedness, numbness and headaches. Hematological: Does not bruise/bleed easily. Psychiatric/Behavioral: Negative for sleep disturbance.          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 10/31/2018    On lung screen done 10/26/18    DDD (degenerative disc disease), lumbosacral 3/12/2018    Used to see pain management     CARTER (generalized anxiety disorder) 3/12/2018    [facility]    Gastroesophageal reflux disease without esophagitis 3/12/2018    EGD over 10 years    Gout     Hyperlipidemia     Insomnia 3/12/2018    Mild single current episode of major depressive disorder ([address] 3/12/2018    Neuropathy 3/12/2018    Clinical diagnosis, EMG    Other emphysema ([address] 3/12/2018    No recent PFT    Pure hypercholesterolemia 3/12/2018    SOB (shortness of breath)     Tobacco abuse 3/12/2018     Past Surgical History:   Procedure Laterality Date     SECTION      LUNG BIOPSY Left 2018    CT guided Left Lung Biopsy by Dr Nora Murphy     Social History     Tobacco Use    Smoking status: Current Every Day Smoker     Packs/day: 1.00     Years: 47.00     Pack years: 47.00     Types: Cigarettes     Start date:     Smokeless tobacco: Never Used   Vaping Use    Vaping Use: Former    Substances: Never   Substance Use Topics    Alcohol use: No    Drug use: No     Family History   Problem Relation Age of Onset    Other Mother         epilepsy     No Known Problems Father     No Known Problems Brother     No Known Problems Brother      Other and Codeine  Allergies   Allergen Reactions    Other Anaphylaxis     Sun flower seeds     Codeine Nausea And Vomiting     Current Outpatient Medications   Medication Sig Dispense Refill    traZODone (DESYREL) 100 MG tablet Take 1 tablet by mouth nightly 30 tablet 3    fluticasone (FLONASE) 50 MCG/ACT nasal spray instill 1 spray INTRANASALLY once daily 16 g 3    atorvastatin (LIPITOR) 20 MG tablet take 1 tablet by mouth once daily 90 tablet 3    tiZANidine (ZANAFLEX) 2 MG tablet Take 1 tablet by mouth 4 times daily as needed (back pain) 40 tablet 0    albuterol sulfate  (90 Base) MCG/ACT inhaler inhale 2 puffs by mouth and INTO THE LUNGS every 4 hours if needed 17 g 2    PROAIR  (90 Base) MCG/ACT inhaler inhale 2 puffs by mouth every 4 hours if needed 2 each 11    busPIRone (BUSPAR) 15 MG tablet TAKE 1 TABLET BY MOUTH THREE TIMES DAILY 270 tablet 3    PARoxetine (PAXIL) 40 MG tablet Take 1 tablet by mouth every morning 90 tablet 3    ondansetron (ZOFRAN-ODT) 4 MG disintegrating tablet Take 1 tablet by mouth 3 times daily as needed for Nausea or Vomiting 21 tablet 0    azelastine (ASTELIN) 0.1 % nasal spray 1 spray by Nasal route 2 times daily Use in each nostril as directed 2 Bottle 1    fluticasone (FLOVENT HFA) 110 MCG/ACT inhaler Inhale 2 puffs into the lungs 2 times daily 1 Inhaler 3    omeprazole (PRILOSEC) 20 MG delayed release capsule 1 tab PO QD 90 capsule 1    Fluticasone-Umeclidin-Vilant (TRELEGY ELLIPTA) 200-62.5-25 MCG/INH AEPB Inhale 1 puff into the lungs daily 1 each 3    nicotine (NICODERM CQ) 14 MG/24HR Place 1 patch onto the skin every 24 hours 30 patch 3    Respiratory Therapy Supplies (NEBULIZER/TUBING/MOUTHPIECE) KIT 1 kit by Does not apply route daily as needed (wheezing) 1 kit 0    folic acid (FOLVITE) 1 MG tablet Take 1 tablet by mouth daily 90 tablet 0    albuterol (PROVENTIL) (5 MG/ML) 0.5% nebulizer solution Take 1 mL by nebulization 4 times daily as needed for Wheezing 120 each 3     Current Facility-Administered Medications   Medication Dose Route Frequency Provider Last Rate Last Admin    cyanocobalamin injection 1,000 mcg  1,000 mcg IntraMUSCular Once Melina He MD         /68   Temp 97.7 °F (36.5 °C)   Ht 5' (1.524 m)   Wt 132 lb (59.9 kg)   LMP  (LMP Unknown)   BMI 25.78 kg/m²     Objective   Physical Exam  Constitutional:       General: She is not in acute distress. Appearance: Normal appearance. HENT:      Mouth/Throat:      Mouth: Mucous membranes are moist.      Pharynx: Oropharynx is clear. Eyes:      Pupils: Pupils are equal, round, and reactive to light. Neck:      Comments: Neck is supple with out masses, no thyromegaly, trachea midline  Cardiovascular:      Rate and Rhythm: Normal rate. Heart sounds: No murmur heard. Pulmonary:      Effort: Pulmonary effort is normal. No respiratory distress. Breath sounds: Normal breath sounds. Abdominal:      Palpations: There is no hepatomegaly or splenomegaly. Tenderness: There is abdominal tenderness in the periumbilical area. Hernia: A hernia is present. Hernia is present in the ventral area.        Musculoskeletal:      Comments: Normal gait   Skin:     Findings: No bruising, lesion or rash. Neurological:      Mental Status: She is alert and oriented to person, place, and time. Psychiatric:         Mood and Affect: Mood normal.         Judgment: Judgment normal.         /68   Temp 97.7 °F (36.5 °C)   Ht 5' (1.524 m)   Wt 132 lb (59.9 kg)   LMP  (LMP Unknown)   BMI 25.78 kg/m²           An electronic signature was used to authenticate this note.     --Chastity Carlson MD

## 2022-03-30 NOTE — PROGRESS NOTES
Butch Tapia (:  1960) is a 57 y.o. female,New patient, here for evaluation of the following chief complaint(s): Other (go over CT scan)         ASSESSMENT/PLAN:  Ventral hernia        Ventral hernia repair    The options of therapy were discussed with the patient. The procedure of the repair with the probable use of mesh was discussed. The potential risks and complications including but not exclusive to infection, blood loss, damage to surrounding structures and chronic pain. If any of these occur it could require another surgery. The expected recovery was discussed. The patient's questions were answered and has decided to proceed with hernia repair. It will be scheduled at their convenience. The patient was counseled at length about the risks of belia Covid-19 in the perioperative period and any recovery window from their procedure. The patient was made aware that belia Covid-19  may worsen their prognosis for recovering from their procedure  and lend to a higher morbidity and/or mortality risk. The patient was given the options of postponing their procedure. All material risks, benefits, and alternatives were discussed. The patient does wish to proceed with the procedure at this time. Please note this report has been partially produced using speech recognition software and may cause contain errors related to that system including grammar, punctuation and spelling as well as words and phrases that may seem inappropriate. If there are questions or concerns please feel free to contact me to clarify        Subjective   SUBJECTIVE/OBJECTIVE:  Butch Tapia is seen today in follow-up for possible hernia. Patient notes mild discomfort in the abdomen. The pain is located periumbilical. She denies nausea and/or vomiting. There is a mass associated with the area of concern. The mass is getting larger. There is a history of surgery in proximity to the area of discomfort.  The hernia is reducible. The patient has known about the hernia for 8 year(s). She is not on anticoagulants. CT scan done on 3/28/2022 shows a periumbilical ventral hernia with mesenteric fat. Bowel function is abnormal with chronic diarrhea. Review of Systems   Constitutional: Negative for activity change, appetite change and unexpected weight change. HENT: Negative for congestion, nosebleeds, rhinorrhea and sneezing. Eyes: Negative for visual disturbance. Respiratory: Negative for chest tightness and shortness of breath. Cardiovascular: Negative for chest pain and leg swelling. Gastrointestinal: Positive for abdominal pain and diarrhea. Negative for abdominal distention, blood in stool, nausea and rectal pain. Endocrine: Negative. Genitourinary: Negative for difficulty urinating. Musculoskeletal: Negative. Skin: Negative for color change. Allergic/Immunologic: Negative. Neurological: Negative for seizures, light-headedness, numbness and headaches. Hematological: Does not bruise/bleed easily. Psychiatric/Behavioral: Negative for sleep disturbance.          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 10/31/2018    On lung screen done 10/26/18    DDD (degenerative disc disease), lumbosacral 3/12/2018    Used to see pain management     CARTER (generalized anxiety disorder) 3/12/2018    [facility]    Gastroesophageal reflux disease without esophagitis 3/12/2018    EGD over 10 years    Gout     Hyperlipidemia     Insomnia 3/12/2018    Mild single current episode of major depressive disorder ([address] 3/12/2018    Neuropathy 3/12/2018    Clinical diagnosis, EMG    Other emphysema ([address] 3/12/2018    No recent PFT    Pure hypercholesterolemia 3/12/2018    SOB (shortness of breath)     Tobacco abuse 3/12/2018     Past Surgical History:   Procedure Laterality Date     SECTION      LUNG BIOPSY Left 2018    CT guided Left Lung Biopsy by Dr Khari Richey     Social History     Tobacco Use    Smoking status: Current Every Day Smoker     Packs/day: 1.00     Years: 47.00     Pack years: 47.00     Types: Cigarettes     Start date:     Smokeless tobacco: Never Used   Vaping Use    Vaping Use: Former    Substances: Never   Substance Use Topics    Alcohol use: No    Drug use: No     Family History   Problem Relation Age of Onset    Other Mother         epilepsy     No Known Problems Father     No Known Problems Brother     No Known Problems Brother      Other and Codeine  Allergies   Allergen Reactions    Other Anaphylaxis     Sun flower seeds     Codeine Nausea And Vomiting     Current Outpatient Medications   Medication Sig Dispense Refill    traZODone (DESYREL) 100 MG tablet Take 1 tablet by mouth nightly 30 tablet 3    fluticasone (FLONASE) 50 MCG/ACT nasal spray instill 1 spray INTRANASALLY once daily 16 g 3    atorvastatin (LIPITOR) 20 MG tablet take 1 tablet by mouth once daily 90 tablet 3    tiZANidine (ZANAFLEX) 2 MG tablet Take 1 tablet by mouth 4 times daily as needed (back pain) 40 tablet 0    albuterol sulfate  (90 Base) MCG/ACT inhaler inhale 2 puffs by mouth and INTO THE LUNGS every 4 hours if needed 17 g 2    PROAIR  (90 Base) MCG/ACT inhaler inhale 2 puffs by mouth every 4 hours if needed 2 each 11    busPIRone (BUSPAR) 15 MG tablet TAKE 1 TABLET BY MOUTH THREE TIMES DAILY 270 tablet 3    PARoxetine (PAXIL) 40 MG tablet Take 1 tablet by mouth every morning 90 tablet 3    ondansetron (ZOFRAN-ODT) 4 MG disintegrating tablet Take 1 tablet by mouth 3 times daily as needed for Nausea or Vomiting 21 tablet 0    azelastine (ASTELIN) 0.1 % nasal spray 1 spray by Nasal route 2 times daily Use in each nostril as directed 2 Bottle 1    fluticasone (FLOVENT HFA) 110 MCG/ACT inhaler Inhale 2 puffs into the lungs 2 times daily 1 Inhaler 3    omeprazole (PRILOSEC) 20 MG delayed release capsule 1 tab PO QD 90 capsule 1    Fluticasone-Umeclidin-Vilant (TRELEGY ELLIPTA) 200-62.5-25 MCG/INH AEPB Inhale 1 puff into the lungs daily 1 each 3    nicotine (NICODERM CQ) 14 MG/24HR Place 1 patch onto the skin every 24 hours 30 patch 3    Respiratory Therapy Supplies (NEBULIZER/TUBING/MOUTHPIECE) KIT 1 kit by Does not apply route daily as needed (wheezing) 1 kit 0    folic acid (FOLVITE) 1 MG tablet Take 1 tablet by mouth daily 90 tablet 0    albuterol (PROVENTIL) (5 MG/ML) 0.5% nebulizer solution Take 1 mL by nebulization 4 times daily as needed for Wheezing 120 each 3     Current Facility-Administered Medications   Medication Dose Route Frequency Provider Last Rate Last Admin    cyanocobalamin injection 1,000 mcg  1,000 mcg IntraMUSCular Once Ebonie Voss MD         /68   Temp 97.7 °F (36.5 °C)   Ht 5' (1.524 m)   Wt 132 lb (59.9 kg)   LMP  (LMP Unknown)   BMI 25.78 kg/m²     Objective   Physical Exam  Constitutional:       General: She is not in acute distress. Appearance: Normal appearance. HENT:      Mouth/Throat:      Mouth: Mucous membranes are moist.      Pharynx: Oropharynx is clear. Eyes:      Pupils: Pupils are equal, round, and reactive to light. Neck:      Comments: Neck is supple with out masses, no thyromegaly, trachea midline  Cardiovascular:      Rate and Rhythm: Normal rate. Heart sounds: No murmur heard. Pulmonary:      Effort: Pulmonary effort is normal. No respiratory distress. Breath sounds: Normal breath sounds. Abdominal:      Palpations: There is no hepatomegaly or splenomegaly. Tenderness: There is abdominal tenderness in the periumbilical area. Hernia: A hernia is present. Hernia is present in the ventral area.        Musculoskeletal:      Comments: Normal gait   Skin:     Findings: No bruising, lesion or rash. Neurological:      Mental Status: She is alert and oriented to person, place, and time. Psychiatric:         Mood and Affect: Mood normal.         Judgment: Judgment normal.         /68   Temp 97.7 °F (36.5 °C)   Ht 5' (1.524 m)   Wt 132 lb (59.9 kg)   LMP  (LMP Unknown)   BMI 25.78 kg/m²           An electronic signature was used to authenticate this note.     --Celine Barrett MD

## 2022-04-04 ENCOUNTER — HOSPITAL ENCOUNTER (OUTPATIENT)
Dept: PREADMISSION TESTING | Age: 62
Discharge: HOME | End: 2022-04-08
Payer: MEDICARE

## 2022-04-04 VITALS
RESPIRATION RATE: 16 BRPM | TEMPERATURE: 97.7 F | BODY MASS INDEX: 25.76 KG/M2 | HEART RATE: 89 BPM | SYSTOLIC BLOOD PRESSURE: 122 MMHG | DIASTOLIC BLOOD PRESSURE: 64 MMHG | OXYGEN SATURATION: 94 % | HEIGHT: 60 IN | WEIGHT: 131.2 LBS

## 2022-04-04 LAB
ANION GAP SERPL CALCULATED.3IONS-SCNC: 13 MEQ/L (ref 9–15)
BUN BLDV-MCNC: 8 MG/DL (ref 8–23)
CALCIUM SERPL-MCNC: 9.9 MG/DL (ref 8.5–9.9)
CHLORIDE BLD-SCNC: 105 MEQ/L (ref 95–107)
CO2: 28 MEQ/L (ref 20–31)
CREAT SERPL-MCNC: 1.13 MG/DL (ref 0.5–0.9)
EKG ATRIAL RATE: 82 BPM
EKG P AXIS: 70 DEGREES
EKG P-R INTERVAL: 132 MS
EKG Q-T INTERVAL: 376 MS
EKG QRS DURATION: 86 MS
EKG QTC CALCULATION (BAZETT): 439 MS
EKG R AXIS: 60 DEGREES
EKG T AXIS: 60 DEGREES
EKG VENTRICULAR RATE: 82 BPM
GFR AFRICAN AMERICAN: 59
GFR NON-AFRICAN AMERICAN: 48.7
GLUCOSE BLD-MCNC: 114 MG/DL (ref 70–99)
HCT VFR BLD CALC: 42.9 % (ref 37–47)
HEMOGLOBIN: 14 G/DL (ref 12–16)
MCH RBC QN AUTO: 29.1 PG (ref 27–31.3)
MCHC RBC AUTO-ENTMCNC: 32.7 % (ref 33–37)
MCV RBC AUTO: 88.8 FL (ref 82–100)
PDW BLD-RTO: 15 % (ref 11.5–14.5)
PLATELET # BLD: 256 K/UL (ref 130–400)
POTASSIUM SERPL-SCNC: 4 MEQ/L (ref 3.4–4.9)
RBC # BLD: 4.83 M/UL (ref 4.2–5.4)
SODIUM BLD-SCNC: 146 MEQ/L (ref 135–144)
WBC # BLD: 6.9 K/UL (ref 4.8–10.8)

## 2022-04-04 PROCEDURE — 93010 ELECTROCARDIOGRAM REPORT: CPT | Performed by: INTERNAL MEDICINE

## 2022-04-04 PROCEDURE — 93005 ELECTROCARDIOGRAM TRACING: CPT | Performed by: SURGERY

## 2022-04-04 PROCEDURE — 80048 BASIC METABOLIC PNL TOTAL CA: CPT

## 2022-04-04 PROCEDURE — 85027 COMPLETE CBC AUTOMATED: CPT

## 2022-04-04 RX ORDER — SODIUM CHLORIDE 9 MG/ML
25 INJECTION, SOLUTION INTRAVENOUS PRN
Status: CANCELLED | OUTPATIENT
Start: 2022-04-06

## 2022-04-04 RX ORDER — KETOROLAC TROMETHAMINE 30 MG/ML
30 INJECTION, SOLUTION INTRAMUSCULAR; INTRAVENOUS
Status: CANCELLED | OUTPATIENT
Start: 2022-04-06 | End: 2022-04-06

## 2022-04-04 RX ORDER — SODIUM CHLORIDE, SODIUM LACTATE, POTASSIUM CHLORIDE, CALCIUM CHLORIDE 600; 310; 30; 20 MG/100ML; MG/100ML; MG/100ML; MG/100ML
INJECTION, SOLUTION INTRAVENOUS CONTINUOUS
Status: CANCELLED | OUTPATIENT
Start: 2022-04-06

## 2022-04-04 RX ORDER — LIDOCAINE HYDROCHLORIDE 10 MG/ML
1 INJECTION, SOLUTION EPIDURAL; INFILTRATION; INTRACAUDAL; PERINEURAL
Status: CANCELLED | OUTPATIENT
Start: 2022-04-06 | End: 2022-04-06

## 2022-04-04 RX ORDER — SODIUM CHLORIDE 0.9 % (FLUSH) 0.9 %
10 SYRINGE (ML) INJECTION PRN
Status: CANCELLED | OUTPATIENT
Start: 2022-04-06

## 2022-04-04 RX ORDER — SODIUM CHLORIDE 0.9 % (FLUSH) 0.9 %
10 SYRINGE (ML) INJECTION EVERY 12 HOURS SCHEDULED
Status: CANCELLED | OUTPATIENT
Start: 2022-04-06

## 2022-04-06 ENCOUNTER — HOSPITAL ENCOUNTER (OUTPATIENT)
Age: 62
Setting detail: OUTPATIENT SURGERY
Discharge: HOME | End: 2022-04-06
Attending: SURGERY | Admitting: SURGERY
Payer: MEDICARE

## 2022-04-06 ENCOUNTER — ANESTHESIA EVENT (OUTPATIENT)
Dept: OPERATING ROOM | Age: 62
End: 2022-04-06
Payer: MEDICARE

## 2022-04-06 ENCOUNTER — ANESTHESIA (OUTPATIENT)
Dept: OPERATING ROOM | Age: 62
End: 2022-04-06
Payer: MEDICARE

## 2022-04-06 VITALS
OXYGEN SATURATION: 91 % | DIASTOLIC BLOOD PRESSURE: 54 MMHG | RESPIRATION RATE: 17 BRPM | SYSTOLIC BLOOD PRESSURE: 118 MMHG | HEART RATE: 74 BPM | TEMPERATURE: 97.4 F

## 2022-04-06 VITALS — SYSTOLIC BLOOD PRESSURE: 119 MMHG | DIASTOLIC BLOOD PRESSURE: 58 MMHG | OXYGEN SATURATION: 90 %

## 2022-04-06 DIAGNOSIS — K43.9 VENTRAL HERNIA WITHOUT OBSTRUCTION OR GANGRENE: Primary | ICD-10-CM

## 2022-04-06 PROCEDURE — A4217 STERILE WATER/SALINE, 500 ML: HCPCS | Performed by: SURGERY

## 2022-04-06 PROCEDURE — 49560 PR REPAIR INCISIONAL HERNIA,REDUCIBLE: CPT | Performed by: SURGERY

## 2022-04-06 PROCEDURE — 6360000002 HC RX W HCPCS: Performed by: ANESTHESIOLOGY

## 2022-04-06 PROCEDURE — 2580000003 HC RX 258: Performed by: SURGERY

## 2022-04-06 PROCEDURE — 2709999900 HC NON-CHARGEABLE SUPPLY: Performed by: SURGERY

## 2022-04-06 PROCEDURE — 7100000011 HC PHASE II RECOVERY - ADDTL 15 MIN: Performed by: SURGERY

## 2022-04-06 PROCEDURE — 2580000003 HC RX 258: Performed by: NURSE PRACTITIONER

## 2022-04-06 PROCEDURE — 6360000002 HC RX W HCPCS: Performed by: SURGERY

## 2022-04-06 PROCEDURE — 7100000001 HC PACU RECOVERY - ADDTL 15 MIN: Performed by: SURGERY

## 2022-04-06 PROCEDURE — 7100000010 HC PHASE II RECOVERY - FIRST 15 MIN: Performed by: SURGERY

## 2022-04-06 PROCEDURE — 3700000000 HC ANESTHESIA ATTENDED CARE: Performed by: SURGERY

## 2022-04-06 PROCEDURE — 7100000000 HC PACU RECOVERY - FIRST 15 MIN: Performed by: SURGERY

## 2022-04-06 PROCEDURE — 3600000013 HC SURGERY LEVEL 3 ADDTL 15MIN: Performed by: SURGERY

## 2022-04-06 PROCEDURE — 3600000003 HC SURGERY LEVEL 3 BASE: Performed by: SURGERY

## 2022-04-06 PROCEDURE — 64488 TAP BLOCK BI INJECTION: CPT | Performed by: ANESTHESIOLOGY

## 2022-04-06 PROCEDURE — 2500000003 HC RX 250 WO HCPCS: Performed by: ANESTHESIOLOGY

## 2022-04-06 PROCEDURE — 3700000001 HC ADD 15 MINUTES (ANESTHESIA): Performed by: SURGERY

## 2022-04-06 RX ORDER — METOCLOPRAMIDE HYDROCHLORIDE 5 MG/ML
10 INJECTION INTRAMUSCULAR; INTRAVENOUS
Status: DISCONTINUED | OUTPATIENT
Start: 2022-04-06 | End: 2022-04-06 | Stop reason: HOSPADM

## 2022-04-06 RX ORDER — SODIUM CHLORIDE 0.9 % (FLUSH) 0.9 %
10 SYRINGE (ML) INJECTION PRN
Status: CANCELLED | OUTPATIENT
Start: 2022-04-06

## 2022-04-06 RX ORDER — MAGNESIUM HYDROXIDE 1200 MG/15ML
LIQUID ORAL CONTINUOUS PRN
Status: COMPLETED | OUTPATIENT
Start: 2022-04-06 | End: 2022-04-06

## 2022-04-06 RX ORDER — OXYCODONE HYDROCHLORIDE 5 MG/1
10 TABLET ORAL PRN
Status: DISCONTINUED | OUTPATIENT
Start: 2022-04-06 | End: 2022-04-06 | Stop reason: HOSPADM

## 2022-04-06 RX ORDER — SODIUM CHLORIDE 0.9 % (FLUSH) 0.9 %
10 SYRINGE (ML) INJECTION PRN
Status: DISCONTINUED | OUTPATIENT
Start: 2022-04-06 | End: 2022-04-06 | Stop reason: HOSPADM

## 2022-04-06 RX ORDER — HYDROCODONE BITARTRATE AND ACETAMINOPHEN 5; 325 MG/1; MG/1
1 TABLET ORAL EVERY 4 HOURS PRN
Qty: 18 TABLET | Refills: 0 | Status: SHIPPED | OUTPATIENT
Start: 2022-04-06 | End: 2022-04-09

## 2022-04-06 RX ORDER — HYDROMORPHONE HCL 110MG/55ML
0.5 PATIENT CONTROLLED ANALGESIA SYRINGE INTRAVENOUS EVERY 10 MIN PRN
Status: DISCONTINUED | OUTPATIENT
Start: 2022-04-06 | End: 2022-04-06 | Stop reason: HOSPADM

## 2022-04-06 RX ORDER — ONDANSETRON 4 MG/1
4 TABLET, ORALLY DISINTEGRATING ORAL EVERY 8 HOURS PRN
Qty: 15 TABLET | Refills: 1 | Status: SHIPPED | OUTPATIENT
Start: 2022-04-06

## 2022-04-06 RX ORDER — TRAMADOL HYDROCHLORIDE 50 MG/1
50 TABLET ORAL EVERY 6 HOURS PRN
Status: CANCELLED | OUTPATIENT
Start: 2022-04-06

## 2022-04-06 RX ORDER — MEPERIDINE HYDROCHLORIDE 50 MG/ML
12.5 INJECTION INTRAMUSCULAR; INTRAVENOUS; SUBCUTANEOUS EVERY 5 MIN PRN
Status: DISCONTINUED | OUTPATIENT
Start: 2022-04-06 | End: 2022-04-06 | Stop reason: HOSPADM

## 2022-04-06 RX ORDER — SODIUM CHLORIDE, SODIUM LACTATE, POTASSIUM CHLORIDE, CALCIUM CHLORIDE 600; 310; 30; 20 MG/100ML; MG/100ML; MG/100ML; MG/100ML
INJECTION, SOLUTION INTRAVENOUS CONTINUOUS
Status: CANCELLED | OUTPATIENT
Start: 2022-04-06

## 2022-04-06 RX ORDER — PROPOFOL 10 MG/ML
INJECTION, EMULSION INTRAVENOUS PRN
Status: DISCONTINUED | OUTPATIENT
Start: 2022-04-06 | End: 2022-04-06 | Stop reason: SDUPTHER

## 2022-04-06 RX ORDER — OXYCODONE HYDROCHLORIDE 5 MG/1
5 TABLET ORAL PRN
Status: DISCONTINUED | OUTPATIENT
Start: 2022-04-06 | End: 2022-04-06 | Stop reason: HOSPADM

## 2022-04-06 RX ORDER — LIDOCAINE HYDROCHLORIDE 10 MG/ML
INJECTION, SOLUTION INFILTRATION; PERINEURAL PRN
Status: DISCONTINUED | OUTPATIENT
Start: 2022-04-06 | End: 2022-04-06 | Stop reason: SDUPTHER

## 2022-04-06 RX ORDER — FENTANYL CITRATE 50 UG/ML
50 INJECTION, SOLUTION INTRAMUSCULAR; INTRAVENOUS EVERY 10 MIN PRN
Status: DISCONTINUED | OUTPATIENT
Start: 2022-04-06 | End: 2022-04-06 | Stop reason: HOSPADM

## 2022-04-06 RX ORDER — SODIUM CHLORIDE 9 MG/ML
25 INJECTION, SOLUTION INTRAVENOUS PRN
Status: DISCONTINUED | OUTPATIENT
Start: 2022-04-06 | End: 2022-04-06 | Stop reason: HOSPADM

## 2022-04-06 RX ORDER — SODIUM CHLORIDE 0.9 % (FLUSH) 0.9 %
10 SYRINGE (ML) INJECTION EVERY 12 HOURS SCHEDULED
Status: DISCONTINUED | OUTPATIENT
Start: 2022-04-06 | End: 2022-04-06 | Stop reason: HOSPADM

## 2022-04-06 RX ORDER — FENTANYL CITRATE 50 UG/ML
INJECTION, SOLUTION INTRAMUSCULAR; INTRAVENOUS PRN
Status: DISCONTINUED | OUTPATIENT
Start: 2022-04-06 | End: 2022-04-06 | Stop reason: SDUPTHER

## 2022-04-06 RX ORDER — KETOROLAC TROMETHAMINE 30 MG/ML
30 INJECTION, SOLUTION INTRAMUSCULAR; INTRAVENOUS
Status: COMPLETED | OUTPATIENT
Start: 2022-04-06 | End: 2022-04-06

## 2022-04-06 RX ORDER — MORPHINE SULFATE 1 MG/ML
1 INJECTION, SOLUTION EPIDURAL; INTRATHECAL; INTRAVENOUS
Status: CANCELLED | OUTPATIENT
Start: 2022-04-06

## 2022-04-06 RX ORDER — LIDOCAINE HYDROCHLORIDE 10 MG/ML
1 INJECTION, SOLUTION EPIDURAL; INFILTRATION; INTRACAUDAL; PERINEURAL
Status: DISCONTINUED | OUTPATIENT
Start: 2022-04-06 | End: 2022-04-06 | Stop reason: HOSPADM

## 2022-04-06 RX ORDER — ONDANSETRON 2 MG/ML
4 INJECTION INTRAMUSCULAR; INTRAVENOUS EVERY 6 HOURS PRN
Status: CANCELLED | OUTPATIENT
Start: 2022-04-06

## 2022-04-06 RX ORDER — PROMETHAZINE HYDROCHLORIDE 12.5 MG/1
12.5 TABLET ORAL EVERY 6 HOURS PRN
Status: CANCELLED | OUTPATIENT
Start: 2022-04-06

## 2022-04-06 RX ORDER — ONDANSETRON 2 MG/ML
4 INJECTION INTRAMUSCULAR; INTRAVENOUS
Status: DISCONTINUED | OUTPATIENT
Start: 2022-04-06 | End: 2022-04-06 | Stop reason: HOSPADM

## 2022-04-06 RX ORDER — ROCURONIUM BROMIDE 10 MG/ML
INJECTION, SOLUTION INTRAVENOUS PRN
Status: DISCONTINUED | OUTPATIENT
Start: 2022-04-06 | End: 2022-04-06 | Stop reason: SDUPTHER

## 2022-04-06 RX ORDER — DIPHENHYDRAMINE HYDROCHLORIDE 50 MG/ML
12.5 INJECTION INTRAMUSCULAR; INTRAVENOUS
Status: DISCONTINUED | OUTPATIENT
Start: 2022-04-06 | End: 2022-04-06 | Stop reason: HOSPADM

## 2022-04-06 RX ORDER — ONDANSETRON 2 MG/ML
INJECTION INTRAMUSCULAR; INTRAVENOUS PRN
Status: DISCONTINUED | OUTPATIENT
Start: 2022-04-06 | End: 2022-04-06 | Stop reason: SDUPTHER

## 2022-04-06 RX ORDER — SODIUM CHLORIDE 9 MG/ML
25 INJECTION, SOLUTION INTRAVENOUS PRN
Status: CANCELLED | OUTPATIENT
Start: 2022-04-06

## 2022-04-06 RX ORDER — SODIUM CHLORIDE, SODIUM LACTATE, POTASSIUM CHLORIDE, CALCIUM CHLORIDE 600; 310; 30; 20 MG/100ML; MG/100ML; MG/100ML; MG/100ML
INJECTION, SOLUTION INTRAVENOUS CONTINUOUS
Status: DISCONTINUED | OUTPATIENT
Start: 2022-04-06 | End: 2022-04-06 | Stop reason: HOSPADM

## 2022-04-06 RX ORDER — SODIUM CHLORIDE 0.9 % (FLUSH) 0.9 %
10 SYRINGE (ML) INJECTION EVERY 12 HOURS SCHEDULED
Status: CANCELLED | OUTPATIENT
Start: 2022-04-06

## 2022-04-06 RX ADMIN — SODIUM CHLORIDE, POTASSIUM CHLORIDE, SODIUM LACTATE AND CALCIUM CHLORIDE: 600; 310; 30; 20 INJECTION, SOLUTION INTRAVENOUS at 07:07

## 2022-04-06 RX ADMIN — KETOROLAC TROMETHAMINE 30 MG: 30 INJECTION, SOLUTION INTRAMUSCULAR at 07:14

## 2022-04-06 RX ADMIN — FENTANYL CITRATE 50 MCG: 50 INJECTION INTRAMUSCULAR; INTRAVENOUS at 09:27

## 2022-04-06 RX ADMIN — PROPOFOL 150 MG: 10 INJECTION, EMULSION INTRAVENOUS at 09:05

## 2022-04-06 RX ADMIN — SUGAMMADEX 200 MG: 100 INJECTION, SOLUTION INTRAVENOUS at 09:47

## 2022-04-06 RX ADMIN — LIDOCAINE HYDROCHLORIDE 40 MG: 10 INJECTION, SOLUTION INFILTRATION; PERINEURAL at 09:05

## 2022-04-06 RX ADMIN — ONDANSETRON 4 MG: 2 INJECTION INTRAMUSCULAR; INTRAVENOUS at 09:35

## 2022-04-06 RX ADMIN — ROCURONIUM BROMIDE 40 MG: 10 INJECTION INTRAVENOUS at 09:05

## 2022-04-06 RX ADMIN — CEFAZOLIN 2000 MG: 1 INJECTION, POWDER, FOR SOLUTION INTRAMUSCULAR; INTRAVENOUS at 09:10

## 2022-04-06 RX ADMIN — SODIUM CHLORIDE, POTASSIUM CHLORIDE, SODIUM LACTATE AND CALCIUM CHLORIDE: 600; 310; 30; 20 INJECTION, SOLUTION INTRAVENOUS at 10:06

## 2022-04-06 ASSESSMENT — PULMONARY FUNCTION TESTS
PIF_VALUE: 19
PIF_VALUE: 1
PIF_VALUE: 20
PIF_VALUE: 3
PIF_VALUE: 18
PIF_VALUE: 19
PIF_VALUE: 19
PIF_VALUE: 20
PIF_VALUE: 19
PIF_VALUE: 21
PIF_VALUE: 1
PIF_VALUE: 20
PIF_VALUE: 19
PIF_VALUE: 19
PIF_VALUE: 2
PIF_VALUE: 2
PIF_VALUE: 19
PIF_VALUE: 19
PIF_VALUE: 3
PIF_VALUE: 18
PIF_VALUE: 20
PIF_VALUE: 2
PIF_VALUE: 4
PIF_VALUE: 19
PIF_VALUE: 20
PIF_VALUE: 3
PIF_VALUE: 19
PIF_VALUE: 2
PIF_VALUE: 19
PIF_VALUE: 18
PIF_VALUE: 19
PIF_VALUE: 21
PIF_VALUE: 19
PIF_VALUE: 24
PIF_VALUE: 19
PIF_VALUE: 19
PIF_VALUE: 21
PIF_VALUE: 1
PIF_VALUE: 21
PIF_VALUE: 19
PIF_VALUE: 20
PIF_VALUE: 18
PIF_VALUE: 22
PIF_VALUE: 15
PIF_VALUE: 1
PIF_VALUE: 19
PIF_VALUE: 19
PIF_VALUE: 29
PIF_VALUE: 19
PIF_VALUE: 19
PIF_VALUE: 20
PIF_VALUE: 2
PIF_VALUE: 18
PIF_VALUE: 1
PIF_VALUE: 16

## 2022-04-06 ASSESSMENT — PAIN SCALES - GENERAL: PAINLEVEL_OUTOF10: 0

## 2022-04-06 ASSESSMENT — ENCOUNTER SYMPTOMS: SHORTNESS OF BREATH: 1

## 2022-04-06 ASSESSMENT — PAIN - FUNCTIONAL ASSESSMENT: PAIN_FUNCTIONAL_ASSESSMENT: 0-10

## 2022-04-06 NOTE — INTERVAL H&P NOTE
Update History & Physical    The patient's History and Physical of March 30, 2022 was reviewed with the patient and I examined the patient. There was no change. The surgical site was confirmed by the patient and me. Plan: The risks, benefits, expected outcome, and alternative to the recommended procedure have been discussed with the patient. Patient understands and wants to proceed with the procedure.      Electronically signed by Opal Holguin MD on 4/6/2022 at 7:36 AM

## 2022-04-06 NOTE — PROGRESS NOTES
Patient ID:  Samira Nurse  [id number]  57 y.o.  1960  BOVIE PAD SITE CLEAR AND INTACT PRE AND POST OP. TAKEN TO PACU,   ATTACHED TO MONITOR AND REPORT GIVEN TO RN.   VSS DRSG DRY AND INTACT, ABDOMINAL BINDER ON  DENTURES IN LABELED DENTURE CUP IN LABELED BAG ON PATIENT CART      Electronically signed by Alicia Rogers RN on 4/6/2022

## 2022-04-06 NOTE — BRIEF OP NOTE
Brief Postoperative Note      Patient: Juan Cabral  YOB: 1960  MRN: [id number]    Date of Procedure: 4/6/2022    Pre-Op Diagnosis: VENTRAL HERNIA    Post-Op Diagnosis: Same       Procedure(s):  REPAIR OF VENTRAL HERNIA WITH UMBILECTOMY    Surgeon(s):  Naveed Lopes MD    Assistant:  First Assistant: Juan Jauregui    Anesthesia: General    Estimated Blood Loss (mL): Minimal    Complications: None    Specimens:   * No specimens in log *    Implants:  * No implants in log *      Drains: * No LDAs found *    Findings: Repaired hernia with suture    Electronically signed by Naveed Lopes MD on 4/6/2022 at 9:33 AM

## 2022-04-06 NOTE — ANESTHESIA PROCEDURE NOTES
Peripheral Block    Patient location during procedure: pre-op  Start time: 4/6/2022 9:10 AM  End time: 4/6/2022 9:15 AM  Staffing  Performed: anesthesiologist   Anesthesiologist: Mary Carmen Zayas MD  Preanesthetic Checklist  Completed: patient identified, IV checked, site marked, risks and benefits discussed, surgical consent, monitors and equipment checked, pre-op evaluation, timeout performed, anesthesia consent given, oxygen available and patient being monitored  Peripheral Block  Patient position: supine  Prep: ChloraPrep  Patient monitoring: cardiac monitor, continuous pulse ox, frequent blood pressure checks and IV access  Block type: TAP  Laterality: bilateral  Injection technique: single-shot  Guidance: ultrasound guided and IP approach, probe cover employed  Local infiltration: lidocaine  Provider prep: mask and sterile gloves  Local infiltration: lidocaine  Needle  Needle type: combined needle/nerve stimulator   Needle gauge: 21 G  Needle length: 10 cm  Needle localization: ultrasound guidance (IP approach, probe cover employed)  Assessment  Injection assessment: negative aspiration for heme, no paresthesia on injection and local visualized surrounding nerve on ultrasound  Paresthesia pain: none  Slow fractionated injection: yes  Hemodynamics: stable  Additional Notes  20cc 0.25% ropivacaine injected each side  Reason for block: post-op pain management

## 2022-04-06 NOTE — ANESTHESIA PRE PROCEDURE
Department of Anesthesiology  Preprocedure Note       Name:  Jm Hawkins   Age:  57 y.o.  :  1960                                          MRN:  [id number]         Date:  2022      Surgeon: Leslie Villalobos):  Clemente Islas MD    Procedure: Procedure(s):  REPAIR OF VENTRAL HERNIA    Medications prior to admission:   Prior to Admission medications    Medication Sig Start Date End Date Taking?  Authorizing Provider   traZODone (DESYREL) 100 MG tablet Take 1 tablet by mouth nightly 3/4/22   SEGUNDO Mims   fluticasone (FLONASE) 50 MCG/ACT nasal spray instill 1 spray INTRANASALLY once daily 22   SEGUNDO Mims   atorvastatin (LIPITOR) 20 MG tablet take 1 tablet by mouth once daily 22   SEGUNDO Mims   tiZANidine (ZANAFLEX) 2 MG tablet Take 1 tablet by mouth 4 times daily as needed (back pain) 22   SEGUNDO Mims   PROAIR  (45 Base) MCG/ACT inhaler inhale 2 puffs by mouth every 4 hours if needed 11/10/21   Husam Marks MD   busPIRone (BUSPAR) 15 MG tablet TAKE 1 TABLET BY MOUTH THREE TIMES DAILY 11/10/21   Husam Marks MD   PARoxetine (PAXIL) 40 MG tablet Take 1 tablet by mouth every morning 11/10/21   Husam Marks MD   azelastine (ASTELIN) 0.1 % nasal spray 1 spray by Nasal route 2 times daily Use in each nostril as directed 21   Alem Moreau MD   omeprazole (PRILOSEC) 20 MG delayed release capsule 1 tab PO QD 21   Naomi Osuna MD   Fluticasone-Umeclidin-Vilant (TRELEGY ELLIPTA) 200-62.5-25 MCG/INH AEPB Inhale 1 puff into the lungs daily 3/19/21   Alem Moreau MD   nicotine (NICODERM CQ) 14 MG/24HR Place 1 patch onto the skin every 24 hours 3/19/21   Alem Moreau MD   Respiratory Therapy Supplies (NEBULIZER/TUBING/MOUTHPIECE) KIT 1 kit by Does not apply route daily as needed (wheezing) 3/15/21   Husam Marks MD   folic acid (FOLVITE) 1 MG tablet Take 1 tablet by mouth daily 3/10/21   Naomi Osuna MD   albuterol (PROVENTIL) (5 MG/ML) 0.5% nebulizer solution Take 1 mL by nebulization 4 times daily as needed for Wheezing 3/19/18   Deborah Crowell MD       Current medications:    Current Facility-Administered Medications   Medication Dose Route Frequency Provider Last Rate Last Admin    0.9 % sodium chloride infusion  25 mL IntraVENous PRN Katherene Posadas, APRN - CNP        lactated ringers infusion   IntraVENous Continuous Katherene Posadas, APRN -  mL/hr at 04/06/22 0707 New Bag at 04/06/22 0707    lidocaine PF 1 % injection 1 mL  1 mL IntraDERmal Once PRN Katherene Posadas, APRN - CNP        sodium chloride flush 0.9 % injection 10 mL  10 mL IntraVENous 2 times per day Katherene Posadas, APRN - CNP        sodium chloride flush 0.9 % injection 10 mL  10 mL IntraVENous PRN Katherene Posadas, APRN - CNP        ceFAZolin (ANCEF) 2,000 mg in dextrose 5 % 100 mL IVPB  2,000 mg IntraVENous On Call to Guillaume Nascimento MD           Allergies:     Allergies   Allergen Reactions    Other Anaphylaxis     Sun flower seeds     Adhesive Tape      Plastic tape blisters    Codeine Nausea And Vomiting       Problem List:    Patient Active Problem List   Diagnosis Code    Pure hypercholesterolemia E78.00    Mild single current episode of major depressive disorder ([facility] F32.0    CARTER (generalized anxiety disorder) F41.1    Insomnia G47.00    Gastroesophageal reflux disease without esophagitis K21.9    Neuropathy G62.9    DDD (degenerative disc disease), lumbosacral M51.37    Tobacco abuse Z72.0    SOB (shortness of breath) R06.02    Coronary artery calcification of native artery - aortic arch I25.10, I25.84    Chronic obstructive pulmonary disease (HCC) J44.9    Hyperlipidemia E78.5    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    Nocturnal hypoxia M56.41    Folic acid deficiency B34.8    Ventral hernia without obstruction or gangrene K43.9       Past Medical History:        Diagnosis Date    Abnormal CT of the chest 2019    Bone spur     Cancer (HCC)     lung    Cervical stenosis of spine     COPD (chronic obstructive pulmonary disease) (HCC)     Coronary artery calcification of native artery - aortic arch 10/31/2018    On lung screen done 10/26/18    DDD (degenerative disc disease), lumbosacral 3/12/2018    Used to see pain management     CARTER (generalized anxiety disorder) 3/12/2018    [facility]    Gastroesophageal reflux disease without esophagitis 3/12/2018    EGD over 10 years    Gout     Hyperlipidemia     Insomnia 3/12/2018    Mild single current episode of major depressive disorder ([address] 3/12/2018    Neuropathy 3/12/2018    Clinical diagnosis, EMG    Other emphysema ([address] 3/12/2018    No recent PFT    Pure hypercholesterolemia 3/12/2018    SOB (shortness of breath)     Tobacco abuse 3/12/2018       Past Surgical History:        Procedure Laterality Date     SECTION      COLONOSCOPY      LUNG BIOPSY Left 2018    CT guided Left Lung Biopsy by Dr John Morgan, PARTIAL Left     TONSILLECTOMY         Social History:    Social History     Tobacco Use    Smoking status: Current Every Day Smoker     Packs/day: 1.00     Years: 47.00     Pack years: 47.00     Types: Cigarettes     Start date:     Smokeless tobacco: Never Used   Substance Use Topics    Alcohol use:  No                                Ready to quit: Not Answered  Counseling given: Not Answered      Vital Signs (Current):   Vitals:    22 0700   BP: (!) 150/80   Pulse: 94   Resp: 18   Temp: 97.3 °F (36.3 °C)   TempSrc: Temporal   SpO2: 92%                                              BP Readings from Last 3 Encounters:   22 (!) 150/80   22 122/64   22 120/68       NPO Status: Time of last liquid consumption:                         Time of last solid consumption: 170                        Date of last liquid consumption: 22                        Date of last solid food consumption: 04/05/22    BMI:   Wt Readings from Last 3 Encounters:   04/04/22 131 lb 3.2 oz (59.5 kg)   03/30/22 132 lb (59.9 kg)   03/02/22 125 lb (56.7 kg)     There is no height or weight on file to calculate BMI.    CBC:   Lab Results   Component Value Date    WBC 6.9 04/04/2022    RBC 4.83 04/04/2022    RBC 3.33 02/10/2019    HGB 14.0 04/04/2022    HCT 42.9 04/04/2022    MCV 88.8 04/04/2022    RDW 15.0 04/04/2022     04/04/2022       CMP:   Lab Results   Component Value Date     04/04/2022    K 4.0 04/04/2022     04/04/2022    CO2 28 04/04/2022    BUN 8 04/04/2022    CREATININE 1.13 04/04/2022    GFRAA 59.0 04/04/2022    AGRATIO 1.5 02/10/2019    LABGLOM 48.7 04/04/2022    GLUCOSE 114 04/04/2022    GLUCOSE 117 02/10/2019    PROT 6.8 03/09/2021    CALCIUM 9.9 04/04/2022    BILITOT 0.6 03/09/2021    ALKPHOS 138 03/09/2021    AST 18 03/09/2021    ALT 14 03/09/2021       POC Tests: No results for input(s): POCGLU, POCNA, POCK, POCCL, POCBUN, POCHEMO, POCHCT in the last 72 hours.     Coags:   Lab Results   Component Value Date    PROTIME 10.6 02/08/2019    INR 0.94 02/08/2019       HCG (If Applicable): No results found for: PREGTESTUR, PREGSERUM, HCG, HCGQUANT     ABGs: No results found for: PHART, PO2ART, UWB3MWT, KKW6JLL, BEART, F4JYJOBI     Type & Screen (If Applicable):  No results found for: LABABO, LABRH    Drug/Infectious Status (If Applicable):  No results found for: HIV, HEPCAB    COVID-19 Screening (If Applicable): No results found for: COVID19        Anesthesia Evaluation  Patient summary reviewed and Nursing notes reviewed no history of anesthetic complications:   Airway: Mallampati: II  TM distance: >3 FB   Neck ROM: full  Mouth opening: > = 3 FB Dental:    (+) edentulous      Pulmonary:   (+) COPD:  shortness of breath:                             Cardiovascular:    (+) CAD:,       ECG reviewed               Beta Blocker:  Not on Beta Blocker         Neuro/Psych:   (+) psychiatric history:            GI/Hepatic/Renal:   (+) GERD:,           Endo/Other: Negative Endo/Other ROS                    Abdominal:             Vascular: negative vascular ROS. Other Findings:           Anesthesia Plan      general     ASA 3     (TAP blocks)  Induction: intravenous. MIPS: Postoperative opioids intended and Prophylactic antiemetics administered. Anesthetic plan and risks discussed with patient.         Attending anesthesiologist reviewed and agrees with Jamia Wood MD   4/6/2022

## 2022-04-06 NOTE — OP NOTE
[address] [address], [address]                                OPERATIVE REPORT    PATIENT NAME: Angela Cabezas                     :        1960  MED REC NO:   [id number]                              ROOM:  ACCOUNT NO:   [de-identified]                           ADMIT DATE: 2022  PROVIDER:     Chalo Almazan MD    DATE OF PROCEDURE:  2022    PREOPERATIVE DIAGNOSIS:  Ventral incisional hernia. POSTOPERATIVE DIAGNOSIS:  Ventral incisional hernia. PROCEDURE:  Repair of ventral hernia with removal umbilicus. SURGEON:  Chalo Almazan MD    ANESTHESIA:  General with abdominal wall blocks. COMPLICATIONS:  None. ESTIMATED BLOOD LOSS:  Minimal.    HISTORY:  The patient is a 69-year-old female who is here now for repair  of a ventral incisional hernia in the periumbilical area. She has a  markedly deformed umbilicus. After discussing with her, her options of  therapy, she was agreeable to the hernia repair with removal of the  umbilicus. The procedure as well as the risks and complications were  discussed including but not exclusive to infection, blood loss, damage  to surrounding structures, chronic pain, recurrence, and even the  possibility of needing further surgery in the future were all discussed. She understood and was agreeable to the procedure. DESCRIPTION OF THE PROCEDURE:  The patient was brought in the operative  suite and placed in the supine position where anesthesia was induced and  she was intubated. She was given abdominal wall blocks by Anesthesia. Her abdomen was then prepped and draped in a sterile fashion. Time-out  called. The patient and procedure were properly identified. There was  an [place] drape placed over the abdomen. A transverse elliptical  incision was then made to ellipse out the umbilicus and carried down  through the subcutaneous tissue to the ventral hernia.   The hernia sac  was dissected away from the surrounding tissue and reduced back into the  abdominal cavity. Afterwards, there was about an 8 mm fascial defect  present. At this point, I elected to close the defect with interrupted  [id number] of 0 Ethibond. Sponge, needle and instrument counts  were then correct. There was excellent hemostasis, and closure of the  wound was then done using 2-0 Vicryl, 3-0 Vicryl and 4-0 Monocryl with  skin glue on the skin edges. She tolerated the procedure well, went to  recovery in stable condition and I spoke with family after the procedure  in the consultation room.         Lynne Waterman MD    D: 04/06/2022 9:45:05       T: 04/06/2022 9:47:27     [email or web address]  Job#: [id number]     Doc#: [id number]    CC:

## 2022-04-06 NOTE — ANESTHESIA POSTPROCEDURE EVALUATION
Department of Anesthesiology  Postprocedure Note    Patient: Isaac Stevens  MRN: [id number]  YOB: 1960  Date of evaluation: 4/6/2022  Time:  10:48 AM     Procedure Summary     Date: 04/06/22 Room / Location: [facility] OR [address] [place] [address]    Anesthesia Start: 0900 Anesthesia Stop: 1455    Procedure: REPAIR OF VENTRAL HERNIA WITH UMBILECTOMY (N/A Abdomen) Diagnosis: (VENTRAL HERNIA)    Surgeons: Kandi Albarado MD Responsible Provider: Abdi Munguia MD    Anesthesia Type: general ASA Status: 3          Anesthesia Type: general    Esdras Phase I: Esdras Score: 10    Esdras Phase II: Esdras Score: 10    Last vitals: Reviewed and per EMR flowsheets.        Anesthesia Post Evaluation    Patient location during evaluation: PACU  Patient participation: complete - patient participated  Level of consciousness: awake and alert  Airway patency: patent  Nausea & Vomiting: no nausea and no vomiting  Complications: no  Cardiovascular status: blood pressure returned to baseline and hemodynamically stable  Respiratory status: face mask  Hydration status: euvolemic

## 2022-04-06 NOTE — PROGRESS NOTES
Patient in PACU bed 2. Report received from surgical nurse and anesthesia. Oral airway/nonrebreather removed. Patient has no current pain. Dressing dry and intact. Abdominal binder in place. Discharge paperwork printed and reviewed. VSS prior to discharge.

## 2022-04-13 ENCOUNTER — OFFICE VISIT (OUTPATIENT)
Dept: SURGERY | Age: 62
End: 2022-04-13

## 2022-04-13 VITALS
BODY MASS INDEX: 25.91 KG/M2 | DIASTOLIC BLOOD PRESSURE: 86 MMHG | WEIGHT: 132 LBS | SYSTOLIC BLOOD PRESSURE: 130 MMHG | TEMPERATURE: 98.6 F | HEIGHT: 60 IN

## 2022-04-13 DIAGNOSIS — K43.9 VENTRAL HERNIA WITHOUT OBSTRUCTION OR GANGRENE: Primary | ICD-10-CM

## 2022-04-13 PROCEDURE — 99024 POSTOP FOLLOW-UP VISIT: CPT | Performed by: SURGERY

## 2022-04-13 NOTE — PROGRESS NOTES
0.  Jin Banda (:  1960) is a 57 y.o. female,Established patient, here for evaluation of the following chief complaint(s):  Post-Op Check (post op repair VH )         ASSESSMENT/PLAN:   doing well       removed Aquacel removed  The patient is instructed to return to see me in 1 month. Subjective   SUBJECTIVE/OBJECTIVE:  SETH Banda is status post repair of ventral hernia with mesh on . The patient is convalescing very well. Pain control is excellent using Tylenol. Appetite is excellent. GI function is normal.   function is normal.  He has not returned to normal activities. Objective   Physical Exam  Constitutional:       General: She is not in acute distress. Appearance: Normal appearance. HENT:      Mouth/Throat:      Mouth: Mucous membranes are moist.      Pharynx: Oropharynx is clear. Eyes:      Pupils: Pupils are equal, round, and reactive to light. Neck:      Comments: Neck is supple with out masses, no thyromegaly, trachea midline  Abdominal:          Comments: Incision is well approximated, erythema is not present, swelling is minimal, no evidence of hernia, mild tenderness, no drainage present, skin glue/sutures present. Musculoskeletal:      Comments: Normal gait   Skin:     Findings: No bruising, lesion or rash. Neurological:      Mental Status: She is alert and oriented to person, place, and time. Psychiatric:         Mood and Affect: Mood normal.         Judgment: Judgment normal.         /86   Temp 98.6 °F (37 °C)   Ht 5' (1.524 m)   Wt 132 lb (59.9 kg)   LMP  (LMP Unknown)   BMI 25.78 kg/m²           An electronic signature was used to authenticate this note.     --Robby Fowler MD

## 2022-05-06 DIAGNOSIS — J44.9 CHRONIC OBSTRUCTIVE PULMONARY DISEASE, UNSPECIFIED COPD TYPE (HCC): ICD-10-CM

## 2022-05-06 NOTE — TELEPHONE ENCOUNTER
Comments:     Last Office Visit (last PCP visit):   1/25/2022    Next Visit Date:  Future Appointments   Date Time Provider Macy Glenys   5/11/2022  9:00 AM LIVE CT ROOM 1 MLOZ CT [facility] Fac RAD   5/11/2022  9:30 AM Cleopatra Moore MD [address]   5/18/2022  2:00 PM Alexandro Bumpers, MD [address] [facility]       **If hasn't been seen in over a year OR hasn't followed up according to last diabetes/ADHD visit, make appointment for patient before sending refill to provider.     Rx requested:  Requested Prescriptions     Pending Prescriptions Disp Refills    Fluticasone-Umeclidin-Vilant (TRELEGY ELLIPTA) 200-62.5-25 MCG/INH AEPB 1 each 3     Sig: Inhale 1 puff into the lungs daily

## 2022-05-06 NOTE — TELEPHONE ENCOUNTER
----- Message from Horacio Martinez sent at 5/6/2022  1:07 PM EDT -----  Subject: Refill Request    QUESTIONS  Name of Medication? Fluticasone-Umeclidin-Vilant (TRELEGY ELLIPTA)   200-62.5-25 MCG/INH AEPB  Patient-reported dosage and instructions? twice daily  How many days do you have left? 0  Preferred Pharmacy? [id number] phone number (if available)? [phone]  ---------------------------------------------------------------------------  --------------  [place] [facility]  What is the best way for the office to contact you? OK to leave message on   voicemail  Preferred Call Back Phone Number? [phone]  ---------------------------------------------------------------------------  --------------  SCRIPT ANSWERS  Relationship to Patient?  Self

## 2022-05-09 RX ORDER — FLUTICASONE FUROATE, UMECLIDINIUM BROMIDE AND VILANTEROL TRIFENATATE 200; 62.5; 25 UG/1; UG/1; UG/1
1 POWDER RESPIRATORY (INHALATION) DAILY
Qty: 1 EACH | Refills: 3 | Status: SHIPPED | OUTPATIENT
Start: 2022-05-09

## 2022-05-11 ENCOUNTER — OFFICE VISIT (OUTPATIENT)
Dept: CARDIOTHORACIC SURGERY | Age: 62
End: 2022-05-11
Payer: MEDICARE

## 2022-05-11 ENCOUNTER — HOSPITAL ENCOUNTER (OUTPATIENT)
Dept: CT IMAGING | Age: 62
Discharge: HOME | End: 2022-05-13
Payer: MEDICARE

## 2022-05-11 VITALS — HEIGHT: 60 IN | BODY MASS INDEX: 26.5 KG/M2 | OXYGEN SATURATION: 96 % | HEART RATE: 77 BPM | WEIGHT: 135 LBS

## 2022-05-11 DIAGNOSIS — Z85.118 HISTORY OF LUNG CANCER: Primary | ICD-10-CM

## 2022-05-11 DIAGNOSIS — C7A.090 ATYPICAL CARCINOID LUNG TUMOR (HCC): Primary | ICD-10-CM

## 2022-05-11 DIAGNOSIS — Z85.118 HISTORY OF LUNG CANCER: ICD-10-CM

## 2022-05-11 DIAGNOSIS — Z71.6 ENCOUNTER FOR SMOKING CESSATION COUNSELING: Primary | ICD-10-CM

## 2022-05-11 PROCEDURE — 4004F PT TOBACCO SCREEN RCVD TLK: CPT | Performed by: THORACIC SURGERY (CARDIOTHORACIC VASCULAR SURGERY)

## 2022-05-11 PROCEDURE — 3017F COLORECTAL CA SCREEN DOC REV: CPT | Performed by: THORACIC SURGERY (CARDIOTHORACIC VASCULAR SURGERY)

## 2022-05-11 PROCEDURE — G8419 CALC BMI OUT NRM PARAM NOF/U: HCPCS | Performed by: THORACIC SURGERY (CARDIOTHORACIC VASCULAR SURGERY)

## 2022-05-11 PROCEDURE — 71250 CT THORAX DX C-: CPT

## 2022-05-11 PROCEDURE — 99203 OFFICE O/P NEW LOW 30 MIN: CPT | Performed by: THORACIC SURGERY (CARDIOTHORACIC VASCULAR SURGERY)

## 2022-05-11 PROCEDURE — G8427 DOCREV CUR MEDS BY ELIG CLIN: HCPCS | Performed by: THORACIC SURGERY (CARDIOTHORACIC VASCULAR SURGERY)

## 2022-05-11 ASSESSMENT — ENCOUNTER SYMPTOMS
SORE THROAT: 0
DIARRHEA: 0
NAUSEA: 0
TROUBLE SWALLOWING: 0
VOICE CHANGE: 0
CHEST TIGHTNESS: 0
SHORTNESS OF BREATH: 0
STRIDOR: 0
ABDOMINAL DISTENTION: 0
COUGH: 0
ABDOMINAL PAIN: 0
VOMITING: 0
CHOKING: 0
WHEEZING: 0

## 2022-05-11 NOTE — PROGRESS NOTES
Subjective:      Patient ID: Vickie Ortega is a 57 y.o. female who presents today for:  Chief Complaint   Patient presents with    6 Month Follow-Up       HPI  Patient is 3 years and 2 months out from a left upper lobe wedge resection for a [id number] atypical carcinoid. She is here for her surveillance CAT scan. Since her last visit she underwent surgical repair of a ventral hernia. She reports no other hospital admissions. She denies any changes in her breathing or any new cough or hemoptysis. She was getting a Chantix prescription last year and was successful in stopping smoking. She has not had the prescription refilled and has started smoking again. She is asking for referral back to pulmonary medicine for Chantix prescriptions and help with smoking cessation.     Past Medical History:   Diagnosis Date    Abnormal CT of the chest 2019    Bone spur     Cancer (HCC)     lung    Cervical stenosis of spine     COPD (chronic obstructive pulmonary disease) (HCC)     Coronary artery calcification of native artery - aortic arch 10/31/2018    On lung screen done 10/26/18    DDD (degenerative disc disease), lumbosacral 3/12/2018    Used to see pain management     CARTER (generalized anxiety disorder) 3/12/2018    [facility]    Gastroesophageal reflux disease without esophagitis 3/12/2018    EGD over 10 years    Gout     Hyperlipidemia     Insomnia 3/12/2018    Mild single current episode of major depressive disorder ([address] 3/12/2018    Neuropathy 3/12/2018    Clinical diagnosis, EMG    Other emphysema ([address] 3/12/2018    No recent PFT    Pure hypercholesterolemia 3/12/2018    SOB (shortness of breath)     Tobacco abuse 3/12/2018      Past Surgical History:   Procedure Laterality Date     SECTION      COLONOSCOPY      LUNG BIOPSY Left 2018    CT guided Left Lung Biopsy by Dr Kaity Wong, PARTIAL Left     TONSILLECTOMY      VENTRAL HERNIA REPAIR N/A 4/6/2022    REPAIR OF VENTRAL HERNIA WITH UMBILECTOMY performed by Khadijah Gotti MD at [address] [place] [address] Marital status: Single     Spouse name: Not on file    Number of children: Not on file    Years of education: Not on file    Highest education level: Not on file   Occupational History    Not on file   Tobacco Use    Smoking status: Current Every Day Smoker     Packs/day: 1.00     Years: 47.00     Pack years: 47.00     Types: Cigarettes     Start date: 1970    Smokeless tobacco: Never Used   Vaping Use    Vaping Use: Former    Substances: Never   Substance and Sexual Activity    Alcohol use: No    Drug use: No    Sexual activity: Not Currently     Birth control/protection: Post-menopausal   Other Topics Concern    Not on file   Social History Narrative    Not on file     Social Determinants of Health     Financial Resource Strain: Medium Risk    Difficulty of Paying Living Expenses: Somewhat hard   Food Insecurity: Food Insecurity Present    Worried About Running Out of Food in the Last Year: Sometimes true    Clau of Food in the Last Year: Never true   Transportation Needs:     Lack of Transportation (Medical): Not on file    Lack of Transportation (Non-Medical):  Not on file   Physical Activity:     Days of Exercise per Week: Not on file    Minutes of Exercise per Session: Not on file   Stress:     Feeling of Stress : Not on file   Social Connections:     Frequency of Communication with Friends and Family: Not on file    Frequency of Social Gatherings with Friends and Family: Not on file    Attends Hinduism Services: Not on file    Active Member of Clubs or Organizations: Not on file    Attends Club or Organization Meetings: Not on file    Marital Status: Not on file   Intimate Partner Violence:     Fear of Current or Ex-Partner: Not on file    Emotionally Abused: Not on file    Physically Abused: Not on file    Sexually Abused: Not on file   Housing Stability:     Unable to Pay for Housing in the Last Year: Not on file    Number of Places Lived in the Last Year: Not on file    Unstable Housing in the Last Year: Not on file     Family History   Problem Relation Age of Onset    Other Mother         epilepsy     No Known Problems Father     No Known Problems Brother     No Known Problems Brother      Allergies   Allergen Reactions    Other Anaphylaxis     Sun flower seeds     Adhesive Tape      Plastic tape blisters    Codeine Nausea And Vomiting     Current Outpatient Medications on File Prior to Visit   Medication Sig Dispense Refill    Fluticasone-Umeclidin-Vilant (TRELEGY ELLIPTA) 200-62.5-25 MCG/INH AEPB Inhale 1 puff into the lungs daily 1 each 3    ondansetron (ZOFRAN ODT) 4 MG disintegrating tablet Take 1 tablet by mouth every 8 hours as needed for Nausea or Vomiting 15 tablet 1    traZODone (DESYREL) 100 MG tablet Take 1 tablet by mouth nightly 30 tablet 3    fluticasone (FLONASE) 50 MCG/ACT nasal spray instill 1 spray INTRANASALLY once daily 16 g 3    atorvastatin (LIPITOR) 20 MG tablet take 1 tablet by mouth once daily 90 tablet 3    tiZANidine (ZANAFLEX) 2 MG tablet Take 1 tablet by mouth 4 times daily as needed (back pain) 40 tablet 0    PROAIR  (90 Base) MCG/ACT inhaler inhale 2 puffs by mouth every 4 hours if needed 2 each 11    busPIRone (BUSPAR) 15 MG tablet TAKE 1 TABLET BY MOUTH THREE TIMES DAILY 270 tablet 3    PARoxetine (PAXIL) 40 MG tablet Take 1 tablet by mouth every morning 90 tablet 3    azelastine (ASTELIN) 0.1 % nasal spray 1 spray by Nasal route 2 times daily Use in each nostril as directed 2 Bottle 1    omeprazole (PRILOSEC) 20 MG delayed release capsule 1 tab PO QD 90 capsule 1    nicotine (NICODERM CQ) 14 MG/24HR Place 1 patch onto the skin every 24 hours 30 patch 3    Respiratory Therapy Supplies (NEBULIZER/TUBING/MOUTHPIECE) KIT 1 kit by Does not apply route daily as needed (wheezing) 1 kit 0    folic acid (FOLVITE) 1 MG tablet Take 1 tablet by mouth daily 90 tablet 0    albuterol (PROVENTIL) (5 MG/ML) 0.5% nebulizer solution Take 1 mL by nebulization 4 times daily as needed for Wheezing 120 each 3     Current Facility-Administered Medications on File Prior to Visit   Medication Dose Route Frequency Provider Last Rate Last Admin    cyanocobalamin injection 1,000 mcg  1,000 mcg IntraMUSCular Once Gerard Pierson MD             Review of Systems   Constitutional: Negative for activity change, appetite change, chills, diaphoresis, fatigue, fever and unexpected weight change. HENT: Negative for sore throat, trouble swallowing and voice change. Eyes: Negative for visual disturbance. Respiratory: Negative for cough, choking, chest tightness, shortness of breath, wheezing and stridor. Cardiovascular: Negative for chest pain, palpitations and leg swelling. Gastrointestinal: Negative for abdominal distention, abdominal pain, diarrhea, nausea and vomiting. Genitourinary: Negative for difficulty urinating. Musculoskeletal: Negative for gait problem and joint swelling. Skin: Negative for rash and wound. Neurological: Negative for dizziness, seizures and light-headedness. Hematological: Negative for adenopathy. Does not bruise/bleed easily. Psychiatric/Behavioral: Negative for behavioral problems and confusion. Objective:   Pulse 77   Ht 5' (1.524 m)   Wt 135 lb (61.2 kg)   LMP  (LMP Unknown)   SpO2 96%   BMI 26.37 kg/m²     Physical Exam  Constitutional:       General: She is not in acute distress. Appearance: She is not ill-appearing, toxic-appearing or diaphoretic. HENT:      Head: Normocephalic and atraumatic. Nose: Nose normal.   Eyes:      Extraocular Movements: Extraocular movements intact. Conjunctiva/sclera: Conjunctivae normal.      Pupils: Pupils are equal, round, and reactive to light. Neck:      Vascular: No carotid bruit.    Cardiovascular: Rate and Rhythm: Normal rate and regular rhythm. Heart sounds: Normal heart sounds. No murmur heard. No gallop. Pulmonary:      Effort: Pulmonary effort is normal. No respiratory distress. Breath sounds: Normal breath sounds. No wheezing or rales. Abdominal:      General: Abdomen is flat. Bowel sounds are normal.      Palpations: Abdomen is soft. There is no mass. Tenderness: There is no abdominal tenderness. Musculoskeletal:         General: No swelling. Cervical back: Neck supple. Right lower leg: No edema. Left lower leg: No edema. Lymphadenopathy:      Cervical: No cervical adenopathy. Skin:     General: Skin is warm and dry. Neurological:      General: No focal deficit present. Mental Status: She is alert and oriented to person, place, and time. Psychiatric:         Mood and Affect: Mood normal.         Behavior: Behavior normal.         Thought Content: Thought content normal.         Judgment: Judgment normal.               Radiographs and Laboratory Studies:     Diagnostic Imaging Studies:    I personally viewed her CAT scan and compared it to her last CAT scan. The groundglass opacity in the right midlung field appears to have completely disappeared. I see stable possibly improved postoperative changes in the left upper lobe. The calcified right lower lobe nodule is stable. I do not appreciate any new intrathoracic pathology. Assessment/Plan     No sign of recurrent atypical carcinoid. I will see her in 6 months with another CAT scan of the chest.  We will refer her back to pulmonary medicine for assistance with smoking cessation. Diagnosis Orders   1. Atypical carcinoid lung tumor ([facility] [address]       No orders of the defined types were placed in this encounter. No orders of the defined types were placed in this encounter. Return in about 6 months (around 11/11/2022) for Surveillance CAT scan.       Jade Bryan MD

## 2022-05-11 NOTE — PATIENT INSTRUCTIONS
We will follow-up in 6 months with a CAT scan of the chest.  Patient wishes to be referred to pulmonary medicine for smoking cessation therapy and possible Chantix.

## 2022-05-18 ENCOUNTER — OFFICE VISIT (OUTPATIENT)
Dept: SURGERY | Age: 62
End: 2022-05-18

## 2022-05-18 VITALS
HEIGHT: 60 IN | WEIGHT: 131 LBS | DIASTOLIC BLOOD PRESSURE: 80 MMHG | TEMPERATURE: 97.1 F | BODY MASS INDEX: 25.72 KG/M2 | SYSTOLIC BLOOD PRESSURE: 118 MMHG

## 2022-05-18 DIAGNOSIS — K43.9 VENTRAL HERNIA WITHOUT OBSTRUCTION OR GANGRENE: Primary | ICD-10-CM

## 2022-05-18 PROBLEM — N18.30 CHRONIC RENAL DISEASE, STAGE III (HCC): Status: ACTIVE | Noted: 2022-05-18

## 2022-05-18 PROCEDURE — 99024 POSTOP FOLLOW-UP VISIT: CPT | Performed by: SURGERY

## 2022-06-23 DIAGNOSIS — K21.9 GASTROESOPHAGEAL REFLUX DISEASE WITHOUT ESOPHAGITIS: ICD-10-CM

## 2022-06-23 RX ORDER — OMEPRAZOLE 20 MG/1
CAPSULE, DELAYED RELEASE ORAL
Qty: 90 CAPSULE | Refills: 1 | Status: SHIPPED | OUTPATIENT
Start: 2022-06-23

## 2022-06-23 NOTE — TELEPHONE ENCOUNTER
Comments:     Last Office Visit (last PCP visit):   1/25/2022    Next Visit Date:  Future Appointments   Date Time Provider Macy Glenys   11/14/2022  9:30 AM LIVE CT ROOM 1 MLOZ CT [facility] Fac RAD   11/14/2022 10:00 AM Nichol Gonzalez MD [facility] [place] EMERGENCY [facility] [place]       **If hasn't been seen in over a year OR hasn't followed up according to last diabetes/ADHD visit, make appointment for patient before sending refill to provider.     Rx requested:  Requested Prescriptions     Pending Prescriptions Disp Refills    omeprazole (PRILOSEC) 20 MG delayed release capsule [Pharmacy Med Name: OMEPRAZOLE DR 20 MG CAPSULE] 90 capsule 1     Sig: take 1 capsule by mouth once daily

## 2022-08-01 ENCOUNTER — NURSE TRIAGE (OUTPATIENT)
Dept: OTHER | Facility: CLINIC | Age: 62
End: 2022-08-01

## 2022-08-01 ENCOUNTER — OFFICE VISIT (OUTPATIENT)
Dept: INTERNAL MEDICINE | Age: 62
End: 2022-08-01
Payer: MEDICARE

## 2022-08-01 VITALS
HEART RATE: 97 BPM | SYSTOLIC BLOOD PRESSURE: 102 MMHG | HEIGHT: 60 IN | OXYGEN SATURATION: 94 % | BODY MASS INDEX: 25.56 KG/M2 | WEIGHT: 130.2 LBS | DIASTOLIC BLOOD PRESSURE: 64 MMHG | RESPIRATION RATE: 16 BRPM

## 2022-08-01 DIAGNOSIS — R06.02 SOB (SHORTNESS OF BREATH): ICD-10-CM

## 2022-08-01 DIAGNOSIS — E01.0 THYROMEGALY: ICD-10-CM

## 2022-08-01 DIAGNOSIS — R07.9 CHEST PAIN, UNSPECIFIED TYPE: Primary | ICD-10-CM

## 2022-08-01 DIAGNOSIS — I20.8 ANGINA AT REST (HCC): ICD-10-CM

## 2022-08-01 DIAGNOSIS — Z13.31 POSITIVE DEPRESSION SCREENING: ICD-10-CM

## 2022-08-01 DIAGNOSIS — J44.9 CHRONIC OBSTRUCTIVE PULMONARY DISEASE, UNSPECIFIED COPD TYPE (HCC): ICD-10-CM

## 2022-08-01 DIAGNOSIS — F32.0 MILD SINGLE CURRENT EPISODE OF MAJOR DEPRESSIVE DISORDER (HCC): ICD-10-CM

## 2022-08-01 DIAGNOSIS — N18.31 STAGE 3A CHRONIC KIDNEY DISEASE (HCC): ICD-10-CM

## 2022-08-01 PROCEDURE — 4004F PT TOBACCO SCREEN RCVD TLK: CPT | Performed by: NURSE PRACTITIONER

## 2022-08-01 PROCEDURE — 3023F SPIROM DOC REV: CPT | Performed by: NURSE PRACTITIONER

## 2022-08-01 PROCEDURE — 3017F COLORECTAL CA SCREEN DOC REV: CPT | Performed by: NURSE PRACTITIONER

## 2022-08-01 PROCEDURE — G8427 DOCREV CUR MEDS BY ELIG CLIN: HCPCS | Performed by: NURSE PRACTITIONER

## 2022-08-01 PROCEDURE — G8419 CALC BMI OUT NRM PARAM NOF/U: HCPCS | Performed by: NURSE PRACTITIONER

## 2022-08-01 PROCEDURE — 99214 OFFICE O/P EST MOD 30 MIN: CPT | Performed by: NURSE PRACTITIONER

## 2022-08-01 PROCEDURE — 93000 ELECTROCARDIOGRAM COMPLETE: CPT | Performed by: NURSE PRACTITIONER

## 2022-08-01 RX ORDER — ARIPIPRAZOLE 5 MG/1
5 TABLET ORAL DAILY
Qty: 30 TABLET | Refills: 1 | Status: SHIPPED | OUTPATIENT
Start: 2022-08-01 | End: 2022-09-30

## 2022-08-01 SDOH — ECONOMIC STABILITY: FOOD INSECURITY: WITHIN THE PAST 12 MONTHS, YOU WORRIED THAT YOUR FOOD WOULD RUN OUT BEFORE YOU GOT MONEY TO BUY MORE.: NEVER TRUE

## 2022-08-01 SDOH — ECONOMIC STABILITY: FOOD INSECURITY: WITHIN THE PAST 12 MONTHS, THE FOOD YOU BOUGHT JUST DIDN'T LAST AND YOU DIDN'T HAVE MONEY TO GET MORE.: NEVER TRUE

## 2022-08-01 ASSESSMENT — PATIENT HEALTH QUESTIONNAIRE - PHQ9
2. FEELING DOWN, DEPRESSED OR HOPELESS: 3
8. MOVING OR SPEAKING SO SLOWLY THAT OTHER PEOPLE COULD HAVE NOTICED. OR THE OPPOSITE, BEING SO FIGETY OR RESTLESS THAT YOU HAVE BEEN MOVING AROUND A LOT MORE THAN USUAL: 1
6. FEELING BAD ABOUT YOURSELF - OR THAT YOU ARE A FAILURE OR HAVE LET YOURSELF OR YOUR FAMILY DOWN: 2
SUM OF ALL RESPONSES TO PHQ QUESTIONS 1-9: 18
9. THOUGHTS THAT YOU WOULD BE BETTER OFF DEAD, OR OF HURTING YOURSELF: 0
10. IF YOU CHECKED OFF ANY PROBLEMS, HOW DIFFICULT HAVE THESE PROBLEMS MADE IT FOR YOU TO DO YOUR WORK, TAKE CARE OF THINGS AT HOME, OR GET ALONG WITH OTHER PEOPLE: 1
SUM OF ALL RESPONSES TO PHQ QUESTIONS 1-9: 18
SUM OF ALL RESPONSES TO PHQ QUESTIONS 1-9: 18
7. TROUBLE CONCENTRATING ON THINGS, SUCH AS READING THE NEWSPAPER OR WATCHING TELEVISION: 3
SUM OF ALL RESPONSES TO PHQ QUESTIONS 1-9: 18
5. POOR APPETITE OR OVEREATING: 3
SUM OF ALL RESPONSES TO PHQ9 QUESTIONS 1 & 2: 6
3. TROUBLE FALLING OR STAYING ASLEEP: 0
1. LITTLE INTEREST OR PLEASURE IN DOING THINGS: 3
4. FEELING TIRED OR HAVING LITTLE ENERGY: 3

## 2022-08-01 ASSESSMENT — ENCOUNTER SYMPTOMS
ABDOMINAL PAIN: 0
COUGH: 1
SHORTNESS OF BREATH: 1
WHEEZING: 0
BLOOD IN STOOL: 0

## 2022-08-01 ASSESSMENT — ANXIETY QUESTIONNAIRES
IF YOU CHECKED OFF ANY PROBLEMS ON THIS QUESTIONNAIRE, HOW DIFFICULT HAVE THESE PROBLEMS MADE IT FOR YOU TO DO YOUR WORK, TAKE CARE OF THINGS AT HOME, OR GET ALONG WITH OTHER PEOPLE: EXTREMELY DIFFICULT
5. BEING SO RESTLESS THAT IT IS HARD TO SIT STILL: 0
GAD7 TOTAL SCORE: 10
4. TROUBLE RELAXING: 1
6. BECOMING EASILY ANNOYED OR IRRITABLE: 3
3. WORRYING TOO MUCH ABOUT DIFFERENT THINGS: 1
2. NOT BEING ABLE TO STOP OR CONTROL WORRYING: 1
1. FEELING NERVOUS, ANXIOUS, OR ON EDGE: 3
7. FEELING AFRAID AS IF SOMETHING AWFUL MIGHT HAPPEN: 1

## 2022-08-01 ASSESSMENT — SOCIAL DETERMINANTS OF HEALTH (SDOH): HOW HARD IS IT FOR YOU TO PAY FOR THE VERY BASICS LIKE FOOD, HOUSING, MEDICAL CARE, AND HEATING?: NOT HARD AT ALL

## 2022-08-01 ASSESSMENT — COLUMBIA-SUICIDE SEVERITY RATING SCALE - C-SSRS
2. HAVE YOU ACTUALLY HAD ANY THOUGHTS OF KILLING YOURSELF?: NO
1. WITHIN THE PAST MONTH, HAVE YOU WISHED YOU WERE DEAD OR WISHED YOU COULD GO TO SLEEP AND NOT WAKE UP?: NO
6. HAVE YOU EVER DONE ANYTHING, STARTED TO DO ANYTHING, OR PREPARED TO DO ANYTHING TO END YOUR LIFE?: NO

## 2022-08-01 NOTE — PROGRESS NOTES
[address] [facility] PRIMARY CARE  [address] [facility] [address]  Dept: [phone]  Dept Fax: [phone]: [phone] [address] (: 1960) is a 57 y.o. female, Established patient, here for evaluation of the following chief complaint(s):  Chest Pain (Chest pain for the past several months. Says she is always short of breath. Had lung cancer in 2018 and had a quarter of lung removed. Said she was sick today and became dizzy and short of breath.)      PCP:  SEGUNDO Almanza      Pt here today for chest pain off and on over last several months, 5-6 episodes total. Episodes last 10-20 min, gets really tight with pain in left chest and at times had pain down her arm as well. Some diaphoresis as well and shortness of breath is worse when has cp. She admittedly doesn't do any physical activity. Just taking her trash to dumpster gets her very short of breath. Wearing a mask makes it harder to breath, triggers cough as well. When does cough, is white/grey color normal for her. Was diagnosed with lung cancer and had resection 2 yrs ago. Was supposed to see cardio at that time, but with everything going on, never made it in. She says is just so depressed, has lost her will to care about things, her appearance and other things. Admits has kind of neglected her health as a result. Is on paxil now. Has been on zoloft, lexapro, celexa, but never prozac. None of them worked well for her or quit working. Wants to feel better. Does have trouble swallowing at times. Had some testing in past and nothing found. Feels like if yawns, gets a lump in her throat that moves and makes it hard to swallow. Has to think before swallowing food at times. Feels like neck is full. Review of Systems   Constitutional:  Positive for fatigue. Negative for unexpected weight change.    Respiratory:  Positive for cough and shortness of breath. Negative for wheezing. Cardiovascular:  Positive for chest pain. Negative for palpitations and leg swelling. Gastrointestinal:  Negative for abdominal pain and blood in stool. Neurological:  Negative for numbness and headaches. Psychiatric/Behavioral:  Positive for dysphoric mood and sleep disturbance. Negative for self-injury and suicidal ideas. The patient is not nervous/anxious.       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 10/31/2018    On lung screen done 10/26/18    DDD (degenerative disc disease), lumbosacral 3/12/2018    Used to see pain management     CARTER (generalized anxiety disorder) 3/12/2018    [facility]    Gastroesophageal reflux disease without esophagitis 3/12/2018    EGD over 10 years    Gout     Hyperlipidemia     Insomnia 3/12/2018    Mild single current episode of major depressive disorder ([address] 3/12/2018    Neuropathy 3/12/2018    Clinical diagnosis, EMG    Other emphysema ([address] 3/12/2018    No recent PFT    Pure hypercholesterolemia 3/12/2018    SOB (shortness of breath)     Tobacco abuse 3/12/2018     Past Surgical History:   Procedure Laterality Date     SECTION      COLONOSCOPY      LUNG BIOPSY Left 2018    CT guided Left Lung Biopsy by Dr Kathy Jolly, PARTIAL Left     TONSILLECTOMY      [facility] N/A 2022    REPAIR OF VENTRAL HERNIA WITH UMBILECTOMY performed by Priscila Chiang MD at [address] [place] [address] History     Socioeconomic History    Marital status: Single     Spouse name: Not on file    Number of children: Not on file    Years of education: Not on file    Highest education level: Not on file   Occupational History    Not on file   Tobacco Use    Smoking status: Every Day SEGUNDO Gage   fluticasone (FLONASE) 50 MCG/ACT nasal spray instill 1 spray INTRANASALLY once daily 2/7/22  Yes SEGUNDO Villegas   atorvastatin (LIPITOR) 20 MG tablet take 1 tablet by mouth once daily 1/27/22  Yes SEGUNDO Villegas   PROAIR  (90 Base) MCG/ACT inhaler inhale 2 puffs by mouth every 4 hours if needed 11/10/21  Yes Michelle Salomon MD   busPIRone (BUSPAR) 15 MG tablet TAKE 1 TABLET BY MOUTH THREE TIMES DAILY 11/10/21  Yes Michelle Salomon MD   PARoxetine (PAXIL) 40 MG tablet Take 1 tablet by mouth every morning 11/10/21  Yes Michelle Salomon MD   azelastine (ASTELIN) 0.1 % nasal spray 1 spray by Nasal route 2 times daily Use in each nostril as directed 6/16/21  Yes Pati Fajardo MD   Respiratory Therapy Supplies (NEBULIZER/TUBING/MOUTHPIECE) KIT 1 kit by Does not apply route daily as needed (wheezing) 3/15/21  Yes Michelle Salomon MD   albuterol (PROVENTIL) (5 MG/ML) 0.5% nebulizer solution Take 1 mL by nebulization 4 times daily as needed for Wheezing 3/19/18  Yes Bettye Reagan MD                I have reviewed the patient's medical history in detail and updated the computerized patient record. OBJECTIVE    Vitals:    08/01/22 1514   BP: 102/64   Site: Left Upper Arm   Position: Sitting   Cuff Size: Medium Adult   Pulse: 97   Resp: 16   SpO2: 94%   Weight: 130 lb 3.2 oz (59.1 kg)   Height: 5' (1.524 m)       Physical Exam  Vitals and nursing note reviewed. Constitutional:       General: She is not in acute distress. Appearance: Normal appearance. She is not ill-appearing or diaphoretic. Neck:      Thyroid: Thyromegaly present. Cardiovascular:      Rate and Rhythm: Normal rate and regular rhythm. Heart sounds: Normal heart sounds. Pulmonary:      Effort: Pulmonary effort is normal. No respiratory distress. Breath sounds: Normal breath sounds. Musculoskeletal:      Cervical back: Normal range of motion and neck supple.    Lymphadenopathy:      Cervical: No cervical adenopathy. Skin:     General: Skin is warm and dry. Neurological:      Mental Status: She is alert and oriented to person, place, and time. Psychiatric:         Mood and Affect: Mood normal.         Thought Content: Thought content normal.         ASSESSMENT/ PLAN    1. Chest pain, unspecified type  -ongoing, intermittent not controlled. Suspect angina  - EKG 12 Lead - Clinic Performed, no acute ST changes  - Melissa Flannery MD, Cardiology, [place] for further eval and likely stress test    2. Chronic obstructive pulmonary disease, unspecified COPD type ([facility] [address] [address]  -chronic, stable  -no flares, on proair and trelegy    3. Mild single current episode of major depressive disorder (HCC)  Chronic, uncontrolled  -ph9=18  -has failed numerous ssri's, add abilify to paxil and reeval in 1 month  - ARIPiprazole (ABILIFY) 5 MG tablet; Take 1 tablet by mouth in the morning. Dispense: 30 tablet; Refill: 1    4. Stage 3a chronic kidney disease (HCC)  Chronic, new  -gfr was 59 earlier this yr,will be due for repeat labs early Oct  -avoid nephrotoxic meds like ibuprofen, she just bought a bottle but will hold off on using    5. Angina at rest [facility])  New, refer to cardio. intermittent  - Melissa Flannery MD, Cardiology, [place]    6. Thyromegaly  New, check US  - US HEAD NECK SOFT TISSUE THYROID; Future    7. SOB (shortness of breath)  - Melissa Flannery MD, Cardiology, [place]  -reviewed CT chest done in May without acute changes, has had lung resection with Dr. Makenzie Ireland and follows with him every 6 months    8. Positive depression screening  See above        Return in about 1 month (around 9/1/2022) for mood recheck .   Will plan for Medicare wellness with fasting labs end of Sept/early October    Electronically signed by:  ANA Jackson - CNP   8/1/22      PHQ-9 score today: (PHQ-9 Total Score: 18), additional evaluation and assessment performed, follow-up plan includes but not limited to: Medication management.

## 2022-08-01 NOTE — TELEPHONE ENCOUNTER
Received call from Alaina Kaur at [facility] with Red Flag Complaint. Subjective: Caller states \"I would like a referral for a cardiologist and I am having some pain in my chest that goes to my left arm sometimes. \"     Current Symptoms: Reports CP lasting 5-10 minutes at a time that occurs a few times a week (last time last midweek) that started a few months ago, L arm pain that occurs alone or with the CP at times, SOB with CP at times. Denies SOB, palpitations, cough, congestion, chills, fever, body aches, chest inj, back inj, major surgeries/fx in past month, bed rest, recent travel, hx of blood clots, cancer treatment, dizziness/lightheadedness, coughing up blood. Onset: a few months ago; intermittent    Associated Symptoms: NA    Pain Severity: 8/10; dull heaviness     Temperature: denies    What has been tried: wait the pain out; 81 ASA - noticed relief a bit sooner than what is normally felt    LMP: NA Pregnant: NA    Recommended disposition: See in Office Today    Care advice provided, patient verbalizes understanding; denies any other questions or concerns; instructed to call back for any new or worsening symptoms. Patient/Caller agrees with recommended disposition; writer provided warm transfer to Alessandro Duron at [facility] for appointment scheduling     Attention Provider: Thank you for allowing me to participate in the care of your patient. The patient was connected to triage in response to information provided to the ECC/PSC. Please do not respond through this encounter as the response is not directed to a shared pool.       Reason for Disposition   Chest pain(s) lasting a few seconds persists > 3 days    Protocols used: Chest Pain-ADULT-[place]

## 2022-08-06 ENCOUNTER — HOSPITAL ENCOUNTER (OUTPATIENT)
Dept: ULTRASOUND IMAGING | Age: 62
Discharge: HOME | End: 2022-08-08
Payer: MEDICARE

## 2022-08-06 DIAGNOSIS — E01.0 THYROMEGALY: ICD-10-CM

## 2022-08-06 PROCEDURE — 76536 US EXAM OF HEAD AND NECK: CPT

## 2022-08-08 ENCOUNTER — TELEPHONE (OUTPATIENT)
Dept: INTERNAL MEDICINE | Age: 62
End: 2022-08-08

## 2022-08-08 DIAGNOSIS — G47.00 INSOMNIA, UNSPECIFIED TYPE: ICD-10-CM

## 2022-08-08 RX ORDER — TRAZODONE HYDROCHLORIDE 100 MG/1
100 TABLET ORAL NIGHTLY
Qty: 30 TABLET | Refills: 3 | Status: SHIPPED | OUTPATIENT
Start: 2022-08-08

## 2022-08-08 NOTE — TELEPHONE ENCOUNTER
Comments:     Last Office Visit (last PCP visit):   1/25/2022    Next Visit Date:  Future Appointments   Date Time Provider Macy Glenys   11/14/2022  9:30 AM LIVE CT ROOM 1 MLOZ CT [facility] Fac RAD   11/14/2022 10:00 AM Dave Belle MD CAROLYN THORACIC [facility] EMERGENCY [facility] AT [place]       **If hasn't been seen in over a year OR hasn't followed up according to last diabetes/ADHD visit, make appointment for patient before sending refill to provider.     Rx requested:  Requested Prescriptions     Pending Prescriptions Disp Refills    traZODone (DESYREL) 100 MG tablet [Pharmacy Med Name: TRAZODONE 100 MG TABLET] 30 tablet 3     Sig: take 1 tablet by mouth nightly

## 2022-08-15 ENCOUNTER — OFFICE VISIT (OUTPATIENT)
Dept: CARDIOLOGY CLINIC | Age: 62
End: 2022-08-15
Payer: MEDICARE

## 2022-08-15 VITALS
DIASTOLIC BLOOD PRESSURE: 70 MMHG | HEART RATE: 89 BPM | BODY MASS INDEX: 24.74 KG/M2 | TEMPERATURE: 97.1 F | SYSTOLIC BLOOD PRESSURE: 118 MMHG | WEIGHT: 126 LBS | RESPIRATION RATE: 18 BRPM | HEIGHT: 60 IN | OXYGEN SATURATION: 95 %

## 2022-08-15 DIAGNOSIS — I73.9 CLAUDICATION (HCC): ICD-10-CM

## 2022-08-15 DIAGNOSIS — R06.02 SOB (SHORTNESS OF BREATH): Primary | ICD-10-CM

## 2022-08-15 DIAGNOSIS — R07.2 PRECORDIAL PAIN: ICD-10-CM

## 2022-08-15 PROCEDURE — 93000 ELECTROCARDIOGRAM COMPLETE: CPT | Performed by: INTERNAL MEDICINE

## 2022-08-15 PROCEDURE — 4004F PT TOBACCO SCREEN RCVD TLK: CPT | Performed by: INTERNAL MEDICINE

## 2022-08-15 PROCEDURE — G8427 DOCREV CUR MEDS BY ELIG CLIN: HCPCS | Performed by: INTERNAL MEDICINE

## 2022-08-15 PROCEDURE — 3017F COLORECTAL CA SCREEN DOC REV: CPT | Performed by: INTERNAL MEDICINE

## 2022-08-15 PROCEDURE — 99204 OFFICE O/P NEW MOD 45 MIN: CPT | Performed by: INTERNAL MEDICINE

## 2022-08-15 PROCEDURE — G8420 CALC BMI NORM PARAMETERS: HCPCS | Performed by: INTERNAL MEDICINE

## 2022-08-15 NOTE — PROGRESS NOTES
8/15/2022    [address]  [facility] [address]    RE: Chloe Noguera   : 1960     Dear Yarelis Duron :    I had the pleasure of seeing your patient, Chloe Noguera in the office today on 8/15/2022. As you are well aware, Ms. Lobito Maharaj is a pleasant 57 y.o.  female who was kindly referred to me for evaluation of chest pain and shortness of breath left sided chest \"pressure\" and \"like someone giving me a big hug\" radiating down left arm. Usually at rest and with activity like taking out trash. Lasts 30  minutes before gradually resolving. +dizzines + nausea. No syncope. No palpitations. No prior history of heart disease. She also has dyspnea with mild degrees of activity like taking out trash and has to stop and rest frequently. No prior history of heart diease. Past Medical History: She  has a past medical history of Abnormal CT of the chest, Bone spur, Cancer (HCC), Cervical stenosis of spine, COPD (chronic obstructive pulmonary disease) ([address] Coronary artery calcification of native artery - aortic arch, DDD (degenerative disc disease), lumbosacral, CARTER (generalized anxiety disorder), Gastroesophageal reflux disease without esophagitis, Gout, Hyperlipidemia, Insomnia, Mild single current episode of major depressive disorder ([address] Neuropathy, Other emphysema ([address] Pure hypercholesterolemia, SOB (shortness of breath), and Tobacco abuse. Surgical History: She  has a past surgical history that includes  section; Lung biopsy (Left, 2018); Lung removal, partial (Left); Colonoscopy; Tonsillectomy; and ventral hernia repair (N/A, 2022). Social History: She smokes 0.5-1.5 PPD for over 50 years. She started smoking about 52 years ago. She has never used smokeless tobacco. She does not drink alcohol and does not use drugs. Drinks 3 cups of coffee of day. Family History: Maternal Grandmother with MI in his 62s.      Current medications:   Current Outpatient Medications   Medication Sig Dispense Refill    traZODone (DESYREL) 100 MG tablet take 1 tablet by mouth nightly 30 tablet 3    ARIPiprazole (ABILIFY) 5 MG tablet Take 1 tablet by mouth in the morning. 30 tablet 1    omeprazole (PRILOSEC) 20 MG delayed release capsule take 1 capsule by mouth once daily 90 capsule 1    Fluticasone-Umeclidin-Vilant (TRELEGY ELLIPTA) 200-62.5-25 MCG/INH AEPB Inhale 1 puff into the lungs daily 1 each 3    ondansetron (ZOFRAN ODT) 4 MG disintegrating tablet Take 1 tablet by mouth every 8 hours as needed for Nausea or Vomiting 15 tablet 1    fluticasone (FLONASE) 50 MCG/ACT nasal spray instill 1 spray INTRANASALLY once daily 16 g 3    atorvastatin (LIPITOR) 20 MG tablet take 1 tablet by mouth once daily 90 tablet 3    PROAIR  (90 Base) MCG/ACT inhaler inhale 2 puffs by mouth every 4 hours if needed 2 each 11    busPIRone (BUSPAR) 15 MG tablet TAKE 1 TABLET BY MOUTH THREE TIMES DAILY 270 tablet 3    PARoxetine (PAXIL) 40 MG tablet Take 1 tablet by mouth every morning 90 tablet 3    azelastine (ASTELIN) 0.1 % nasal spray 1 spray by Nasal route 2 times daily Use in each nostril as directed 2 Bottle 1    Respiratory Therapy Supplies (NEBULIZER/TUBING/MOUTHPIECE) KIT 1 kit by Does not apply route daily as needed (wheezing) 1 kit 0    albuterol (PROVENTIL) (5 MG/ML) 0.5% nebulizer solution Take 1 mL by nebulization 4 times daily as needed for Wheezing 120 each 3     Current Facility-Administered Medications   Medication Dose Route Frequency Provider Last Rate Last Admin    cyanocobalamin injection 1,000 mcg  1,000 mcg IntraMUSCular Once Inez Berry MD           Allergies: Other, Adhesive tape, and Codeine     Review of Systems   General: Fatigues easily. Cardiovascular: See HPI. No orthopnea or PND. Respiratory: Dyspnea is present withmild degrees of activity. + COPD  Gastrointestinal: No melena. + recent hematochezia due to hemorrhoids. Genitourinary: No hematuria. Hematological: No easy bruising or bleeding. Vascular: No lower extremity edema. + L>R leg claudication. Neurological: No TIA or CVA symptoms. + leg paresthesias. + restless legs. + headaches  Musculoskeletal: No chest wall pain. Psychiatric: +anxiety. *All other systems were reviewed and found to be negative unless otherwise noted in the HPI*    Physical Examination: Her  height is 5' (1.524 m) and weight is 126 lb (57.2 kg). Her temporal temperature is 97.1 °F (36.2 °C). Her blood pressure is 118/70 and her pulse is 89. Her respiration is 18 and oxygen saturation is 95%. She is alert and oriented to person, place, and time. No distress. Head is atraumatic. Moist mucous membranes. Neck is supple without JVD or carotid bruits. No thyroidmegaly. Lungs are clear to auscultation both anteriorly and posteriorly. No accessory muscle usage. Heart is a regular rate and rhythm. Soft, systolic murmur heard best at the base. No S3 or S4. PMI is nondisplaced. Abdomen is soft and non tender. No hepatosplenomegaly. No abdominal aortic bruits or masses. Lower extremities are free of edema. No clubbing or cyanosis. Neurologically, cranial nerves are grossly intact. No focal sensory and/or motor deficits. Skin is intact without rash or lesions. ECG (8/15/2022): Sinus rhythm. HR 85 bpm. Nonspecific T wave changes. IMPRESSION:  Functional class III (mild activity) dyspnea on exertion, possible anginal equivalent vs LV dysfunction  Precordial pain  HTN  Hyperlipidemia  Claudication, possible PAD  COPD with long standing tobacco abuse  Family history of heart disease. RECOMMENDATIONS:    Lexiscan Myoview stress test  Echocardiogram  Bilateral lower extremity arterial doppler to evaluate for PAD  Quit smoking! Follow up PRN    Thank you very much for allowing me to participate in Ms. Wade's cardiac care. Should you have any questions, please do not hesitate to contact me.      Sincerely,    Jose E Root, DO, [facility]

## 2022-08-25 ENCOUNTER — HOSPITAL ENCOUNTER (OUTPATIENT)
Dept: NON INVASIVE DIAGNOSTICS | Age: 62
Discharge: HOME | End: 2022-08-25
Payer: MEDICARE

## 2022-08-25 ENCOUNTER — HOSPITAL ENCOUNTER (OUTPATIENT)
Dept: ULTRASOUND IMAGING | Age: 62
Discharge: HOME | End: 2022-08-27
Payer: MEDICARE

## 2022-08-25 ENCOUNTER — HOSPITAL ENCOUNTER (OUTPATIENT)
Dept: NUCLEAR MEDICINE | Age: 62
Discharge: HOME | End: 2022-08-27
Payer: MEDICARE

## 2022-08-25 DIAGNOSIS — R07.2 PRECORDIAL PAIN: ICD-10-CM

## 2022-08-25 DIAGNOSIS — R06.02 SOB (SHORTNESS OF BREATH): ICD-10-CM

## 2022-08-25 DIAGNOSIS — I73.9 CLAUDICATION (HCC): ICD-10-CM

## 2022-08-25 PROCEDURE — A9502 TC99M TETROFOSMIN: HCPCS | Performed by: INTERNAL MEDICINE

## 2022-08-25 PROCEDURE — 93017 CV STRESS TEST TRACING ONLY: CPT

## 2022-08-25 PROCEDURE — 2580000003 HC RX 258: Performed by: INTERNAL MEDICINE

## 2022-08-25 PROCEDURE — 78452 HT MUSCLE IMAGE SPECT MULT: CPT

## 2022-08-25 PROCEDURE — 6360000002 HC RX W HCPCS: Performed by: INTERNAL MEDICINE

## 2022-08-25 PROCEDURE — 3430000000 HC RX DIAGNOSTIC RADIOPHARMACEUTICAL: Performed by: INTERNAL MEDICINE

## 2022-08-25 PROCEDURE — 93925 LOWER EXTREMITY STUDY: CPT

## 2022-08-25 RX ORDER — SODIUM CHLORIDE 0.9 % (FLUSH) 0.9 %
10 SYRINGE (ML) INJECTION PRN
Status: DISCONTINUED | OUTPATIENT
Start: 2022-08-25 | End: 2022-08-28 | Stop reason: HOSPADM

## 2022-08-25 RX ADMIN — TETROFOSMIN 11.5 MILLICURIE: 1.38 INJECTION, POWDER, LYOPHILIZED, FOR SOLUTION INTRAVENOUS at 09:12

## 2022-08-25 RX ADMIN — SODIUM CHLORIDE, PRESERVATIVE FREE 10 ML: 5 INJECTION INTRAVENOUS at 10:15

## 2022-08-25 RX ADMIN — SODIUM CHLORIDE, PRESERVATIVE FREE 10 ML: 5 INJECTION INTRAVENOUS at 10:16

## 2022-08-25 RX ADMIN — SODIUM CHLORIDE, PRESERVATIVE FREE 10 ML: 5 INJECTION INTRAVENOUS at 09:12

## 2022-08-25 RX ADMIN — REGADENOSON 0.4 MG: 0.08 INJECTION, SOLUTION INTRAVENOUS at 10:15

## 2022-08-25 RX ADMIN — TETROFOSMIN 30.5 MILLICURIE: 1.38 INJECTION, POWDER, LYOPHILIZED, FOR SOLUTION INTRAVENOUS at 10:17

## 2022-08-25 NOTE — PROGRESS NOTES
Reviewed history, allergies, and medications. Patient held AM medications prior to testing. Consent confirmed. Lexiscan exam explained. Placed patient on monitor. [address] here to inject Juan José Ilia. SOB noted during recovery phase. Denied chest pain. No ectopy noted. Doctor to further review and interpret results. Patient off monitor and instructed to eat, will have last part of exam in 1 hour.      Electronically signed by Ree Bowens RN on 8/25/2022 at 10:23 AM

## 2022-08-25 NOTE — PROCEDURES
[address]                      [address], [address] [facility]                              CARDIAC STRESS TEST    PATIENT NAME: Carlito Velazquez                     :        1960  MED REC NO:   [id number]                            ROOM:  ACCOUNT NO:   [de-identified]                           ADMIT DATE: 2022  PROVIDER:     Vangie Grimm MD    CARDIOVASCULAR DIAGNOSTIC DEPARTMENT    DATE OF STUDY:  2022    NUCLEAR STRESS TEST REPORT    ORDERING PROVIDER:  Debra Christie DO    INDICATION:  Chest pain. DESCRIPTION OF PROCEDURE:  After informed written consent was obtained  an EKG was performed, which showed normal sinus rhythm. For the rest portion of the test, the patient received 11.5 mCi of  Myoview IV. After appropriate delay, rest SPECT images were obtained. For the stress portion of the test, the patient received 0.4 mg of  Lexiscan IV followed by 30.5 mCi of Myoview IV. After appropriate  delay, stress SPECT images were obtained. FINDINGS:  EKG during the stress portion of the test showed no ischemia,  no major arrhythmias. The patient remained asymptomatic. GATED SPECT IMAGES:  Images demonstrated normal wall motion, normal wall  thickening, and normal EF. LVEF:  63%  TID:  0.85    SPECT IMAGES:  Overall study quality was poor. There was a moderate  size fixed perfusion abnormality of moderate intensity in the inferior  wall with no ischemia. This perfusion abnormality likely represents  artifact given that the patient has normal wall thickening, normal wall  motion, and normal EF. The remainder of the ventricle has normal perfusion at rest and with  stress. CONCLUSION:  1. This was a poor quality study. There was a moderate size perfusion  abnormality of moderate intensity in the inferior wall likely  representing tissue artifact given that the patient has normal EF,  normal wall motion, and normal wall thickening. 2.   LVEF of 63%. Clinical correlation is advised.         Yvette French MD    D: 08/25/2022 #14:05:41       T: 08/25/2022 14:07:57     [email or web address]  Job#: [id number]     Doc#: [id number]    CC:

## 2022-08-26 PROCEDURE — 93018 CV STRESS TEST I&R ONLY: CPT | Performed by: INTERNAL MEDICINE

## 2022-09-10 LAB — HBA1C MFR BLD: 5.8 %

## 2022-09-28 DIAGNOSIS — J01.40 ACUTE PANSINUSITIS, RECURRENCE NOT SPECIFIED: ICD-10-CM

## 2022-09-29 DIAGNOSIS — F32.0 MILD SINGLE CURRENT EPISODE OF MAJOR DEPRESSIVE DISORDER (HCC): ICD-10-CM

## 2022-09-29 RX ORDER — FLUTICASONE PROPIONATE 50 MCG
SPRAY, SUSPENSION (ML) NASAL
Qty: 16 G | Refills: 3 | Status: SHIPPED | OUTPATIENT
Start: 2022-09-29

## 2022-09-29 NOTE — TELEPHONE ENCOUNTER
Comments: This request is coming from the pharmacy. Last Office Visit (last PCP visit):   8/1/2022    Next Visit Date:  Future Appointments   Date Time Provider Macy Veronica   9/30/2022 10:30 AM [facility] ECHO  [facility] [place]   11/14/2022  9:30 AM [place] CT ROOM 1 [facility]LOZ CT [facility] Fac RAD   11/14/2022 10:00 AM [facility] [address] [address] [facility]       If hasn't been seen in over a year OR hasn't followed up according to last diabetes/ADHD visit, make appointment for patient before sending refill to provider.     Rx requested:  Requested Prescriptions     Pending Prescriptions Disp Refills    ARIPiprazole (ABILIFY) 5 MG tablet [Pharmacy Med Name: ARIPIPRAZOLE 5 MG TABLET] 30 tablet 1     Sig: take 1 tablet by mouth every morning

## 2022-09-30 ENCOUNTER — HOSPITAL ENCOUNTER (OUTPATIENT)
Dept: NON INVASIVE DIAGNOSTICS | Age: 62
Discharge: HOME | End: 2022-09-30
Payer: MEDICARE

## 2022-09-30 DIAGNOSIS — R06.02 SOB (SHORTNESS OF BREATH): ICD-10-CM

## 2022-09-30 LAB
LV EF: 43 %
LVEF MODALITY: NORMAL

## 2022-09-30 PROCEDURE — C8929 TTE W OR WO FOL WCON,DOPPLER: HCPCS

## 2022-09-30 PROCEDURE — 6360000004 HC RX CONTRAST MEDICATION: Performed by: INTERNAL MEDICINE

## 2022-09-30 RX ORDER — ARIPIPRAZOLE 5 MG/1
TABLET ORAL
Qty: 30 TABLET | Refills: 1 | Status: SHIPPED | OUTPATIENT
Start: 2022-09-30

## 2022-09-30 RX ADMIN — PERFLUTREN 3 ML: 6.52 INJECTION, SUSPENSION INTRAVENOUS at 11:13

## 2022-11-04 DIAGNOSIS — J44.9 CHRONIC OBSTRUCTIVE PULMONARY DISEASE, UNSPECIFIED COPD TYPE (HCC): ICD-10-CM

## 2022-11-04 NOTE — TELEPHONE ENCOUNTER
Comments:     Last Office Visit (last PCP visit):   1/25/2022    Next Visit Date:  Future Appointments   Date Time Provider Macy Glenys   11/14/2022  9:30 AM LIVE CT ROOM 1 MLOZ CT [facility] RAD   11/14/2022 10:00 AM Shakeel Tapia MD CAROLYN THORACIC [facility] EMERGENCY [facility] AT [place]       **If hasn't been seen in over a year OR hasn't followed up according to last diabetes/ADHD visit, make appointment for patient before sending refill to provider.     Rx requested:  Requested Prescriptions     Pending Prescriptions Disp Refills    albuterol sulfate HFA (PROVENTIL;VENTOLIN;PROAIR) 108 (90 Base) MCG/ACT inhaler [Pharmacy Med Name: ALBUTEROL HFA 90 MCG INHALER] 17 g      Sig: inhale 2 puffs by mouth every 4 hours if needed

## 2022-11-07 DIAGNOSIS — J44.9 CHRONIC OBSTRUCTIVE PULMONARY DISEASE, UNSPECIFIED COPD TYPE (HCC): ICD-10-CM

## 2022-11-07 RX ORDER — ALBUTEROL SULFATE 90 UG/1
AEROSOL, METERED RESPIRATORY (INHALATION)
Qty: 17 G | Refills: 2 | Status: SHIPPED | OUTPATIENT
Start: 2022-11-07

## 2022-11-07 NOTE — TELEPHONE ENCOUNTER
Comments: Pharmacy needs to know if generic is ok    Last Office Visit (last PCP visit):   1/25/2022    Next Visit Date:  Future Appointments   Date Time Provider Macy Veronica   11/14/2022  9:30 AM LIVE CT ROOM 1 MLOZ CT [facility] RAD   11/14/2022 10:00 AM Nkechi Hughes MD [address]       **If hasn't been seen in over a year OR hasn't followed up according to last diabetes/ADHD visit, make appointment for patient before sending refill to provider.     Rx requested:  Requested Prescriptions     Pending Prescriptions Disp Refills    albuterol sulfate HFA (PROVENTIL;VENTOLIN;PROAIR) 108 (90 Base) MCG/ACT inhaler 17 g 2     Sig: inhale 2 puffs by mouth every 4 hours if needed

## 2022-11-08 RX ORDER — ALBUTEROL SULFATE 90 UG/1
AEROSOL, METERED RESPIRATORY (INHALATION)
Qty: 17 G | Refills: 2 | OUTPATIENT
Start: 2022-11-08

## 2022-11-15 ENCOUNTER — HOSPITAL ENCOUNTER (OUTPATIENT)
Dept: CT IMAGING | Age: 62
Discharge: HOME | End: 2022-11-17
Payer: MEDICARE

## 2022-11-15 DIAGNOSIS — Z85.118 HISTORY OF LUNG CANCER: ICD-10-CM

## 2022-11-15 PROCEDURE — 71250 CT THORAX DX C-: CPT

## 2022-11-16 ENCOUNTER — OFFICE VISIT (OUTPATIENT)
Dept: CARDIOTHORACIC SURGERY | Age: 62
End: 2022-11-16
Payer: MEDICARE

## 2022-11-16 VITALS — HEART RATE: 86 BPM | HEIGHT: 60 IN | BODY MASS INDEX: 24.15 KG/M2 | WEIGHT: 123 LBS | OXYGEN SATURATION: 98 %

## 2022-11-16 DIAGNOSIS — R91.1 PULMONARY NODULE: Primary | ICD-10-CM

## 2022-11-16 PROCEDURE — 4004F PT TOBACCO SCREEN RCVD TLK: CPT | Performed by: THORACIC SURGERY (CARDIOTHORACIC VASCULAR SURGERY)

## 2022-11-16 PROCEDURE — G8420 CALC BMI NORM PARAMETERS: HCPCS | Performed by: THORACIC SURGERY (CARDIOTHORACIC VASCULAR SURGERY)

## 2022-11-16 PROCEDURE — G8427 DOCREV CUR MEDS BY ELIG CLIN: HCPCS | Performed by: THORACIC SURGERY (CARDIOTHORACIC VASCULAR SURGERY)

## 2022-11-16 PROCEDURE — 3017F COLORECTAL CA SCREEN DOC REV: CPT | Performed by: THORACIC SURGERY (CARDIOTHORACIC VASCULAR SURGERY)

## 2022-11-16 PROCEDURE — 99213 OFFICE O/P EST LOW 20 MIN: CPT | Performed by: THORACIC SURGERY (CARDIOTHORACIC VASCULAR SURGERY)

## 2022-11-16 PROCEDURE — G8484 FLU IMMUNIZE NO ADMIN: HCPCS | Performed by: THORACIC SURGERY (CARDIOTHORACIC VASCULAR SURGERY)

## 2022-11-16 ASSESSMENT — ENCOUNTER SYMPTOMS
VOICE CHANGE: 0
CHOKING: 0
TROUBLE SWALLOWING: 0
DIARRHEA: 0
COUGH: 1
WHEEZING: 0
ABDOMINAL DISTENTION: 0
NAUSEA: 0
VOMITING: 0
SORE THROAT: 0
ABDOMINAL PAIN: 0
STRIDOR: 0
SHORTNESS OF BREATH: 0
CHEST TIGHTNESS: 0

## 2022-11-16 NOTE — PROGRESS NOTES
Subjective:      Patient ID: Natalee Joe is a 57 y.o. female who presents today for:  Chief Complaint   Patient presents with    6 Month Follow-Up       HPI  Patient is 3 years and 8 months out from a left upper lobe wedge resection for a [id number] atypical carcinoid. She is here for her 6-month follow-up CAT scan. Since her last visit she reports no changes to her health. She did not follow-up with pulmonary medicine to try and get on a smoking cessation program.  Therefore she is still smoking. She denies any change in her chronic cough, hemoptysis, or unexpected weight loss. She denies any recent respiratory infections.     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 10/31/2018    On lung screen done 10/26/18    DDD (degenerative disc disease), lumbosacral 3/12/2018    Used to see pain management     CARTER (generalized anxiety disorder) 3/12/2018    [facility]    Gastroesophageal reflux disease without esophagitis 3/12/2018    EGD over 10 years    Gout     Hyperlipidemia     Insomnia 3/12/2018    Mild single current episode of major depressive disorder ([address] 3/12/2018    Neuropathy 3/12/2018    Clinical diagnosis, EMG    Other emphysema ([address] 3/12/2018    No recent PFT    Pure hypercholesterolemia 3/12/2018    SOB (shortness of breath)     Tobacco abuse 3/12/2018      Past Surgical History:   Procedure Laterality Date     SECTION      COLONOSCOPY      LUNG BIOPSY Left 2018    CT guided Left Lung Biopsy by Dr Bello Callahan, PARTIAL Left     [facility] N/A 2022    REPAIR OF VENTRAL HERNIA WITH UMBILECTOMY performed by Robbie Vanessa MD at [address] [place] [address] History     Socioeconomic History    Marital status: Single     Spouse name: Not on file    Number of children: Not on file    Years of education: Not on file    Highest education level: Not on file   Occupational History    Not on file   Tobacco Use    Smoking status: Every Day     Packs/day: 1.00     Years: 47.00     Pack years: 47.00     Types: Cigarettes     Start date: 1970    Smokeless tobacco: Never   Vaping Use    Vaping Use: Former    Substances: Never   Substance and Sexual Activity    Alcohol use: No    Drug use: No    Sexual activity: Not Currently     Birth control/protection: Post-menopausal   Other Topics Concern    Not on file   Social History Narrative    Not on file     Social Determinants of Health     Financial Resource Strain: Low Risk     Difficulty of Paying Living Expenses: Not hard at all   Food Insecurity: No Food Insecurity    Worried About Running Out of Food in the Last Year: Never true    Ran Out of Food in the Last Year: Never true   Transportation Needs: Not on file   Physical Activity: Not on file   Stress: Not on file   Social Connections: Not on file   Intimate Partner Violence: Not on file   Housing Stability: Not on file     Family History   Problem Relation Age of Onset    Other Mother         epilepsy     No Known Problems Father     No Known Problems Brother     No Known Problems Brother      Allergies   Allergen Reactions    Other Anaphylaxis     Sun flower seeds     Adhesive Tape      Plastic tape blisters    Codeine Nausea And Vomiting     Current Outpatient Medications on File Prior to Visit   Medication Sig Dispense Refill    albuterol sulfate HFA (PROVENTIL;VENTOLIN;PROAIR) 108 (90 Base) MCG/ACT inhaler inhale 2 puffs by mouth every 4 hours if needed 17 g 2    ARIPiprazole (ABILIFY) 5 MG tablet take 1 tablet by mouth every morning 30 tablet 1    fluticasone (FLONASE) 50 MCG/ACT nasal spray instill 1 spray into each nostril daily 16 g 3    traZODone (DESYREL) 100 MG tablet take 1 tablet by mouth nightly 30 tablet 3    omeprazole (PRILOSEC) Negative for dizziness, seizures and light-headedness. Hematological:  Negative for adenopathy. Does not bruise/bleed easily. Psychiatric/Behavioral:  Negative for behavioral problems and confusion. Objective:   Pulse 86   Ht 5' (1.524 m)   Wt 123 lb (55.8 kg)   LMP  (LMP Unknown)   SpO2 98%   BMI 24.02 kg/m²     Physical Exam  Constitutional:       General: She is not in acute distress. Appearance: She is not ill-appearing, toxic-appearing or diaphoretic. HENT:      Head: Normocephalic and atraumatic. Nose: Nose normal.   Eyes:      Extraocular Movements: Extraocular movements intact. Conjunctiva/sclera: Conjunctivae normal.      Pupils: Pupils are equal, round, and reactive to light. Neck:      Vascular: No carotid bruit. Cardiovascular:      Rate and Rhythm: Normal rate and regular rhythm. Heart sounds: Normal heart sounds. No murmur heard. No gallop. Pulmonary:      Effort: Pulmonary effort is normal. No respiratory distress. Breath sounds: Normal breath sounds. No wheezing or rales. Abdominal:      General: Abdomen is flat. Bowel sounds are normal.      Palpations: Abdomen is soft. There is no mass. Tenderness: There is no abdominal tenderness. Musculoskeletal:         General: No swelling. Cervical back: Neck supple. Right lower leg: No edema. Left lower leg: No edema. Lymphadenopathy:      Cervical: No cervical adenopathy. Skin:     General: Skin is warm and dry. Neurological:      General: No focal deficit present. Mental Status: She is alert and oriented to person, place, and time. Psychiatric:         Mood and Affect: Mood normal.         Behavior: Behavior normal.         Thought Content: Thought content normal.         Judgment: Judgment normal.             Radiographs and Laboratory Studies:     Diagnostic Imaging Studies:    I personally viewed her CAT scan and compared it to her last 2 CAT scans.   The CAT scan has also been read by radiology. There are stable postoperative changes in the left lung. The only pertinent findings are 2 new smooth-walled nodules at the very base of the right lung. They measure 5 mm and 7 mm. Assessment/Plan     To new small nodules in the base of the right lung of unknown etiology in a patient with a recent history of atypical carcinoid resection. I have gone over treatment options with the patient. We discussed thoracoscopic wedge resection for definitive diagnosis as well as short-term follow-up CAT scan. I feel these nodules are too small to get a PET scan or to attempt a needle biopsy. The patient would like a follow-up CAT scan performed working on surgery. I will see her in 4 months with a 2 mm cut CAT scan. Diagnosis Orders   1. Pulmonary nodule          No orders of the defined types were placed in this encounter. No orders of the defined types were placed in this encounter. Return in about 4 months (around 3/16/2023) for Surveillance CAT scan.       Lyssa Mcwilliams MD

## 2022-11-18 ENCOUNTER — TELEPHONE (OUTPATIENT)
Dept: CARDIOTHORACIC SURGERY | Age: 62
End: 2022-11-18

## 2022-11-18 NOTE — TELEPHONE ENCOUNTER
Pt called wanting surgery. She says her anxiety over these nodules is too much for follow up CT in 4 months. OR 11/29 for RVATS lower lobe wedge.

## 2022-11-21 DIAGNOSIS — F32.A DEPRESSION, UNSPECIFIED DEPRESSION TYPE: ICD-10-CM

## 2022-11-21 DIAGNOSIS — F32.0 MILD SINGLE CURRENT EPISODE OF MAJOR DEPRESSIVE DISORDER (HCC): ICD-10-CM

## 2022-11-21 DIAGNOSIS — F41.1 GAD (GENERALIZED ANXIETY DISORDER): ICD-10-CM

## 2022-11-21 NOTE — TELEPHONE ENCOUNTER
Comments: Will call patient to schedule    Last Office Visit (last PCP visit):   11/10/2021    Next Visit Date:  Future Appointments   Date Time Provider Department Center   11/23/2022  2:00 PM [facility] PAT  3 [facility] PAT [facility] Center   3/15/2023  9:30 AM LIVE CT ROOM 1 MLOZ CT [facility] Fac RAD   3/15/2023 10:00 AM MD [facility]wang       **If hasn't been seen in over a year OR hasn't followed up according to last diabetes/ADHD visit, make appointment for patient before sending refill to provider.    Rx requested:  Requested Prescriptions     Pending Prescriptions Disp Refills    busPIRone (BUSPAR) 15 MG tablet [Pharmacy Med Name: BUSPIRONE HCL 15 MG TABLET] 270 tablet 3     Sig: take 1 tablet by mouth three times a day

## 2022-11-22 RX ORDER — BUSPIRONE HYDROCHLORIDE 15 MG/1
TABLET ORAL
Qty: 45 TABLET | Refills: 0 | Status: SHIPPED | OUTPATIENT
Start: 2022-11-22

## 2022-11-23 ENCOUNTER — HOSPITAL ENCOUNTER (OUTPATIENT)
Dept: PREADMISSION TESTING | Age: 62
Discharge: HOME | End: 2022-11-27
Payer: MEDICARE

## 2022-11-23 VITALS
SYSTOLIC BLOOD PRESSURE: 137 MMHG | WEIGHT: 124.13 LBS | BODY MASS INDEX: 23.43 KG/M2 | TEMPERATURE: 98.6 F | RESPIRATION RATE: 18 BRPM | HEIGHT: 61 IN | OXYGEN SATURATION: 94 % | HEART RATE: 77 BPM | DIASTOLIC BLOOD PRESSURE: 69 MMHG

## 2022-11-23 LAB
ANION GAP SERPL CALCULATED.3IONS-SCNC: 10 MEQ/L (ref 9–15)
BUN BLDV-MCNC: 7 MG/DL (ref 8–23)
CALCIUM SERPL-MCNC: 10 MG/DL (ref 8.5–9.9)
CHLORIDE BLD-SCNC: 105 MEQ/L (ref 95–107)
CO2: 26 MEQ/L (ref 20–31)
CREAT SERPL-MCNC: 0.75 MG/DL (ref 0.5–0.9)
GFR SERPL CREATININE-BSD FRML MDRD: >60 ML/MIN/{1.73_M2}
GLUCOSE BLD-MCNC: 98 MG/DL (ref 70–99)
HCT VFR BLD CALC: 44.7 % (ref 37–47)
HEMOGLOBIN: 14.7 G/DL (ref 12–16)
MCH RBC QN AUTO: 30 PG (ref 27–31.3)
MCHC RBC AUTO-ENTMCNC: 32.9 % (ref 33–37)
MCV RBC AUTO: 91.1 FL (ref 79.4–94.8)
PDW BLD-RTO: 14.6 % (ref 11.5–14.5)
PLATELET # BLD: 259 K/UL (ref 130–400)
POTASSIUM SERPL-SCNC: 4.3 MEQ/L (ref 3.4–4.9)
RBC # BLD: 4.9 M/UL (ref 4.2–5.4)
SODIUM BLD-SCNC: 141 MEQ/L (ref 135–144)
WBC # BLD: 6.7 K/UL (ref 4.8–10.8)

## 2022-11-23 PROCEDURE — 86900 BLOOD TYPING SEROLOGIC ABO: CPT

## 2022-11-23 PROCEDURE — 86850 RBC ANTIBODY SCREEN: CPT

## 2022-11-23 PROCEDURE — 80048 BASIC METABOLIC PNL TOTAL CA: CPT

## 2022-11-23 PROCEDURE — 85027 COMPLETE CBC AUTOMATED: CPT

## 2022-11-23 PROCEDURE — 86901 BLOOD TYPING SEROLOGIC RH(D): CPT

## 2022-11-24 LAB
ABO/RH: NORMAL
ANTIBODY SCREEN: NORMAL

## 2022-11-28 ENCOUNTER — ANESTHESIA EVENT (OUTPATIENT)
Dept: OPERATING ROOM | Age: 62
DRG: 168 | End: 2022-11-28
Payer: MEDICARE

## 2022-11-29 ENCOUNTER — APPOINTMENT (OUTPATIENT)
Dept: GENERAL RADIOLOGY | Age: 62
DRG: 168 | End: 2022-11-29
Attending: THORACIC SURGERY (CARDIOTHORACIC VASCULAR SURGERY)
Payer: MEDICARE

## 2022-11-29 ENCOUNTER — HOSPITAL ENCOUNTER (INPATIENT)
Age: 62
LOS: 1 days | Discharge: HOME | DRG: 168 | End: 2022-11-30
Attending: THORACIC SURGERY (CARDIOTHORACIC VASCULAR SURGERY) | Admitting: THORACIC SURGERY (CARDIOTHORACIC VASCULAR SURGERY)
Payer: MEDICARE

## 2022-11-29 ENCOUNTER — ANESTHESIA (OUTPATIENT)
Dept: OPERATING ROOM | Age: 62
DRG: 168 | End: 2022-11-29
Payer: MEDICARE

## 2022-11-29 DIAGNOSIS — R91.8 LUNG NODULES: ICD-10-CM

## 2022-11-29 DIAGNOSIS — R91.8 LUNG NODULE, MULTIPLE: Primary | ICD-10-CM

## 2022-11-29 PROCEDURE — 3600000003 HC SURGERY LEVEL 3 BASE: Performed by: THORACIC SURGERY (CARDIOTHORACIC VASCULAR SURGERY)

## 2022-11-29 PROCEDURE — 94640 AIRWAY INHALATION TREATMENT: CPT

## 2022-11-29 PROCEDURE — 2580000003 HC RX 258: Performed by: ANESTHESIOLOGY

## 2022-11-29 PROCEDURE — 7100000001 HC PACU RECOVERY - ADDTL 15 MIN: Performed by: THORACIC SURGERY (CARDIOTHORACIC VASCULAR SURGERY)

## 2022-11-29 PROCEDURE — 3700000000 HC ANESTHESIA ATTENDED CARE: Performed by: THORACIC SURGERY (CARDIOTHORACIC VASCULAR SURGERY)

## 2022-11-29 PROCEDURE — 1210000000 HC MED SURG R&B

## 2022-11-29 PROCEDURE — 0BBF4ZX EXCISION OF RIGHT LOWER LUNG LOBE, PERCUTANEOUS ENDOSCOPIC APPROACH, DIAGNOSTIC: ICD-10-PCS | Performed by: THORACIC SURGERY (CARDIOTHORACIC VASCULAR SURGERY)

## 2022-11-29 PROCEDURE — 2060000000 HC ICU INTERMEDIATE R&B

## 2022-11-29 PROCEDURE — 6360000002 HC RX W HCPCS: Performed by: NURSE ANESTHETIST, CERTIFIED REGISTERED

## 2022-11-29 PROCEDURE — 3600000013 HC SURGERY LEVEL 3 ADDTL 15MIN: Performed by: THORACIC SURGERY (CARDIOTHORACIC VASCULAR SURGERY)

## 2022-11-29 PROCEDURE — 6370000000 HC RX 637 (ALT 250 FOR IP): Performed by: ANESTHESIOLOGY

## 2022-11-29 PROCEDURE — 6370000000 HC RX 637 (ALT 250 FOR IP): Performed by: THORACIC SURGERY (CARDIOTHORACIC VASCULAR SURGERY)

## 2022-11-29 PROCEDURE — 71045 X-RAY EXAM CHEST 1 VIEW: CPT

## 2022-11-29 PROCEDURE — 2709999900 HC NON-CHARGEABLE SUPPLY: Performed by: THORACIC SURGERY (CARDIOTHORACIC VASCULAR SURGERY)

## 2022-11-29 PROCEDURE — 94664 DEMO&/EVAL PT USE INHALER: CPT

## 2022-11-29 PROCEDURE — 6360000002 HC RX W HCPCS: Performed by: THORACIC SURGERY (CARDIOTHORACIC VASCULAR SURGERY)

## 2022-11-29 PROCEDURE — 88307 TISSUE EXAM BY PATHOLOGIST: CPT

## 2022-11-29 PROCEDURE — 32666 THORACOSCOPY W/WEDGE RESECT: CPT | Performed by: THORACIC SURGERY (CARDIOTHORACIC VASCULAR SURGERY)

## 2022-11-29 PROCEDURE — C1729 CATH, DRAINAGE: HCPCS | Performed by: THORACIC SURGERY (CARDIOTHORACIC VASCULAR SURGERY)

## 2022-11-29 PROCEDURE — 94761 N-INVAS EAR/PLS OXIMETRY MLT: CPT

## 2022-11-29 PROCEDURE — A4217 STERILE WATER/SALINE, 500 ML: HCPCS | Performed by: THORACIC SURGERY (CARDIOTHORACIC VASCULAR SURGERY)

## 2022-11-29 PROCEDURE — 2580000003 HC RX 258

## 2022-11-29 PROCEDURE — 6360000002 HC RX W HCPCS: Performed by: ANESTHESIOLOGY

## 2022-11-29 PROCEDURE — 2500000003 HC RX 250 WO HCPCS: Performed by: NURSE ANESTHETIST, CERTIFIED REGISTERED

## 2022-11-29 PROCEDURE — 2580000003 HC RX 258: Performed by: THORACIC SURGERY (CARDIOTHORACIC VASCULAR SURGERY)

## 2022-11-29 PROCEDURE — 3700000001 HC ADD 15 MINUTES (ANESTHESIA): Performed by: THORACIC SURGERY (CARDIOTHORACIC VASCULAR SURGERY)

## 2022-11-29 PROCEDURE — 7100000000 HC PACU RECOVERY - FIRST 15 MIN: Performed by: THORACIC SURGERY (CARDIOTHORACIC VASCULAR SURGERY)

## 2022-11-29 PROCEDURE — 2720000010 HC SURG SUPPLY STERILE: Performed by: THORACIC SURGERY (CARDIOTHORACIC VASCULAR SURGERY)

## 2022-11-29 RX ORDER — FAMOTIDINE 20 MG/1
20 TABLET, FILM COATED ORAL 2 TIMES DAILY
Status: DISCONTINUED | OUTPATIENT
Start: 2022-11-29 | End: 2022-11-30 | Stop reason: HOSPADM

## 2022-11-29 RX ORDER — SODIUM CHLORIDE, SODIUM LACTATE, POTASSIUM CHLORIDE, CALCIUM CHLORIDE 600; 310; 30; 20 MG/100ML; MG/100ML; MG/100ML; MG/100ML
INJECTION, SOLUTION INTRAVENOUS
Status: COMPLETED
Start: 2022-11-29 | End: 2022-11-30

## 2022-11-29 RX ORDER — BUDESONIDE AND FORMOTEROL FUMARATE DIHYDRATE 160; 4.5 UG/1; UG/1
2 AEROSOL RESPIRATORY (INHALATION) 2 TIMES DAILY
Status: DISCONTINUED | OUTPATIENT
Start: 2022-11-29 | End: 2022-11-30 | Stop reason: HOSPADM

## 2022-11-29 RX ORDER — ATORVASTATIN CALCIUM 20 MG/1
20 TABLET, FILM COATED ORAL
Status: DISCONTINUED | OUTPATIENT
Start: 2022-11-29 | End: 2022-11-30 | Stop reason: HOSPADM

## 2022-11-29 RX ORDER — LABETALOL HYDROCHLORIDE 5 MG/ML
10 INJECTION, SOLUTION INTRAVENOUS
Status: DISCONTINUED | OUTPATIENT
Start: 2022-11-29 | End: 2022-11-29 | Stop reason: HOSPADM

## 2022-11-29 RX ORDER — ARIPIPRAZOLE 5 MG/1
5 TABLET ORAL DAILY
Status: DISCONTINUED | OUTPATIENT
Start: 2022-11-29 | End: 2022-11-30 | Stop reason: HOSPADM

## 2022-11-29 RX ORDER — SODIUM CHLORIDE, SODIUM LACTATE, POTASSIUM CHLORIDE, CALCIUM CHLORIDE 600; 310; 30; 20 MG/100ML; MG/100ML; MG/100ML; MG/100ML
INJECTION, SOLUTION INTRAVENOUS CONTINUOUS
Status: DISCONTINUED | OUTPATIENT
Start: 2022-11-29 | End: 2022-11-29 | Stop reason: HOSPADM

## 2022-11-29 RX ORDER — SODIUM CHLORIDE 0.9 % (FLUSH) 0.9 %
5-40 SYRINGE (ML) INJECTION PRN
Status: DISCONTINUED | OUTPATIENT
Start: 2022-11-29 | End: 2022-11-30 | Stop reason: HOSPADM

## 2022-11-29 RX ORDER — ONDANSETRON 2 MG/ML
4 INJECTION INTRAMUSCULAR; INTRAVENOUS
Status: DISCONTINUED | OUTPATIENT
Start: 2022-11-29 | End: 2022-11-29 | Stop reason: HOSPADM

## 2022-11-29 RX ORDER — SODIUM CHLORIDE 9 MG/ML
25 INJECTION, SOLUTION INTRAVENOUS PRN
Status: DISCONTINUED | OUTPATIENT
Start: 2022-11-29 | End: 2022-11-29 | Stop reason: HOSPADM

## 2022-11-29 RX ORDER — DEXAMETHASONE SODIUM PHOSPHATE 4 MG/ML
INJECTION, SOLUTION INTRA-ARTICULAR; INTRALESIONAL; INTRAMUSCULAR; INTRAVENOUS; SOFT TISSUE PRN
Status: DISCONTINUED | OUTPATIENT
Start: 2022-11-29 | End: 2022-11-29 | Stop reason: SDUPTHER

## 2022-11-29 RX ORDER — FENTANYL CITRATE 50 UG/ML
INJECTION, SOLUTION INTRAMUSCULAR; INTRAVENOUS PRN
Status: DISCONTINUED | OUTPATIENT
Start: 2022-11-29 | End: 2022-11-29 | Stop reason: SDUPTHER

## 2022-11-29 RX ORDER — MAGNESIUM SULFATE 1 G/100ML
1000 INJECTION INTRAVENOUS PRN
Status: DISCONTINUED | OUTPATIENT
Start: 2022-11-29 | End: 2022-11-30 | Stop reason: HOSPADM

## 2022-11-29 RX ORDER — HYDRALAZINE HYDROCHLORIDE 20 MG/ML
10 INJECTION INTRAMUSCULAR; INTRAVENOUS
Status: DISCONTINUED | OUTPATIENT
Start: 2022-11-29 | End: 2022-11-29 | Stop reason: HOSPADM

## 2022-11-29 RX ORDER — IPRATROPIUM BROMIDE AND ALBUTEROL SULFATE 2.5; .5 MG/3ML; MG/3ML
1 SOLUTION RESPIRATORY (INHALATION)
Status: DISCONTINUED | OUTPATIENT
Start: 2022-11-29 | End: 2022-11-29 | Stop reason: HOSPADM

## 2022-11-29 RX ORDER — ACETAMINOPHEN 325 MG/1
650 TABLET ORAL EVERY 4 HOURS PRN
Status: DISCONTINUED | OUTPATIENT
Start: 2022-11-29 | End: 2022-11-30 | Stop reason: HOSPADM

## 2022-11-29 RX ORDER — TRAZODONE HYDROCHLORIDE 50 MG/1
100 TABLET ORAL NIGHTLY
Status: DISCONTINUED | OUTPATIENT
Start: 2022-11-29 | End: 2022-11-30 | Stop reason: HOSPADM

## 2022-11-29 RX ORDER — PAROXETINE 10 MG/1
40 TABLET, FILM COATED ORAL EVERY MORNING
Status: DISCONTINUED | OUTPATIENT
Start: 2022-11-30 | End: 2022-11-30 | Stop reason: HOSPADM

## 2022-11-29 RX ORDER — KETOROLAC TROMETHAMINE 30 MG/ML
30 INJECTION, SOLUTION INTRAMUSCULAR; INTRAVENOUS EVERY 6 HOURS PRN
Status: DISCONTINUED | OUTPATIENT
Start: 2022-11-29 | End: 2022-11-30 | Stop reason: HOSPADM

## 2022-11-29 RX ORDER — ENOXAPARIN SODIUM 100 MG/ML
40 INJECTION SUBCUTANEOUS DAILY
Status: DISCONTINUED | OUTPATIENT
Start: 2022-11-29 | End: 2022-11-30 | Stop reason: HOSPADM

## 2022-11-29 RX ORDER — SODIUM CHLORIDE 0.9 % (FLUSH) 0.9 %
5-40 SYRINGE (ML) INJECTION EVERY 12 HOURS SCHEDULED
Status: DISCONTINUED | OUTPATIENT
Start: 2022-11-29 | End: 2022-11-30 | Stop reason: HOSPADM

## 2022-11-29 RX ORDER — FENTANYL CITRATE 50 UG/ML
25 INJECTION, SOLUTION INTRAMUSCULAR; INTRAVENOUS EVERY 5 MIN PRN
Status: COMPLETED | OUTPATIENT
Start: 2022-11-29 | End: 2022-11-29

## 2022-11-29 RX ORDER — METOCLOPRAMIDE HYDROCHLORIDE 5 MG/ML
10 INJECTION INTRAMUSCULAR; INTRAVENOUS
Status: DISCONTINUED | OUTPATIENT
Start: 2022-11-29 | End: 2022-11-29 | Stop reason: HOSPADM

## 2022-11-29 RX ORDER — MAGNESIUM HYDROXIDE 1200 MG/15ML
LIQUID ORAL CONTINUOUS PRN
Status: DISCONTINUED | OUTPATIENT
Start: 2022-11-29 | End: 2022-11-29 | Stop reason: HOSPADM

## 2022-11-29 RX ORDER — LIDOCAINE HYDROCHLORIDE 20 MG/ML
INJECTION, SOLUTION INTRAVENOUS PRN
Status: DISCONTINUED | OUTPATIENT
Start: 2022-11-29 | End: 2022-11-29 | Stop reason: SDUPTHER

## 2022-11-29 RX ORDER — ONDANSETRON 4 MG/1
4 TABLET, ORALLY DISINTEGRATING ORAL EVERY 8 HOURS PRN
Status: DISCONTINUED | OUTPATIENT
Start: 2022-11-29 | End: 2022-11-30 | Stop reason: HOSPADM

## 2022-11-29 RX ORDER — POTASSIUM CHLORIDE 7.45 MG/ML
10 INJECTION INTRAVENOUS PRN
Status: DISCONTINUED | OUTPATIENT
Start: 2022-11-29 | End: 2022-11-30 | Stop reason: HOSPADM

## 2022-11-29 RX ORDER — SODIUM CHLORIDE 0.9 % (FLUSH) 0.9 %
5-40 SYRINGE (ML) INJECTION PRN
Status: DISCONTINUED | OUTPATIENT
Start: 2022-11-29 | End: 2022-11-29 | Stop reason: HOSPADM

## 2022-11-29 RX ORDER — SODIUM CHLORIDE 0.9 % (FLUSH) 0.9 %
5-40 SYRINGE (ML) INJECTION EVERY 12 HOURS SCHEDULED
Status: DISCONTINUED | OUTPATIENT
Start: 2022-11-29 | End: 2022-11-29 | Stop reason: HOSPADM

## 2022-11-29 RX ORDER — ONDANSETRON 2 MG/ML
4 INJECTION INTRAMUSCULAR; INTRAVENOUS EVERY 6 HOURS PRN
Status: DISCONTINUED | OUTPATIENT
Start: 2022-11-29 | End: 2022-11-30 | Stop reason: HOSPADM

## 2022-11-29 RX ORDER — OXYCODONE HYDROCHLORIDE 5 MG/1
5 TABLET ORAL EVERY 4 HOURS PRN
Status: DISCONTINUED | OUTPATIENT
Start: 2022-11-29 | End: 2022-11-30 | Stop reason: HOSPADM

## 2022-11-29 RX ORDER — OXYCODONE HYDROCHLORIDE 5 MG/1
5 TABLET ORAL
Status: COMPLETED | OUTPATIENT
Start: 2022-11-29 | End: 2022-11-29

## 2022-11-29 RX ORDER — DEXTROSE, SODIUM CHLORIDE, SODIUM LACTATE, POTASSIUM CHLORIDE, AND CALCIUM CHLORIDE 5; .6; .31; .03; .02 G/100ML; G/100ML; G/100ML; G/100ML; G/100ML
INJECTION, SOLUTION INTRAVENOUS CONTINUOUS
Status: DISCONTINUED | OUTPATIENT
Start: 2022-11-29 | End: 2022-11-30

## 2022-11-29 RX ORDER — DEXTROSE MONOHYDRATE 100 MG/ML
INJECTION, SOLUTION INTRAVENOUS CONTINUOUS PRN
Status: DISCONTINUED | OUTPATIENT
Start: 2022-11-29 | End: 2022-11-29 | Stop reason: HOSPADM

## 2022-11-29 RX ORDER — ONDANSETRON 2 MG/ML
INJECTION INTRAMUSCULAR; INTRAVENOUS PRN
Status: DISCONTINUED | OUTPATIENT
Start: 2022-11-29 | End: 2022-11-29 | Stop reason: SDUPTHER

## 2022-11-29 RX ORDER — SODIUM CHLORIDE 9 MG/ML
INJECTION, SOLUTION INTRAVENOUS PRN
Status: DISCONTINUED | OUTPATIENT
Start: 2022-11-29 | End: 2022-11-30 | Stop reason: HOSPADM

## 2022-11-29 RX ORDER — PROPOFOL 10 MG/ML
INJECTION, EMULSION INTRAVENOUS PRN
Status: DISCONTINUED | OUTPATIENT
Start: 2022-11-29 | End: 2022-11-29 | Stop reason: SDUPTHER

## 2022-11-29 RX ORDER — ROCURONIUM BROMIDE 10 MG/ML
INJECTION, SOLUTION INTRAVENOUS PRN
Status: DISCONTINUED | OUTPATIENT
Start: 2022-11-29 | End: 2022-11-29 | Stop reason: SDUPTHER

## 2022-11-29 RX ADMIN — SODIUM CHLORIDE, SODIUM LACTATE, POTASSIUM CHLORIDE, CALCIUM CHLORIDE AND DEXTROSE MONOHYDRATE: 5; 600; 310; 30; 20 INJECTION, SOLUTION INTRAVENOUS at 20:40

## 2022-11-29 RX ADMIN — HYDROMORPHONE HYDROCHLORIDE 0.5 MG: 1 INJECTION, SOLUTION INTRAMUSCULAR; INTRAVENOUS; SUBCUTANEOUS at 13:44

## 2022-11-29 RX ADMIN — FENTANYL CITRATE 50 MCG: 50 INJECTION, SOLUTION INTRAMUSCULAR; INTRAVENOUS at 13:26

## 2022-11-29 RX ADMIN — FENTANYL CITRATE 25 MCG: 50 INJECTION, SOLUTION INTRAMUSCULAR; INTRAVENOUS at 14:00

## 2022-11-29 RX ADMIN — HYDROMORPHONE HYDROCHLORIDE 0.5 MG: 1 INJECTION, SOLUTION INTRAMUSCULAR; INTRAVENOUS; SUBCUTANEOUS at 13:36

## 2022-11-29 RX ADMIN — SODIUM CHLORIDE, POTASSIUM CHLORIDE, SODIUM LACTATE AND CALCIUM CHLORIDE 1000 ML: 600; 310; 30; 20 INJECTION, SOLUTION INTRAVENOUS at 17:11

## 2022-11-29 RX ADMIN — ROCURONIUM BROMIDE 50 MG: 10 INJECTION INTRAVENOUS at 12:31

## 2022-11-29 RX ADMIN — SODIUM CHLORIDE, POTASSIUM CHLORIDE, SODIUM LACTATE AND CALCIUM CHLORIDE: 600; 310; 30; 20 INJECTION, SOLUTION INTRAVENOUS at 10:40

## 2022-11-29 RX ADMIN — SUGAMMADEX 200 MG: 100 INJECTION, SOLUTION INTRAVENOUS at 13:03

## 2022-11-29 RX ADMIN — BUSPIRONE HYDROCHLORIDE 15 MG: 10 TABLET ORAL at 20:35

## 2022-11-29 RX ADMIN — Medication 10 ML: at 20:36

## 2022-11-29 RX ADMIN — FENTANYL CITRATE 50 MCG: 50 INJECTION, SOLUTION INTRAMUSCULAR; INTRAVENOUS at 12:40

## 2022-11-29 RX ADMIN — BUDESONIDE AND FORMOTEROL FUMARATE DIHYDRATE 2 PUFF: 160; 4.5 AEROSOL RESPIRATORY (INHALATION) at 19:50

## 2022-11-29 RX ADMIN — FENTANYL CITRATE 25 MCG: 50 INJECTION, SOLUTION INTRAMUSCULAR; INTRAVENOUS at 14:06

## 2022-11-29 RX ADMIN — FENTANYL CITRATE 25 MCG: 50 INJECTION, SOLUTION INTRAMUSCULAR; INTRAVENOUS at 13:53

## 2022-11-29 RX ADMIN — LIDOCAINE HYDROCHLORIDE 50 MG: 20 INJECTION, SOLUTION INTRAVENOUS at 12:30

## 2022-11-29 RX ADMIN — SUGAMMADEX 100 MG: 100 INJECTION, SOLUTION INTRAVENOUS at 13:09

## 2022-11-29 RX ADMIN — FENTANYL CITRATE 25 MCG: 50 INJECTION, SOLUTION INTRAMUSCULAR; INTRAVENOUS at 13:54

## 2022-11-29 RX ADMIN — OXYCODONE 5 MG: 5 TABLET ORAL at 15:58

## 2022-11-29 RX ADMIN — ONDANSETRON 4 MG: 2 INJECTION INTRAMUSCULAR; INTRAVENOUS at 12:53

## 2022-11-29 RX ADMIN — OXYCODONE 5 MG: 5 TABLET ORAL at 19:30

## 2022-11-29 RX ADMIN — FAMOTIDINE 20 MG: 20 TABLET, FILM COATED ORAL at 20:35

## 2022-11-29 RX ADMIN — ARIPIPRAZOLE 5 MG: 5 TABLET ORAL at 20:43

## 2022-11-29 RX ADMIN — PROPOFOL 150 MG: 10 INJECTION, EMULSION INTRAVENOUS at 12:30

## 2022-11-29 RX ADMIN — CEFAZOLIN 2 G: 10 INJECTION, POWDER, FOR SOLUTION INTRAVENOUS at 12:37

## 2022-11-29 RX ADMIN — KETOROLAC TROMETHAMINE 30 MG: 30 INJECTION, SOLUTION INTRAMUSCULAR; INTRAVENOUS at 13:51

## 2022-11-29 RX ADMIN — TRAZODONE HYDROCHLORIDE 100 MG: 50 TABLET ORAL at 20:35

## 2022-11-29 RX ADMIN — DEXAMETHASONE SODIUM PHOSPHATE 4 MG: 4 INJECTION, SOLUTION INTRAMUSCULAR; INTRAVENOUS at 12:37

## 2022-11-29 RX ADMIN — KETOROLAC TROMETHAMINE 30 MG: 30 INJECTION, SOLUTION INTRAMUSCULAR; INTRAVENOUS at 20:35

## 2022-11-29 RX ADMIN — ATORVASTATIN CALCIUM 20 MG: 20 TABLET, FILM COATED ORAL at 20:35

## 2022-11-29 ASSESSMENT — PAIN DESCRIPTION - ORIENTATION
ORIENTATION: RIGHT

## 2022-11-29 ASSESSMENT — ENCOUNTER SYMPTOMS
SORE THROAT: 0
PHOTOPHOBIA: 0
SHORTNESS OF BREATH: 1
RHINORRHEA: 0
EYES NEGATIVE: 1
WHEEZING: 0
ABDOMINAL PAIN: 0
NAUSEA: 0
SHORTNESS OF BREATH: 1
BACK PAIN: 0
VOMITING: 0
ALLERGIC/IMMUNOLOGIC NEGATIVE: 1

## 2022-11-29 ASSESSMENT — PAIN DESCRIPTION - DESCRIPTORS
DESCRIPTORS: BURNING
DESCRIPTORS: SHARP
DESCRIPTORS: BURNING
DESCRIPTORS: ACHING;DISCOMFORT

## 2022-11-29 ASSESSMENT — PAIN SCALES - GENERAL
PAINLEVEL_OUTOF10: 7
PAINLEVEL_OUTOF10: 9
PAINLEVEL_OUTOF10: 7
PAINLEVEL_OUTOF10: 10
PAINLEVEL_OUTOF10: 8
PAINLEVEL_OUTOF10: 9
PAINLEVEL_OUTOF10: 6
PAINLEVEL_OUTOF10: 10
PAINLEVEL_OUTOF10: 8

## 2022-11-29 ASSESSMENT — PAIN DESCRIPTION - LOCATION
LOCATION: CHEST
LOCATION: RIB CAGE
LOCATION: CHEST

## 2022-11-29 ASSESSMENT — PAIN - FUNCTIONAL ASSESSMENT: PAIN_FUNCTIONAL_ASSESSMENT: PREVENTS OR INTERFERES SOME ACTIVE ACTIVITIES AND ADLS

## 2022-11-29 NOTE — ANESTHESIA POSTPROCEDURE EVALUATION
Department of Anesthesiology  Postprocedure Note    Patient: Liz Steele  MRN: [id number]  YOB: 1960  Date of evaluation: 11/29/2022      Procedure Summary     Date: 11/29/22 Room / Location: [address]    Anesthesia Start: 1224 Anesthesia Stop:     Procedure: RIGHT THORACOSCOPIC LOWER LOBE WEDGE (Right: Chest) Diagnosis:       Lung nodules      (LUNG NODULES)    Surgeons: Sierra Schroeder MD Responsible Provider: Nicole De Anda MD    Anesthesia Type: General, Regional ASA Status: 3          Anesthesia Type: General, Regional    Esdras Phase I:      Esdras Phase II:        Anesthesia Post Evaluation    Patient location during evaluation: PACU  Patient participation: complete - patient participated  Level of consciousness: awake, sleepy but conscious and responsive to verbal stimuli  Pain score: 1  Airway patency: patent  Nausea & Vomiting: no nausea and no vomiting  Complications: no  Respiratory status: spontaneous ventilation, nonlabored ventilation and face mask  Hydration status: stable

## 2022-11-29 NOTE — PROGRESS NOTES
07/07/2022  Katie Blackman is a 65 y.o., female.      Pre-op Assessment    I have reviewed the Patient Summary Reports.     I have reviewed the Nursing Notes. I have reviewed the NPO Status.   I have reviewed the Medications.     Review of Systems  Anesthesia Hx:  No problems with previous Anesthesia  Family Hx of Anesthesia complications:  Personal Hx of Anesthesia complications   Social:  Non-Smoker    Hematology/Oncology:  Hematology Normal   Oncology Normal     EENT/Dental:EENT/Dental Normal   Cardiovascular:   Hypertension    Pulmonary:  Pulmonary Normal    Renal/:  Renal/ Normal     Hepatic/GI:  Hepatic/GI Normal    Musculoskeletal:   Arthritis     Neurological:   CVA Neuromuscular Disease,   Chronic Pain Syndrome Dementia    Endocrine:  Endocrine Normal  Obesity / BMI > 30  Dermatological:  Skin Normal        Physical Exam  General: Well nourished, Cooperative, Alert and Oriented    Airway:  Mallampati: II   Mouth Opening: Normal  TM Distance: Normal  Tongue: Normal  Neck ROM: Normal ROM    Chest/Lungs:  Clear to auscultation, Normal Respiratory Rate    Heart:  Rate: Normal  Rhythm: Regular Rhythm    Abdomen:  Normal, Soft        Anesthesia Plan  Type of Anesthesia, risks & benefits discussed:    Anesthesia Type: Gen Natural Airway  Intra-op Monitoring Plan: Standard ASA Monitors  Post Op Pain Control Plan: multimodal analgesia  Induction:  IV  Informed Consent: Informed consent signed with the Patient and all parties understand the risks and agree with anesthesia plan.  All questions answered. Patient consented to blood products? Yes  ASA Score: 3    Ready For Surgery From Anesthesia Perspective.     .       Radiology at bedside for CXR.

## 2022-11-29 NOTE — OP NOTE
Operative Note      Patient: Rajani Ribeiro  YOB: 1960  MRN: [id number]    Date of Procedure: 11/29/2022    Pre-Op Diagnosis: LUNG NODULES    Post-Op Diagnosis: Same       Procedure(s):  RIGHT THORACOSCOPIC LOWER LOBE WEDGE    Surgeon(s):  Debora Jones MD    Assistant:   First Assistant: Amadeo Pepe    Anesthesia: General    Estimated Blood Loss (mL): Minimal    Complications: None    Specimens:   ID Type Source Tests Collected by Time Destination   A : right lower lobe wedge Tissue Lung SURGICAL PATHOLOGY Debora Jones MD 11/29/2022 1249        Implants:  * No implants in log *      Drains:   Chest Tube Right 1 (Active)       Findings: Lung nodules    Detailed Description of Procedure:   Patient is almost 4 years out from resection of an atypical carcinoid on her left lung. On her surveillance CAT scan she developed 2 small nodules at the base of her right lung of unknown significance. We initially opted for follow-up surveillance CAT scans but the patient called back strongly wanting them removed for diagnosis. Purpose of the procedure was for diagnosis and potentially therapeutic if there were malignant. Once patient was intubated she was turned in the right side up position had her chest prepped and draped in sterile fashion. 3 12 mm thorascopic port incisions were placed over lateral rib cage. Examination hemithorax showed no adhesions or carcinomatosis. By palpating along the base of the right lung we felt these 2 subcentimeter nodules. Grossly they appeared to be subpleural lymph nodes. Using a reinforced stapler the area was wedged off with grossly negative margins. The specimen was removed and sent for pathology. 1 chest tube was placed. The lung was reinflated and the main incisions were closed in layers.     Electronically signed by Pina Sharp MD on 11/29/2022 at 1:01 PM

## 2022-11-29 NOTE — PROGRESS NOTES
5 beat VT noted on telemetry, pt moving around, getting her phone out. Denies any c/o. V/S stable. Dr Juan Steve not answering his phone, Dr. Lise Diaz not answering his phone. O2 maint.  At 2L  N/C.

## 2022-11-29 NOTE — ANESTHESIA PRE PROCEDURE
for Wheezing 3/19/18   Guillermo Arthur MD       Current medications:    No current facility-administered medications for this visit. No current outpatient medications on file. Facility-Administered Medications Ordered in Other Visits   Medication Dose Route Frequency Provider Last Rate Last Admin    ceFAZolin (ANCEF) 2000 mg in dextrose 5 % 100 mL IVPB  2,000 mg IntraVENous Once Mary Walker MD        lactated ringers infusion   IntraVENous Continuous Pranay Fang MD        lactated ringers infusion   IntraVENous Continuous Pranay Fang  mL/hr at 11/29/22 1040 New Bag at 11/29/22 1040       Allergies:     Allergies   Allergen Reactions    Other Anaphylaxis     Sun flower seeds     Adhesive Tape      Plastic tape blisters    Codeine Nausea And Vomiting       Problem List:    Patient Active Problem List   Diagnosis Code    Pure hypercholesterolemia E78.00    Mild single current episode of major depressive disorder (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    Gastroesophageal reflux disease without esophagitis 3/12/2018    EGD over 10 years    Gout     Hyperlipidemia     Insomnia 3/12/2018    Mild single current episode of major depressive disorder ([facility] [address] 3/12/2018    Neuropathy 3/12/2018    Clinical diagnosis, EMG    Other emphysema ([facility] [address] 3/12/2018    No recent PFT    Pure hypercholesterolemia 3/12/2018    SOB (shortness of breath)     Tobacco abuse 3/12/2018       Past Surgical History:        Procedure Laterality Date     SECTION      COLONOSCOPY      LUNG BIOPSY Left 2018    CT guided Left Lung Biopsy by Dr Vipin Garza, PARTIAL Left     TONSILLECTOMY      VENTRAL HERNIA REPAIR N/A 2022    REPAIR OF VENTRAL HERNIA WITH UMBILECTOMY performed by Snow Dennis MD at [address] [place] [address] History:    Social History     Tobacco Use    Smoking status: Every Day     Packs/day: 1.00     Years: 47.00     Pack years: 47.00     Types: Cigarettes     Start date:     Smokeless tobacco: Never   Substance Use Topics    Alcohol use: No                                Ready to quit: Not Answered  Counseling given: Not Answered      Vital Signs (Current): There were no vitals filed for this visit.                                            BP Readings from Last 3 Encounters:   22 (!) 151/65   22 137/69   08/15/22 118/70       NPO Status:                                                                                 BMI:   Wt Readings from Last 3 Encounters:   22 124 lb (56.2 kg)   22 124 lb 2 oz (56.3 kg)   22 123 lb (55.8 kg)     There is no height or weight on file to calculate BMI.    CBC:   Lab Results   Component Value Date/Time    WBC 6.7 2022 04:07 PM    RBC 4.90 2022 04:07 PM    RBC 3.33 02/10/2019 05:20 AM    HGB 14.7 11/23/2022 04:07 PM    HCT 44.7 11/23/2022 04:07 PM    MCV 91.1 11/23/2022 04:07 PM    RDW 14.6 11/23/2022 04:07 PM     11/23/2022 04:07 PM       CMP:   Lab Results   Component Value Date/Time     11/23/2022 02:26 PM    K 4.3 11/23/2022 02:26 PM     11/23/2022 02:26 PM    CO2 26 11/23/2022 02:26 PM    BUN 7 11/23/2022 02:26 PM    CREATININE 0.75 11/23/2022 02:26 PM    GFRAA 59.0 04/04/2022 12:41 PM    AGRATIO 1.5 02/10/2019 05:20 AM    LABGLOM >60.0 11/23/2022 02:26 PM    GLUCOSE 98 11/23/2022 02:26 PM    GLUCOSE 117 02/10/2019 05:20 AM    PROT 6.8 03/09/2021 11:40 AM    CALCIUM 10.0 11/23/2022 02:26 PM    BILITOT 0.6 03/09/2021 11:40 AM    ALKPHOS 138 03/09/2021 11:40 AM    AST 18 03/09/2021 11:40 AM    ALT 14 03/09/2021 11:40 AM       POC Tests: No results for input(s): POCGLU, POCNA, POCK, POCCL, POCBUN, POCHEMO, POCHCT in the last 72 hours.     Coags:   Lab Results   Component Value Date/Time    PROTIME 10.6 02/08/2019 06:29 AM    INR 0.94 02/08/2019 06:29 AM       HCG (If Applicable): No results found for: PREGTESTUR, PREGSERUM, HCG, HCGQUANT     ABGs: No results found for: PHART, PO2ART, YJV0QCE, YMG0ORO, BEART, P9LTMTCT     Type & Screen (If Applicable):  No results found for: LABABO, LABRH    Drug/Infectious Status (If Applicable):  No results found for: HIV, HEPCAB    COVID-19 Screening (If Applicable): No results found for: COVID19        Anesthesia Evaluation  Patient summary reviewed and Nursing notes reviewed no history of anesthetic complications:   Airway: Mallampati: II  TM distance: >3 FB   Neck ROM: full  Mouth opening: > = 3 FB   Dental:    (+) edentulous      Pulmonary:   (+) COPD:  shortness of breath:                             Cardiovascular:  Exercise tolerance: no interval change,   (+) CAD:,       ECG reviewed               Beta Blocker:  Not on Beta Blocker         Neuro/Psych:   (+) psychiatric history:            GI/Hepatic/Renal:   (+) GERD:, renal disease: CRI,           Endo/Other: Negative Endo/Other ROS                    Abdominal:             Vascular: negative vascular ROS. Other Findings:             Anesthesia Plan      general and regional     ASA 3       Induction: intravenous. MIPS: Postoperative opioids intended and Prophylactic antiemetics administered. Anesthetic plan and risks discussed with patient.         Attending anesthesiologist reviewed and agrees with Preprocedure content                Martina Beltran MD   11/29/2022

## 2022-11-29 NOTE — H&P
Update History & Physical    I examined the patient and reviewed the History and Physical and there were no significant changes other than she requests resection of the lung nodules now. Vitals:    11/29/22 1030   BP: (!) 151/65   Pulse: 94   Resp: 18   Temp: 97.2 °F (36.2 °C)   SpO2: 94%     Principal Problem:    Lung nodule, multiple  Resolved Problems:    * No resolved hospital problems. *        Plan: The risk, benefits, expected outcome, and alternative to the recommended procedure have been discussed with the patient. Patient understands and wants to proceed with the procedure.     Electronically signed by Irving Blake MD on 11/29/22 at 11:39 AM EST

## 2022-11-30 ENCOUNTER — APPOINTMENT (OUTPATIENT)
Dept: GENERAL RADIOLOGY | Age: 62
DRG: 168 | End: 2022-11-30
Attending: THORACIC SURGERY (CARDIOTHORACIC VASCULAR SURGERY)
Payer: MEDICARE

## 2022-11-30 VITALS
BODY MASS INDEX: 23.41 KG/M2 | HEART RATE: 81 BPM | WEIGHT: 124 LBS | SYSTOLIC BLOOD PRESSURE: 123 MMHG | HEIGHT: 61 IN | TEMPERATURE: 97.3 F | DIASTOLIC BLOOD PRESSURE: 68 MMHG | RESPIRATION RATE: 18 BRPM | OXYGEN SATURATION: 94 %

## 2022-11-30 DIAGNOSIS — Z09 STATUS POST LUNG SURGERY, FOLLOW-UP EXAM: Primary | ICD-10-CM

## 2022-11-30 PROCEDURE — 2580000003 HC RX 258: Performed by: THORACIC SURGERY (CARDIOTHORACIC VASCULAR SURGERY)

## 2022-11-30 PROCEDURE — 6370000000 HC RX 637 (ALT 250 FOR IP): Performed by: THORACIC SURGERY (CARDIOTHORACIC VASCULAR SURGERY)

## 2022-11-30 PROCEDURE — 6360000002 HC RX W HCPCS: Performed by: THORACIC SURGERY (CARDIOTHORACIC VASCULAR SURGERY)

## 2022-11-30 PROCEDURE — 71045 X-RAY EXAM CHEST 1 VIEW: CPT

## 2022-11-30 PROCEDURE — 94761 N-INVAS EAR/PLS OXIMETRY MLT: CPT

## 2022-11-30 PROCEDURE — 94640 AIRWAY INHALATION TREATMENT: CPT

## 2022-11-30 PROCEDURE — 2700000000 HC OXYGEN THERAPY PER DAY

## 2022-11-30 RX ORDER — OXYCODONE HYDROCHLORIDE 5 MG/1
5 TABLET ORAL EVERY 6 HOURS PRN
Qty: 25 TABLET | Refills: 0 | Status: SHIPPED | OUTPATIENT
Start: 2022-11-30 | End: 2022-12-07

## 2022-11-30 RX ADMIN — BUSPIRONE HYDROCHLORIDE 15 MG: 10 TABLET ORAL at 09:05

## 2022-11-30 RX ADMIN — KETOROLAC TROMETHAMINE 30 MG: 30 INJECTION, SOLUTION INTRAMUSCULAR; INTRAVENOUS at 04:31

## 2022-11-30 RX ADMIN — Medication 10 ML: at 09:57

## 2022-11-30 RX ADMIN — FAMOTIDINE 20 MG: 20 TABLET, FILM COATED ORAL at 09:04

## 2022-11-30 RX ADMIN — TIOTROPIUM BROMIDE INHALATION SPRAY 2 PUFF: 3.12 SPRAY, METERED RESPIRATORY (INHALATION) at 07:43

## 2022-11-30 RX ADMIN — PAROXETINE 40 MG: 10 TABLET, FILM COATED ORAL at 09:05

## 2022-11-30 RX ADMIN — OXYCODONE 5 MG: 5 TABLET ORAL at 02:49

## 2022-11-30 RX ADMIN — BUDESONIDE AND FORMOTEROL FUMARATE DIHYDRATE 2 PUFF: 160; 4.5 AEROSOL RESPIRATORY (INHALATION) at 07:43

## 2022-11-30 RX ADMIN — ENOXAPARIN SODIUM 40 MG: 100 INJECTION SUBCUTANEOUS at 09:05

## 2022-11-30 RX ADMIN — ARIPIPRAZOLE 5 MG: 5 TABLET ORAL at 09:05

## 2022-11-30 RX ADMIN — KETOROLAC TROMETHAMINE 30 MG: 30 INJECTION, SOLUTION INTRAMUSCULAR; INTRAVENOUS at 10:40

## 2022-11-30 RX ADMIN — OXYCODONE 5 MG: 5 TABLET ORAL at 07:22

## 2022-11-30 RX ADMIN — Medication 10 ML: at 04:31

## 2022-11-30 ASSESSMENT — PAIN DESCRIPTION - ORIENTATION
ORIENTATION: RIGHT

## 2022-11-30 ASSESSMENT — PAIN - FUNCTIONAL ASSESSMENT
PAIN_FUNCTIONAL_ASSESSMENT: PREVENTS OR INTERFERES SOME ACTIVE ACTIVITIES AND ADLS

## 2022-11-30 ASSESSMENT — PAIN SCALES - GENERAL
PAINLEVEL_OUTOF10: 7
PAINLEVEL_OUTOF10: 8
PAINLEVEL_OUTOF10: 8
PAINLEVEL_OUTOF10: 7

## 2022-11-30 ASSESSMENT — PAIN DESCRIPTION - DESCRIPTORS
DESCRIPTORS: THROBBING
DESCRIPTORS: SHARP
DESCRIPTORS: SHARP

## 2022-11-30 ASSESSMENT — PAIN DESCRIPTION - LOCATION
LOCATION: RIB CAGE

## 2022-11-30 NOTE — PLAN OF CARE
Problem: Discharge Planning  Goal: Discharge to home or other facility with appropriate resources  Outcome: Progressing  Flowsheets  Taken 11/29/2022 2044 by Alicia Abraham RN  Discharge to home or other facility with appropriate resources:   Identify barriers to discharge with patient and caregiver   Arrange for needed discharge resources and transportation as appropriate  Taken 11/29/2022 1942 by Enedina Ash RN  Discharge to home or other facility with appropriate resources:   Identify barriers to discharge with patient and caregiver   Arrange for needed discharge resources and transportation as appropriate   Identify discharge learning needs (meds, wound care, etc)     Problem: Safety - Adult  Goal: Free from fall injury  Outcome: Progressing     Problem: Pain  Goal: Verbalizes/displays adequate comfort level or baseline comfort level  Outcome: Progressing

## 2022-11-30 NOTE — CONSULTS
Consult Note    Reason for Consult: Medical management    Requesting Physician:  Julee Tapia MD    HISTORY OF PRESENT ILLNESS:    Patient admitted status post right fluoroscopic lower lobe wedge. Internal medicine consulted for medical management assistance and home meds. Patient is 4 years out from resection of left lung due to CA. Today patient had 2 left lung nodules removed and sent to pathology. One chest tube was placed. Patient currently states that she is mildly short of breath but tolerable. Patient states that her pain is also well tolerated. Patient denies any nausea or vomiting. Patient's other past medical history includes anxiety, insomnia, COPD, and GERD. Patient recently stopped smoking tobacco.  Patient denies use of illicit drugs or alcohol.       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 10/31/2018    On lung screen done 10/26/18    DDD (degenerative disc disease), lumbosacral 3/12/2018    Used to see pain management     CARTER (generalized anxiety disorder) 3/12/2018    [facility]    Gastroesophageal reflux disease without esophagitis 3/12/2018    EGD over 10 years    Gout     Hyperlipidemia     Insomnia 3/12/2018    Mild single current episode of major depressive disorder ([address] 3/12/2018    Neuropathy 3/12/2018    Clinical diagnosis, EMG    Other emphysema ([address] 3/12/2018    No recent PFT    Pure hypercholesterolemia 3/12/2018    SOB (shortness of breath)     Tobacco abuse 3/12/2018       Past Surgical History:   Procedure Laterality Date     SECTION      COLONOSCOPY      LUNG BIOPSY Left 2018    CT guided Left Lung Biopsy by Dr Hafsa Duong, PARTIAL Left     [address] N/A 2022    REPAIR OF VENTRAL HERNIA WITH UMBILECTOMY performed by Curt Solano MD at [facility]       Prior to Admission medications    Medication Sig Start Date End Date Taking?  Authorizing Provider   busPIRone (BUSPAR) 15 MG tablet take 1 tablet by mouth three times a day 11/22/22   SEGUNDO Wolfe   albuterol sulfate HFA (PROVENTIL;VENTOLIN;PROAIR) 108 (90 Base) MCG/ACT inhaler inhale 2 puffs by mouth every 4 hours if needed 11/7/22   SEGUNDO Wolfe   ARIPiprazole (ABILIFY) 5 MG tablet take 1 tablet by mouth every morning 9/30/22   SEGUNDO Wolfe   fluticasone (FLONASE) 50 MCG/ACT nasal spray instill 1 spray into each nostril daily 9/29/22   SEGUNDO Wolfe   traZODone (DESYREL) 100 MG tablet take 1 tablet by mouth nightly 8/8/22   SEGUNDO Wolfe   omeprazole (PRILOSEC) 20 MG delayed release capsule take 1 capsule by mouth once daily 6/23/22   SEGUNDO Wolfe   Fluticasone-Umeclidin-Vilant (TRELEGY ELLIPTA) 200-62.5-25 MCG/INH AEPB Inhale 1 puff into the lungs daily 5/9/22   SEGUNDO Wolfe   ondansetron (ZOFRAN ODT) 4 MG disintegrating tablet Take 1 tablet by mouth every 8 hours as needed for Nausea or Vomiting  Patient not taking: Reported on 11/29/2022 4/6/22   Curt Solano MD   atorvastatin (LIPITOR) 20 MG tablet take 1 tablet by mouth once daily  Patient taking differently: 20 mg nightly 1/27/22   SEGUNDO Wolfe   PARoxetine (PAXIL) 40 MG tablet Take 1 tablet by mouth every morning 11/10/21   Denzel Vaughan MD   Respiratory Therapy Supplies (NEBULIZER/TUBING/MOUTHPIECE) KIT 1 kit by Does not apply route daily as needed (wheezing) 3/15/21   Denzel Vaughan MD   albuterol (PROVENTIL) (5 MG/ML) 0.5% nebulizer solution Take 1 mL by nebulization 4 times daily as needed for Wheezing 3/19/18   Lehman Frankel, MD       Scheduled Meds:   ARIPiprazole  5 mg Oral Daily    atorvastatin  20 mg Oral QHS    busPIRone  15 mg Oral TID    [START ON 11/30/2022] PARoxetine  40 mg Oral QAM    traZODone 100 mg Oral Nightly    sodium chloride flush  5-40 mL IntraVENous 2 times per day    enoxaparin  40 mg SubCUTAneous Daily    famotidine  20 mg Oral BID    Or    famotidine (PEPCID) injection  20 mg IntraVENous BID    tiotropium  2 puff Inhalation Daily    budesonide-formoterol  2 puff Inhalation BID     Continuous Infusions:   sodium chloride      dextrose 5% in lactated ringers 50 mL/hr at 11/29/22 2040     PRN Meds:albuterol, sodium chloride flush, sodium chloride, ondansetron **OR** ondansetron, magnesium sulfate, potassium chloride, acetaminophen, oxyCODONE, ketorolac    Allergies   Allergen Reactions    Other Anaphylaxis     Sun flower seeds     Adhesive Tape      Plastic tape blisters    Codeine Nausea And Vomiting       Social History     Socioeconomic History    Marital status: Single     Spouse name: Not on file    Number of children: Not on file    Years of education: Not on file    Highest education level: Not on file   Occupational History    Not on file   Tobacco Use    Smoking status: Every Day     Packs/day: 1.00     Years: 47.00     Pack years: 47.00     Types: Cigarettes     Start date: 1970    Smokeless tobacco: Never   Vaping Use    Vaping Use: Former    Substances: Never   Substance and Sexual Activity    Alcohol use: No    Drug use: No    Sexual activity: Not Currently     Birth control/protection: Post-menopausal   Other Topics Concern    Not on file   Social History Narrative    Not on file     Social Determinants of Health     Financial Resource Strain: Low Risk     Difficulty of Paying Living Expenses: Not hard at all   Food Insecurity: No Food Insecurity    Worried About Running Out of Food in the Last Year: Never true    Ran Out of Food in the Last Year: Never true   Transportation Needs: Not on file   Physical Activity: Not on file   Stress: Not on file   Social Connections: Not on file   Intimate Partner Violence: Not on file   Housing Stability: Not on file       Family History Problem Relation Age of Onset    Other Mother         epilepsy     No Known Problems Father     No Known Problems Brother     No Known Problems Brother        Review Of Systems:   Review of Systems   Constitutional:  Negative for appetite change, fatigue, fever and unexpected weight change. HENT:  Negative for congestion, rhinorrhea and sore throat. Eyes: Negative. Negative for photophobia and visual disturbance. Respiratory:  Positive for shortness of breath (mild). Negative for wheezing. Cardiovascular:  Negative for chest pain. Gastrointestinal:  Negative for abdominal pain, nausea and vomiting. Endocrine: Negative. Negative for polydipsia, polyphagia and polyuria. Genitourinary:  Negative for difficulty urinating, dysuria and pelvic pain. Musculoskeletal:  Negative for back pain. Skin: Negative. Negative for rash. Allergic/Immunologic: Negative. Neurological: Negative. Negative for dizziness, speech difficulty and weakness. Hematological: Negative. Psychiatric/Behavioral: Negative. Negative for behavioral problems and confusion. ROS as noted in HPI, 12 point ROS reviewed and otherwise negative. Physical Exam:  Vitals:    11/29/22 1837 11/29/22 1915 11/29/22 1930 11/29/22 2006   BP:  (!) 118/51     Pulse: 91 91     Resp:  12 12    Temp:  98.2 °F (36.8 °C)     TempSrc:  Oral     SpO2:  93%  95%   Weight:       Height:           Physical Exam  Vitals and nursing note reviewed. Constitutional:       General: She is not in acute distress. Appearance: Normal appearance. She is not ill-appearing. HENT:      Head: Normocephalic and atraumatic. Mouth/Throat:      Mouth: Mucous membranes are moist.   Eyes:      Pupils: Pupils are equal, round, and reactive to light. Cardiovascular:      Rate and Rhythm: Normal rate and regular rhythm. Pulses: Normal pulses. Heart sounds: Normal heart sounds.    Pulmonary:      Effort: Pulmonary effort is normal. No respiratory distress. Breath sounds: Rhonchi (right) and rales (right) present. No wheezing. Comments: Right chest tube in place. Abdominal:      General: Abdomen is flat. Bowel sounds are normal.      Tenderness: There is no abdominal tenderness. Skin:     General: Skin is warm and dry. Capillary Refill: Capillary refill takes less than 2 seconds. Neurological:      General: No focal deficit present. Mental Status: She is alert. Psychiatric:         Mood and Affect: Mood normal.        Labs:  No results found for this or any previous visit (from the past 72 hour(s)). Data:    No results found. Assessment/Plan:    Principal Problem:  Lung nodules: Two  lung nodules were removed by Dr. Nuris Hale. Chest tube in place. Being managed by thoracic surgery. We will continue to monitor respiratory status    Active problems:  Anxiety: PT on home meds to control. Will resume home meds, as tolerated. Insomnia:  PT on home meds to control. Will resume home meds, as tolerated. COPD:  PT on home meds to control. Will resume home meds, as tolerated. GERD:  PT on home meds to control. Will resume home meds, as tolerated.     Electronically signed by ANA Sanches CNP on 11/29/22 at 8:51 PM EST    Dr. Deisi Cordova MD - supervising physician

## 2022-11-30 NOTE — DISCHARGE INSTRUCTIONS
No lifting more than 15 pounds or flying in an airplane for 3 weeks. No driving while taking narcotics. May remove surgical dressings 48 hours after discharge and shower. Recover incisions with simple gauze daily until healed.

## 2022-11-30 NOTE — PLAN OF CARE
Problem: Discharge Planning  Goal: Discharge to home or other facility with appropriate resources  11/30/2022 1104 by Seun King RN  Outcome: Completed  11/30/2022 0406 by Hilaria Hanna RN  Outcome: Progressing  Flowsheets  Taken 11/29/2022 2044 by Hilaria Hanna RN  Discharge to home or other facility with appropriate resources:   Identify barriers to discharge with patient and caregiver   Arrange for needed discharge resources and transportation as appropriate  Taken 11/29/2022 1942 by Javier Pichardo RN  Discharge to home or other facility with appropriate resources:   Identify barriers to discharge with patient and caregiver   Arrange for needed discharge resources and transportation as appropriate   Identify discharge learning needs (meds, wound care, etc)     Problem: Safety - Adult  Goal: Free from fall injury  11/30/2022 1104 by Seun King RN  Outcome: Completed  11/30/2022 0406 by Hilaria Hanna RN  Outcome: Progressing     Problem: Pain  Goal: Verbalizes/displays adequate comfort level or baseline comfort level  11/30/2022 1104 by Seun King RN  Outcome: Completed  11/30/2022 0406 by Hilaria Hanna RN  Outcome: Progressing

## 2022-11-30 NOTE — PROGRESS NOTES
Pt discharged home. F/u appts made by unit secretary. MD Brandi Khan made aware of one episode of blood tinge sputum . No concern should suspect for a few days. Orders sent to pts pharmacy.  Pt walked down to lobby to her ride

## 2022-11-30 NOTE — PROGRESS NOTES
[facility] Respiratory Therapy Evaluation   Current Order:  ALBUTEROL Q4 PRN      Home Regimen: PRN      Ordering Physician: Jesus Manuel Luna  Re-evaluation Date:  12/3     Diagnosis: SURGERY      Patient Status: Stable     The following MDI Criteria must be met in order to convert aerosol to MDI with spacer. If unable to meet, MDI will be converted to aerosol:  []  Patient able to demonstrate the ability to use MDI effectively  []  Patient alert and cooperative  []  Patient able to take deep breath with 5-10 second hold  []  Medication(s) available in this delivery method   []  Peak flow greater than or equal to 200 ml/min            Current Order Substituted To  (same drug, same frequency)   Aerosol to MDI [] Albuterol Sulfate 0.083% unit dose by aerosol Albuterol Sulfate MDI 2 puffs by inhalation with spacer    [] Levalbuterol 1.25 mg unit dose by aerosol Levalbuterol MDI 2 puffs by inhalation with spacer    [] Levalbuterol 0.63 mg unit dose by aerosol Levalbuterol MDI 2 puffs by inhalation with spacer    [] Ipratropium Bromide 0.02% unit dose by aerosol Ipratropium Bromide MDI 2 puffs by inhalation with spacer    [] Duoneb (Ipratropium + Albuterol) unit dose by aerosol Ipratropium MDI + Albuterol MDI 2 puffs by inhalation w/spacer   MDI to Aerosol [] Albuterol Sulfate MDI Albuterol Sulfate 0.083% unit dose by aerosol    [] Levalbuterol MDI 2 puffs by inhalation Levalbuterol 1.25 mg unit dose by aerosol    [] Ipratropium Bromide MDI by inhalation Ipratropium Bromide 0.02% unit dose by aerosol    [] Combivent (Ipratropium + Albuterol) MDI by inhalation Duoneb (Ipratropium + Albuterol) unit dose by aerosol       Treatment Assessment [Frequency/Schedule]:  Change frequency to: ___NO CHANGE_______________________________________________per Protocol, P&T, MEC      Points 0 1 2 3 4   Pulmonary Status  Non-Smoker  []   Smoking history   < 20 pack years  []   Smoking history  ?  20 pack years  [x]   Pulmonary Disorder  (acute or chronic)  []   Severe or Chronic w/ Exacerbation  []     Surgical Status No []   Surgeries     General [x]   Surgery Lower []   Abdominal Thoracic or []   Upper Abdominal Thoracic with  PulmonaryDisorder  []     Chest X-ray Clear/Not  Ordered     [x]  Chronic Changes  Results Pending  []  Infiltrates, atelectasis, pleural effusion, or edema  []  Infiltrates in more than one lobe []  Infiltrate + Atelectasis, &/or pleural effusion  []    Respiratory Pattern Regular,  RR = 12-20 [x]  Increased,  RR = 21-25 []  MUÑOZ, irregular,  or RR = 26-30 []  Decreased FEV1  or RR = 31-35 []  Severe SOB, use  of accessory muscles, or RR ? 35  []    Mental Status Alert, oriented,  Cooperative [x]  Confused but Follows commands []  Lethargic or unable to follow commands []  Obtunded  []  Comatose  []    Breath Sounds Clear to  auscultation  [x]  Decreased unilaterally or  in bases only []  Decreased  bilaterally  []  Crackles or intermittent wheezes []  Wheezes []    Cough Strong, Spontan., & nonproductive [x]  Strong,  spontaneous, &  productive []  Weak,  Nonproductive []  Weak, productive or  with wheezes []  No spontaneous  cough or may require suctioning []    Level of Activity Ambulatory [x]  Ambulatory w/ Assist  []  Non-ambulatory []  Paraplegic []  Quadriplegic []    Total    Score:____3___     Triage Score:____5____      Tri       Triage:     1. (>20) Freq: Q3    2. (16-20) Freq: Q4   3. (11-15) Freq: QID & Albuterol Q2 PRN    4. (6-10) Freq: TID & Albuterol Q2 PRN    5. (0-5) Freq Q4prn

## 2022-11-30 NOTE — PROGRESS NOTES
Admission assessment documented. A + O x 4. Right chest tube to water seal, with small amount of serous drainage. Clamps and Vaseline gauze at bedside. Respirations are even and unlabored. Medications taken well. Medicated for pain as per [facility] with (+) results. Oriented to room and call light system. Skin assessment done with Jose Nunez RN. No other needs offered at this time. 0600 - CT output - 10 ml's serous fluid. Uneventful night. BP (!) 117/53   Pulse 70   Temp 98.2 °F (36.8 °C) (Oral)   Resp 18   Ht 5' 0.67\" (1.541 m)   Wt 124 lb (56.2 kg)   LMP  (LMP Unknown)   SpO2 94%   BMI 23.69 kg/m²       .

## 2022-11-30 NOTE — CARE COORDINATION
[facility] EMERGENCY [facility] CENTER AT [place] Case Management Initial Discharge Assessment    Met with Patient to discuss discharge plan. PCP: SEGUNDO Larose                                Date of Last Visit:     [facility] Patient: No        [facility] Notified: no    If no PCP, list provided? N/A    Discharge Planning    Living Arrangements: independently at home    Who do you live with? ALONE    Who helps you with your care:  self    If lives at home:     Do you have any barriers navigating in your home? no    Patient can perform ADL? Yes    Current Services (outpatient and in home) :  None    Dialysis: No    Is transportation available to get to your appointments? Yes, PT DRIVES     DME Equipment:  no    Respiratory equipment: PRN/HS Oxygen 3 Liters and Nebulizer    Respiratory provider:  yes - MEDICAL SERVICES      Pharmacy:  yes - [address] [place] [address] with Medication Assistance Program?  No      Patient agreeable to [facility]? Declined    Patient agreeable to SNF/Rehab? Declined    Other discharge needs identified? N/A    Does Patient Have a High-Risk for Readmission Diagnosis (CHF, PN, MI, COPD)? No      Initial Discharge Plan? (Note: please see concurrent daily documentation for any updates after initial note). MET WITH PT AT BEDSIDE TO DISCUSS DISCHARGE PLANS. PT PLANS TO RETURN HOME AND DENIES NEEDS.      Readmission Risk              Risk of Unplanned Readmission:  8         Electronically signed by Colton Dakin, RN on 11/30/2022 at 8:37 AM

## 2022-11-30 NOTE — DISCHARGE SUMMARY
Physician Discharge Summary     Patient ID:  Natalee Joe  [id number]  57 y.o.  1960    Admit date: 11/29/2022    Discharge date and time: No discharge date for patient encounter. 11/30/2022    Admitting Physician: Mary Walker MD     Discharge Physician: Same    Admission Diagnoses: Lung nodules [R91.8]  Lung nodule, multiple [R91.8]    Discharge Diagnoses: Same    Admission Condition: good    Discharged Condition: good    Indication for Admission: Surgery    Hospital Course: Patient was admitted day of surgery for resection of 2 suspicious right lung nodules. The surgery was uneventful. By postoperative day 1 she had no air leak. The chest tube was removed and she was discharged to home.     Consults: none    Significant Diagnostic Studies: radiology: CXR: normal    Treatments: surgery: Right VATS wedge    Discharge Exam:  /68   Pulse 81   Temp 97.3 °F (36.3 °C) (Oral)   Resp 18   Ht 5' 0.67\" (1.541 m)   Wt 124 lb (56.2 kg)   LMP  (LMP Unknown)   SpO2 94%   BMI 23.69 kg/m²     General Appearance:    Alert, cooperative, no distress, appears stated age   Head:    Normocephalic, without obvious abnormality, atraumatic   Eyes:    PERRL, conjunctiva/corneas clear, EOM's intact, fundi     benign, both eyes   Ears:    Normal TM's and external ear canals, both ears   Nose:   Nares normal, septum midline, mucosa normal, no drainage    or sinus tenderness   Throat:   Lips, mucosa, and tongue normal; teeth and gums normal   Neck:   Supple, symmetrical, trachea midline, no adenopathy;     thyroid:  no enlargement/tenderness/nodules; no carotid    bruit or JVD   Back:     Symmetric, no curvature, ROM normal, no CVA tenderness   Lungs:     Clear to auscultation bilaterally, respirations unlabored   Chest Wall:    No tenderness or deformity    Heart:    Regular rate and rhythm, S1 and S2 normal, no murmur, rub   or gallop   Breast Exam:    No tenderness, masses, or nipple abnormality   Abdomen:     Soft, non-tender, bowel sounds active all four quadrants,     no masses, no organomegaly   Genitalia:    Normal female without lesion, discharge or tenderness   Rectal:    Normal tone ;guaiac negative stool   Extremities:   Extremities normal, atraumatic, no cyanosis or edema   Pulses:   2+ and symmetric all extremities   Skin:   Skin color, texture, turgor normal, no rashes or lesions   Lymph nodes:   Cervical, supraclavicular, and axillary nodes normal   Neurologic:   CNII-XII intact, normal strength, sensation and reflexes     throughout       Disposition: home    In process/preliminary results:  Outstanding Order Results       Date and Time Order Name Status Description    11/30/2022  9:28 AM XR CHEST PORTABLE In process     11/29/2022  1:47 PM Surgical Pathology In process             Patient Instructions:   Current Discharge Medication List        CONTINUE these medications which have NOT CHANGED    Details   busPIRone (BUSPAR) 15 MG tablet take 1 tablet by mouth three times a day  Qty: 45 tablet, Refills: 0    Comments: We are requesting this refill to have on file for next month s order. Associated Diagnoses: Mild single current episode of major depressive disorder ([facility] [address] CARTER (generalized anxiety disorder);  Depression, unspecified depression type      albuterol sulfate HFA (PROVENTIL;VENTOLIN;PROAIR) 108 (90 Base) MCG/ACT inhaler inhale 2 puffs by mouth every 4 hours if needed  Qty: 17 g, Refills: 2    Associated Diagnoses: Chronic obstructive pulmonary disease, unspecified COPD type (HCC)      ARIPiprazole (ABILIFY) 5 MG tablet take 1 tablet by mouth every morning  Qty: 30 tablet, Refills: 1    Associated Diagnoses: Mild single current episode of major depressive disorder (HCC)      fluticasone (FLONASE) 50 MCG/ACT nasal spray instill 1 spray into each nostril daily  Qty: 16 g, Refills: 3    Associated Diagnoses: Acute pansinusitis, recurrence not specified      traZODone (DESYREL) 100 MG tablet take 1 tablet by mouth nightly  Qty: 30 tablet, Refills: 3    Associated Diagnoses: Insomnia, unspecified type      omeprazole (PRILOSEC) 20 MG delayed release capsule take 1 capsule by mouth once daily  Qty: 90 capsule, Refills: 1    Associated Diagnoses: Gastroesophageal reflux disease without esophagitis      Fluticasone-Umeclidin-Vilant (TRELEGY ELLIPTA) 200-62.5-25 MCG/INH AEPB Inhale 1 puff into the lungs daily  Qty: 1 each, Refills: 3    Associated Diagnoses: Chronic obstructive pulmonary disease, unspecified COPD type (HCC)      ondansetron (ZOFRAN ODT) 4 MG disintegrating tablet Take 1 tablet by mouth every 8 hours as needed for Nausea or Vomiting  Qty: 15 tablet, Refills: 1      atorvastatin (LIPITOR) 20 MG tablet take 1 tablet by mouth once daily  Qty: 90 tablet, Refills: 3    Associated Diagnoses: Hyperlipidemia, unspecified hyperlipidemia type      PARoxetine (PAXIL) 40 MG tablet Take 1 tablet by mouth every morning  Qty: 90 tablet, Refills: 3    Associated Diagnoses: Mild single current episode of major depressive disorder ([facility] [address] CARTER (generalized anxiety disorder); Depression, unspecified depression type      Respiratory Therapy Supplies (NEBULIZER/TUBING/MOUTHPIECE) KIT 1 kit by Does not apply route daily as needed (wheezing)  Qty: 1 kit, Refills: 0    Associated Diagnoses: 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 Letty Teran in 3 weeks.     Signed:  Dr. Letty Teran  11/30/2022  10:23 AM

## 2022-12-02 ENCOUNTER — TELEPHONE (OUTPATIENT)
Dept: FAMILY MEDICINE CLINIC | Age: 62
End: 2022-12-02

## 2022-12-02 NOTE — TELEPHONE ENCOUNTER
Care Transitions Initial Follow Up Call    Outreach made within 2 business days of discharge: Yes    Patient: Romario Abreu Patient : 1960   MRN: [id number]  Reason for Admission: There are no discharge diagnoses documented for the most recent discharge. Discharge Date: 22       Spoke with: Pt was called and left message to call back.      Discharge department/facility: 4W  Scheduled appointment with PCP within 7-14 days    Follow Up  Future Appointments   Date Time Provider Macy Veronica   2022  9:00 AM [address] [place] [address]   2022 10:15 AM Amna Hillman MD [address]   3/15/2023  9:30 AM [place] CT ROOM 1 ML CT [facility] Fac RAD   3/15/2023 10:00 AM Amna Hillman MD [facility] EMERGENCY MEDICAL CENTER AT [place], [place]

## 2022-12-21 ENCOUNTER — HOSPITAL ENCOUNTER (OUTPATIENT)
Dept: GENERAL RADIOLOGY | Age: 62
Discharge: HOME | End: 2022-12-23
Payer: MEDICARE

## 2022-12-21 ENCOUNTER — OFFICE VISIT (OUTPATIENT)
Dept: CARDIOTHORACIC SURGERY | Age: 62
End: 2022-12-21

## 2022-12-21 VITALS — WEIGHT: 125 LBS | HEART RATE: 104 BPM | BODY MASS INDEX: 24.54 KG/M2 | HEIGHT: 60 IN | OXYGEN SATURATION: 92 %

## 2022-12-21 DIAGNOSIS — R91.1 PULMONARY NODULE: Primary | ICD-10-CM

## 2022-12-21 DIAGNOSIS — Z09 STATUS POST LUNG SURGERY, FOLLOW-UP EXAM: ICD-10-CM

## 2022-12-21 PROCEDURE — 71045 X-RAY EXAM CHEST 1 VIEW: CPT

## 2022-12-21 PROCEDURE — 99024 POSTOP FOLLOW-UP VISIT: CPT | Performed by: THORACIC SURGERY (CARDIOTHORACIC VASCULAR SURGERY)

## 2022-12-21 NOTE — PATIENT INSTRUCTIONS
No restrictions from surgery. Patient will go back to her normal CAT scan follow-up for her carcinoid history.

## 2022-12-21 NOTE — PROGRESS NOTES
Patient is 3 weeks out from a right VATS wedge resection of lung nodules. These turned out to be intraparenchymal lymph nodes with no evidence of carcinoid tumor. Patient has done well. She denies any pain or shortness of breath. Lungs are clear bilaterally. Incisions are well-healed. Chest x-ray shows no pneumothorax or effusion. Excellent healing after wedge resection of benign nodules. From a surgical standpoint she has no restrictions. She will get back to her normal scheduled surveillance CAT scans.

## 2023-01-05 DIAGNOSIS — F32.0 MILD SINGLE CURRENT EPISODE OF MAJOR DEPRESSIVE DISORDER (HCC): ICD-10-CM

## 2023-01-05 DIAGNOSIS — G47.00 INSOMNIA, UNSPECIFIED TYPE: ICD-10-CM

## 2023-01-06 RX ORDER — ARIPIPRAZOLE 5 MG/1
TABLET ORAL
Qty: 30 TABLET | Refills: 0 | Status: SHIPPED | OUTPATIENT
Start: 2023-01-06

## 2023-01-06 RX ORDER — TRAZODONE HYDROCHLORIDE 100 MG/1
100 TABLET ORAL NIGHTLY
Qty: 30 TABLET | Refills: 0 | Status: SHIPPED | OUTPATIENT
Start: 2023-01-06

## 2023-01-06 NOTE — TELEPHONE ENCOUNTER
Comments:     Last Office Visit (last PCP visit):   1/25/2022    Next Visit Date:  Future Appointments   Date Time Provider Macy Veronica   3/15/2023  9:30 AM LIVE CT ROOM 1 [facility]LO[facility] CT [facility] Fac RAD   3/15/2023 10:00 AM MD [facility]       **If hasn't been seen in over a year OR hasn't followed up according to last diabetes/ADHD visit, make appointment for patient before sending refill to provider.     Rx requested:  Requested Prescriptions     Pending Prescriptions Disp Refills    ARIPiprazole (ABILIFY) 5 MG tablet [Pharmacy Med Name: ARIPIPRAZOLE 5 MG TABLET] 30 tablet 1     Sig: take 1 tablet by mouth every morning    traZODone (DESYREL) 100 MG tablet 30 tablet 3     Sig: Take 1 tablet by mouth nightly

## 2023-01-18 ENCOUNTER — TELEPHONE (OUTPATIENT)
Dept: INTERNAL MEDICINE | Age: 63
End: 2023-01-18

## 2023-01-23 DIAGNOSIS — F32.A DEPRESSION, UNSPECIFIED DEPRESSION TYPE: ICD-10-CM

## 2023-01-23 DIAGNOSIS — F32.0 MILD SINGLE CURRENT EPISODE OF MAJOR DEPRESSIVE DISORDER (HCC): ICD-10-CM

## 2023-01-23 DIAGNOSIS — F41.1 GAD (GENERALIZED ANXIETY DISORDER): ICD-10-CM

## 2023-01-24 NOTE — TELEPHONE ENCOUNTER
Comments: Will call patient to schedule    Last Office Visit (last PCP visit):   1/25/2022    Next Visit Date:  Future Appointments   Date Time Provider Macy Glenys   3/15/2023  9:30 AM LIVE CT ROOM 1 MLOZ CT [facility] Fac RAD   3/15/2023 10:00 AM Jessica Yap MD [facility] EMERGENCY [facility] AT [place]       **If hasn't been seen in over a year OR hasn't followed up according to last diabetes/ADHD visit, make appointment for patient before sending refill to provider.     Rx requested:  Requested Prescriptions     Pending Prescriptions Disp Refills    busPIRone (BUSPAR) 15 MG tablet [Pharmacy Med Name: BUSPIRONE HCL 15 MG TABLET] 270 tablet      Sig: take 1 tablet by mouth three times a day

## 2023-01-27 RX ORDER — BUSPIRONE HYDROCHLORIDE 15 MG/1
TABLET ORAL
Qty: 270 TABLET | Refills: 0 | Status: SHIPPED | OUTPATIENT
Start: 2023-01-27 | End: 2023-03-13 | Stop reason: DRUGHIGH

## 2023-02-01 DIAGNOSIS — J44.9 CHRONIC OBSTRUCTIVE PULMONARY DISEASE, UNSPECIFIED COPD TYPE (HCC): ICD-10-CM

## 2023-02-01 NOTE — TELEPHONE ENCOUNTER
Comments:     Last Office Visit (last PCP visit):   1/25/2022    Next Visit Date:  Future Appointments   Date Time Provider Macy Glenys   3/15/2023  9:30 AM LIVE CT ROOM 1 MLOZ CT [facility] Fac RAD   3/15/2023 10:00 AM Simon Gomez MD CAR[facility]YN THORACIC [facility] EMERGENCY [facility] AT [place]       **If hasn't been seen in over a year OR hasn't followed up according to last diabetes/ADHD visit, make appointment for patient before sending refill to provider.     Rx requested:  Requested Prescriptions     Pending Prescriptions Disp Refills    albuterol sulfate HFA (PROVENTIL;VENTOLIN;PROAIR) 108 (90 Base) MCG/ACT inhaler [Pharmacy Med Name: ALBUTEROL HFA 90 MCG INHALER] 17 g 2     Sig: inhale 2 puffs by mouth and INTO THE LUNGS every 4 hours if needed

## 2023-02-02 RX ORDER — ALBUTEROL SULFATE 90 UG/1
AEROSOL, METERED RESPIRATORY (INHALATION)
Qty: 17 G | Refills: 5 | Status: SHIPPED | OUTPATIENT
Start: 2023-02-02

## 2023-02-04 DIAGNOSIS — K21.9 GASTROESOPHAGEAL REFLUX DISEASE WITHOUT ESOPHAGITIS: ICD-10-CM

## 2023-02-06 RX ORDER — OMEPRAZOLE 20 MG/1
CAPSULE, DELAYED RELEASE ORAL
Qty: 30 CAPSULE | Refills: 0 | Status: SHIPPED | OUTPATIENT
Start: 2023-02-06 | End: 2023-03-13 | Stop reason: SDUPTHER

## 2023-02-06 NOTE — TELEPHONE ENCOUNTER
Comments: [employer] message sent to schedule appointment    Last Office Visit (last PCP visit):   1/25/2022    Next Visit Date:  Future Appointments   Date Time Provider Macy Veronica   3/15/2023  9:30 AM LIVE CT ROOM 1 MLOZ CT [facility] Fac RAD   3/15/2023 10:00 AM Luis Sanford MD CAR[facility]YN THORACIC [facility] EMERGENCY [facility] AT [place]       **If hasn't been seen in over a year OR hasn't followed up according to last diabetes/ADHD visit, make appointment for patient before sending refill to provider.     Rx requested:  Requested Prescriptions     Pending Prescriptions Disp Refills    omeprazole (PRILOSEC) 20 MG delayed release capsule [Pharmacy Med Name: OMEPRAZOLE DR 20 MG CAPSULE] 90 capsule 1     Sig: take 1 capsule by mouth once daily

## 2023-03-03 DIAGNOSIS — F32.0 MILD SINGLE CURRENT EPISODE OF MAJOR DEPRESSIVE DISORDER (HCC): ICD-10-CM

## 2023-03-03 DIAGNOSIS — F32.A DEPRESSION, UNSPECIFIED DEPRESSION TYPE: ICD-10-CM

## 2023-03-03 DIAGNOSIS — F41.1 GAD (GENERALIZED ANXIETY DISORDER): ICD-10-CM

## 2023-03-05 RX ORDER — PAROXETINE HYDROCHLORIDE 40 MG/1
40 TABLET, FILM COATED ORAL EVERY MORNING
Qty: 90 TABLET | Refills: 3 | OUTPATIENT
Start: 2023-03-05

## 2023-03-12 DIAGNOSIS — F32.A DEPRESSION, UNSPECIFIED DEPRESSION TYPE: ICD-10-CM

## 2023-03-12 DIAGNOSIS — F41.1 GAD (GENERALIZED ANXIETY DISORDER): ICD-10-CM

## 2023-03-12 DIAGNOSIS — F32.0 MILD SINGLE CURRENT EPISODE OF MAJOR DEPRESSIVE DISORDER (HCC): ICD-10-CM

## 2023-03-13 ENCOUNTER — OFFICE VISIT (OUTPATIENT)
Dept: INTERNAL MEDICINE | Age: 63
End: 2023-03-13
Payer: MEDICARE

## 2023-03-13 VITALS
HEIGHT: 60 IN | BODY MASS INDEX: 24.54 KG/M2 | DIASTOLIC BLOOD PRESSURE: 84 MMHG | OXYGEN SATURATION: 98 % | RESPIRATION RATE: 16 BRPM | HEART RATE: 87 BPM | TEMPERATURE: 96.8 F | WEIGHT: 125 LBS | SYSTOLIC BLOOD PRESSURE: 132 MMHG

## 2023-03-13 DIAGNOSIS — Z00.00 MEDICARE ANNUAL WELLNESS VISIT, SUBSEQUENT: Primary | ICD-10-CM

## 2023-03-13 DIAGNOSIS — E78.5 HYPERLIPIDEMIA, UNSPECIFIED HYPERLIPIDEMIA TYPE: ICD-10-CM

## 2023-03-13 DIAGNOSIS — F41.1 GAD (GENERALIZED ANXIETY DISORDER): ICD-10-CM

## 2023-03-13 DIAGNOSIS — Z12.11 SCREENING FOR COLON CANCER: ICD-10-CM

## 2023-03-13 DIAGNOSIS — Z12.31 SCREENING MAMMOGRAM, ENCOUNTER FOR: ICD-10-CM

## 2023-03-13 DIAGNOSIS — G47.00 INSOMNIA, UNSPECIFIED TYPE: ICD-10-CM

## 2023-03-13 DIAGNOSIS — F32.A DEPRESSION, UNSPECIFIED DEPRESSION TYPE: ICD-10-CM

## 2023-03-13 DIAGNOSIS — K21.9 GASTROESOPHAGEAL REFLUX DISEASE WITHOUT ESOPHAGITIS: ICD-10-CM

## 2023-03-13 DIAGNOSIS — F32.0 MILD SINGLE CURRENT EPISODE OF MAJOR DEPRESSIVE DISORDER (HCC): ICD-10-CM

## 2023-03-13 DIAGNOSIS — R73.09 ELEVATED HEMOGLOBIN A1C: ICD-10-CM

## 2023-03-13 DIAGNOSIS — Z87.891 PERSONAL HISTORY OF TOBACCO USE, PRESENTING HAZARDS TO HEALTH: ICD-10-CM

## 2023-03-13 DIAGNOSIS — J44.9 CHRONIC OBSTRUCTIVE PULMONARY DISEASE, UNSPECIFIED COPD TYPE (HCC): ICD-10-CM

## 2023-03-13 PROCEDURE — G0439 PPPS, SUBSEQ VISIT: HCPCS | Performed by: PHYSICIAN ASSISTANT

## 2023-03-13 PROCEDURE — 99406 BEHAV CHNG SMOKING 3-10 MIN: CPT | Performed by: PHYSICIAN ASSISTANT

## 2023-03-13 PROCEDURE — G8484 FLU IMMUNIZE NO ADMIN: HCPCS | Performed by: PHYSICIAN ASSISTANT

## 2023-03-13 PROCEDURE — 3017F COLORECTAL CA SCREEN DOC REV: CPT | Performed by: PHYSICIAN ASSISTANT

## 2023-03-13 RX ORDER — ATORVASTATIN CALCIUM 20 MG/1
20 TABLET, FILM COATED ORAL NIGHTLY
Qty: 90 TABLET | Refills: 3 | Status: SHIPPED | OUTPATIENT
Start: 2023-03-13

## 2023-03-13 RX ORDER — FLUTICASONE FUROATE, UMECLIDINIUM BROMIDE AND VILANTEROL TRIFENATATE 200; 62.5; 25 UG/1; UG/1; UG/1
1 POWDER RESPIRATORY (INHALATION) DAILY
Qty: 1 EACH | Refills: 5 | Status: SHIPPED | OUTPATIENT
Start: 2023-03-13

## 2023-03-13 RX ORDER — PAROXETINE HYDROCHLORIDE 40 MG/1
40 TABLET, FILM COATED ORAL EVERY MORNING
Qty: 90 TABLET | Refills: 3 | Status: CANCELLED | OUTPATIENT
Start: 2023-03-13

## 2023-03-13 RX ORDER — PAROXETINE HYDROCHLORIDE 40 MG/1
40 TABLET, FILM COATED ORAL EVERY MORNING
Qty: 90 TABLET | Refills: 3 | OUTPATIENT
Start: 2023-03-13

## 2023-03-13 RX ORDER — OMEPRAZOLE 20 MG/1
CAPSULE, DELAYED RELEASE ORAL
Qty: 90 CAPSULE | Refills: 1 | Status: SHIPPED | OUTPATIENT
Start: 2023-03-13

## 2023-03-13 RX ORDER — PAROXETINE HYDROCHLORIDE 40 MG/1
40 TABLET, FILM COATED ORAL EVERY MORNING
Qty: 90 TABLET | Refills: 3 | Status: SHIPPED | OUTPATIENT
Start: 2023-03-13

## 2023-03-13 RX ORDER — BUSPIRONE HYDROCHLORIDE 10 MG/1
10 TABLET ORAL 2 TIMES DAILY
Qty: 180 TABLET | Refills: 1 | Status: SHIPPED | OUTPATIENT
Start: 2023-03-13 | End: 2023-04-12

## 2023-03-13 RX ORDER — TRAZODONE HYDROCHLORIDE 100 MG/1
100 TABLET ORAL NIGHTLY PRN
Qty: 30 TABLET | Refills: 5 | Status: SHIPPED | OUTPATIENT
Start: 2023-03-13

## 2023-03-13 SDOH — ECONOMIC STABILITY: HOUSING INSECURITY
IN THE LAST 12 MONTHS, WAS THERE A TIME WHEN YOU DID NOT HAVE A STEADY PLACE TO SLEEP OR SLEPT IN A SHELTER (INCLUDING NOW)?: NO

## 2023-03-13 SDOH — ECONOMIC STABILITY: INCOME INSECURITY: HOW HARD IS IT FOR YOU TO PAY FOR THE VERY BASICS LIKE FOOD, HOUSING, MEDICAL CARE, AND HEATING?: NOT HARD AT ALL

## 2023-03-13 SDOH — ECONOMIC STABILITY: FOOD INSECURITY: WITHIN THE PAST 12 MONTHS, YOU WORRIED THAT YOUR FOOD WOULD RUN OUT BEFORE YOU GOT MONEY TO BUY MORE.: NEVER TRUE

## 2023-03-13 SDOH — ECONOMIC STABILITY: FOOD INSECURITY: WITHIN THE PAST 12 MONTHS, THE FOOD YOU BOUGHT JUST DIDN'T LAST AND YOU DIDN'T HAVE MONEY TO GET MORE.: NEVER TRUE

## 2023-03-13 ASSESSMENT — PATIENT HEALTH QUESTIONNAIRE - PHQ9
9. THOUGHTS THAT YOU WOULD BE BETTER OFF DEAD, OR OF HURTING YOURSELF: 0
SUM OF ALL RESPONSES TO PHQ QUESTIONS 1-9: 20
2. FEELING DOWN, DEPRESSED OR HOPELESS: 3
10. IF YOU CHECKED OFF ANY PROBLEMS, HOW DIFFICULT HAVE THESE PROBLEMS MADE IT FOR YOU TO DO YOUR WORK, TAKE CARE OF THINGS AT HOME, OR GET ALONG WITH OTHER PEOPLE: 2
SUM OF ALL RESPONSES TO PHQ9 QUESTIONS 1 & 2: 6
SUM OF ALL RESPONSES TO PHQ QUESTIONS 1-9: 20
3. TROUBLE FALLING OR STAYING ASLEEP: 3
1. LITTLE INTEREST OR PLEASURE IN DOING THINGS: 3
7. TROUBLE CONCENTRATING ON THINGS, SUCH AS READING THE NEWSPAPER OR WATCHING TELEVISION: 3
4. FEELING TIRED OR HAVING LITTLE ENERGY: 3
8. MOVING OR SPEAKING SO SLOWLY THAT OTHER PEOPLE COULD HAVE NOTICED. OR THE OPPOSITE, BEING SO FIGETY OR RESTLESS THAT YOU HAVE BEEN MOVING AROUND A LOT MORE THAN USUAL: 0
SUM OF ALL RESPONSES TO PHQ QUESTIONS 1-9: 20
SUM OF ALL RESPONSES TO PHQ QUESTIONS 1-9: 20
6. FEELING BAD ABOUT YOURSELF - OR THAT YOU ARE A FAILURE OR HAVE LET YOURSELF OR YOUR FAMILY DOWN: 2
5. POOR APPETITE OR OVEREATING: 3

## 2023-03-13 ASSESSMENT — LIFESTYLE VARIABLES
HOW MANY STANDARD DRINKS CONTAINING ALCOHOL DO YOU HAVE ON A TYPICAL DAY: PATIENT DOES NOT DRINK
HOW OFTEN DO YOU HAVE A DRINK CONTAINING ALCOHOL: NEVER

## 2023-03-13 ASSESSMENT — COLUMBIA-SUICIDE SEVERITY RATING SCALE - C-SSRS
6. HAVE YOU EVER DONE ANYTHING, STARTED TO DO ANYTHING, OR PREPARED TO DO ANYTHING TO END YOUR LIFE?: NO
2. HAVE YOU ACTUALLY HAD ANY THOUGHTS OF KILLING YOURSELF?: NO
1. WITHIN THE PAST MONTH, HAVE YOU WISHED YOU WERE DEAD OR WISHED YOU COULD GO TO SLEEP AND NOT WAKE UP?: NO

## 2023-03-13 NOTE — TELEPHONE ENCOUNTER
Comments: LVM for pt to call to schedule annual exam    Last Office Visit (last PCP visit):   11/10/2021    Next Visit Date:  Future Appointments   Date Time Provider Macy Veronica   3/15/2023  9:30 AM [place] CT ROOM 1 [facility]LOZ CT [facility] Fac RAD   3/15/2023 10:00 AM Jorge Francis MD [facility] AT [place]       **If hasn't been seen in over a year OR hasn't followed up according to last diabetes/ADHD visit, make appointment for patient before sending refill to provider.     Rx requested:  Requested Prescriptions     Pending Prescriptions Disp Refills    PARoxetine (PAXIL) 40 MG tablet [Pharmacy Med Name: PAROXETINE HCL 40 MG TABLET] 90 tablet 3     Sig: take 1 tablet by mouth every morning

## 2023-03-13 NOTE — PROGRESS NOTES
R-5Normal  -      Controlled continue Trelegy, Adderall as needed  -      Needs to stop smoking    Personal history of tobacco use, presenting hazards to health  -     [place] Smoking and Tobacco Use Cessation (Intermediate): 3-10 MINUTES [70296]    Screening for colon cancer  -     Fecal DNA Colorectal cancer screening (Cologuard)    Screening mammogram, encounter for  -     [facility] BIJU DIGITAL SCREEN BILATERAL; Future      Recommendations for Preventive Services Due: see orders and patient instructions/AVS.  Recommended screening schedule for the next 5-10 years is provided to the patient in written form: see Patient Instructions/AVS.     Return in 4 weeks (on 4/10/2023) for follow  up anxiety and depression. then Medicare Annual Wellness Visit in 1 year. Subjective       Patient's complete Health Risk Assessment and screening values have been reviewed and are found in Flowsheets. The following problems were reviewed today and where indicated follow up appointments were made and/or referrals ordered. Positive Risk Factor Screenings with Interventions:    Fall Risk:  Do you feel unsteady or are you worried about falling? : (!) yes  2 or more falls in past year?: no  Fall with injury in past year?: no     Interventions:    Patient comments: due to shakiness, missed Paxil dose      Depression:  PHQ-2 Score: 6  PHQ-9 Total Score: 20    Interpretation:   1-4 = minimal  5-9 = mild  10-14 = moderate  15-19 = moderately severe  20-27 = severe  Interventions:  Patient comments: states no emotions, on too much medication.   Dose changed today   Will see her back in 4 weeks       Drug Use:    DAST-10 Score: 0     Interpretation:  1-2: Low level - Monitor, re-assess at a later date  3-5: Moderate level - Further Investigation  6-8: Substantial level - Intensive Assessment  9-10: Severe level - Intensive Assessment    Interventions:  Patient comments: smokes \"pot\" , Patient declined any further intervention or treatment

## 2023-03-13 NOTE — PATIENT INSTRUCTIONS
Christian, or other Gnosticist group. Ask your family and friends. Ask people who have the same health concerns. Go online. Forums and blogs let you read messages from others and leave your own messages. You can exchange stories, vent your frustrations, and ask and answer questions. Contact a city, state, or national group that provides support for your health concerns. Your library or community center may have a list of these groups. Or you can look for information online. Look for a support group that works for you. Ask yourself if you prefer structure and would like a , or if you would like a less formal group. Do you prefer face-to-face meetings? Or do you feel more secure in online chat rooms or forums? Supportive relationships  A supportive relationship includes emotional support such as love, trust, and understanding, as well as advice and concrete help, such as help managing your time. Reach out to others  Family and friends can help you. Ask them to:  Listen to you and give you encouragement. This can keep you from feeling hopeless or alone. Help with small daily tasks or with bigger problems. A helping hand can keep you from feeling overwhelmed. Help you manage a health problem. For example, ask them to go to doctor visits with you. Your loved ones can offer support by being involved in your medical care. Respect your relationships  A good relationship is also a two-way street. You count on help from others, but they also count on you. Know your friends' limits. You don't have to see or call your friends every day. If you are going through a rough patch, ask friends if you can contact them outside of the usual boundaries. Don't always complain or talk about yourself. Know when it's time to stop talking and listen or just enjoy your friend's company. Know that good friends can be a bad influence.  For example, if a friend encourages you to drink when you know it will harm you, you may

## 2023-03-13 NOTE — TELEPHONE ENCOUNTER
Comments:     Last Office Visit (last PCP visit):   1/25/2022    Next Visit Date:  Future Appointments   Date Time Provider Macy Veronica   3/13/2023  4:00  [facility], [address]   3/15/2023  9:30 AM [place] CT ROOM 1 [facility] RAD   3/15/2023 10:00 AM Kizzy James MD CAR[facility]YN [facility]ORACIC [facility] EMERGENCY [facility] AT [place]       **If hasn't been seen in over a year OR hasn't followed up according to last diabetes/ADHD visit, make appointment for patient before sending refill to provider.     Rx requested:  Requested Prescriptions     Pending Prescriptions Disp Refills    PARoxetine (PAXIL) 40 MG tablet 90 tablet 3     Sig: Take 1 tablet by mouth every morning

## 2023-04-10 ENCOUNTER — OFFICE VISIT (OUTPATIENT)
Dept: INTERNAL MEDICINE | Age: 63
End: 2023-04-10
Payer: MEDICARE

## 2023-04-10 VITALS
DIASTOLIC BLOOD PRESSURE: 66 MMHG | HEART RATE: 74 BPM | OXYGEN SATURATION: 98 % | SYSTOLIC BLOOD PRESSURE: 110 MMHG | BODY MASS INDEX: 24.94 KG/M2 | WEIGHT: 127 LBS | TEMPERATURE: 97.5 F | RESPIRATION RATE: 16 BRPM | HEIGHT: 60 IN

## 2023-04-10 DIAGNOSIS — M54.2 NECK PAIN, ACUTE: ICD-10-CM

## 2023-04-10 DIAGNOSIS — F32.A ANXIETY AND DEPRESSION: Primary | ICD-10-CM

## 2023-04-10 DIAGNOSIS — U07.1 COVID: ICD-10-CM

## 2023-04-10 DIAGNOSIS — F41.9 ANXIETY AND DEPRESSION: Primary | ICD-10-CM

## 2023-04-10 LAB
Lab: ABNORMAL
PERFORMING INSTRUMENT: ABNORMAL
QC PASS/FAIL: ABNORMAL
SARS-COV-2, POC: DETECTED

## 2023-04-10 PROCEDURE — 4004F PT TOBACCO SCREEN RCVD TLK: CPT | Performed by: PHYSICIAN ASSISTANT

## 2023-04-10 PROCEDURE — G8420 CALC BMI NORM PARAMETERS: HCPCS | Performed by: PHYSICIAN ASSISTANT

## 2023-04-10 PROCEDURE — 3017F COLORECTAL CA SCREEN DOC REV: CPT | Performed by: PHYSICIAN ASSISTANT

## 2023-04-10 PROCEDURE — G8427 DOCREV CUR MEDS BY ELIG CLIN: HCPCS | Performed by: PHYSICIAN ASSISTANT

## 2023-04-10 PROCEDURE — 99213 OFFICE O/P EST LOW 20 MIN: CPT | Performed by: PHYSICIAN ASSISTANT

## 2023-04-10 PROCEDURE — 87426 SARSCOV CORONAVIRUS AG IA: CPT | Performed by: PHYSICIAN ASSISTANT

## 2023-04-10 RX ORDER — METHYLPREDNISOLONE 4 MG/1
TABLET ORAL
Qty: 1 KIT | Refills: 0 | Status: SHIPPED | OUTPATIENT
Start: 2023-04-10 | End: 2023-04-16

## 2023-04-10 ASSESSMENT — ENCOUNTER SYMPTOMS
VOMITING: 0
NAUSEA: 0
BACK PAIN: 1
ABDOMINAL PAIN: 0

## 2023-04-20 DIAGNOSIS — E78.5 HYPERLIPIDEMIA, UNSPECIFIED HYPERLIPIDEMIA TYPE: ICD-10-CM

## 2023-04-21 ENCOUNTER — TELEPHONE (OUTPATIENT)
Dept: INTERNAL MEDICINE | Age: 63
End: 2023-04-21

## 2023-04-21 ENCOUNTER — HOSPITAL ENCOUNTER (OUTPATIENT)
Dept: CT IMAGING | Age: 63
End: 2023-04-21
Payer: MEDICARE

## 2023-04-21 ENCOUNTER — OFFICE VISIT (OUTPATIENT)
Dept: CARDIOTHORACIC SURGERY | Age: 63
End: 2023-04-21
Payer: MEDICARE

## 2023-04-21 VITALS — WEIGHT: 127 LBS | BODY MASS INDEX: 24.94 KG/M2 | HEART RATE: 92 BPM | OXYGEN SATURATION: 94 % | HEIGHT: 60 IN

## 2023-04-21 DIAGNOSIS — R91.1 PULMONARY NODULE: ICD-10-CM

## 2023-04-21 DIAGNOSIS — C7A.090 ATYPICAL CARCINOID LUNG TUMOR (HCC): Primary | ICD-10-CM

## 2023-04-21 PROCEDURE — G8427 DOCREV CUR MEDS BY ELIG CLIN: HCPCS | Performed by: THORACIC SURGERY (CARDIOTHORACIC VASCULAR SURGERY)

## 2023-04-21 PROCEDURE — 71250 CT THORAX DX C-: CPT

## 2023-04-21 PROCEDURE — 99203 OFFICE O/P NEW LOW 30 MIN: CPT | Performed by: THORACIC SURGERY (CARDIOTHORACIC VASCULAR SURGERY)

## 2023-04-21 PROCEDURE — 3017F COLORECTAL CA SCREEN DOC REV: CPT | Performed by: THORACIC SURGERY (CARDIOTHORACIC VASCULAR SURGERY)

## 2023-04-21 PROCEDURE — G8420 CALC BMI NORM PARAMETERS: HCPCS | Performed by: THORACIC SURGERY (CARDIOTHORACIC VASCULAR SURGERY)

## 2023-04-21 PROCEDURE — 4004F PT TOBACCO SCREEN RCVD TLK: CPT | Performed by: THORACIC SURGERY (CARDIOTHORACIC VASCULAR SURGERY)

## 2023-04-21 ASSESSMENT — ENCOUNTER SYMPTOMS
VOICE CHANGE: 0
SHORTNESS OF BREATH: 0
ABDOMINAL PAIN: 0
CHOKING: 0
TROUBLE SWALLOWING: 0
CHEST TIGHTNESS: 0
VOMITING: 0
SORE THROAT: 0
STRIDOR: 0
WHEEZING: 0
ABDOMINAL DISTENTION: 0
NAUSEA: 0
DIARRHEA: 0
COUGH: 0

## 2023-04-21 NOTE — PROGRESS NOTES
not appreciate any new lung nodules or adenopathy. There are stable postoperative changes in the left lung. She has a stable calcified nodule at the base of the right lung. Her other nodules have been removed. Assessment/Plan     No sign of recurrent atypical carcinoid tumor which is over 4 years of follow-up. I recommend follow-up CAT scan in 1 year. Diagnosis Orders   1. Atypical carcinoid lung tumor ([facility] [address]          No orders of the defined types were placed in this encounter. No orders of the defined types were placed in this encounter. Return in about 1 year (around 4/21/2024) for Surveillance CAT scan.       Naomi Rasmussen MD

## 2023-04-24 RX ORDER — ATORVASTATIN CALCIUM 20 MG/1
TABLET, FILM COATED ORAL
Qty: 30 TABLET | Refills: 0 | Status: SHIPPED | OUTPATIENT
Start: 2023-04-24

## 2023-04-26 DIAGNOSIS — E78.5 HYPERLIPIDEMIA, UNSPECIFIED HYPERLIPIDEMIA TYPE: ICD-10-CM

## 2023-04-26 DIAGNOSIS — R73.09 ELEVATED HEMOGLOBIN A1C: ICD-10-CM

## 2023-04-26 DIAGNOSIS — Z00.00 MEDICARE ANNUAL WELLNESS VISIT, SUBSEQUENT: ICD-10-CM

## 2023-04-26 LAB
ALBUMIN SERPL-MCNC: 4.3 G/DL (ref 3.5–4.6)
ALP SERPL-CCNC: 120 U/L (ref 40–130)
ALT SERPL-CCNC: 19 U/L (ref 0–33)
ANION GAP SERPL CALCULATED.3IONS-SCNC: 12 MEQ/L (ref 9–15)
AST SERPL-CCNC: 21 U/L (ref 0–35)
BASOPHILS # BLD: 0 K/UL (ref 0–0.2)
BASOPHILS NFR BLD: 0.2 %
BILIRUB SERPL-MCNC: 0.5 MG/DL (ref 0.2–0.7)
BUN SERPL-MCNC: 8 MG/DL (ref 8–23)
CALCIUM SERPL-MCNC: 9.7 MG/DL (ref 8.5–9.9)
CHLORIDE SERPL-SCNC: 104 MEQ/L (ref 95–107)
CHOLEST SERPL-MCNC: 229 MG/DL (ref 0–199)
CO2 SERPL-SCNC: 27 MEQ/L (ref 20–31)
CREAT SERPL-MCNC: 0.92 MG/DL (ref 0.5–0.9)
EOSINOPHIL # BLD: 0.2 K/UL (ref 0–0.7)
EOSINOPHIL NFR BLD: 3.9 %
ERYTHROCYTE [DISTWIDTH] IN BLOOD BY AUTOMATED COUNT: 15.6 % (ref 11.5–14.5)
GLOBULIN SER CALC-MCNC: 2.3 G/DL (ref 2.3–3.5)
GLUCOSE SERPL-MCNC: 94 MG/DL (ref 70–99)
HBA1C MFR BLD: 5.8 % (ref 4.8–5.9)
HCT VFR BLD AUTO: 42 % (ref 37–47)
HDLC SERPL-MCNC: 55 MG/DL (ref 40–59)
HGB BLD-MCNC: 14.2 G/DL (ref 12–16)
LDLC SERPL CALC-MCNC: 147 MG/DL (ref 0–129)
LYMPHOCYTES # BLD: 2 K/UL (ref 1–4.8)
LYMPHOCYTES NFR BLD: 33.1 %
MCH RBC QN AUTO: 31.4 PG (ref 27–31.3)
MCHC RBC AUTO-ENTMCNC: 33.9 % (ref 33–37)
MCV RBC AUTO: 92.8 FL (ref 79.4–94.8)
MONOCYTES # BLD: 0.5 K/UL (ref 0.2–0.8)
MONOCYTES NFR BLD: 7.5 %
NEUTROPHILS # BLD: 3.4 K/UL (ref 1.4–6.5)
NEUTS SEG NFR BLD: 55.3 %
PLATELET # BLD AUTO: 281 K/UL (ref 130–400)
POTASSIUM SERPL-SCNC: 4.6 MEQ/L (ref 3.4–4.9)
PROT SERPL-MCNC: 6.6 G/DL (ref 6.3–8)
RBC # BLD AUTO: 4.52 M/UL (ref 4.2–5.4)
SODIUM SERPL-SCNC: 143 MEQ/L (ref 135–144)
TRIGL SERPL-MCNC: 135 MG/DL (ref 0–150)
WBC # BLD AUTO: 6.1 K/UL (ref 4.8–10.8)

## 2023-05-02 PROBLEM — R73.03 PRE-DIABETES: Status: ACTIVE | Noted: 2023-05-02

## 2023-06-29 ENCOUNTER — OFFICE VISIT (OUTPATIENT)
Dept: INTERNAL MEDICINE | Age: 63
End: 2023-06-29
Payer: MEDICARE

## 2023-06-29 VITALS
SYSTOLIC BLOOD PRESSURE: 90 MMHG | TEMPERATURE: 97.4 F | BODY MASS INDEX: 25.13 KG/M2 | RESPIRATION RATE: 16 BRPM | HEIGHT: 60 IN | WEIGHT: 128 LBS | DIASTOLIC BLOOD PRESSURE: 52 MMHG | HEART RATE: 92 BPM | OXYGEN SATURATION: 95 %

## 2023-06-29 DIAGNOSIS — R42 DIZZY SPELLS: ICD-10-CM

## 2023-06-29 DIAGNOSIS — E86.0 DEHYDRATION: Primary | ICD-10-CM

## 2023-06-29 LAB
CHP ED QC CHECK: NORMAL
GLUCOSE BLD-MCNC: 113 MG/DL

## 2023-06-29 PROCEDURE — G8419 CALC BMI OUT NRM PARAM NOF/U: HCPCS | Performed by: PHYSICIAN ASSISTANT

## 2023-06-29 PROCEDURE — 3017F COLORECTAL CA SCREEN DOC REV: CPT | Performed by: PHYSICIAN ASSISTANT

## 2023-06-29 PROCEDURE — 99213 OFFICE O/P EST LOW 20 MIN: CPT | Performed by: PHYSICIAN ASSISTANT

## 2023-06-29 PROCEDURE — 4004F PT TOBACCO SCREEN RCVD TLK: CPT | Performed by: PHYSICIAN ASSISTANT

## 2023-06-29 PROCEDURE — G8427 DOCREV CUR MEDS BY ELIG CLIN: HCPCS | Performed by: PHYSICIAN ASSISTANT

## 2023-06-29 PROCEDURE — 82962 GLUCOSE BLOOD TEST: CPT | Performed by: PHYSICIAN ASSISTANT

## 2023-06-29 RX ORDER — ONDANSETRON 4 MG/1
4 TABLET, FILM COATED ORAL 3 TIMES DAILY PRN
Qty: 15 TABLET | Refills: 0 | Status: SHIPPED | OUTPATIENT
Start: 2023-06-29

## 2023-06-30 ASSESSMENT — ENCOUNTER SYMPTOMS
NAUSEA: 1
RESPIRATORY NEGATIVE: 1

## 2023-07-06 ENCOUNTER — OFFICE VISIT (OUTPATIENT)
Dept: INTERNAL MEDICINE | Age: 63
End: 2023-07-06
Payer: MEDICARE

## 2023-07-06 VITALS
SYSTOLIC BLOOD PRESSURE: 112 MMHG | TEMPERATURE: 97.3 F | HEART RATE: 62 BPM | HEIGHT: 60 IN | OXYGEN SATURATION: 98 % | RESPIRATION RATE: 16 BRPM | DIASTOLIC BLOOD PRESSURE: 62 MMHG | BODY MASS INDEX: 25.13 KG/M2 | WEIGHT: 128 LBS

## 2023-07-06 DIAGNOSIS — R63.0 POOR APPETITE: ICD-10-CM

## 2023-07-06 DIAGNOSIS — R42 DIZZY SPELLS: ICD-10-CM

## 2023-07-06 DIAGNOSIS — E86.0 DEHYDRATION: ICD-10-CM

## 2023-07-06 DIAGNOSIS — F32.A ANXIETY AND DEPRESSION: ICD-10-CM

## 2023-07-06 DIAGNOSIS — I95.9 HYPOTENSION, UNSPECIFIED HYPOTENSION TYPE: ICD-10-CM

## 2023-07-06 DIAGNOSIS — E86.0 DEHYDRATION: Primary | ICD-10-CM

## 2023-07-06 DIAGNOSIS — F41.9 ANXIETY AND DEPRESSION: ICD-10-CM

## 2023-07-06 LAB
ANION GAP SERPL CALCULATED.3IONS-SCNC: 10 MEQ/L (ref 9–15)
BASOPHILS # BLD: 0 K/UL (ref 0–0.2)
BASOPHILS NFR BLD: 0.4 %
BUN SERPL-MCNC: 6 MG/DL (ref 8–23)
CALCIUM SERPL-MCNC: 9.7 MG/DL (ref 8.5–9.9)
CHLORIDE SERPL-SCNC: 103 MEQ/L (ref 95–107)
CO2 SERPL-SCNC: 28 MEQ/L (ref 20–31)
CREAT SERPL-MCNC: 0.93 MG/DL (ref 0.5–0.9)
EOSINOPHIL # BLD: 0.2 K/UL (ref 0–0.7)
EOSINOPHIL NFR BLD: 3.4 %
ERYTHROCYTE [DISTWIDTH] IN BLOOD BY AUTOMATED COUNT: 15.1 % (ref 11.5–14.5)
GLUCOSE SERPL-MCNC: 92 MG/DL (ref 70–99)
HCT VFR BLD AUTO: 45.1 % (ref 37–47)
HGB BLD-MCNC: 15.1 G/DL (ref 12–16)
LYMPHOCYTES # BLD: 2.6 K/UL (ref 1–4.8)
LYMPHOCYTES NFR BLD: 38 %
MCH RBC QN AUTO: 31.1 PG (ref 27–31.3)
MCHC RBC AUTO-ENTMCNC: 33.4 % (ref 33–37)
MCV RBC AUTO: 93 FL (ref 79.4–94.8)
MONOCYTES # BLD: 0.6 K/UL (ref 0.2–0.8)
MONOCYTES NFR BLD: 8.2 %
NEUTROPHILS # BLD: 3.4 K/UL (ref 1.4–6.5)
NEUTS SEG NFR BLD: 50 %
PLATELET # BLD AUTO: 304 K/UL (ref 130–400)
POTASSIUM SERPL-SCNC: 4.4 MEQ/L (ref 3.4–4.9)
RBC # BLD AUTO: 4.85 M/UL (ref 4.2–5.4)
SODIUM SERPL-SCNC: 141 MEQ/L (ref 135–144)
TSH SERPL-MCNC: 4.54 UIU/ML (ref 0.44–3.86)
WBC # BLD AUTO: 6.9 K/UL (ref 4.8–10.8)

## 2023-07-06 PROCEDURE — G8427 DOCREV CUR MEDS BY ELIG CLIN: HCPCS | Performed by: PHYSICIAN ASSISTANT

## 2023-07-06 PROCEDURE — 3017F COLORECTAL CA SCREEN DOC REV: CPT | Performed by: PHYSICIAN ASSISTANT

## 2023-07-06 PROCEDURE — 99213 OFFICE O/P EST LOW 20 MIN: CPT | Performed by: PHYSICIAN ASSISTANT

## 2023-07-06 PROCEDURE — 4004F PT TOBACCO SCREEN RCVD TLK: CPT | Performed by: PHYSICIAN ASSISTANT

## 2023-07-06 PROCEDURE — G8419 CALC BMI OUT NRM PARAM NOF/U: HCPCS | Performed by: PHYSICIAN ASSISTANT

## 2023-07-06 ASSESSMENT — ENCOUNTER SYMPTOMS
GASTROINTESTINAL NEGATIVE: 1
RESPIRATORY NEGATIVE: 1

## 2023-08-01 DIAGNOSIS — J44.9 CHRONIC OBSTRUCTIVE PULMONARY DISEASE, UNSPECIFIED COPD TYPE (HCC): ICD-10-CM

## 2023-08-01 RX ORDER — ALBUTEROL SULFATE 90 UG/1
AEROSOL, METERED RESPIRATORY (INHALATION)
Qty: 17 G | Refills: 5 | Status: SHIPPED | OUTPATIENT
Start: 2023-08-01

## 2023-08-01 NOTE — TELEPHONE ENCOUNTER
Comments:     Last Office Visit (last PCP visit):   7/6/2023    Next Visit Date:  Future Appointments   Date Time Provider [address]   4/19/2024  9:30 AM LIVE CT ROOM 1 MLOZ CT [facility] Fac RAD   4/19/2024 10:00 AM Cindy Interiano MD CAROLYN THORACIC [facility] EMERGENCY [facility] AT [place]       **If hasn't been seen in over a year OR hasn't followed up according to last diabetes/ADHD visit, make appointment for patient before sending refill to provider.     Rx requested:  Requested Prescriptions     Pending Prescriptions Disp Refills    albuterol sulfate HFA (PROVENTIL;VENTOLIN;PROAIR) 108 (90 Base) MCG/ACT inhaler [Pharmacy Med Name: ALBUTEROL HFA 90 MCG INHALER] 17 g 5     Sig: inhale 2 puffs by mouth and INTO THE LUNGS every 4 hours if needed

## 2023-08-15 ENCOUNTER — OFFICE VISIT (OUTPATIENT)
Dept: CARDIOLOGY CLINIC | Age: 63
End: 2023-08-15
Payer: MEDICARE

## 2023-08-15 VITALS
BODY MASS INDEX: 25.13 KG/M2 | DIASTOLIC BLOOD PRESSURE: 70 MMHG | SYSTOLIC BLOOD PRESSURE: 118 MMHG | HEIGHT: 60 IN | WEIGHT: 128 LBS | OXYGEN SATURATION: 94 % | HEART RATE: 82 BPM

## 2023-08-15 DIAGNOSIS — Z00.00 PE (PHYSICAL EXAM), ROUTINE: ICD-10-CM

## 2023-08-15 DIAGNOSIS — R55 NEAR SYNCOPE: Primary | ICD-10-CM

## 2023-08-15 PROCEDURE — 3017F COLORECTAL CA SCREEN DOC REV: CPT | Performed by: INTERNAL MEDICINE

## 2023-08-15 PROCEDURE — 99214 OFFICE O/P EST MOD 30 MIN: CPT | Performed by: INTERNAL MEDICINE

## 2023-08-15 PROCEDURE — G8419 CALC BMI OUT NRM PARAM NOF/U: HCPCS | Performed by: INTERNAL MEDICINE

## 2023-08-15 PROCEDURE — 93000 ELECTROCARDIOGRAM COMPLETE: CPT | Performed by: INTERNAL MEDICINE

## 2023-08-15 PROCEDURE — G8427 DOCREV CUR MEDS BY ELIG CLIN: HCPCS | Performed by: INTERNAL MEDICINE

## 2023-08-15 PROCEDURE — 4004F PT TOBACCO SCREEN RCVD TLK: CPT | Performed by: INTERNAL MEDICINE

## 2023-08-15 RX ORDER — BUSPIRONE HYDROCHLORIDE 10 MG/1
10 TABLET ORAL 2 TIMES DAILY
COMMUNITY
Start: 2023-08-01

## 2023-08-15 NOTE — PROGRESS NOTES
8/15/2023    Ary Monzon, [address]  [facility] [address]    RE: Ceasar Painting   : 1960     Dear Mg Villalta :    I had the pleasure of seeing your patient, Ceasar Painting in the office today on 8/15/2023. As you are well aware, Ms. Lamar Calix is a pleasant 60 y.o.  female who was kindly referred back to me for evaluation of dizzy spells. She was seen last year with complaints of chest pain and exertional dyspnea. Her stress test at that time was negative for ischemia and showed normal EF. EF by echocardiogram was 40 to 45%. Currently, she reports episodes of dizziness with and without position changes worsening over the last year. She feels like the episodes start suddenly and can last up to 15 minutes before gradually resolving. She has not passed out. She denies associated palpitations. No associated chest pain. She still has dyspnea with more physical degrees of activity however continues to smoke. She is currently smoking 1 pack/day as well as smoking marijuana daily. No alcohol use. She drinks 3 cups of coffee per day but does not drink much water.       Current medications:   Current Outpatient Medications   Medication Sig Dispense Refill    busPIRone (BUSPAR) 10 MG tablet Take 1 tablet by mouth 2 times daily      albuterol sulfate HFA (PROVENTIL;VENTOLIN;PROAIR) 108 (90 Base) MCG/ACT inhaler inhale 2 puffs by mouth and INTO THE LUNGS every 4 hours if needed 17 g 5    ondansetron (ZOFRAN) 4 MG tablet Take 1 tablet by mouth 3 times daily as needed for Nausea or Vomiting 15 tablet 0    atorvastatin (LIPITOR) 20 MG tablet take 1 tablet by mouth once daily 30 tablet 0    PARoxetine (PAXIL) 40 MG tablet take 1 tablet by mouth every morning 90 tablet 3    omeprazole (PRILOSEC) 20 MG delayed release capsule take 1 capsule by mouth once daily 90 capsule 1    traZODone (DESYREL) 100 MG tablet Take 1 tablet by mouth nightly as needed for Sleep 30 tablet 5

## 2023-08-15 NOTE — PATIENT INSTRUCTIONS
2 week event monitor  Echocardiogram  Continue current medications  Drinks more water!   Quit smoking  Follow up in 6 weeks

## 2023-08-18 ENCOUNTER — TELEPHONE (OUTPATIENT)
Dept: GASTROENTEROLOGY | Age: 63
End: 2023-08-18

## 2023-08-18 ENCOUNTER — TELEPHONE (OUTPATIENT)
Dept: INTERNAL MEDICINE | Age: 63
End: 2023-08-18

## 2023-08-28 ENCOUNTER — HOSPITAL ENCOUNTER (OUTPATIENT)
Dept: WOMENS IMAGING | Age: 63
Discharge: HOME | End: 2023-08-30
Payer: MEDICARE

## 2023-08-28 DIAGNOSIS — Z12.31 SCREENING MAMMOGRAM, ENCOUNTER FOR: ICD-10-CM

## 2023-08-28 PROCEDURE — 77063 BREAST TOMOSYNTHESIS BI: CPT

## 2023-09-15 DIAGNOSIS — G47.00 INSOMNIA, UNSPECIFIED TYPE: ICD-10-CM

## 2023-09-18 RX ORDER — TRAZODONE HYDROCHLORIDE 100 MG/1
100 TABLET ORAL
Qty: 30 TABLET | Refills: 5 | Status: SHIPPED | OUTPATIENT
Start: 2023-09-18

## 2023-09-18 NOTE — TELEPHONE ENCOUNTER
Comments:     Last Office Visit (last PCP visit):   7/6/2023    Next Visit Date:  Future Appointments   Date Time Provider [address]   9/26/2023 12:00 PM DO ANDRE House   10/3/2023 11:00 AM MAL ECHO 1 MALZ  SHONA MALZ Fac RAD   4/19/2024  9:30 AM [place] ROOM 1 MLOZ CT [facility]Z Fac RAD   4/19/2024 10:00 AM Stephen Forbes MD CAROLYN THORACIC [facility] EMERGENCY [facility] AT [place]       **If hasn't been seen in over a year OR hasn't followed up according to last diabetes/ADHD visit, make appointment for patient before sending refill to provider.     Rx requested:  Requested Prescriptions     Pending Prescriptions Disp Refills    traZODone (DESYREL) 100 MG tablet [Pharmacy Med Name: TRAZODONE 100 MG TABLET] 30 tablet 5     Sig: take 1 tablet by mouth nightly if needed for sleep

## 2023-09-20 DIAGNOSIS — J44.9 COPD WITH CHRONIC BRONCHITIS AND EMPHYSEMA (HCC): ICD-10-CM

## 2023-09-20 DIAGNOSIS — J44.9 CHRONIC OBSTRUCTIVE PULMONARY DISEASE, UNSPECIFIED COPD TYPE (HCC): ICD-10-CM

## 2023-09-21 RX ORDER — ALBUTEROL SULFATE 90 UG/1
AEROSOL, METERED RESPIRATORY (INHALATION)
Qty: 17 G | Refills: 5 | Status: SHIPPED | OUTPATIENT
Start: 2023-09-21 | End: 2023-09-22 | Stop reason: SDUPTHER

## 2023-09-21 RX ORDER — NEBULIZER ACCESSORIES
1 KIT MISCELLANEOUS DAILY PRN
Qty: 1 KIT | Refills: 0 | Status: SHIPPED | OUTPATIENT
Start: 2023-09-21

## 2023-09-21 NOTE — TELEPHONE ENCOUNTER
Comments:     Last Office Visit (last PCP visit):   7/6/2023    Next Visit Date:  Future Appointments   Date Time Provider [address]   9/26/2023 12:00 PM Clearance Boy, DO [facility]   10/3/2023 11:00 AM MAL ECHO 1 MALZ  SHONA MALZ Fac RAD   4/19/2024  9:30 AM [place] ROOM 1 MLOZ CT MOLZ Fac RAD   4/19/2024 10:00 AM Yumi Carter MD CAROLYN THORACIC [facility] EMERGENCY [facility] AT [place]       **If hasn't been seen in over a year OR hasn't followed up according to last diabetes/ADHD visit, make appointment for patient before sending refill to provider.     Rx requested:  Requested Prescriptions     Pending Prescriptions Disp Refills    albuterol sulfate HFA (PROVENTIL;VENTOLIN;PROAIR) 108 (90 Base) MCG/ACT inhaler 17 g 5

## 2023-09-21 NOTE — TELEPHONE ENCOUNTER
Comments:     Last Office Visit (last PCP visit):   11/10/2021    Next Visit Date:  Future Appointments   Date Time Provider [address]   2023 12:00 PM DO ANDRE House   10/3/2023 11:00 AM MAL ECHO 1 MALZ  SHONA MALZ Fac RAD   2024  9:30 AM [place] ROOM 1 MLOZ CT MOLZ Fac RAD   2024 10:00 AM Stephen Forbes MD CAROLYN THORACIC [facility] EMERGENCY [facility] AT [place]       **If hasn't been seen in over a year OR hasn't followed up according to last diabetes/ADHD visit, make appointment for patient before sending refill to provider.     Rx requested:  Requested Prescriptions     Pending Prescriptions Disp Refills    Respiratory Therapy Supplies (NEBULIZER/TUBING/MOUTHPIECE) KIT 1 kit 0     Si kit by Does not apply route daily as needed (wheezing)

## 2023-09-22 DIAGNOSIS — J44.9 CHRONIC OBSTRUCTIVE PULMONARY DISEASE, UNSPECIFIED COPD TYPE (HCC): ICD-10-CM

## 2023-09-22 RX ORDER — ALBUTEROL SULFATE 90 UG/1
AEROSOL, METERED RESPIRATORY (INHALATION)
Qty: 3 EACH | Refills: 0 | Status: SHIPPED | OUTPATIENT
Start: 2023-09-22

## 2023-09-22 RX ORDER — ALBUTEROL SULFATE 90 UG/1
AEROSOL, METERED RESPIRATORY (INHALATION)
Qty: 3 EACH | Refills: 0 | Status: SHIPPED | OUTPATIENT
Start: 2023-09-22 | End: 2023-09-22 | Stop reason: SDUPTHER

## 2023-10-03 ENCOUNTER — HOSPITAL ENCOUNTER (OUTPATIENT)
Age: 63
Discharge: HOME | End: 2023-10-05
Payer: MEDICARE

## 2023-10-03 VITALS
BODY MASS INDEX: 24.94 KG/M2 | DIASTOLIC BLOOD PRESSURE: 60 MMHG | WEIGHT: 127 LBS | HEIGHT: 60 IN | SYSTOLIC BLOOD PRESSURE: 126 MMHG

## 2023-10-03 DIAGNOSIS — R55 NEAR SYNCOPE: ICD-10-CM

## 2023-10-03 PROCEDURE — 93306 TTE W/DOPPLER COMPLETE: CPT | Performed by: INTERNAL MEDICINE

## 2023-10-03 PROCEDURE — 93306 TTE W/DOPPLER COMPLETE: CPT

## 2023-10-05 LAB
ECHO AV AREA PEAK VELOCITY: 1.8 CM2
ECHO AV AREA PEAK VELOCITY: 1.9 CM2
ECHO AV AREA PEAK VELOCITY: 2.3 CM2
ECHO AV AREA PEAK VELOCITY: 2.4 CM2
ECHO AV AREA VTI: 1.9 CM2
ECHO AV AREA/BSA VTI: 1.2 CM2/M2
ECHO AV CUSP MM: 1.8 CM
ECHO AV MEAN GRADIENT: 5 MMHG
ECHO AV MEAN VELOCITY: 1 M/S
ECHO AV PEAK GRADIENT: 6 MMHG
ECHO AV PEAK GRADIENT: 9 MMHG
ECHO AV PEAK VELOCITY: 1.2 M/S
ECHO AV PEAK VELOCITY: 1.5 M/S
ECHO AV VTI: 27.8 CM
ECHO BSA: 1.56 M2
ECHO EST RA PRESSURE: 3 MMHG
ECHO LA VOL 2C: 28 ML (ref 22–52)
ECHO LA VOL 2C: 30 ML (ref 22–52)
ECHO LA VOL 4C: 28 ML (ref 22–52)
ECHO LA VOL 4C: 30 ML (ref 22–52)
ECHO LA VOLUME AREA LENGTH: 31 ML
ECHO LA VOLUME INDEX AREA LENGTH: 20 ML/M2 (ref 16–34)
ECHO LV E' LATERAL VELOCITY: 9 CM/S
ECHO LV E' SEPTAL VELOCITY: 10 CM/S
ECHO LV EDV A2C: 44 ML
ECHO LV EDV A4C: 74 ML
ECHO LV EDV BP: 59 ML (ref 56–104)
ECHO LV EDV INDEX A4C: 48 ML/M2
ECHO LV EDV INDEX BP: 38 ML/M2
ECHO LV EDV NDEX A2C: 29 ML/M2
ECHO LV EJECTION FRACTION A2C: 61 %
ECHO LV EJECTION FRACTION A4C: 62 %
ECHO LV EJECTION FRACTION BIPLANE: 63 % (ref 55–100)
ECHO LV ESV A2C: 17 ML
ECHO LV ESV A4C: 28 ML
ECHO LV ESV BP: 22 ML (ref 19–49)
ECHO LV ESV INDEX A2C: 11 ML/M2
ECHO LV ESV INDEX A4C: 18 ML/M2
ECHO LV ESV INDEX BP: 14 ML/M2
ECHO LV FRACTIONAL SHORTENING: 30 % (ref 28–44)
ECHO LV INTERNAL DIMENSION DIASTOLE INDEX: 3.51 CM/M2
ECHO LV INTERNAL DIMENSION DIASTOLIC: 5.4 CM (ref 3.9–5.3)
ECHO LV INTERNAL DIMENSION SYSTOLIC INDEX: 2.47 CM/M2
ECHO LV INTERNAL DIMENSION SYSTOLIC: 3.8 CM
ECHO LV IVSD: 0.9 CM (ref 0.6–0.9)
ECHO LV IVSS: 1.1 CM
ECHO LV MASS 2D: 180.1 G (ref 67–162)
ECHO LV MASS INDEX 2D: 117 G/M2 (ref 43–95)
ECHO LV POSTERIOR WALL DIASTOLIC: 0.9 CM (ref 0.6–0.9)
ECHO LV POSTERIOR WALL SYSTOLIC: 1 CM
ECHO LV RELATIVE WALL THICKNESS RATIO: 0.33
ECHO LVOT AREA: 3.1 CM2
ECHO LVOT AV VTI INDEX: 0.61
ECHO LVOT DIAM: 2 CM
ECHO LVOT MEAN GRADIENT: 2 MMHG
ECHO LVOT PEAK GRADIENT: 3 MMHG
ECHO LVOT PEAK GRADIENT: 3 MMHG
ECHO LVOT PEAK VELOCITY: 0.9 M/S
ECHO LVOT PEAK VELOCITY: 0.9 M/S
ECHO LVOT STROKE VOLUME INDEX: 34.7 ML/M2
ECHO LVOT SV: 53.4 ML
ECHO LVOT VTI: 17 CM
ECHO MV A VELOCITY: 1.15 M/S
ECHO MV AREA PHT: 3.5 CM2
ECHO MV E DECELERATION TIME (DT): 189.1 MS
ECHO MV E VELOCITY: 0.92 M/S
ECHO MV E/A RATIO: 0.8
ECHO MV E/E' LATERAL: 10.22
ECHO MV E/E' RATIO (AVERAGED): 9.71
ECHO MV E/E' SEPTAL: 9.2
ECHO MV PRESSURE HALF TIME (PHT): 62.9 MS
ECHO MV REGURGITANT PEAK GRADIENT: 121 MMHG
ECHO MV REGURGITANT PEAK VELOCITY: 5.5 M/S
ECHO PV MAX VELOCITY: 1.2 M/S
ECHO PV PEAK GRADIENT: 6 MMHG
ECHO RIGHT VENTRICULAR SYSTOLIC PRESSURE (RVSP): 27 MMHG
ECHO RV INTERNAL DIMENSION: 0.5 CM
ECHO RVOT PEAK GRADIENT: 3 MMHG
ECHO RVOT PEAK VELOCITY: 0.9 M/S
ECHO TV REGURGITANT MAX VELOCITY: 2.45 M/S
ECHO TV REGURGITANT PEAK GRADIENT: 24 MMHG

## 2023-10-23 ENCOUNTER — TELEPHONE (OUTPATIENT)
Dept: INTERNAL MEDICINE | Age: 63
End: 2023-10-23

## 2023-10-24 ENCOUNTER — OFFICE VISIT (OUTPATIENT)
Dept: CARDIOLOGY CLINIC | Age: 63
End: 2023-10-24
Payer: MEDICARE

## 2023-10-24 VITALS
WEIGHT: 125 LBS | DIASTOLIC BLOOD PRESSURE: 68 MMHG | HEIGHT: 60 IN | OXYGEN SATURATION: 94 % | SYSTOLIC BLOOD PRESSURE: 100 MMHG | HEART RATE: 100 BPM | BODY MASS INDEX: 24.54 KG/M2

## 2023-10-24 DIAGNOSIS — R55 NEAR SYNCOPE: Primary | ICD-10-CM

## 2023-10-24 PROCEDURE — G8484 FLU IMMUNIZE NO ADMIN: HCPCS | Performed by: INTERNAL MEDICINE

## 2023-10-24 PROCEDURE — G8420 CALC BMI NORM PARAMETERS: HCPCS | Performed by: INTERNAL MEDICINE

## 2023-10-24 PROCEDURE — 99214 OFFICE O/P EST MOD 30 MIN: CPT | Performed by: INTERNAL MEDICINE

## 2023-10-24 PROCEDURE — 4004F PT TOBACCO SCREEN RCVD TLK: CPT | Performed by: INTERNAL MEDICINE

## 2023-10-24 PROCEDURE — 3017F COLORECTAL CA SCREEN DOC REV: CPT | Performed by: INTERNAL MEDICINE

## 2023-10-24 PROCEDURE — G8427 DOCREV CUR MEDS BY ELIG CLIN: HCPCS | Performed by: INTERNAL MEDICINE

## 2023-10-24 NOTE — PROGRESS NOTES
daily as needed (wheezing) 1 kit 0    traZODone (DESYREL) 100 MG tablet take 1 tablet by mouth nightly if needed for sleep 30 tablet 5    busPIRone (BUSPAR) 10 MG tablet Take 1 tablet by mouth 2 times daily      atorvastatin (LIPITOR) 20 MG tablet take 1 tablet by mouth once daily 30 tablet 0    PARoxetine (PAXIL) 40 MG tablet take 1 tablet by mouth every morning 90 tablet 3    omeprazole (PRILOSEC) 20 MG delayed release capsule take 1 capsule by mouth once daily 90 capsule 1    fluticasone-umeclidin-vilant (TRELEGY ELLIPTA) 200-62.5-25 MCG/ACT AEPB inhaler Inhale 1 puff into the lungs daily 1 each 5    albuterol (PROVENTIL) (5 MG/ML) 0.5% nebulizer solution Take 1 mL by nebulization 4 times daily as needed for Wheezing 120 each 3     Current Facility-Administered Medications   Medication Dose Route Frequency Provider Last Rate Last Admin    cyanocobalamin injection 1,000 mcg  1,000 mcg IntraMUSCular Once Monica Mariano MD           Allergies: Other, Adhesive tape, and Codeine     Review of Systems   General: Fatigued at times. + flu like symptoms. Cardiovascular: See HPI. No orthopnea or PND. Respiratory: Dyspnea is present withmild degrees of activity. + COPD/smoker  Gastrointestinal: No melena. +  hemorrhoids. Genitourinary: No hematuria. Hematological: No easy bruising or bleeding. Vascular: No lower extremity edema. No leg claudication. Neurological: No TIA or CVA symptoms. + leg paresthesias. + restless legs. + headaches  Musculoskeletal: No chest wall pain. Psychiatric: +anxiety. *All other systems were reviewed and found to be negative unless otherwise noted in the HPI*    Physical Examination: Her  height is 1.524 m (5') and weight is 56.7 kg (125 lb). Her blood pressure is 100/68 and her pulse is 100. Her oxygen saturation is 94%. Orthostatics: Supine blood pressure 100/84, sitting blood pressure 80/66, standing blood pressure 100/76. She is alert and oriented to person, place, and time.

## 2023-11-02 RX ORDER — NEBULIZER ACCESSORIES
1 KIT MISCELLANEOUS DAILY PRN
Qty: 1 KIT | Refills: 0 | Status: SHIPPED | OUTPATIENT
Start: 2023-11-02

## 2023-11-08 NOTE — TELEPHONE ENCOUNTER
Comments:     Last Office Visit (last PCP visit):   7/6/2023    Next Visit Date:  Future Appointments   Date Time Provider [address]   4/19/2024  9:30 AM LIVE CT ROOM 1 ML CT MOLZ Fac RAD   4/19/2024 10:00 AM Urban, [address]       **If hasn't been seen in over a year OR hasn't followed up according to last diabetes/ADHD visit, make appointment for patient before sending refill to provider.     Rx requested:  Requested Prescriptions     Pending Prescriptions Disp Refills    busPIRone (BUSPAR) 10 MG tablet [Pharmacy Med Name: BUSPIRONE HCL 10 MG TABLET] 180 tablet      Sig: take 1 tablet by mouth twice a day

## 2023-11-10 RX ORDER — BUSPIRONE HYDROCHLORIDE 10 MG/1
10 TABLET ORAL 2 TIMES DAILY
Qty: 180 TABLET | Refills: 1 | Status: SHIPPED | OUTPATIENT
Start: 2023-11-10

## 2023-11-13 ENCOUNTER — OFFICE VISIT (OUTPATIENT)
Dept: INTERNAL MEDICINE | Age: 63
End: 2023-11-13
Payer: MEDICARE

## 2023-11-13 VITALS
WEIGHT: 122.8 LBS | HEART RATE: 88 BPM | TEMPERATURE: 97.2 F | SYSTOLIC BLOOD PRESSURE: 108 MMHG | HEIGHT: 60 IN | RESPIRATION RATE: 18 BRPM | OXYGEN SATURATION: 96 % | DIASTOLIC BLOOD PRESSURE: 74 MMHG | BODY MASS INDEX: 24.11 KG/M2

## 2023-11-13 DIAGNOSIS — Z87.891 PERSONAL HISTORY OF TOBACCO USE, PRESENTING HAZARDS TO HEALTH: ICD-10-CM

## 2023-11-13 DIAGNOSIS — D3A.090 CARCINOID TUMOR OF LUNG, UNSPECIFIED WHETHER MALIGNANT: Primary | ICD-10-CM

## 2023-11-13 PROCEDURE — 99406 BEHAV CHNG SMOKING 3-10 MIN: CPT | Performed by: PHYSICIAN ASSISTANT

## 2023-11-13 PROCEDURE — 3017F COLORECTAL CA SCREEN DOC REV: CPT | Performed by: PHYSICIAN ASSISTANT

## 2023-11-13 PROCEDURE — G8420 CALC BMI NORM PARAMETERS: HCPCS | Performed by: PHYSICIAN ASSISTANT

## 2023-11-13 PROCEDURE — 4004F PT TOBACCO SCREEN RCVD TLK: CPT | Performed by: PHYSICIAN ASSISTANT

## 2023-11-13 PROCEDURE — G8427 DOCREV CUR MEDS BY ELIG CLIN: HCPCS | Performed by: PHYSICIAN ASSISTANT

## 2023-11-13 PROCEDURE — 99214 OFFICE O/P EST MOD 30 MIN: CPT | Performed by: PHYSICIAN ASSISTANT

## 2023-11-13 PROCEDURE — G8484 FLU IMMUNIZE NO ADMIN: HCPCS | Performed by: PHYSICIAN ASSISTANT

## 2023-11-13 RX ORDER — VARENICLINE TARTRATE 0.5 MG/1
TABLET, FILM COATED ORAL
Qty: 57 TABLET | Refills: 0 | Status: SHIPPED | OUTPATIENT
Start: 2023-11-13

## 2023-11-13 RX ORDER — VARENICLINE TARTRATE 1 MG/1
1 TABLET, FILM COATED ORAL 2 TIMES DAILY
Qty: 180 TABLET | Refills: 1 | Status: SHIPPED | OUTPATIENT
Start: 2023-11-13

## 2023-11-13 ASSESSMENT — ENCOUNTER SYMPTOMS
TROUBLE SWALLOWING: 0
VOICE CHANGE: 1
SORE THROAT: 1
RESPIRATORY NEGATIVE: 1

## 2023-11-13 NOTE — PROGRESS NOTES
Rahel Manning (: 1960) is a 60 y.o. female, Established patient, here for evaluation of the following chief complaint(s): Other (Pt would like another pet scan/) and Neck Pain (When she yawns her neck bones move to the side (can really feel it and kind of see it.)  her voice has been raspy for the past few months.  )        ASSESSMENT/PLAN:  1. Carcinoid tumor of lung, unspecified whether malignant  - PET CT SKULL BASE TO MID THIGH; Future    2. Personal history of tobacco use, presenting hazards to health  - varenicline (CHANTIX) 0.5 MG tablet; 0.5mg DAILY for 3 days followed by 0.5mg TWICE DAILY for 4 days followed by 1mg TWICE DAILY  Dispense: 57 tablet; Refill: 0  - varenicline (CHANTIX) 1 MG tablet; Take 1 tablet by mouth 2 times daily  Dispense: 180 tablet; Refill: 1  - DE TOBACCO USE CESSATION INTERMEDIATE 3-10 MINUTES  Tobacco Cessation Counseling: Patient advised about behavior change, including information about personal health harms, usage of appropriate cessation measures and benefits of cessation. Time spent (minutes): 5        No follow-ups on file. SUBJECTIVE/OBJECTIVE:  HPI      Throat and voice changes  Started months ago  Slightly sore, and is worried about cancer   She has a h/o carcinoid lung nodule, and wants to get another PET scan   Feels that the neck changes  when coughing, and it looks displaced      Review of Systems   HENT:  Positive for sore throat and voice change. Negative for trouble swallowing. Respiratory: Negative. Cardiovascular: Negative. Musculoskeletal:  Positive for neck pain. All other systems reviewed and are negative. Physical Exam  Vitals reviewed. Constitutional:       Appearance: Normal appearance. HENT:      Head: Normocephalic and atraumatic. Right Ear: Tympanic membrane normal.      Left Ear: Tympanic membrane normal.      Nose: No congestion.       Mouth/Throat:      Pharynx: No oropharyngeal exudate or posterior

## 2023-12-03 DIAGNOSIS — K21.9 GASTROESOPHAGEAL REFLUX DISEASE WITHOUT ESOPHAGITIS: ICD-10-CM

## 2023-12-05 ENCOUNTER — TELEPHONE (OUTPATIENT)
Dept: INTERNAL MEDICINE | Age: 63
End: 2023-12-05

## 2023-12-05 RX ORDER — OMEPRAZOLE 20 MG/1
CAPSULE, DELAYED RELEASE ORAL
Qty: 90 CAPSULE | Refills: 1 | Status: SHIPPED | OUTPATIENT
Start: 2023-12-05

## 2023-12-05 NOTE — TELEPHONE ENCOUNTER
Prior Auth was denied for the PET scan. They are going to reschedule it, so we can get a per to per done.

## 2023-12-05 NOTE — TELEPHONE ENCOUNTER
Comments:     Last Office Visit (last PCP visit):   11/13/2023    Next Visit Date:  Future Appointments   Date Time Provider [address]   4/19/2024  9:30 AM LIVE CT ROOM 1 ML CT MOLZ Fac RAD   4/19/2024 10:00 AM Urban, [address]       **If hasn't been seen in over a year OR hasn't followed up according to last diabetes/ADHD visit, make appointment for patient before sending refill to provider.     Rx requested:  Requested Prescriptions     Pending Prescriptions Disp Refills    omeprazole (PRILOSEC) 20 MG delayed release capsule [Pharmacy Med Name: OMEPRAZOLE DR 20 MG CAPSULE] 90 capsule 1     Sig: take 1 capsule by mouth once daily

## 2024-01-02 DIAGNOSIS — J44.9 CHRONIC OBSTRUCTIVE PULMONARY DISEASE, UNSPECIFIED COPD TYPE (HCC): ICD-10-CM

## 2024-01-02 NOTE — TELEPHONE ENCOUNTER
Comments:     Last Office Visit (last PCP visit):   11/13/2023    Next Visit Date:  Future Appointments   Date Time Provider Department Center   4/19/2024  9:30 AM LIVE CT ROOM 1 MLOZ CT [facility] Fac RAD   4/19/2024 10:00 AM Ilia Duggan MD [facility]OLYN [facility]       **If hasn't been seen in over a year OR hasn't followed up according to last diabetes/ADHD visit, make appointment for patient before sending refill to provider.    Rx requested:  Requested Prescriptions     Pending Prescriptions Disp Refills    albuterol sulfate HFA (PROVENTIL;VENTOLIN;PROAIR) 108 (90 Base) MCG/ACT inhaler [Pharmacy Med Name: albuterol sulfate HFA 90 mcg/actuation aerosol inhaler] 25.5 g 0     Sig: inhale 2 (TWO) puffs into the lungs EVERY 4 HOURS if needed

## 2024-01-04 RX ORDER — ALBUTEROL SULFATE 90 UG/1
AEROSOL, METERED RESPIRATORY (INHALATION)
Qty: 25.5 G | Refills: 0 | Status: SHIPPED | OUTPATIENT
Start: 2024-01-04

## 2024-01-18 ENCOUNTER — HOSPITAL ENCOUNTER (OUTPATIENT)
Dept: LAB | Age: 64
Discharge: HOME | End: 2024-01-18
Payer: MEDICARE

## 2024-01-18 ENCOUNTER — OFFICE VISIT (OUTPATIENT)
Dept: INTERNAL MEDICINE | Age: 64
End: 2024-01-18
Payer: MEDICARE

## 2024-01-18 ENCOUNTER — HOSPITAL ENCOUNTER (INPATIENT)
Age: 64
LOS: 8 days | Discharge: HOME | End: 2024-01-26
Attending: INTERNAL MEDICINE | Admitting: INTERNAL MEDICINE
Payer: MEDICARE

## 2024-01-18 ENCOUNTER — HOSPITAL ENCOUNTER (OUTPATIENT)
Dept: CT IMAGING | Age: 64
Discharge: HOME | End: 2024-01-20
Payer: MEDICARE

## 2024-01-18 ENCOUNTER — HOSPITAL ENCOUNTER (EMERGENCY)
Age: 64
Discharge: ANOTHER HOSPITAL | End: 2024-01-18
Payer: MEDICARE

## 2024-01-18 VITALS
TEMPERATURE: 97.1 F | OXYGEN SATURATION: 95 % | WEIGHT: 124.4 LBS | SYSTOLIC BLOOD PRESSURE: 112 MMHG | RESPIRATION RATE: 16 BRPM | BODY MASS INDEX: 24.42 KG/M2 | DIASTOLIC BLOOD PRESSURE: 70 MMHG | HEART RATE: 104 BPM | HEIGHT: 60 IN

## 2024-01-18 VITALS
SYSTOLIC BLOOD PRESSURE: 128 MMHG | HEART RATE: 88 BPM | TEMPERATURE: 98.1 F | BODY MASS INDEX: 24.35 KG/M2 | DIASTOLIC BLOOD PRESSURE: 73 MMHG | OXYGEN SATURATION: 94 % | WEIGHT: 124 LBS | RESPIRATION RATE: 18 BRPM | HEIGHT: 60 IN

## 2024-01-18 DIAGNOSIS — R19.4 DECREASED FREQUENCY OF BOWEL MOVEMENTS: ICD-10-CM

## 2024-01-18 DIAGNOSIS — R19.15 DECREASED BOWEL SOUNDS: ICD-10-CM

## 2024-01-18 DIAGNOSIS — R10.32 LLQ ABDOMINAL PAIN: ICD-10-CM

## 2024-01-18 DIAGNOSIS — K57.92 ACUTE DIVERTICULITIS: ICD-10-CM

## 2024-01-18 DIAGNOSIS — R10.32 LLQ ABDOMINAL PAIN: Primary | ICD-10-CM

## 2024-01-18 DIAGNOSIS — K57.80 DIVERTICULITIS OF INTESTINE WITH ABSCESS WITHOUT BLEEDING, UNSPECIFIED PART OF INTESTINAL TRACT: Primary | ICD-10-CM

## 2024-01-18 LAB
ALBUMIN SERPL-MCNC: 4.3 G/DL (ref 3.5–4.6)
ALP SERPL-CCNC: 163 U/L (ref 40–130)
ALT SERPL-CCNC: 9 U/L (ref 0–33)
ANION GAP SERPL CALCULATED.3IONS-SCNC: 13 MEQ/L (ref 9–15)
AST SERPL-CCNC: 10 U/L (ref 0–35)
BASOPHILS # BLD: 0 K/UL (ref 0–0.1)
BASOPHILS NFR BLD: 0.2 % (ref 0.1–1.2)
BILIRUB SERPL-MCNC: 0.7 MG/DL (ref 0.2–0.7)
BILIRUB UR QL STRIP: NEGATIVE
BUN SERPL-MCNC: 4 MG/DL (ref 8–23)
CALCIUM SERPL-MCNC: 9.8 MG/DL (ref 8.5–9.9)
CHLORIDE SERPL-SCNC: 101 MEQ/L (ref 95–107)
CLARITY UR: CLEAR
CO2 SERPL-SCNC: 27 MEQ/L (ref 20–31)
COLOR UR: YELLOW
CREAT SERPL-MCNC: 0.64 MG/DL (ref 0.5–0.9)
CREAT SERPL-MCNC: 0.7 MG/DL (ref 0.5–0.9)
EOSINOPHIL # BLD: 0.1 K/UL (ref 0–0.4)
EOSINOPHIL NFR BLD: 1 % (ref 0.7–5.8)
ERYTHROCYTE [DISTWIDTH] IN BLOOD BY AUTOMATED COUNT: 14.4 % (ref 11.7–14.4)
GLOBULIN SER CALC-MCNC: 3.4 G/DL (ref 2.3–3.5)
GLUCOSE SERPL-MCNC: 99 MG/DL (ref 70–99)
GLUCOSE UR STRIP-MCNC: NEGATIVE MG/DL
HCT VFR BLD AUTO: 39.5 % (ref 37–47)
HGB BLD-MCNC: 13.2 G/DL (ref 11.2–15.7)
HGB UR QL STRIP: NEGATIVE
IMM GRANULOCYTES # BLD: 0 K/UL
IMM GRANULOCYTES NFR BLD: 0.3 %
INR PPP: 1
KETONES UR STRIP-MCNC: NEGATIVE MG/DL
LACTATE BLDV-SCNC: 1.2 MMOL/L (ref 0.5–2.2)
LEUKOCYTE ESTERASE UR QL STRIP: NEGATIVE
LYMPHOCYTES # BLD: 1.8 K/UL (ref 1.2–3.7)
LYMPHOCYTES NFR BLD: 15.1 %
MCH RBC QN AUTO: 30.8 PG (ref 25.6–32.2)
MCHC RBC AUTO-ENTMCNC: 33.4 % (ref 32.2–35.5)
MCV RBC AUTO: 92.1 FL (ref 79.4–94.8)
MONOCYTES # BLD: 0.9 K/UL (ref 0.2–0.9)
MONOCYTES NFR BLD: 7.1 % (ref 4.7–12.5)
NEUTROPHILS # BLD: 9.2 K/UL (ref 1.6–6.1)
NEUTS SEG NFR BLD: 76.3 % (ref 34–71.1)
NITRITE UR QL STRIP: NEGATIVE
PH UR STRIP: 7.5 [PH] (ref 5–9)
PLATELET # BLD AUTO: 339 K/UL (ref 182–369)
POTASSIUM SERPL-SCNC: 3.3 MEQ/L (ref 3.4–4.9)
PROT SERPL-MCNC: 7.7 G/DL (ref 6.3–8)
PROT UR STRIP-MCNC: NEGATIVE MG/DL
PROTHROMBIN TIME: 13.4 SEC (ref 12.3–14.9)
RBC # BLD AUTO: 4.29 M/UL (ref 3.93–5.22)
SODIUM SERPL-SCNC: 141 MEQ/L (ref 135–144)
SP GR UR STRIP: 1.01 (ref 1–1.03)
URINE REFLEX TO CULTURE: ABNORMAL
UROBILINOGEN UR STRIP-ACNC: 2 E.U./DL
WBC # BLD AUTO: 12 K/UL (ref 4–10)

## 2024-01-18 PROCEDURE — 99214 OFFICE O/P EST MOD 30 MIN: CPT | Performed by: PHYSICIAN ASSISTANT

## 2024-01-18 PROCEDURE — G8420 CALC BMI NORM PARAMETERS: HCPCS | Performed by: PHYSICIAN ASSISTANT

## 2024-01-18 PROCEDURE — 87040 BLOOD CULTURE FOR BACTERIA: CPT

## 2024-01-18 PROCEDURE — 96375 TX/PRO/DX INJ NEW DRUG ADDON: CPT

## 2024-01-18 PROCEDURE — 82565 ASSAY OF CREATININE: CPT

## 2024-01-18 PROCEDURE — 99285 EMERGENCY DEPT VISIT HI MDM: CPT

## 2024-01-18 PROCEDURE — 36415 COLL VENOUS BLD VENIPUNCTURE: CPT

## 2024-01-18 PROCEDURE — 2580000003 HC RX 258: Performed by: NURSE PRACTITIONER

## 2024-01-18 PROCEDURE — G8484 FLU IMMUNIZE NO ADMIN: HCPCS | Performed by: PHYSICIAN ASSISTANT

## 2024-01-18 PROCEDURE — 96376 TX/PRO/DX INJ SAME DRUG ADON: CPT

## 2024-01-18 PROCEDURE — 83605 ASSAY OF LACTIC ACID: CPT

## 2024-01-18 PROCEDURE — 2580000003 HC RX 258: Performed by: INTERNAL MEDICINE

## 2024-01-18 PROCEDURE — 1210000000 HC MED SURG R&B

## 2024-01-18 PROCEDURE — 74177 CT ABD & PELVIS W/CONTRAST: CPT

## 2024-01-18 PROCEDURE — 96365 THER/PROPH/DIAG IV INF INIT: CPT

## 2024-01-18 PROCEDURE — 85025 COMPLETE CBC W/AUTO DIFF WBC: CPT

## 2024-01-18 PROCEDURE — 6360000004 HC RX CONTRAST MEDICATION: Performed by: PHYSICIAN ASSISTANT

## 2024-01-18 PROCEDURE — 80053 COMPREHEN METABOLIC PANEL: CPT

## 2024-01-18 PROCEDURE — G8427 DOCREV CUR MEDS BY ELIG CLIN: HCPCS | Performed by: PHYSICIAN ASSISTANT

## 2024-01-18 PROCEDURE — 4004F PT TOBACCO SCREEN RCVD TLK: CPT | Performed by: PHYSICIAN ASSISTANT

## 2024-01-18 PROCEDURE — 96368 THER/DIAG CONCURRENT INF: CPT

## 2024-01-18 PROCEDURE — 3017F COLORECTAL CA SCREEN DOC REV: CPT | Performed by: PHYSICIAN ASSISTANT

## 2024-01-18 PROCEDURE — 6360000002 HC RX W HCPCS: Performed by: NURSE PRACTITIONER

## 2024-01-18 PROCEDURE — 85610 PROTHROMBIN TIME: CPT

## 2024-01-18 PROCEDURE — 6360000002 HC RX W HCPCS: Performed by: INTERNAL MEDICINE

## 2024-01-18 PROCEDURE — 81003 URINALYSIS AUTO W/O SCOPE: CPT

## 2024-01-18 PROCEDURE — 96366 THER/PROPH/DIAG IV INF ADDON: CPT

## 2024-01-18 RX ORDER — ONDANSETRON 2 MG/ML
4 INJECTION INTRAMUSCULAR; INTRAVENOUS ONCE
Status: COMPLETED | OUTPATIENT
Start: 2024-01-18 | End: 2024-01-18

## 2024-01-18 RX ORDER — POLYETHYLENE GLYCOL 3350 17 G/17G
17 POWDER, FOR SOLUTION ORAL DAILY
Status: ON HOLD | COMMUNITY

## 2024-01-18 RX ORDER — CIPROFLOXACIN 2 MG/ML
400 INJECTION, SOLUTION INTRAVENOUS ONCE
Status: COMPLETED | OUTPATIENT
Start: 2024-01-18 | End: 2024-01-18

## 2024-01-18 RX ORDER — POLYETHYLENE GLYCOL 3350 17 G/17G
17 POWDER, FOR SOLUTION ORAL DAILY PRN
Status: DISCONTINUED | OUTPATIENT
Start: 2024-01-18 | End: 2024-01-26 | Stop reason: HOSPADM

## 2024-01-18 RX ORDER — ONDANSETRON 4 MG/1
4 TABLET, ORALLY DISINTEGRATING ORAL EVERY 8 HOURS PRN
Status: DISCONTINUED | OUTPATIENT
Start: 2024-01-18 | End: 2024-01-26 | Stop reason: HOSPADM

## 2024-01-18 RX ORDER — DOCUSATE SODIUM 100 MG/1
100 CAPSULE, LIQUID FILLED ORAL 2 TIMES DAILY
Status: ON HOLD | COMMUNITY

## 2024-01-18 RX ORDER — ACETAMINOPHEN 325 MG/1
650 TABLET ORAL EVERY 6 HOURS PRN
Status: DISCONTINUED | OUTPATIENT
Start: 2024-01-18 | End: 2024-01-22

## 2024-01-18 RX ORDER — ONDANSETRON 2 MG/ML
4 INJECTION INTRAMUSCULAR; INTRAVENOUS EVERY 6 HOURS PRN
Status: DISCONTINUED | OUTPATIENT
Start: 2024-01-18 | End: 2024-01-26 | Stop reason: HOSPADM

## 2024-01-18 RX ORDER — MORPHINE SULFATE 4 MG/ML
4 INJECTION, SOLUTION INTRAMUSCULAR; INTRAVENOUS ONCE
Status: COMPLETED | OUTPATIENT
Start: 2024-01-18 | End: 2024-01-18

## 2024-01-18 RX ORDER — SODIUM CHLORIDE 0.9 % (FLUSH) 0.9 %
5-40 SYRINGE (ML) INJECTION EVERY 12 HOURS SCHEDULED
Status: DISCONTINUED | OUTPATIENT
Start: 2024-01-18 | End: 2024-01-26 | Stop reason: HOSPADM

## 2024-01-18 RX ORDER — ENOXAPARIN SODIUM 100 MG/ML
40 INJECTION SUBCUTANEOUS DAILY
Status: DISCONTINUED | OUTPATIENT
Start: 2024-01-19 | End: 2024-01-26 | Stop reason: HOSPADM

## 2024-01-18 RX ORDER — ATORVASTATIN CALCIUM 20 MG/1
20 TABLET, FILM COATED ORAL DAILY
Status: DISCONTINUED | OUTPATIENT
Start: 2024-01-19 | End: 2024-01-22

## 2024-01-18 RX ORDER — 0.9 % SODIUM CHLORIDE 0.9 %
1000 INTRAVENOUS SOLUTION INTRAVENOUS ONCE
Status: COMPLETED | OUTPATIENT
Start: 2024-01-18 | End: 2024-01-18

## 2024-01-18 RX ORDER — MAGNESIUM SULFATE IN WATER 40 MG/ML
2000 INJECTION, SOLUTION INTRAVENOUS PRN
Status: DISCONTINUED | OUTPATIENT
Start: 2024-01-18 | End: 2024-01-19

## 2024-01-18 RX ORDER — POTASSIUM CHLORIDE 20 MEQ/1
40 TABLET, EXTENDED RELEASE ORAL PRN
Status: DISCONTINUED | OUTPATIENT
Start: 2024-01-18 | End: 2024-01-19

## 2024-01-18 RX ORDER — POTASSIUM CHLORIDE 7.45 MG/ML
10 INJECTION INTRAVENOUS PRN
Status: DISCONTINUED | OUTPATIENT
Start: 2024-01-18 | End: 2024-01-19

## 2024-01-18 RX ORDER — METRONIDAZOLE 500 MG/100ML
500 INJECTION, SOLUTION INTRAVENOUS ONCE
Status: COMPLETED | OUTPATIENT
Start: 2024-01-18 | End: 2024-01-18

## 2024-01-18 RX ORDER — SODIUM CHLORIDE 0.9 % (FLUSH) 0.9 %
5-40 SYRINGE (ML) INJECTION PRN
Status: DISCONTINUED | OUTPATIENT
Start: 2024-01-18 | End: 2024-01-26 | Stop reason: HOSPADM

## 2024-01-18 RX ORDER — ACETAMINOPHEN 650 MG/1
650 SUPPOSITORY RECTAL EVERY 6 HOURS PRN
Status: DISCONTINUED | OUTPATIENT
Start: 2024-01-18 | End: 2024-01-26 | Stop reason: HOSPADM

## 2024-01-18 RX ORDER — SODIUM CHLORIDE 9 MG/ML
INJECTION, SOLUTION INTRAVENOUS CONTINUOUS
Status: DISCONTINUED | OUTPATIENT
Start: 2024-01-18 | End: 2024-01-20

## 2024-01-18 RX ORDER — SODIUM CHLORIDE 9 MG/ML
INJECTION, SOLUTION INTRAVENOUS PRN
Status: DISCONTINUED | OUTPATIENT
Start: 2024-01-18 | End: 2024-01-26 | Stop reason: HOSPADM

## 2024-01-18 RX ADMIN — CIPROFLOXACIN 400 MG: 400 INJECTION, SOLUTION INTRAVENOUS at 14:38

## 2024-01-18 RX ADMIN — IOPAMIDOL 75 ML: 755 INJECTION, SOLUTION INTRAVENOUS at 13:16

## 2024-01-18 RX ADMIN — SODIUM CHLORIDE: 9 INJECTION, SOLUTION INTRAVENOUS at 22:21

## 2024-01-18 RX ADMIN — Medication 10 ML: at 22:17

## 2024-01-18 RX ADMIN — METRONIDAZOLE 500 MG: 500 INJECTION, SOLUTION INTRAVENOUS at 14:37

## 2024-01-18 RX ADMIN — SODIUM CHLORIDE 1000 ML: 9 INJECTION, SOLUTION INTRAVENOUS at 14:37

## 2024-01-18 RX ADMIN — MORPHINE SULFATE 4 MG: 4 INJECTION INTRAVENOUS at 16:45

## 2024-01-18 RX ADMIN — PIPERACILLIN AND TAZOBACTAM 3375 MG: 3; .375 INJECTION, POWDER, FOR SOLUTION INTRAVENOUS at 22:23

## 2024-01-18 RX ADMIN — MORPHINE SULFATE 4 MG: 4 INJECTION INTRAVENOUS at 20:24

## 2024-01-18 RX ADMIN — ONDANSETRON 4 MG: 2 INJECTION INTRAMUSCULAR; INTRAVENOUS at 20:24

## 2024-01-18 RX ADMIN — ONDANSETRON 4 MG: 2 INJECTION INTRAMUSCULAR; INTRAVENOUS at 16:46

## 2024-01-18 ASSESSMENT — ENCOUNTER SYMPTOMS
BLOOD IN STOOL: 0
NAUSEA: 0
BACK PAIN: 0
PHOTOPHOBIA: 0
COLOR CHANGE: 0
APNEA: 0
ALLERGIC/IMMUNOLOGIC NEGATIVE: 1
EYE PAIN: 0
RHINORRHEA: 0
ABDOMINAL DISTENTION: 1
NAUSEA: 1
COUGH: 0
ABDOMINAL PAIN: 1
ABDOMINAL DISTENTION: 1
TROUBLE SWALLOWING: 0
CHEST TIGHTNESS: 0
RECTAL PAIN: 0
STRIDOR: 0
SORE THROAT: 0
ANAL BLEEDING: 0
CHOKING: 0
BLOOD IN STOOL: 0
FACIAL SWELLING: 0
CONSTIPATION: 1
DIARRHEA: 0
RESPIRATORY NEGATIVE: 1
SINUS PAIN: 0
CONSTIPATION: 1
VOICE CHANGE: 0
ABDOMINAL PAIN: 1
EYE ITCHING: 0
EYE REDNESS: 0
EYE DISCHARGE: 0
VOMITING: 0
SHORTNESS OF BREATH: 0
WHEEZING: 0
DIARRHEA: 0
VOMITING: 0
SINUS PRESSURE: 0

## 2024-01-18 ASSESSMENT — PAIN - FUNCTIONAL ASSESSMENT
PAIN_FUNCTIONAL_ASSESSMENT: 0-10

## 2024-01-18 ASSESSMENT — PATIENT HEALTH QUESTIONNAIRE - PHQ9
7. TROUBLE CONCENTRATING ON THINGS, SUCH AS READING THE NEWSPAPER OR WATCHING TELEVISION: 3
SUM OF ALL RESPONSES TO PHQ QUESTIONS 1-9: 13
10. IF YOU CHECKED OFF ANY PROBLEMS, HOW DIFFICULT HAVE THESE PROBLEMS MADE IT FOR YOU TO DO YOUR WORK, TAKE CARE OF THINGS AT HOME, OR GET ALONG WITH OTHER PEOPLE: 2
SUM OF ALL RESPONSES TO PHQ QUESTIONS 1-9: 13
8. MOVING OR SPEAKING SO SLOWLY THAT OTHER PEOPLE COULD HAVE NOTICED. OR THE OPPOSITE, BEING SO FIGETY OR RESTLESS THAT YOU HAVE BEEN MOVING AROUND A LOT MORE THAN USUAL: 0
1. LITTLE INTEREST OR PLEASURE IN DOING THINGS: 1
SUM OF ALL RESPONSES TO PHQ QUESTIONS 1-9: 13
6. FEELING BAD ABOUT YOURSELF - OR THAT YOU ARE A FAILURE OR HAVE LET YOURSELF OR YOUR FAMILY DOWN: 3
9. THOUGHTS THAT YOU WOULD BE BETTER OFF DEAD, OR OF HURTING YOURSELF: 0
SUM OF ALL RESPONSES TO PHQ9 QUESTIONS 1 & 2: 4
2. FEELING DOWN, DEPRESSED OR HOPELESS: 3
SUM OF ALL RESPONSES TO PHQ QUESTIONS 1-9: 13
5. POOR APPETITE OR OVEREATING: 0
4. FEELING TIRED OR HAVING LITTLE ENERGY: 3
3. TROUBLE FALLING OR STAYING ASLEEP: 0

## 2024-01-18 ASSESSMENT — PAIN SCALES - GENERAL
PAINLEVEL_OUTOF10: 5
PAINLEVEL_OUTOF10: 6
PAINLEVEL_OUTOF10: 7
PAINLEVEL_OUTOF10: 8

## 2024-01-18 ASSESSMENT — PAIN DESCRIPTION - LOCATION: LOCATION: ABDOMEN

## 2024-01-18 ASSESSMENT — PAIN DESCRIPTION - DESCRIPTORS: DESCRIPTORS: CRAMPING

## 2024-01-18 NOTE — ED NOTES
Brief neurology note    MRI brain shows chronic multiple infarctions.  From a neurological standpoint okay to continue with anticoagulation for stroke prevention.  Neurology will sign off.   [facility] called and provided transport ETA to [facility] of 3 hrs from now

## 2024-01-18 NOTE — ED PROVIDER NOTES
wheezing and stridor.    Cardiovascular:  Negative for chest pain, palpitations and leg swelling.   Gastrointestinal:  Positive for abdominal distention, abdominal pain and constipation. Negative for blood in stool, diarrhea, nausea and vomiting.   Endocrine: Negative.    Genitourinary: Negative.  Negative for decreased urine volume, difficulty urinating, dysuria, flank pain, frequency and hematuria.   Musculoskeletal: Negative.  Negative for arthralgias, back pain, gait problem, joint swelling, myalgias, neck pain and neck stiffness.   Skin: Negative.  Negative for color change, rash and wound.   Allergic/Immunologic: Negative.    Neurological: Negative.  Negative for dizziness, tremors, seizures, syncope, facial asymmetry, speech difficulty, weakness, light-headedness, numbness and headaches.   Psychiatric/Behavioral: Negative.  Negative for confusion.    All other systems reviewed and are negative.      Except as noted above the remainder of the review of systems was reviewed and negative.       PAST MEDICAL HISTORY     Past Medical History:   Diagnosis Date    Abnormal CT of the chest 11/29/2019    Bone spur     Cancer (HCC)     lung    Cervical stenosis of spine     COPD (chronic obstructive pulmonary disease) ([facility])     Coronary artery calcification of native artery - aortic arch 10/31/2018    On lung screen done 10/26/18    DDD (degenerative disc disease), lumbosacral 3/12/2018    Used to see pain management     CARTER (generalized anxiety disorder) 3/12/2018    [facility]    Gastroesophageal reflux disease without esophagitis 3/12/2018    EGD over 10 years    Gout     Hyperlipidemia     Insomnia 3/12/2018    Mild single current episode of major depressive disorder ([facility]) 3/12/2018    Neuropathy 3/12/2018    Clinical diagnosis, EMG    Other emphysema ([facility]) 3/12/2018    No recent PFT    Pure hypercholesterolemia 3/12/2018    SOB (shortness of breath)     Tobacco abuse 3/12/2018         SURGICAL HISTORY

## 2024-01-18 NOTE — ED NOTES
Called transfer center to maribel hospitalist at [facility] for  to Dr cardenas.

## 2024-01-18 NOTE — PROGRESS NOTES
Nicole Wade (: 1960) is a 63 y.o. female, Established patient, here for evaluation of the following chief complaint(s):  bowel obstruction (Pt is here for a possible bowel obstruction.  Pt has not had a BM since around chris.  She has not passed gas on a normally, and is having abdominal cramping.  She is experiencing bloating and nausea.   )        ASSESSMENT/PLAN:  1. LLQ abdominal pain  2. Decreased bowel sounds  3. Decreased frequency of bowel movements  - will get STAT ct of the abd and pelvis  - CT ABDOMEN PELVIS W IV CONTRAST Additional Contrast? None; Future        No follow-ups on file.    SUBJECTIVE/OBJECTIVE:  HPI      Abd pain and very little stool passed x 3 weeks   Abd is bloated and sore   Not ever passing gas   Nausea, no vomiting  No h/o abd issues   She is a smoker with h/o lung tumor       Review of Systems   Constitutional: Negative.    HENT: Negative.     Respiratory: Negative.     Cardiovascular: Negative.    Gastrointestinal:  Positive for abdominal distention, abdominal pain, constipation and nausea. Negative for anal bleeding, blood in stool, diarrhea, rectal pain and vomiting.       Physical Exam  Vitals reviewed.   Constitutional:       Appearance: Normal appearance.   Cardiovascular:      Rate and Rhythm: Normal rate.   Pulmonary:      Effort: Pulmonary effort is normal.   Abdominal:      General: Bowel sounds are decreased.      Tenderness: There is abdominal tenderness in the left lower quadrant.   Neurological:      Mental Status: She is alert.         Vitals:    24 0757   BP: 112/70   Pulse: (!) 104   Resp: 16   Temp: 97.1 °F (36.2 °C)   SpO2: 95%   Weight: 56.4 kg (124 lb 6.4 oz)   Height: 1.524 m (5')                 An electronic signature was used to authenticate this note.    --SEGUNDO Villegas

## 2024-01-19 LAB
ALBUMIN SERPL-MCNC: 3.3 G/DL (ref 3.5–4.6)
ALP SERPL-CCNC: 115 U/L (ref 40–130)
ALT SERPL-CCNC: <5 U/L (ref 0–33)
ANION GAP SERPL CALCULATED.3IONS-SCNC: 8 MEQ/L (ref 9–15)
AST SERPL-CCNC: 7 U/L (ref 0–35)
BACTERIA BLD CULT ORG #2: NORMAL
BACTERIA BLD CULT: NORMAL
BASOPHILS # BLD: 0 K/UL (ref 0–0.2)
BASOPHILS NFR BLD: 0.2 %
BILIRUB SERPL-MCNC: 0.5 MG/DL (ref 0.2–0.7)
BUN SERPL-MCNC: 4 MG/DL (ref 8–23)
CALCIUM SERPL-MCNC: 8.7 MG/DL (ref 8.5–9.9)
CHLORIDE SERPL-SCNC: 107 MEQ/L (ref 95–107)
CO2 SERPL-SCNC: 26 MEQ/L (ref 20–31)
CREAT SERPL-MCNC: 0.62 MG/DL (ref 0.5–0.9)
EOSINOPHIL # BLD: 0.2 K/UL (ref 0–0.7)
EOSINOPHIL NFR BLD: 2.6 %
ERYTHROCYTE [DISTWIDTH] IN BLOOD BY AUTOMATED COUNT: 14.4 % (ref 11.5–14.5)
GLOBULIN SER CALC-MCNC: 2.2 G/DL (ref 2.3–3.5)
GLUCOSE SERPL-MCNC: 94 MG/DL (ref 70–99)
HCT VFR BLD AUTO: 32.2 % (ref 37–47)
HGB BLD-MCNC: 10.5 G/DL (ref 12–16)
LYMPHOCYTES # BLD: 2.3 K/UL (ref 1–4.8)
LYMPHOCYTES NFR BLD: 25.5 %
MAGNESIUM SERPL-MCNC: 1.7 MG/DL (ref 1.7–2.4)
MCH RBC QN AUTO: 30.2 PG (ref 27–31.3)
MCHC RBC AUTO-ENTMCNC: 32.6 % (ref 33–37)
MCV RBC AUTO: 92.5 FL (ref 79.4–94.8)
MONOCYTES # BLD: 1 K/UL (ref 0.2–0.8)
MONOCYTES NFR BLD: 11.2 %
NEUTROPHILS # BLD: 5.5 K/UL (ref 1.4–6.5)
NEUTS SEG NFR BLD: 60.2 %
PLATELET # BLD AUTO: 264 K/UL (ref 130–400)
POTASSIUM SERPL-SCNC: 3.3 MEQ/L (ref 3.4–4.9)
PROT SERPL-MCNC: 5.5 G/DL (ref 6.3–8)
RBC # BLD AUTO: 3.48 M/UL (ref 4.2–5.4)
SODIUM SERPL-SCNC: 141 MEQ/L (ref 135–144)
WBC # BLD AUTO: 9.1 K/UL (ref 4.8–10.8)

## 2024-01-19 PROCEDURE — 80053 COMPREHEN METABOLIC PANEL: CPT

## 2024-01-19 PROCEDURE — 83735 ASSAY OF MAGNESIUM: CPT

## 2024-01-19 PROCEDURE — 2580000003 HC RX 258: Performed by: INTERNAL MEDICINE

## 2024-01-19 PROCEDURE — 6370000000 HC RX 637 (ALT 250 FOR IP): Performed by: INTERNAL MEDICINE

## 2024-01-19 PROCEDURE — 99221 1ST HOSP IP/OBS SF/LOW 40: CPT | Performed by: SURGERY

## 2024-01-19 PROCEDURE — 36415 COLL VENOUS BLD VENIPUNCTURE: CPT

## 2024-01-19 PROCEDURE — 94760 N-INVAS EAR/PLS OXIMETRY 1: CPT

## 2024-01-19 PROCEDURE — 97161 PT EVAL LOW COMPLEX 20 MIN: CPT

## 2024-01-19 PROCEDURE — 97165 OT EVAL LOW COMPLEX 30 MIN: CPT

## 2024-01-19 PROCEDURE — 94640 AIRWAY INHALATION TREATMENT: CPT

## 2024-01-19 PROCEDURE — 94761 N-INVAS EAR/PLS OXIMETRY MLT: CPT

## 2024-01-19 PROCEDURE — 6360000002 HC RX W HCPCS: Performed by: INTERNAL MEDICINE

## 2024-01-19 PROCEDURE — 85025 COMPLETE CBC W/AUTO DIFF WBC: CPT

## 2024-01-19 PROCEDURE — 1210000000 HC MED SURG R&B

## 2024-01-19 RX ORDER — PANTOPRAZOLE SODIUM 40 MG/1
40 TABLET, DELAYED RELEASE ORAL
Status: DISCONTINUED | OUTPATIENT
Start: 2024-01-20 | End: 2024-01-26 | Stop reason: HOSPADM

## 2024-01-19 RX ORDER — BUDESONIDE AND FORMOTEROL FUMARATE DIHYDRATE 160; 4.5 UG/1; UG/1
2 AEROSOL RESPIRATORY (INHALATION)
Status: DISCONTINUED | OUTPATIENT
Start: 2024-01-19 | End: 2024-01-26 | Stop reason: HOSPADM

## 2024-01-19 RX ORDER — DEXAMETHASONE SODIUM PHOSPHATE 10 MG/ML
8 INJECTION INTRAMUSCULAR; INTRAVENOUS ONCE
Status: COMPLETED | OUTPATIENT
Start: 2024-01-19 | End: 2024-01-19

## 2024-01-19 RX ORDER — TRAZODONE HYDROCHLORIDE 50 MG/1
100 TABLET ORAL NIGHTLY
Status: DISCONTINUED | OUTPATIENT
Start: 2024-01-19 | End: 2024-01-26 | Stop reason: HOSPADM

## 2024-01-19 RX ORDER — POTASSIUM CHLORIDE 20 MEQ/1
40 TABLET, EXTENDED RELEASE ORAL ONCE
Status: COMPLETED | OUTPATIENT
Start: 2024-01-19 | End: 2024-01-19

## 2024-01-19 RX ORDER — MAGNESIUM SULFATE IN WATER 40 MG/ML
2000 INJECTION, SOLUTION INTRAVENOUS ONCE
Status: COMPLETED | OUTPATIENT
Start: 2024-01-19 | End: 2024-01-19

## 2024-01-19 RX ORDER — ALBUTEROL SULFATE 90 UG/1
2 AEROSOL, METERED RESPIRATORY (INHALATION) EVERY 4 HOURS PRN
Status: DISCONTINUED | OUTPATIENT
Start: 2024-01-19 | End: 2024-01-26 | Stop reason: HOSPADM

## 2024-01-19 RX ORDER — DOCUSATE SODIUM 100 MG/1
100 CAPSULE, LIQUID FILLED ORAL 2 TIMES DAILY
Status: DISCONTINUED | OUTPATIENT
Start: 2024-01-19 | End: 2024-01-22

## 2024-01-19 RX ORDER — PAROXETINE 10 MG/1
40 TABLET, FILM COATED ORAL EVERY MORNING
Status: DISCONTINUED | OUTPATIENT
Start: 2024-01-19 | End: 2024-01-26 | Stop reason: HOSPADM

## 2024-01-19 RX ADMIN — MAGNESIUM SULFATE HEPTAHYDRATE 2000 MG: 40 INJECTION, SOLUTION INTRAVENOUS at 11:18

## 2024-01-19 RX ADMIN — DEXAMETHASONE SODIUM PHOSPHATE 8 MG: 10 INJECTION INTRAMUSCULAR; INTRAVENOUS at 11:10

## 2024-01-19 RX ADMIN — POTASSIUM CHLORIDE 40 MEQ: 1500 TABLET, EXTENDED RELEASE ORAL at 11:09

## 2024-01-19 RX ADMIN — ACETAMINOPHEN 650 MG: 325 TABLET ORAL at 06:50

## 2024-01-19 RX ADMIN — TRAZODONE HYDROCHLORIDE 100 MG: 50 TABLET ORAL at 19:32

## 2024-01-19 RX ADMIN — ENOXAPARIN SODIUM 40 MG: 100 INJECTION SUBCUTANEOUS at 11:19

## 2024-01-19 RX ADMIN — ACETAMINOPHEN 650 MG: 325 TABLET ORAL at 21:43

## 2024-01-19 RX ADMIN — BUDESONIDE AND FORMOTEROL FUMARATE DIHYDRATE 2 PUFF: 160; 4.5 AEROSOL RESPIRATORY (INHALATION) at 19:04

## 2024-01-19 RX ADMIN — PAROXETINE 40 MG: 10 TABLET, FILM COATED ORAL at 14:43

## 2024-01-19 RX ADMIN — TIOTROPIUM BROMIDE INHALATION SPRAY 2 PUFF: 3.12 SPRAY, METERED RESPIRATORY (INHALATION) at 10:49

## 2024-01-19 RX ADMIN — SODIUM CHLORIDE: 9 INJECTION, SOLUTION INTRAVENOUS at 05:17

## 2024-01-19 RX ADMIN — DOCUSATE SODIUM 100 MG: 100 CAPSULE, LIQUID FILLED ORAL at 19:32

## 2024-01-19 RX ADMIN — PIPERACILLIN AND TAZOBACTAM 3375 MG: 3; .375 INJECTION, POWDER, FOR SOLUTION INTRAVENOUS at 14:45

## 2024-01-19 RX ADMIN — ACETAMINOPHEN 650 MG: 325 TABLET ORAL at 12:00

## 2024-01-19 RX ADMIN — PIPERACILLIN AND TAZOBACTAM 3375 MG: 3; .375 INJECTION, POWDER, FOR SOLUTION INTRAVENOUS at 22:01

## 2024-01-19 RX ADMIN — BUDESONIDE AND FORMOTEROL FUMARATE DIHYDRATE 2 PUFF: 160; 4.5 AEROSOL RESPIRATORY (INHALATION) at 10:49

## 2024-01-19 RX ADMIN — PIPERACILLIN AND TAZOBACTAM 3375 MG: 3; .375 INJECTION, POWDER, FOR SOLUTION INTRAVENOUS at 05:12

## 2024-01-19 RX ADMIN — Medication 10 ML: at 11:27

## 2024-01-19 RX ADMIN — DOCUSATE SODIUM 100 MG: 100 CAPSULE, LIQUID FILLED ORAL at 11:25

## 2024-01-19 RX ADMIN — SODIUM CHLORIDE: 9 INJECTION, SOLUTION INTRAVENOUS at 19:31

## 2024-01-19 RX ADMIN — ATORVASTATIN CALCIUM 20 MG: 20 TABLET, FILM COATED ORAL at 11:09

## 2024-01-19 ASSESSMENT — PAIN DESCRIPTION - DESCRIPTORS: DESCRIPTORS: THROBBING

## 2024-01-19 ASSESSMENT — PAIN SCALES - GENERAL
PAINLEVEL_OUTOF10: 7
PAINLEVEL_OUTOF10: 8

## 2024-01-19 ASSESSMENT — PAIN DESCRIPTION - LOCATION: LOCATION: ABDOMEN

## 2024-01-19 ASSESSMENT — PAIN DESCRIPTION - ORIENTATION: ORIENTATION: LEFT;LOWER

## 2024-01-19 NOTE — PROGRESS NOTES
Other emphysema (HCC) 3/12/2018    No recent PFT    Pure hypercholesterolemia 3/12/2018    SOB (shortness of breath)     Tobacco abuse 3/12/2018     Past Surgical History:   Procedure Laterality Date     SECTION      COLONOSCOPY      HERNIA REPAIR      LUNG BIOPSY Left 2018    CT guided Left Lung Biopsy by Dr Jatinder Plummer    LUNG REMOVAL, PARTIAL Left     THORACOSCOPY Right 2022    RIGHT THORACOSCOPIC LOWER LOBE WEDGE performed by Ilia Duggan MD at [facility] OR    TONSILLECTOMY      VENTRAL HERNIA REPAIR N/A 2022    REPAIR OF VENTRAL HERNIA WITH UMBILECTOMY performed by Storm Lang MD at [facility] OR      Restrictions  Restrictions/Precautions: Up as Tolerated          Safety Devices: Safety Devices  Type of Devices: All fall risk precautions in place     Patient's date of birth confirmed: Yes    General:  Chart Reviewed: Yes  Patient assessed for rehabilitation services?: Yes  Family / Caregiver Present: No    Subjective   \"I will do it\"     Pt reports some ongoing abdominal pain during the session    Prior Level of Function:  Social/Functional History  Lives With: Alone  Home Layout: One level  Home Access: Level entry  Bathroom Shower/Tub:  (its ok)  Has the patient had two or more falls in the past year or any fall with injury in the past year?: No  ADL Assistance: Independent  Homemaking Assistance: Independent  Homemaking Responsibilities: Yes  Ambulation Assistance: Independent (no divuce)  Transfer Assistance: Independent  Active : No    OBJECTIVE:     Orientation Status:  Orientation  Overall Orientation Status: Within Functional Limits    Observation:  Observation/Palpation  Posture: Fair  Observation: pleasant, agreeble. On RA, On IV  Edema: none noted    Cognition Status:  Cognition  Overall Cognitive Status: WFL    Perception Status:  Perception  Overall Perceptual Status: WFL    Vision and Hearing Status:   No glasses during the session       GROSS ASSESSMENT  AROM/PROM:  AROM: Generally decreased, functional       UE STRENGTH:  Strength: Generally decreased, functional (Pt reports some L shoulder pain- ongoing for some time now)    UE COORDINATION:  Coordination: Generally decreased, functional    UE SENSATION:  Sensation: Intact    ADL Status:  ADL  Feeding: Independent  Grooming: Independent  UE Bathing: Independent  LE Bathing: Independent  UE Dressing: Independent  LE Dressing: Independent  Toileting: Independent  Additional Comments: Mostly simulated ADLs however pt with no difficulty managing socks , pants. Pt feels like she is at her normal function in regards to ADLs  Toilet Transfers  Toilet - Technique: Ambulating  Toilet Transfers Comments: anticipate independent - pt declined to complete    Functional Mobility:    Transfers  Sit to stand: Independent  Stand to sit: Independent    Patient ambulated household distance in hallway with No device at Independent level.     Bed Mobility  Bed mobility  Rolling to Left: Independent  Rolling to Right: Independent  Supine to Sit: Independent  Sit to Supine: Independent  Scooting: Independent    Seated and Standing Balance:   Fair+    Functional Endurance:  Activity Tolerance  Activity Tolerance: Patient Tolerated treatment well    D/C Recommendations:  OT D/C RECOMMENDATIONS  REQUIRES OT FOLLOW-UP: No    OT Education:   Patient Education  Education Given To: Patient  Education Provided: Role of Therapy;Plan of Care  Education Method: Demonstration;Verbal  Education Outcome: Continued education needed    OT Follow Up:   OT D/C RECOMMENDATIONS  REQUIRES OT FOLLOW-UP: No     Assessment/Discharge Disposition:  Assessment: 64 y/o female who presents to [facility] s/ medical [facility]. Pt at or near her functional baseline- no further OT recommended at this time  Prognosis: Good  No Skilled OT: Independent with ADL's, At baseline function, Safe to return home  Decision Making: Low Complexity  History: complex chart  Exam:

## 2024-01-19 NOTE — PROGRESS NOTES
01/19/24 1000   RT Protocol   History Pulmonary Disease 2   Respiratory pattern 0   Breath sounds 0   Cough 0   Indications for Bronchodilator Therapy Decreased or absent breath sounds   Bronchodilator Assessment Score 2

## 2024-01-19 NOTE — RT PROTOCOL NOTE
RT Inhaler-Nebulizer Bronchodilator Protocol Note    There is a bronchodilator order in the chart from a provider indicating to follow the RT Bronchodilator Protocol and there is an “Initiate RT Inhaler-Nebulizer Bronchodilator Protocol” order as well (see protocol at bottom of note).    CXR Findings:  No results found.    The findings from the last RT Protocol Assessment were as follows:   History Pulmonary Disease: Chronic pulmonary disease  Respiratory Pattern: Regular pattern and RR 12-20 bpm  Breath Sounds: Clear breath sounds  Cough: Strong, spontaneous, non-productive  Indication for Bronchodilator Therapy: Decreased or absent breath sounds  Bronchodilator Assessment Score: 2    Aerosolized bronchodilator medication orders have been revised according to the RT Inhaler-Nebulizer Bronchodilator Protocol below.    Respiratory Therapist to perform RT Therapy Protocol Assessment initially then follow the protocol.  Repeat RT Therapy Protocol Assessment PRN for score 0-3 or on second treatment, BID, and PRN for scores above 3.    No Indications - adjust the frequency to every 6 hours PRN wheezing or bronchospasm, if no treatments needed after 48 hours then discontinue using Per Protocol order mode.     If indication present, adjust the RT bronchodilator orders based on the Bronchodilator Assessment Score as indicated below.  Use Inhaler orders unless patient has one or more of the following: on home nebulizer, not able to hold breath for 10 seconds, is not alert and oriented, cannot activate and use MDI correctly, or respiratory rate 25 breaths per minute or more, then use the equivalent nebulizer order(s) with same Frequency and PRN reasons based on the score.  If a patient is on this medication at home then do not decrease Frequency below that used at home.    0-3 - enter or revise RT bronchodilator order(s) to equivalent RT Bronchodilator order with Frequency of every 4 hours PRN for wheezing or increased work

## 2024-01-19 NOTE — ACP (ADVANCE CARE PLANNING)
Advance Care Planning   Healthcare Decision Maker:    Primary Decision Maker: Jona Schneider - [phone]    Click here to complete Healthcare Decision Makers including selection of the Healthcare Decision Maker Relationship (ie \"Primary\").  Today we documented Decision Maker(s) consistent with Legal Next of Kin hierarchy.       If the relationship to the patient does NOT follow our state's Next of Kin hierarchy, the patient MUST complete an ACP Document to allow him/her to act on the patient's behalf. :      Electronically signed by JUMANA Silva on 1/19/2024 at 11:58 AM

## 2024-01-19 NOTE — H&P
Hospitalist Group   History and Physical      CHIEF COMPLAINT: Abdominal pain    History of Present Illness:  63 y.o. female with a history of COPD, GERD, hyperlipidemia, lung cancer status postresection presents with abdominal pain.  Since [place] she has not had full bowel movement.  Just small amounts.  She has been eating but not much.  Denies any nausea or vomiting.  Abdominal pain mainly in the left lower quadrant and has been progressively worsening.  Patient has been noticing some abdominal distention as well.  Denies bleeding from anywhere.  No urinary symptoms and no other complaints.  Not on any blood thinners.    REVIEW OF SYSTEMS:  no fevers, chills, cp, sob, n/v, ha, vision/hearing changes, wt changes, hot/cold flashes, other open skin lesions, diarrhea, constipation, dysuria/hematuria unless noted in HPI. Complete ROS performed with the patient and is otherwise negative.      PMH:  Past Medical History:   Diagnosis Date    Abnormal CT of the chest 2019    Bone spur     Cancer (HCC)     lung    Cervical stenosis of spine     COPD (chronic obstructive pulmonary disease) ([facility])     Coronary artery calcification of native artery - aortic arch 10/31/2018    On lung screen done 10/26/18    DDD (degenerative disc disease), lumbosacral 3/12/2018    Used to see pain management     CARTER (generalized anxiety disorder) 3/12/2018    [facility]    Gastroesophageal reflux disease without esophagitis 3/12/2018    EGD over 10 years    Gout     Hyperlipidemia     Insomnia 3/12/2018    Mild single current episode of major depressive disorder (HCC) 3/12/2018    Neuropathy 3/12/2018    Clinical diagnosis, EMG    Other emphysema (HCC) 3/12/2018    No recent PFT    Pure hypercholesterolemia 3/12/2018    SOB (shortness of breath)     Tobacco abuse 3/12/2018       Surgical History:  Past Surgical History:   Procedure Laterality Date     SECTION      COLONOSCOPY      HERNIA REPAIR      LUNG BIOPSY Left

## 2024-01-19 NOTE — PROGRESS NOTES
Admission and med rec complete, see flowsheets. Pt A&Ox4, VSS, on 3L NC. JED Chung, notified via [employer] @[id number]. No other needs verbalized at this time, safety maintained, call light within reach.    Electronically signed by Timoteo Jones RN on 1/18/2024 at 9:36 PM

## 2024-01-19 NOTE — CARE COORDINATION
Case Management Assessment  Initial Evaluation    Date/Time of Evaluation: 1/19/2024 11:58 AM  Assessment Completed by: JUMANA [place]    If patient is discharged prior to next notation, then this note serves as note for discharge by case management.    Patient Name: Nicole Wade                   YOB: 1960  Diagnosis: Acute diverticulitis [K57.92]                   Date / Time: 1/18/2024  9:14 PM    Patient Admission Status: Inpatient   Readmission Risk (Low < 19, Mod (19-27), High > 27): Readmission Risk Score: 9.9    Current PCP: Glenna Gage PA  PCP verified by CM? Yes    Chart Reviewed: Yes      History Provided by: Patient  Patient Orientation: Alert and Oriented    Patient Cognition: Alert    Hospitalization in the last 30 days (Readmission):  No    If yes, Readmission Assessment in CM Navigator will be completed.    Advance Directives:      Code Status: Full Code   Patient's Primary Decision Maker is: Legal Next of Kin    Primary Decision Maker: Jona Schneider - [phone]    Discharge Planning:    Patient lives with: Alone Type of Home: Apartment  Primary Care Giver: Self  Patient Support Systems include: Family Members   Current Financial resources: Medicare  Current community resources: None  Current services prior to admission: Oxygen Therapy            Current DME:              Type of Home Care services:  None    ADLS  Prior functional level: Independent in ADLs/IADLs  Current functional level: Independent in ADLs/IADLs    PT AM-PAC: 24 /24  OT AM-PAC: 24 /24    Family can provide assistance at DC: No  Would you like Case Management to discuss the discharge plan with any other family members/significant others, and if so, who? No  Plans to Return to Present Housing: Yes  Other Identified Issues/Barriers to RETURNING to current housing: MEDICAL CLEARNACE  Potential Assistance needed at discharge: N/A            Potential DME:    Patient expects to discharge to:

## 2024-01-19 NOTE — CONSULTS
GENERAL SURGERY NOTE    Pt Name: Nicole Wade  MRN: [id number]  Date: 2024        SUBJECTIVE:     History of Chief Complaint:    Nicole is a 63 y.o. female with a history of COPD, GERD, hyperlipidemia, lung cancer status postresection presents with abdominal pain.  Since [place] she has not had full bowel movement.  Just small amounts.  She has been eating but not much.  Denies any nausea or vomiting.  Abdominal pain mainly in the left lower quadrant and has been progressively worsening.  Patient has been noticing some abdominal distention as well.  Denies bleeding from anywhere.  No urinary symptoms and no other complaints.  Not on any blood thinners.     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 10/31/2018    On lung screen done 10/26/18    DDD (degenerative disc disease), lumbosacral 3/12/2018    Used to see pain management     CARTER (generalized anxiety disorder) 3/12/2018    [facility]    Gastroesophageal reflux disease without esophagitis 3/12/2018    EGD over 10 years    Gout     Hyperlipidemia     Insomnia 3/12/2018    Mild single current episode of major depressive disorder (HCC) 3/12/2018    Neuropathy 3/12/2018    Clinical diagnosis, EMG    Other emphysema (HCC) 3/12/2018    No recent PFT    Pure hypercholesterolemia 3/12/2018    SOB (shortness of breath)     Tobacco abuse 3/12/2018     Past Surgical History:   Procedure Laterality Date     SECTION      COLONOSCOPY      HERNIA REPAIR      LUNG BIOPSY Left 2018    CT guided Left Lung Biopsy by Dr Jatinder Plummer    LUNG REMOVAL, PARTIAL Left     THORACOSCOPY Right 2022    RIGHT THORACOSCOPIC LOWER LOBE WEDGE performed by Ilia Duggan MD at [facility] [place]    TONSILLECTOMY      VENTRAL HERNIA REPAIR N/A 2022  decreased in attenuation.  No intrahepatic ductal dilation is seen.  Spleen, gallbladder and pancreas are unremarkable.  A 11 mm nodule is again seen in the left adrenal gland.  In the right kidney there are stones visualized.  The largest measures 4 mm and is not obstructing. There is no evidence for hydronephrosis. GI/Bowel: There is stranding seen in the sigmoid colon.  There is a 2.3 x 1.7 cm hypodense area seen in the sigmoid colon in the pelvis (series 2, image 61 and series 602 image 32).  The bowel is diffusely thickened.  Findings are consistent with diverticulitis and abscess.  Diverticulosis coli is seen predominately in the descending and sigmoid colon. Pelvis: The bladder wall appears to be thickened.  There are multiple check wall loops of bowel seen in the left side of the pelvis with stranding. Peritoneum/Retroperitoneum:   No retroperitoneal hemorrhage or adenopathy is seen. Bones/Soft Tissues: The bony structures are grossly intact.     Acute diverticulitis is seen in the sigmoid colon with  abscess.  The bowel is diffusely thickened.  To exclude any developing mass recommend follow-up colonoscopy after treatment. Nephrolithiasis without evidence for hydronephrosis Findings discussed with AKIRA Leija on today's date at 1:50 p.m..       ASSESSMENT AND PLAN:   Nicole Wade is a 63 y.o. female with a history of COPD, GERD, hyperlipidemia, lung cancer status postresection presents with abdominal pain.  Since [place] she has not had full bowel movement.  Just small amounts.  She has been eating but not much.  Denies any nausea or vomiting.  Abdominal pain mainly in the left lower quadrant and has been progressively worsening.  Patient has been noticing some abdominal distention as well.  Denies bleeding from anywhere.  No urinary symptoms and no other complaints.  Not on any blood thinners.     Patient with a sigmoid colon diverticular abscess 2.3 cm X 1.7 cm  There is no urgent surgical

## 2024-01-19 NOTE — PROGRESS NOTES
Physical Therapy Med Surg Initial Assessment  Facility/Department: [address] MED SURG UNIT  Room: [facility] [address]       NAME: Nicole Wade  : 1960 (63 y.o.)  MRN: [id number]  CODE STATUS: Full Code    Date of Service: 2024    Patient Diagnosis(es): Acute diverticulitis [K57.92]   No chief complaint on file.    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    Coronary artery calcification of native artery - aortic arch 10/31/2018    Chronic obstructive pulmonary disease (HCC)     Hyperlipidemia     SOB (shortness of breath)     Pure hypercholesterolemia 2018    Mild single current episode of major depressive disorder (HCC) 2018    CARTER (generalized anxiety disorder) 2018    Insomnia 2018    Gastroesophageal reflux disease without esophagitis 2018    Neuropathy 2018    DDD (degenerative disc disease), lumbosacral 2018    Tobacco abuse 2018        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 10/31/2018    On lung screen done 10/26/18    DDD (degenerative disc disease), lumbosacral 3/12/2018    Used to see pain management     CARTER (generalized anxiety disorder) 3/12/2018    [facility]    Gastroesophageal reflux disease without esophagitis 3/12/2018    EGD over 10 years    Gout     Hyperlipidemia     Insomnia 3/12/2018    Mild single current episode of major depressive  disorder (HCC) 3/12/2018    Neuropathy 3/12/2018    Clinical diagnosis, EMG    Other emphysema (HCC) 3/12/2018    No recent PFT    Pure hypercholesterolemia 3/12/2018    SOB (shortness of breath)     Tobacco abuse 3/12/2018     Past Surgical History:   Procedure Laterality Date     SECTION      COLONOSCOPY      HERNIA REPAIR      LUNG BIOPSY Left 2018    CT guided Left Lung Biopsy by Dr Jatinder Plummer    LUNG REMOVAL, PARTIAL Left     THORACOSCOPY Right 2022    RIGHT THORACOSCOPIC LOWER LOBE WEDGE [place] [facility] [place]    TONSILLECTOMY      VENTRAL HERNIA REPAIR N/A 2022    REPAIR OF VENTRAL HERNIA WITH UMBILECTOMY performed by Storm Lang MD at [facility] OR       Patient assessed for rehabilitation services?: Yes    Restrictions:        SUBJECTIVE:        Pain  Pain: 7/10 aching pain in abdomen reported at beginning and end of session. Nursing informed of pain level    Prior Level of Function:  Social/Functional History  Lives With: Alone  Home Layout: One level  Home Access: Level entry  Bathroom Shower/Tub:  (its ok)  Has the patient had two or more falls in the past year or any fall with injury in the past year?: No  ADL Assistance: Independent  Homemaking Assistance: Independent  Homemaking Responsibilities: Yes  Ambulation Assistance: Independent (no divuce)  Transfer Assistance: Independent  Active : No    OBJECTIVE:   Vision  Vision: Within Functional Limits  Hearing: Within functional limits    Cognition:  Overall Orientation Status: Within Functional Limits         ROM:  AROM: Within functional limits  PROM: Within functional limits    Strength:  Strength: Within functional limits    Neuro:  Balance  Sitting - Static: Good  Sitting - Dynamic: Good  Standing - Static: Good  Standing - Dynamic: Good                      Coordination: Within functional limits         Bed mobility  Rolling to Left: Independent  Rolling to Right: Independent  Supine to Sit:

## 2024-01-20 ENCOUNTER — PREP FOR PROCEDURE (OUTPATIENT)
Dept: SURGERY | Age: 64
End: 2024-01-20

## 2024-01-20 LAB
ALBUMIN SERPL-MCNC: 3.2 G/DL (ref 3.5–4.6)
ALP SERPL-CCNC: 116 U/L (ref 40–130)
ALT SERPL-CCNC: <5 U/L (ref 0–33)
ANION GAP SERPL CALCULATED.3IONS-SCNC: 9 MEQ/L (ref 9–15)
AST SERPL-CCNC: 7 U/L (ref 0–35)
BASOPHILS # BLD: 0 K/UL (ref 0–0.2)
BASOPHILS NFR BLD: 0.1 %
BILIRUB SERPL-MCNC: <0.2 MG/DL (ref 0.2–0.7)
BUN SERPL-MCNC: 7 MG/DL (ref 8–23)
CALCIUM SERPL-MCNC: 8.8 MG/DL (ref 8.5–9.9)
CHLORIDE SERPL-SCNC: 112 MEQ/L (ref 95–107)
CO2 SERPL-SCNC: 23 MEQ/L (ref 20–31)
CREAT SERPL-MCNC: 0.66 MG/DL (ref 0.5–0.9)
EOSINOPHIL # BLD: 0 K/UL (ref 0–0.7)
EOSINOPHIL NFR BLD: 0 %
ERYTHROCYTE [DISTWIDTH] IN BLOOD BY AUTOMATED COUNT: 14.2 % (ref 11.5–14.5)
GLOBULIN SER CALC-MCNC: 2.4 G/DL (ref 2.3–3.5)
GLUCOSE SERPL-MCNC: 125 MG/DL (ref 70–99)
HCT VFR BLD AUTO: 33 % (ref 37–47)
HGB BLD-MCNC: 10.8 G/DL (ref 12–16)
LYMPHOCYTES # BLD: 0.9 K/UL (ref 1–4.8)
LYMPHOCYTES NFR BLD: 9.6 %
MCH RBC QN AUTO: 30.3 PG (ref 27–31.3)
MCHC RBC AUTO-ENTMCNC: 32.7 % (ref 33–37)
MCV RBC AUTO: 92.7 FL (ref 79.4–94.8)
MONOCYTES # BLD: 0.7 K/UL (ref 0.2–0.8)
MONOCYTES NFR BLD: 7.6 %
NEUTROPHILS # BLD: 7.4 K/UL (ref 1.4–6.5)
NEUTS SEG NFR BLD: 82.3 %
PLATELET # BLD AUTO: 278 K/UL (ref 130–400)
POTASSIUM SERPL-SCNC: 5 MEQ/L (ref 3.4–4.9)
PROT SERPL-MCNC: 5.6 G/DL (ref 6.3–8)
RBC # BLD AUTO: 3.56 M/UL (ref 4.2–5.4)
SODIUM SERPL-SCNC: 144 MEQ/L (ref 135–144)
WBC # BLD AUTO: 9 K/UL (ref 4.8–10.8)

## 2024-01-20 PROCEDURE — 80053 COMPREHEN METABOLIC PANEL: CPT

## 2024-01-20 PROCEDURE — 94640 AIRWAY INHALATION TREATMENT: CPT

## 2024-01-20 PROCEDURE — 2580000003 HC RX 258: Performed by: INTERNAL MEDICINE

## 2024-01-20 PROCEDURE — 85025 COMPLETE CBC W/AUTO DIFF WBC: CPT

## 2024-01-20 PROCEDURE — 6370000000 HC RX 637 (ALT 250 FOR IP): Performed by: INTERNAL MEDICINE

## 2024-01-20 PROCEDURE — 94760 N-INVAS EAR/PLS OXIMETRY 1: CPT

## 2024-01-20 PROCEDURE — 1210000000 HC MED SURG R&B

## 2024-01-20 PROCEDURE — 36415 COLL VENOUS BLD VENIPUNCTURE: CPT

## 2024-01-20 PROCEDURE — 6360000002 HC RX W HCPCS: Performed by: INTERNAL MEDICINE

## 2024-01-20 PROCEDURE — 2700000000 HC OXYGEN THERAPY PER DAY

## 2024-01-20 PROCEDURE — 6370000000 HC RX 637 (ALT 250 FOR IP): Performed by: COLON & RECTAL SURGERY

## 2024-01-20 RX ORDER — OXYCODONE HYDROCHLORIDE 5 MG/1
5 TABLET ORAL EVERY 4 HOURS PRN
Status: DISCONTINUED | OUTPATIENT
Start: 2024-01-20 | End: 2024-01-25

## 2024-01-20 RX ORDER — SODIUM CHLORIDE 0.9 % (FLUSH) 0.9 %
5-40 SYRINGE (ML) INJECTION PRN
Status: CANCELLED | OUTPATIENT
Start: 2024-01-21

## 2024-01-20 RX ORDER — POLYETHYLENE GLYCOL 3350 17 G/17G
238 POWDER, FOR SOLUTION ORAL ONCE
Status: DISCONTINUED | OUTPATIENT
Start: 2024-01-20 | End: 2024-01-20 | Stop reason: ALTCHOICE

## 2024-01-20 RX ORDER — SODIUM CHLORIDE 0.9 % (FLUSH) 0.9 %
5-40 SYRINGE (ML) INJECTION EVERY 12 HOURS SCHEDULED
Status: CANCELLED | OUTPATIENT
Start: 2024-01-21

## 2024-01-20 RX ORDER — SODIUM CHLORIDE 9 MG/ML
INJECTION, SOLUTION INTRAVENOUS CONTINUOUS
Status: DISCONTINUED | OUTPATIENT
Start: 2024-01-20 | End: 2024-01-21

## 2024-01-20 RX ORDER — BISACODYL 5 MG/1
20 TABLET, DELAYED RELEASE ORAL 2 TIMES DAILY
Status: DISPENSED | OUTPATIENT
Start: 2024-01-20 | End: 2024-01-21

## 2024-01-20 RX ORDER — MORPHINE SULFATE 2 MG/ML
2 INJECTION, SOLUTION INTRAMUSCULAR; INTRAVENOUS EVERY 4 HOURS PRN
Status: DISCONTINUED | OUTPATIENT
Start: 2024-01-20 | End: 2024-01-25

## 2024-01-20 RX ORDER — SODIUM CHLORIDE 9 MG/ML
INJECTION, SOLUTION INTRAVENOUS PRN
Status: CANCELLED | OUTPATIENT
Start: 2024-01-21

## 2024-01-20 RX ORDER — POLYETHYLENE GLYCOL 3350 17 G/17G
238 POWDER, FOR SOLUTION ORAL ONCE
Status: DISCONTINUED | OUTPATIENT
Start: 2024-01-20 | End: 2024-01-26 | Stop reason: HOSPADM

## 2024-01-20 RX ADMIN — ENOXAPARIN SODIUM 40 MG: 100 INJECTION SUBCUTANEOUS at 09:42

## 2024-01-20 RX ADMIN — Medication 10 ML: at 09:48

## 2024-01-20 RX ADMIN — SODIUM CHLORIDE: 9 INJECTION, SOLUTION INTRAVENOUS at 09:46

## 2024-01-20 RX ADMIN — PIPERACILLIN AND TAZOBACTAM 3375 MG: 3; .375 INJECTION, POWDER, FOR SOLUTION INTRAVENOUS at 06:02

## 2024-01-20 RX ADMIN — BISACODYL 20 MG: 5 TABLET, COATED ORAL at 18:42

## 2024-01-20 RX ADMIN — DOCUSATE SODIUM 100 MG: 100 CAPSULE, LIQUID FILLED ORAL at 09:43

## 2024-01-20 RX ADMIN — ACETAMINOPHEN 650 MG: 325 TABLET ORAL at 14:24

## 2024-01-20 RX ADMIN — BUDESONIDE AND FORMOTEROL FUMARATE DIHYDRATE 2 PUFF: 160; 4.5 AEROSOL RESPIRATORY (INHALATION) at 07:00

## 2024-01-20 RX ADMIN — ATORVASTATIN CALCIUM 20 MG: 20 TABLET, FILM COATED ORAL at 09:43

## 2024-01-20 RX ADMIN — PIPERACILLIN AND TAZOBACTAM 3375 MG: 3; .375 INJECTION, POWDER, FOR SOLUTION INTRAVENOUS at 14:14

## 2024-01-20 RX ADMIN — BUDESONIDE AND FORMOTEROL FUMARATE DIHYDRATE 2 PUFF: 160; 4.5 AEROSOL RESPIRATORY (INHALATION) at 18:56

## 2024-01-20 RX ADMIN — PAROXETINE 40 MG: 10 TABLET, FILM COATED ORAL at 09:42

## 2024-01-20 RX ADMIN — ALBUTEROL SULFATE 2 PUFF: 90 AEROSOL, METERED RESPIRATORY (INHALATION) at 09:47

## 2024-01-20 RX ADMIN — PANTOPRAZOLE SODIUM 40 MG: 40 TABLET, DELAYED RELEASE ORAL at 06:01

## 2024-01-20 RX ADMIN — OXYCODONE 5 MG: 5 TABLET ORAL at 16:55

## 2024-01-20 ASSESSMENT — PAIN SCALES - GENERAL
PAINLEVEL_OUTOF10: 8
PAINLEVEL_OUTOF10: 7
PAINLEVEL_OUTOF10: 6

## 2024-01-20 ASSESSMENT — PAIN DESCRIPTION - ORIENTATION
ORIENTATION: LOWER
ORIENTATION: LOWER;LEFT

## 2024-01-20 ASSESSMENT — PAIN DESCRIPTION - LOCATION
LOCATION: ABDOMEN
LOCATION: ABDOMEN

## 2024-01-20 ASSESSMENT — PAIN DESCRIPTION - DESCRIPTORS: DESCRIPTORS: CRAMPING

## 2024-01-20 NOTE — PROGRESS NOTES
Patient is feeling better.  Less pain and discomfort.    Awake and oriented.  No distress.  Chest is clear, heart is regular.  Abdomen, moderately tender in the suprapubic and left lower quadrant    *Acute diverticulitis.  Continue Zosyn    *COPD, stable, no exacerbation    *Tobacco addiction.    *DVT prophylaxis.

## 2024-01-20 NOTE — PROGRESS NOTES
Patient seen and examined.  I was asked to see her regarding colorectal intervention by Dr. Nieto.    Patient has had 3 weeks of left lower quadrant abdominal pain worsening recently.    I followed her clinical course to date.  I reviewed her CAT scan which showed acute on chronic diverticulitis with pericolic abscess.    I had a discussion with her regarding the risks and benefits of operative versus nonoperative treatment of her diverticulitis.    Risks of surgery including infection, bleeding, postoperative pain and temporary diverting ostomy were all discussed.  Risks of nonoperative treatment were discussed.    I reviewed her imaging with her.    At the end of our discussion, she wished to proceed with sigmoid resection with possible diverting ileostomy.  Risks and benefits excepted.    Bowel prep was given this weekend.  Plan for sigmoid resection 1/22/2024.  She will be marked for possible ileostomy the day of surgery.    I will follow closely with you.  Please call if any questions.

## 2024-01-20 NOTE — PROGRESS NOTES
0940 Assessment completed per flowsheet. Medications administered per MAR.  Now on clear liquid diet.  1420 Tylenol PRN given for pain with little effect.  1700 Oxycodone administered PRN. This relieved the pain slightly, pt states her pain is now 4/10.  Electronically signed by Christy Britton RN on 1/20/2024 at 6:56 PM

## 2024-01-20 NOTE — PROGRESS NOTES
Patient is feeling better.  Less pain and discomfort.    Awake and oriented.  No distress.  Chest is clear, heart is regular.  Abdomen, moderately tender in the suprapubic and left lower quadrant    *Acute diverticulitis.  Continue Zosyn    *COPD, stable, no exacerbation    *Tobacco addiction.    *DVT prophylaxis.    *Anemia, no evidence of acute blood loss.  Continue to monitor.  Anemia workup in the outpatient setting.

## 2024-01-21 ENCOUNTER — APPOINTMENT (OUTPATIENT)
Dept: GENERAL RADIOLOGY | Age: 64
End: 2024-01-21
Attending: INTERNAL MEDICINE
Payer: MEDICARE

## 2024-01-21 PROCEDURE — 6370000000 HC RX 637 (ALT 250 FOR IP): Performed by: INTERNAL MEDICINE

## 2024-01-21 PROCEDURE — 2580000003 HC RX 258: Performed by: INTERNAL MEDICINE

## 2024-01-21 PROCEDURE — 2580000003 HC RX 258: Performed by: COLON & RECTAL SURGERY

## 2024-01-21 PROCEDURE — 94640 AIRWAY INHALATION TREATMENT: CPT

## 2024-01-21 PROCEDURE — 2700000000 HC OXYGEN THERAPY PER DAY

## 2024-01-21 PROCEDURE — 1210000000 HC MED SURG R&B

## 2024-01-21 PROCEDURE — 71045 X-RAY EXAM CHEST 1 VIEW: CPT

## 2024-01-21 PROCEDURE — 6360000002 HC RX W HCPCS: Performed by: INTERNAL MEDICINE

## 2024-01-21 RX ORDER — SODIUM CHLORIDE, SODIUM LACTATE, POTASSIUM CHLORIDE, CALCIUM CHLORIDE 600; 310; 30; 20 MG/100ML; MG/100ML; MG/100ML; MG/100ML
INJECTION, SOLUTION INTRAVENOUS CONTINUOUS
Status: DISPENSED | OUTPATIENT
Start: 2024-01-22 | End: 2024-01-22

## 2024-01-21 RX ADMIN — Medication 10 ML: at 09:03

## 2024-01-21 RX ADMIN — DOCUSATE SODIUM 100 MG: 100 CAPSULE, LIQUID FILLED ORAL at 00:37

## 2024-01-21 RX ADMIN — PIPERACILLIN AND TAZOBACTAM 3375 MG: 3; .375 INJECTION, POWDER, FOR SOLUTION INTRAVENOUS at 22:37

## 2024-01-21 RX ADMIN — OXYCODONE 5 MG: 5 TABLET ORAL at 11:40

## 2024-01-21 RX ADMIN — OXYCODONE 5 MG: 5 TABLET ORAL at 15:41

## 2024-01-21 RX ADMIN — BUDESONIDE AND FORMOTEROL FUMARATE DIHYDRATE 2 PUFF: 160; 4.5 AEROSOL RESPIRATORY (INHALATION) at 16:50

## 2024-01-21 RX ADMIN — PANTOPRAZOLE SODIUM 40 MG: 40 TABLET, DELAYED RELEASE ORAL at 05:45

## 2024-01-21 RX ADMIN — SODIUM CHLORIDE, PRESERVATIVE FREE 10 ML: 5 INJECTION INTRAVENOUS at 22:37

## 2024-01-21 RX ADMIN — ENOXAPARIN SODIUM 40 MG: 100 INJECTION SUBCUTANEOUS at 09:03

## 2024-01-21 RX ADMIN — TRAZODONE HYDROCHLORIDE 100 MG: 50 TABLET ORAL at 00:37

## 2024-01-21 RX ADMIN — PIPERACILLIN AND TAZOBACTAM 3375 MG: 3; .375 INJECTION, POWDER, FOR SOLUTION INTRAVENOUS at 09:08

## 2024-01-21 RX ADMIN — ACETAMINOPHEN 650 MG: 325 TABLET ORAL at 00:36

## 2024-01-21 RX ADMIN — PAROXETINE 40 MG: 10 TABLET, FILM COATED ORAL at 09:02

## 2024-01-21 RX ADMIN — PIPERACILLIN AND TAZOBACTAM 3375 MG: 3; .375 INJECTION, POWDER, FOR SOLUTION INTRAVENOUS at 14:04

## 2024-01-21 RX ADMIN — DOCUSATE SODIUM 100 MG: 100 CAPSULE, LIQUID FILLED ORAL at 09:02

## 2024-01-21 RX ADMIN — PIPERACILLIN AND TAZOBACTAM 3375 MG: 3; .375 INJECTION, POWDER, FOR SOLUTION INTRAVENOUS at 00:39

## 2024-01-21 RX ADMIN — ATORVASTATIN CALCIUM 20 MG: 20 TABLET, FILM COATED ORAL at 09:02

## 2024-01-21 RX ADMIN — Medication 10 ML: at 22:30

## 2024-01-21 RX ADMIN — Medication 10 ML: at 00:37

## 2024-01-21 RX ADMIN — SODIUM CHLORIDE: 9 INJECTION, SOLUTION INTRAVENOUS at 00:29

## 2024-01-21 RX ADMIN — OXYCODONE 5 MG: 5 TABLET ORAL at 22:35

## 2024-01-21 RX ADMIN — TRAZODONE HYDROCHLORIDE 100 MG: 50 TABLET ORAL at 22:35

## 2024-01-21 RX ADMIN — DOCUSATE SODIUM 100 MG: 100 CAPSULE, LIQUID FILLED ORAL at 22:35

## 2024-01-21 RX ADMIN — ONDANSETRON 4 MG: 2 INJECTION INTRAMUSCULAR; INTRAVENOUS at 00:48

## 2024-01-21 RX ADMIN — BUDESONIDE AND FORMOTEROL FUMARATE DIHYDRATE 2 PUFF: 160; 4.5 AEROSOL RESPIRATORY (INHALATION) at 05:33

## 2024-01-21 RX ADMIN — TIOTROPIUM BROMIDE INHALATION SPRAY 2 PUFF: 3.12 SPRAY, METERED RESPIRATORY (INHALATION) at 05:35

## 2024-01-21 ASSESSMENT — PAIN SCALES - GENERAL
PAINLEVEL_OUTOF10: 3
PAINLEVEL_OUTOF10: 0
PAINLEVEL_OUTOF10: 7
PAINLEVEL_OUTOF10: 9
PAINLEVEL_OUTOF10: 7
PAINLEVEL_OUTOF10: 6
PAINLEVEL_OUTOF10: 8
PAINLEVEL_OUTOF10: 9
PAINLEVEL_OUTOF10: 0
PAINLEVEL_OUTOF10: 7

## 2024-01-21 ASSESSMENT — PAIN DESCRIPTION - ORIENTATION
ORIENTATION: RIGHT;LEFT
ORIENTATION: LEFT;MID
ORIENTATION: LEFT;UPPER;LOWER
ORIENTATION: MID
ORIENTATION: LOWER;LEFT
ORIENTATION: LEFT;UPPER;LOWER;MID

## 2024-01-21 ASSESSMENT — PAIN DESCRIPTION - LOCATION
LOCATION: BACK;ABDOMEN
LOCATION: ABDOMEN
LOCATION: ABDOMEN;BACK
LOCATION: ABDOMEN
LOCATION: BACK
LOCATION: BACK;ABDOMEN

## 2024-01-21 ASSESSMENT — PAIN DESCRIPTION - DESCRIPTORS
DESCRIPTORS: SHARP
DESCRIPTORS: SQUEEZING

## 2024-01-21 NOTE — FLOWSHEET NOTE
Patient is seated on her bed,no respiratory distress,no shortness of breath,her heart rate regular.  0035  patient voiced that the roxicodone  that was given earlier made her  dizzy ,so tylenol was given for her lower back pain. Rated 7/10 from the pain scale,whereby ten is the worst pain there is.her vital signs were stable.  01:30 : patient is in bed with closed eyes.call light within her easy reach.  05:15 patient remain seated on the bed,she is watching T.V.,she denies pain or shortness of breath

## 2024-01-21 NOTE — PROGRESS NOTES
1255: Pt started on bowel prep at this time, made aware that bowel prep is to be completed by 6:00pm tonight.    1758: Pt completed her bowel prep by this time. Pt made aware that she is on CLD and NPO after midnight fir surgery with Dr. Darby tomorrow. Pt verbalized understanding.    Electronically signed by Gill Saini RN on 1/21/24 at 6:08 PM EST

## 2024-01-21 NOTE — PROGRESS NOTES
Mild to moderate amount of pain intensity in the lower abdomen.    Awake and oriented.  No distress.  Chest is clear, heart is regular.  Abdomen, moderately tender in the suprapubic and left lower quadrant    *Acute diverticulitis.  Continue Zosyn.  Decision was made to proceed with a sigmoid resection after discussion took place between patient and surgeon.  N.p.o. after midnight.  I ordered  LR to be started tomorrow at 6 AM.  Hold Lovenox.  Resume postoperatively.    *COPD, stable, no exacerbation    *Tobacco addiction.    *DVT prophylaxis.    *Anemia, no evidence of acute blood loss.  Continue to monitor.  Anemia workup in the outpatient setting.

## 2024-01-21 NOTE — PROGRESS NOTES
0900 Assessment completed per flowsheet. Medications administered per MAR.  1115 Pt complaining of back pain 7/10. Gave warm blanket.  1140 Pt complaining of back pain 9/10. Administered Stephanie PRN.  Electronically signed by Christy Britton RN on 1/21/2024 at 11:54 AM    1215 Pt stated that Oxycodone did not help much with pain, still an 8/10.  Electronically signed by Christy Britton RN on 1/21/2024 at 12:22 PM    1540 Pt requesting another dose of Oxycodone, pain at 9/10.  1620 Pt states only some relief of pain, now 6/10.  Electronically signed by Christy Britton RN on 1/21/2024 at 5:25 PM

## 2024-01-21 NOTE — PROGRESS NOTES
Patient seen and examined    Doing well    Bowel prep today    Plan for sigmoid resection with possible diverting ileostomy tomorrow    Risks and benefits explained to the patient.  She understands the plan.  She wishes to proceed.  Nonoperative alternatives were given.  After our discussion she wishes to proceed with resection.  Consent obtained bowel prep available.  All questions answered

## 2024-01-22 ENCOUNTER — ANESTHESIA EVENT (OUTPATIENT)
Dept: OPERATING ROOM | Age: 64
End: 2024-01-22
Payer: MEDICARE

## 2024-01-22 ENCOUNTER — ANESTHESIA (OUTPATIENT)
Dept: OPERATING ROOM | Age: 64
End: 2024-01-22
Payer: MEDICARE

## 2024-01-22 LAB
ABO + RH BLD: NORMAL
ANION GAP SERPL CALCULATED.3IONS-SCNC: 15 MEQ/L (ref 9–15)
BASOPHILS # BLD: 0 K/UL (ref 0–0.2)
BASOPHILS NFR BLD: 0.1 %
BLD GP AB SCN SERPL QL: NORMAL
BUN SERPL-MCNC: 3 MG/DL (ref 8–23)
CALCIUM SERPL-MCNC: 8.6 MG/DL (ref 8.5–9.9)
CHLORIDE SERPL-SCNC: 108 MEQ/L (ref 95–107)
CO2 SERPL-SCNC: 22 MEQ/L (ref 20–31)
CREAT SERPL-MCNC: 0.7 MG/DL (ref 0.5–0.9)
EOSINOPHIL # BLD: 0.2 K/UL (ref 0–0.7)
EOSINOPHIL NFR BLD: 1.9 %
ERYTHROCYTE [DISTWIDTH] IN BLOOD BY AUTOMATED COUNT: 14.3 % (ref 11.5–14.5)
GLUCOSE SERPL-MCNC: 72 MG/DL (ref 70–99)
HCT VFR BLD AUTO: 34.9 % (ref 37–47)
HGB BLD-MCNC: 11.4 G/DL (ref 12–16)
LYMPHOCYTES # BLD: 2 K/UL (ref 1–4.8)
LYMPHOCYTES NFR BLD: 18.7 %
MCH RBC QN AUTO: 30.4 PG (ref 27–31.3)
MCHC RBC AUTO-ENTMCNC: 32.7 % (ref 33–37)
MCV RBC AUTO: 93.1 FL (ref 79.4–94.8)
MONOCYTES # BLD: 1.1 K/UL (ref 0.2–0.8)
MONOCYTES NFR BLD: 10.2 %
NEUTROPHILS # BLD: 7.2 K/UL (ref 1.4–6.5)
NEUTS SEG NFR BLD: 68.6 %
PLATELET # BLD AUTO: 288 K/UL (ref 130–400)
POTASSIUM SERPL-SCNC: 4.1 MEQ/L (ref 3.4–4.9)
RBC # BLD AUTO: 3.75 M/UL (ref 4.2–5.4)
SODIUM SERPL-SCNC: 145 MEQ/L (ref 135–144)
WBC # BLD AUTO: 10.5 K/UL (ref 4.8–10.8)

## 2024-01-22 PROCEDURE — 3600000014 HC SURGERY LEVEL 4 ADDTL 15MIN: Performed by: COLON & RECTAL SURGERY

## 2024-01-22 PROCEDURE — 36415 COLL VENOUS BLD VENIPUNCTURE: CPT

## 2024-01-22 PROCEDURE — 1210000000 HC MED SURG R&B

## 2024-01-22 PROCEDURE — 87075 CULTR BACTERIA EXCEPT BLOOD: CPT

## 2024-01-22 PROCEDURE — 6370000000 HC RX 637 (ALT 250 FOR IP): Performed by: INTERNAL MEDICINE

## 2024-01-22 PROCEDURE — 6360000002 HC RX W HCPCS

## 2024-01-22 PROCEDURE — 2580000003 HC RX 258: Performed by: STUDENT IN AN ORGANIZED HEALTH CARE EDUCATION/TRAINING PROGRAM

## 2024-01-22 PROCEDURE — 58720 REMOVAL OF OVARY/TUBE(S): CPT | Performed by: COLON & RECTAL SURGERY

## 2024-01-22 PROCEDURE — 6360000002 HC RX W HCPCS: Performed by: STUDENT IN AN ORGANIZED HEALTH CARE EDUCATION/TRAINING PROGRAM

## 2024-01-22 PROCEDURE — 3700000001 HC ADD 15 MINUTES (ANESTHESIA): Performed by: COLON & RECTAL SURGERY

## 2024-01-22 PROCEDURE — 0DJD8ZZ INSPECTION OF LOWER INTESTINAL TRACT, VIA NATURAL OR ARTIFICIAL OPENING ENDOSCOPIC: ICD-10-PCS | Performed by: COLON & RECTAL SURGERY

## 2024-01-22 PROCEDURE — 0UT60ZZ RESECTION OF LEFT FALLOPIAN TUBE, OPEN APPROACH: ICD-10-PCS | Performed by: COLON & RECTAL SURGERY

## 2024-01-22 PROCEDURE — 2580000003 HC RX 258: Performed by: COLON & RECTAL SURGERY

## 2024-01-22 PROCEDURE — 2500000003 HC RX 250 WO HCPCS: Performed by: STUDENT IN AN ORGANIZED HEALTH CARE EDUCATION/TRAINING PROGRAM

## 2024-01-22 PROCEDURE — 87076 CULTURE ANAEROBE IDENT EACH: CPT

## 2024-01-22 PROCEDURE — 87077 CULTURE AEROBIC IDENTIFY: CPT

## 2024-01-22 PROCEDURE — 94761 N-INVAS EAR/PLS OXIMETRY MLT: CPT

## 2024-01-22 PROCEDURE — 88304 TISSUE EXAM BY PATHOLOGIST: CPT

## 2024-01-22 PROCEDURE — 86901 BLOOD TYPING SEROLOGIC RH(D): CPT

## 2024-01-22 PROCEDURE — 80048 BASIC METABOLIC PNL TOTAL CA: CPT

## 2024-01-22 PROCEDURE — 2580000003 HC RX 258: Performed by: INTERNAL MEDICINE

## 2024-01-22 PROCEDURE — 6360000002 HC RX W HCPCS: Performed by: COLON & RECTAL SURGERY

## 2024-01-22 PROCEDURE — 62325 NJX INTERLAMINAR CRV/THRC: CPT | Performed by: STUDENT IN AN ORGANIZED HEALTH CARE EDUCATION/TRAINING PROGRAM

## 2024-01-22 PROCEDURE — 6360000002 HC RX W HCPCS: Performed by: INTERNAL MEDICINE

## 2024-01-22 PROCEDURE — 3700000000 HC ANESTHESIA ATTENDED CARE: Performed by: COLON & RECTAL SURGERY

## 2024-01-22 PROCEDURE — 88305 TISSUE EXAM BY PATHOLOGIST: CPT

## 2024-01-22 PROCEDURE — 0DTN0ZZ RESECTION OF SIGMOID COLON, OPEN APPROACH: ICD-10-PCS | Performed by: COLON & RECTAL SURGERY

## 2024-01-22 PROCEDURE — 44139 MOBILIZATION OF COLON: CPT | Performed by: COLON & RECTAL SURGERY

## 2024-01-22 PROCEDURE — 99213 OFFICE O/P EST LOW 20 MIN: CPT

## 2024-01-22 PROCEDURE — 3600000004 HC SURGERY LEVEL 4 BASE: Performed by: COLON & RECTAL SURGERY

## 2024-01-22 PROCEDURE — 87070 CULTURE OTHR SPECIMN AEROBIC: CPT

## 2024-01-22 PROCEDURE — 86850 RBC ANTIBODY SCREEN: CPT

## 2024-01-22 PROCEDURE — 2709999900 HC NON-CHARGEABLE SUPPLY: Performed by: COLON & RECTAL SURGERY

## 2024-01-22 PROCEDURE — 7100000000 HC PACU RECOVERY - FIRST 15 MIN: Performed by: COLON & RECTAL SURGERY

## 2024-01-22 PROCEDURE — 0DTJ0ZZ RESECTION OF APPENDIX, OPEN APPROACH: ICD-10-PCS | Performed by: COLON & RECTAL SURGERY

## 2024-01-22 PROCEDURE — 87186 SC STD MICRODIL/AGAR DIL: CPT

## 2024-01-22 PROCEDURE — 87185 SC STD ENZYME DETCJ PER NZM: CPT

## 2024-01-22 PROCEDURE — 6370000000 HC RX 637 (ALT 250 FOR IP): Performed by: COLON & RECTAL SURGERY

## 2024-01-22 PROCEDURE — 88307 TISSUE EXAM BY PATHOLOGIST: CPT

## 2024-01-22 PROCEDURE — 00HU33Z INSERTION OF INFUSION DEVICE INTO SPINAL CANAL, PERCUTANEOUS APPROACH: ICD-10-PCS | Performed by: COLON & RECTAL SURGERY

## 2024-01-22 PROCEDURE — 94640 AIRWAY INHALATION TREATMENT: CPT

## 2024-01-22 PROCEDURE — 94150 VITAL CAPACITY TEST: CPT

## 2024-01-22 PROCEDURE — 2700000000 HC OXYGEN THERAPY PER DAY

## 2024-01-22 PROCEDURE — 86900 BLOOD TYPING SEROLOGIC ABO: CPT

## 2024-01-22 PROCEDURE — 85025 COMPLETE CBC W/AUTO DIFF WBC: CPT

## 2024-01-22 PROCEDURE — 2720000010 HC SURG SUPPLY STERILE: Performed by: COLON & RECTAL SURGERY

## 2024-01-22 PROCEDURE — 44140 PARTIAL REMOVAL OF COLON: CPT | Performed by: COLON & RECTAL SURGERY

## 2024-01-22 PROCEDURE — 2500000003 HC RX 250 WO HCPCS

## 2024-01-22 PROCEDURE — 7100000001 HC PACU RECOVERY - ADDTL 15 MIN: Performed by: COLON & RECTAL SURGERY

## 2024-01-22 PROCEDURE — 0UT10ZZ RESECTION OF LEFT OVARY, OPEN APPROACH: ICD-10-PCS | Performed by: COLON & RECTAL SURGERY

## 2024-01-22 PROCEDURE — 2580000003 HC RX 258

## 2024-01-22 RX ORDER — MEPERIDINE HYDROCHLORIDE 25 MG/ML
12.5 INJECTION INTRAMUSCULAR; INTRAVENOUS; SUBCUTANEOUS
Status: DISCONTINUED | OUTPATIENT
Start: 2024-01-22 | End: 2024-01-22 | Stop reason: HOSPADM

## 2024-01-22 RX ORDER — FENTANYL CITRATE 0.05 MG/ML
50 INJECTION, SOLUTION INTRAMUSCULAR; INTRAVENOUS EVERY 10 MIN PRN
Status: DISCONTINUED | OUTPATIENT
Start: 2024-01-22 | End: 2024-01-22 | Stop reason: HOSPADM

## 2024-01-22 RX ORDER — ROCURONIUM BROMIDE 10 MG/ML
INJECTION, SOLUTION INTRAVENOUS PRN
Status: DISCONTINUED | OUTPATIENT
Start: 2024-01-22 | End: 2024-01-22 | Stop reason: SDUPTHER

## 2024-01-22 RX ORDER — MAGNESIUM HYDROXIDE 1200 MG/15ML
LIQUID ORAL CONTINUOUS PRN
Status: DISCONTINUED | OUTPATIENT
Start: 2024-01-22 | End: 2024-01-22 | Stop reason: HOSPADM

## 2024-01-22 RX ORDER — SODIUM CHLORIDE 0.9 % (FLUSH) 0.9 %
5-40 SYRINGE (ML) INJECTION PRN
Status: DISCONTINUED | OUTPATIENT
Start: 2024-01-22 | End: 2024-01-22 | Stop reason: HOSPADM

## 2024-01-22 RX ORDER — SODIUM CHLORIDE 0.9 % (FLUSH) 0.9 %
5-40 SYRINGE (ML) INJECTION EVERY 12 HOURS SCHEDULED
Status: DISCONTINUED | OUTPATIENT
Start: 2024-01-22 | End: 2024-01-22 | Stop reason: HOSPADM

## 2024-01-22 RX ORDER — FENTANYL CITRATE 50 UG/ML
INJECTION, SOLUTION INTRAMUSCULAR; INTRAVENOUS PRN
Status: DISCONTINUED | OUTPATIENT
Start: 2024-01-22 | End: 2024-01-22 | Stop reason: SDUPTHER

## 2024-01-22 RX ORDER — NALBUPHINE HYDROCHLORIDE 20 MG/ML
5 INJECTION, SOLUTION INTRAMUSCULAR; INTRAVENOUS; SUBCUTANEOUS EVERY 4 HOURS PRN
Status: DISCONTINUED | OUTPATIENT
Start: 2024-01-22 | End: 2024-01-25

## 2024-01-22 RX ORDER — NALOXONE HYDROCHLORIDE 0.4 MG/ML
INJECTION, SOLUTION INTRAMUSCULAR; INTRAVENOUS; SUBCUTANEOUS PRN
Status: DISCONTINUED | OUTPATIENT
Start: 2024-01-22 | End: 2024-01-25

## 2024-01-22 RX ORDER — METOCLOPRAMIDE HYDROCHLORIDE 5 MG/ML
10 INJECTION INTRAMUSCULAR; INTRAVENOUS
Status: DISCONTINUED | OUTPATIENT
Start: 2024-01-22 | End: 2024-01-22 | Stop reason: HOSPADM

## 2024-01-22 RX ORDER — LIDOCAINE HYDROCHLORIDE AND EPINEPHRINE 15; 5 MG/ML; UG/ML
INJECTION, SOLUTION EPIDURAL PRN
Status: DISCONTINUED | OUTPATIENT
Start: 2024-01-22 | End: 2024-01-22 | Stop reason: SDUPTHER

## 2024-01-22 RX ORDER — OXYCODONE HYDROCHLORIDE 5 MG/1
5 TABLET ORAL
Status: DISCONTINUED | OUTPATIENT
Start: 2024-01-22 | End: 2024-01-22 | Stop reason: HOSPADM

## 2024-01-22 RX ORDER — DEXAMETHASONE SODIUM PHOSPHATE 4 MG/ML
INJECTION, SOLUTION INTRA-ARTICULAR; INTRALESIONAL; INTRAMUSCULAR; INTRAVENOUS; SOFT TISSUE PRN
Status: DISCONTINUED | OUTPATIENT
Start: 2024-01-22 | End: 2024-01-22 | Stop reason: SDUPTHER

## 2024-01-22 RX ORDER — LIDOCAINE HYDROCHLORIDE 10 MG/ML
1 INJECTION, SOLUTION EPIDURAL; INFILTRATION; INTRACAUDAL; PERINEURAL
Status: DISCONTINUED | OUTPATIENT
Start: 2024-01-22 | End: 2024-01-22 | Stop reason: HOSPADM

## 2024-01-22 RX ORDER — ONDANSETRON 2 MG/ML
4 INJECTION INTRAMUSCULAR; INTRAVENOUS EVERY 6 HOURS PRN
Status: DISCONTINUED | OUTPATIENT
Start: 2024-01-22 | End: 2024-01-25

## 2024-01-22 RX ORDER — BUPIVACAINE HYDROCHLORIDE 2.5 MG/ML
INJECTION, SOLUTION EPIDURAL; INFILTRATION; INTRACAUDAL PRN
Status: DISCONTINUED | OUTPATIENT
Start: 2024-01-22 | End: 2024-01-22 | Stop reason: SDUPTHER

## 2024-01-22 RX ORDER — LIDOCAINE HYDROCHLORIDE 10 MG/ML
INJECTION, SOLUTION EPIDURAL; INFILTRATION; INTRACAUDAL; PERINEURAL PRN
Status: DISCONTINUED | OUTPATIENT
Start: 2024-01-22 | End: 2024-01-22 | Stop reason: SDUPTHER

## 2024-01-22 RX ORDER — PROPOFOL 10 MG/ML
INJECTION, EMULSION INTRAVENOUS PRN
Status: DISCONTINUED | OUTPATIENT
Start: 2024-01-22 | End: 2024-01-22 | Stop reason: SDUPTHER

## 2024-01-22 RX ORDER — DIPHENHYDRAMINE HYDROCHLORIDE 50 MG/ML
12.5 INJECTION INTRAMUSCULAR; INTRAVENOUS
Status: DISCONTINUED | OUTPATIENT
Start: 2024-01-22 | End: 2024-01-22 | Stop reason: HOSPADM

## 2024-01-22 RX ORDER — ONDANSETRON 2 MG/ML
4 INJECTION INTRAMUSCULAR; INTRAVENOUS
Status: DISCONTINUED | OUTPATIENT
Start: 2024-01-22 | End: 2024-01-22 | Stop reason: HOSPADM

## 2024-01-22 RX ORDER — MIDAZOLAM HYDROCHLORIDE 1 MG/ML
INJECTION INTRAMUSCULAR; INTRAVENOUS PRN
Status: DISCONTINUED | OUTPATIENT
Start: 2024-01-22 | End: 2024-01-22 | Stop reason: SDUPTHER

## 2024-01-22 RX ORDER — SODIUM CHLORIDE 9 MG/ML
INJECTION, SOLUTION INTRAVENOUS PRN
Status: DISCONTINUED | OUTPATIENT
Start: 2024-01-22 | End: 2024-01-22 | Stop reason: HOSPADM

## 2024-01-22 RX ORDER — SUCCINYLCHOLINE/SOD CL,ISO/PF 100 MG/5ML
SYRINGE (ML) INTRAVENOUS PRN
Status: DISCONTINUED | OUTPATIENT
Start: 2024-01-22 | End: 2024-01-22 | Stop reason: SDUPTHER

## 2024-01-22 RX ORDER — ONDANSETRON 2 MG/ML
INJECTION INTRAMUSCULAR; INTRAVENOUS PRN
Status: DISCONTINUED | OUTPATIENT
Start: 2024-01-22 | End: 2024-01-22 | Stop reason: SDUPTHER

## 2024-01-22 RX ADMIN — PHENYLEPHRINE HYDROCHLORIDE 100 MCG: 10 INJECTION INTRAVENOUS at 15:05

## 2024-01-22 RX ADMIN — LIDOCAINE HYDROCHLORIDE AND EPINEPHRINE 3 ML: 15; 5 INJECTION, SOLUTION EPIDURAL at 13:25

## 2024-01-22 RX ADMIN — INDOCYANINE GREEN AND WATER 3 ML: KIT at 14:50

## 2024-01-22 RX ADMIN — ROPIVACAINE HYDROCHLORIDE 10 ML/HR: 5 INJECTION EPIDURAL; INFILTRATION; PERINEURAL at 16:12

## 2024-01-22 RX ADMIN — BUPIVACAINE HYDROCHLORIDE 2 ML: 2.5 INJECTION, SOLUTION EPIDURAL; INFILTRATION; INTRACAUDAL; PERINEURAL at 15:14

## 2024-01-22 RX ADMIN — PHENYLEPHRINE HYDROCHLORIDE 100 MCG: 10 INJECTION INTRAVENOUS at 15:24

## 2024-01-22 RX ADMIN — TRAZODONE HYDROCHLORIDE 100 MG: 50 TABLET ORAL at 20:59

## 2024-01-22 RX ADMIN — MIDAZOLAM HYDROCHLORIDE 2 MG: 1 INJECTION, SOLUTION INTRAMUSCULAR; INTRAVENOUS at 13:47

## 2024-01-22 RX ADMIN — FENTANYL CITRATE 25 MCG: 50 INJECTION, SOLUTION INTRAMUSCULAR; INTRAVENOUS at 13:50

## 2024-01-22 RX ADMIN — PHENYLEPHRINE HYDROCHLORIDE 200 MCG: 10 INJECTION INTRAVENOUS at 14:44

## 2024-01-22 RX ADMIN — SUGAMMADEX 200 MG: 100 INJECTION, SOLUTION INTRAVENOUS at 15:40

## 2024-01-22 RX ADMIN — ROCURONIUM BROMIDE 20 MG: 10 INJECTION, SOLUTION INTRAVENOUS at 14:33

## 2024-01-22 RX ADMIN — PHENYLEPHRINE HYDROCHLORIDE 100 MCG: 10 INJECTION INTRAVENOUS at 15:02

## 2024-01-22 RX ADMIN — SODIUM CHLORIDE, POTASSIUM CHLORIDE, SODIUM LACTATE AND CALCIUM CHLORIDE: 600; 310; 30; 20 INJECTION, SOLUTION INTRAVENOUS at 14:17

## 2024-01-22 RX ADMIN — LIDOCAINE HYDROCHLORIDE 40 MG: 10 INJECTION, SOLUTION EPIDURAL; INFILTRATION; INTRACAUDAL; PERINEURAL at 13:55

## 2024-01-22 RX ADMIN — MIDAZOLAM HYDROCHLORIDE 2 MG: 1 INJECTION, SOLUTION INTRAMUSCULAR; INTRAVENOUS at 13:17

## 2024-01-22 RX ADMIN — ONDANSETRON 4 MG: 2 INJECTION INTRAMUSCULAR; INTRAVENOUS at 14:14

## 2024-01-22 RX ADMIN — PIPERACILLIN AND TAZOBACTAM 3375 MG: 3; .375 INJECTION, POWDER, FOR SOLUTION INTRAVENOUS at 14:18

## 2024-01-22 RX ADMIN — MORPHINE SULFATE 2 MG: 2 INJECTION, SOLUTION INTRAMUSCULAR; INTRAVENOUS at 22:53

## 2024-01-22 RX ADMIN — PHENYLEPHRINE HYDROCHLORIDE 100 MCG: 10 INJECTION INTRAVENOUS at 14:59

## 2024-01-22 RX ADMIN — PHENYLEPHRINE HYDROCHLORIDE 100 MCG: 10 INJECTION INTRAVENOUS at 14:53

## 2024-01-22 RX ADMIN — PHENYLEPHRINE HYDROCHLORIDE 100 MCG: 10 INJECTION INTRAVENOUS at 14:39

## 2024-01-22 RX ADMIN — DEXAMETHASONE SODIUM PHOSPHATE 4 MG: 4 INJECTION INTRA-ARTICULAR; INTRALESIONAL; INTRAMUSCULAR; INTRAVENOUS; SOFT TISSUE at 14:14

## 2024-01-22 RX ADMIN — ONDANSETRON 4 MG: 2 INJECTION INTRAMUSCULAR; INTRAVENOUS at 20:59

## 2024-01-22 RX ADMIN — BUPIVACAINE HYDROCHLORIDE 3 ML: 2.5 INJECTION, SOLUTION EPIDURAL; INFILTRATION; INTRACAUDAL; PERINEURAL at 14:54

## 2024-01-22 RX ADMIN — Medication 10 ML: at 09:03

## 2024-01-22 RX ADMIN — Medication 100 MG: at 13:55

## 2024-01-22 RX ADMIN — OXYCODONE 5 MG: 5 TABLET ORAL at 21:00

## 2024-01-22 RX ADMIN — ROCURONIUM BROMIDE 10 MG: 10 INJECTION, SOLUTION INTRAVENOUS at 15:02

## 2024-01-22 RX ADMIN — PHENYLEPHRINE HYDROCHLORIDE 200 MCG: 10 INJECTION INTRAVENOUS at 14:47

## 2024-01-22 RX ADMIN — SODIUM CHLORIDE, POTASSIUM CHLORIDE, SODIUM LACTATE AND CALCIUM CHLORIDE: 600; 310; 30; 20 INJECTION, SOLUTION INTRAVENOUS at 06:02

## 2024-01-22 RX ADMIN — TIOTROPIUM BROMIDE INHALATION SPRAY 2 PUFF: 3.12 SPRAY, METERED RESPIRATORY (INHALATION) at 06:57

## 2024-01-22 RX ADMIN — BUDESONIDE AND FORMOTEROL FUMARATE DIHYDRATE 2 PUFF: 160; 4.5 AEROSOL RESPIRATORY (INHALATION) at 06:57

## 2024-01-22 RX ADMIN — FENTANYL CITRATE 100 MCG: 50 INJECTION, SOLUTION INTRAMUSCULAR; INTRAVENOUS at 14:00

## 2024-01-22 RX ADMIN — ROCURONIUM BROMIDE 70 MG: 10 INJECTION, SOLUTION INTRAVENOUS at 13:56

## 2024-01-22 RX ADMIN — BUDESONIDE AND FORMOTEROL FUMARATE DIHYDRATE 2 PUFF: 160; 4.5 AEROSOL RESPIRATORY (INHALATION) at 19:15

## 2024-01-22 RX ADMIN — PANTOPRAZOLE SODIUM 40 MG: 40 TABLET, DELAYED RELEASE ORAL at 06:16

## 2024-01-22 RX ADMIN — Medication 10 ML: at 10:22

## 2024-01-22 RX ADMIN — PHENYLEPHRINE HYDROCHLORIDE 100 MCG: 10 INJECTION INTRAVENOUS at 15:32

## 2024-01-22 RX ADMIN — FENTANYL CITRATE 50 MCG: 50 INJECTION, SOLUTION INTRAMUSCULAR; INTRAVENOUS at 14:26

## 2024-01-22 RX ADMIN — BUPIVACAINE HYDROCHLORIDE 2 ML: 2.5 INJECTION, SOLUTION EPIDURAL; INFILTRATION; INTRACAUDAL; PERINEURAL at 15:20

## 2024-01-22 RX ADMIN — BUPIVACAINE HYDROCHLORIDE 2 ML: 2.5 INJECTION, SOLUTION EPIDURAL; INFILTRATION; INTRACAUDAL; PERINEURAL at 15:05

## 2024-01-22 RX ADMIN — PIPERACILLIN AND TAZOBACTAM 3375 MG: 3; .375 INJECTION, POWDER, FOR SOLUTION INTRAVENOUS at 06:15

## 2024-01-22 RX ADMIN — BUPIVACAINE HYDROCHLORIDE 1 ML: 2.5 INJECTION, SOLUTION EPIDURAL; INFILTRATION; INTRACAUDAL; PERINEURAL at 15:26

## 2024-01-22 RX ADMIN — PROPOFOL 140 MG: 10 INJECTION, EMULSION INTRAVENOUS at 13:55

## 2024-01-22 RX ADMIN — PIPERACILLIN AND TAZOBACTAM 3375 MG: 3; .375 INJECTION, POWDER, FOR SOLUTION INTRAVENOUS at 21:04

## 2024-01-22 ASSESSMENT — PAIN SCALES - GENERAL
PAINLEVEL_OUTOF10: 6
PAINLEVEL_OUTOF10: 10
PAINLEVEL_OUTOF10: 5
PAINLEVEL_OUTOF10: 8
PAINLEVEL_OUTOF10: 6
PAINLEVEL_OUTOF10: 7

## 2024-01-22 ASSESSMENT — ENCOUNTER SYMPTOMS: SHORTNESS OF BREATH: 1

## 2024-01-22 ASSESSMENT — PAIN DESCRIPTION - LOCATION
LOCATION: ABDOMEN

## 2024-01-22 ASSESSMENT — PAIN DESCRIPTION - DESCRIPTORS: DESCRIPTORS: DULL;ACHING;SQUEEZING

## 2024-01-22 ASSESSMENT — LIFESTYLE VARIABLES: SMOKING_STATUS: 1

## 2024-01-22 ASSESSMENT — PAIN DESCRIPTION - ORIENTATION: ORIENTATION: LOWER;LEFT

## 2024-01-22 NOTE — BRIEF OP NOTE
Brief Postoperative Note      Patient: Nicole Wade  YOB: 1960  MRN: [id number]    Date of Procedure: 1/22/2024    Pre-Op Diagnosis Codes:     * Acute diverticulitis [K57.92]    Post-Op Diagnosis:  Acute on chronic sigmoid diverticulitis       Procedure: Sigmoid resection, mobilization of splenic flexure, left salpingo-oophorectomy, appendectomy    Surgeon(s):  Akin Darby MD    Assistant:  First Assistant: Cari Luz Erica    Anesthesia: General, epidural    Estimated Blood Loss (mL): 300     Complications: None    Specimens:   ID Type Source Tests Collected by Time Destination   1 : Pelvic Abcess Swab Abdomen CULTURE, SURGICAL Akin Darby MD 1/22/2024 1426    A : Sigmoid Colon Tissue Abdomen SURGICAL PATHOLOGY Akin Darby MD 1/22/2024 1437    B : Appendix Tissue Appendix SURGICAL PATHOLOGY Akin Darby MD 1/22/2024 1444    C : Left Ovary and Fallopian tube Tissue Ovary SURGICAL PATHOLOGY Akin Darby MD 1/22/2024 1452        Implants:  * No implants in log *      Drains:   Urinary Catheter 01/22/24 Romeo-Temperature (Active)       Findings: Acute on chronic diverticulitis  This procedure was not performed to treat colon cancer through resection      Electronically signed by AKIN DARBY MD on 1/22/2024 at 3:45 PM

## 2024-01-22 NOTE — FLOWSHEET NOTE
Patient is aware of her nothing by mouth after midnight in preparation for the bowel resection tomorrow.a new iv site was located in her right radial vein,she tolerated the iv insertion well.    02:50 she remain asleep,her respirations were effortless,call light within her easy reach.    06:35 patient bowel movement is yellow green liquid with small flexes of fecal materials.

## 2024-01-22 NOTE — ANESTHESIA POSTPROCEDURE EVALUATION
Department of Anesthesiology  Postprocedure Note    Patient: Nicole Wade  MRN: [id number]  YOB: 1960  Date of evaluation: 1/22/2024    Procedure Summary       Date: 01/22/24 Room / Location: [address]    Anesthesia Start: 1348 Anesthesia Stop: 1553    Procedure: Sigmoid colectomy, possible diverting ileostomy Left salpingectomy and Left oophorectomy (Abdomen) Diagnosis:       Acute diverticulitis      (Acute diverticulitis [K57.92])    Surgeons: Akin Darby MD Responsible Provider: Bradley Dumont DO    Anesthesia Type: general, epidural ASA Status: 3            Anesthesia Type: No value filed.    Esdras Phase I:      Esdras Phase II:      Anesthesia Post Evaluation    Patient location during evaluation: bedside  Patient participation: complete - patient participated  Level of consciousness: awake and awake and alert  Airway patency: patent  Nausea & Vomiting: no nausea and no vomiting  Cardiovascular status: blood pressure returned to baseline and hemodynamically stable  Respiratory status: acceptable  Hydration status: euvolemic  Pain management: adequate        No notable events documented.

## 2024-01-22 NOTE — FLOWSHEET NOTE
1547- Pt arrived to PACU via bed from OR, blood pressure noted to be low, pt on simple mask at 6 Liters, pt received bolus in epidural by CRNA prior to arrival-KGW  1548- Assessment completed at this time, Pt Alert and oriented, denies chest pain/SOB, lungs clear/diminished, dressing to abdomen with small amount of shadowing present, abdomen soft, OCHOA drain present with bright red blood in tubing and bulb, reed catheter present draining clear, yellow urine, pt holding abdomen says she is in pain, pt falls asleep during conversation, NG tube present in right nostril at 53cm, NG to low intermittent suction, pt denies further needs at this time-KGW  1612- Dr. Dumont made aware of patient blood pressure being low, systolic in the 80's, no new orders received-KGW  1640- Dr. Dumont at bedside, Epidural started-KGW  1700- Report called to Geeta MOSES on [address]-KGW  Electronically signed by Gabbi Davis RN on 1/22/2024

## 2024-01-22 NOTE — PROGRESS NOTES
Wound Ostomy Continence Nursing    This [facility] Nurse received referral for stoma marking. Patient anticipating bowel surgery with possibility of diverting loop ileostomy, today with Dr. Darby. 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    In a laying position, rectus muscle located easily. Patient's abdomen is soft and round in laying position. Patient's abdomen then evaluated in a sitting, standing and bending forward position. Patient's abdomen remains soft and round in all positions - deep skin fold present in the suprapubic area. Patient also has a midline abdominal scar and a scar at the level of the umbilicus. Taking into consideration patient's usual belt line, stoma site marked in the RLQ of the abdomen, within the rectus muscle an din a location patient is able to see without issue. This [facility] Nurse will follow post-op for ostomy education and training needs.    Electronically signed by ROMAN Goins, RN, CWOCN on 1/22/2024 at 9:13 AM

## 2024-01-22 NOTE — OP NOTE
[place] [id number]                                OPERATIVE REPORT    PATIENT NAME: VEL OLMEDO                     :        1960  MED REC NO:   [id number]                            ROOM:  ACCOUNT NO:   [id number]                           ADMIT DATE: 2024  PROVIDER:     Akin Darby MD    DATE OF PROCEDURE:  2024    PREOPERATIVE DIAGNOSIS:  Acute-on-chronic refractory sigmoid  diverticulitis.    POSTOPERATIVE DIAGNOSIS:  Acute-on-chronic refractory sigmoid  diverticulitis.    PROCEDURES PERFORMED:  1.  Sigmoid resection with primary anastomosis.  2.  Mobilization of splenic flexure.  3.  Left salpingo-oophorectomy.  4.  Appendectomy.    SURGEON:  Akin Darby MD    ASSISTANTS:  Ms. Monson/Ms. Luz.    ANESTHESIA:  1.  General endotracheal anesthesia.  2.  Epidural.    ESTIMATED BLOOD LOSS:  300 mL.    SPECIMENS:  1.  Sigmoid colon.  2.  Appendix.  3.  Left ovary and tube.    COMPLICATIONS:  None.    INDICATIONS:  A 63-year-old female who has acute-on-chronic sigmoid  diverticulitis with failure to improve.  Risks and benefits of sigmoid  resection was described over the weekend.  Risks of possible ileostomy  outlined.  Risks of the surgery including infection, bleeding, damage to  the surrounding structures, reoperation, postoperative pain, and  ileostomy formation were all described.  Despite the risks, she  understood the benefits and wished to proceed.  Consent obtained.    Bowel prep performed over the weekend.  Zosyn was given preoperatively.   A time-out was taken for appropriate verification.    OPERATIVE PROCEDURE:  She was taken to the operating room and placed in  the supine position.  General endotracheal anesthesia was administered.   An epidural catheter was placed preoperatively.  She was placed in Elan  stirrups.  All pressure points properly padded.  Romeo catheter  01/22/2024 16:26:44     [email or web address]  Job#: [id number]     Doc#: [id number]    CC:

## 2024-01-22 NOTE — ANESTHESIA PRE PROCEDURE
GLUCOSE 72 01/22/2024 04:38 AM    GLUCOSE 117 02/10/2019 05:20 AM    PROT 5.6 01/20/2024 05:38 AM    CALCIUM 8.6 01/22/2024 04:38 AM    BILITOT <0.2 01/20/2024 05:38 AM    ALKPHOS 116 01/20/2024 05:38 AM    AST 7 01/20/2024 05:38 AM    ALT <5 01/20/2024 05:38 AM       POC Tests: No results for input(s): \"POCGLU\", \"POCNA\", \"POCK\", \"POCCL\", \"POCBUN\", \"POCHEMO\", \"POCHCT\" in the last 72 hours.    Coags:   Lab Results   Component Value Date/Time    PROTIME 13.4 01/18/2024 02:43 PM    INR 1.0 01/18/2024 02:43 PM       HCG (If Applicable): No results found for: \"PREGTESTUR\", \"PREGSERUM\", \"HCG\", \"HCGQUANT\"     ABGs: No results found for: \"PHART\", \"PO2ART\", \"IXU5FXF\", \"RXF4AIX\", \"BEART\", \"X3GJSJIK\"     Type & Screen (If Applicable):  No results found for: \"LABABO\", \"LABRH\"    Drug/Infectious Status (If Applicable):  No results found for: \"HIV\", \"HEPCAB\"    COVID-19 Screening (If Applicable):   Lab Results   Component Value Date/Time    COVID19 Detected 04/10/2023 01:49 PM           Anesthesia Evaluation  Patient summary reviewed and Nursing notes reviewed   no history of anesthetic complications:   Airway: Mallampati: II  TM distance: >3 FB   Neck ROM: full  Mouth opening: > = 3 FB   Dental: normal exam         Pulmonary:normal exam    (+)  COPD:  shortness of breath:         current smoker          Patient did not smoke on day of surgery.                 Cardiovascular:  Exercise tolerance: good (>4 METS)  (+) CAD:, hyperlipidemia      ECG reviewed               Beta Blocker:  Not on Beta Blocker      ROS comment: Left Ventricle Normal left ventricular systolic function with a visually estimated EF of 55 - 60%. Left ventricle is mildly dilated. Normal wall thickness. Normal wall motion. Diastolic dysfunction present with normal LV EF.  Left Atrium Left atrium size is normal.  Interatrial Septum No interatrial shunt visualized with color Doppler.  Right Ventricle Right ventricle size is normal. Normal systolic

## 2024-01-22 NOTE — ANESTHESIA PROCEDURE NOTES
Epidural Block    Patient location during procedure: pre-op  Start time: 1/22/2024 1:17 PM  End time: 1/22/2024 1:27 PM  Reason for block: post-op pain management  Staffing  Performed: anesthesiologist   Anesthesiologist: Bradley Dumont DO  Performed by: Bradley Dumont DO  Authorized by: Bradley Dumont DO    Epidural  Patient position: sitting  Prep: Betadine  Patient monitoring: cardiac monitor, continuous pulse ox and frequent blood pressure checks  Approach: midline  Location: T11-12  Injection technique: CAROLYN air  Provider prep: mask and sterile gloves  Needle  Needle type: Tuohy   Needle gauge: 17 G  Needle length: 3.5 in  Needle insertion depth: 5 cm  Catheter type: end hole  Catheter size: 18 G  Catheter at skin depth: 11 cm  Test dose: negativeCatheter Secured: tegaderm and tape  Assessment  Sensory level: T8  Hemodynamics: stable  Attempts: 1  Outcomes: uncomplicated and patient tolerated procedure well  Preanesthetic Checklist  Completed: patient identified, IV checked, site marked, risks and benefits discussed, surgical/procedural consents, equipment checked, pre-op evaluation, timeout performed, anesthesia consent given, oxygen available and monitors applied/VS acknowledged

## 2024-01-23 LAB
ALBUMIN SERPL-MCNC: 2.6 G/DL (ref 3.5–4.6)
ALP SERPL-CCNC: 83 U/L (ref 40–130)
ALT SERPL-CCNC: 7 U/L (ref 0–33)
ANION GAP SERPL CALCULATED.3IONS-SCNC: 15 MEQ/L (ref 9–15)
AST SERPL-CCNC: 10 U/L (ref 0–35)
BACTERIA BLD CULT ORG #2: NORMAL
BACTERIA BLD CULT: NORMAL
BASOPHILS # BLD: 0 K/UL (ref 0–0.2)
BASOPHILS NFR BLD: 0.1 %
BILIRUB SERPL-MCNC: 0.6 MG/DL (ref 0.2–0.7)
BUN SERPL-MCNC: 7 MG/DL (ref 8–23)
CALCIUM SERPL-MCNC: 8.1 MG/DL (ref 8.5–9.9)
CHLORIDE SERPL-SCNC: 103 MEQ/L (ref 95–107)
CO2 SERPL-SCNC: 24 MEQ/L (ref 20–31)
CREAT SERPL-MCNC: 0.95 MG/DL (ref 0.5–0.9)
EOSINOPHIL # BLD: 0 K/UL (ref 0–0.7)
EOSINOPHIL NFR BLD: 0 %
ERYTHROCYTE [DISTWIDTH] IN BLOOD BY AUTOMATED COUNT: 14.5 % (ref 11.5–14.5)
GLOBULIN SER CALC-MCNC: 2.1 G/DL (ref 2.3–3.5)
GLUCOSE SERPL-MCNC: 103 MG/DL (ref 70–99)
HCT VFR BLD AUTO: 31.9 % (ref 37–47)
HGB BLD-MCNC: 10.1 G/DL (ref 12–16)
LYMPHOCYTES # BLD: 1.1 K/UL (ref 1–4.8)
LYMPHOCYTES NFR BLD: 8.7 %
MAGNESIUM SERPL-MCNC: 1.4 MG/DL (ref 1.7–2.4)
MCH RBC QN AUTO: 29.4 PG (ref 27–31.3)
MCHC RBC AUTO-ENTMCNC: 31.7 % (ref 33–37)
MCV RBC AUTO: 93 FL (ref 79.4–94.8)
MONOCYTES # BLD: 1 K/UL (ref 0.2–0.8)
MONOCYTES NFR BLD: 8.2 %
NEUTROPHILS # BLD: 10.2 K/UL (ref 1.4–6.5)
NEUTS SEG NFR BLD: 82.4 %
PLATELET # BLD AUTO: 292 K/UL (ref 130–400)
POTASSIUM SERPL-SCNC: 4.4 MEQ/L (ref 3.4–4.9)
PROT SERPL-MCNC: 4.7 G/DL (ref 6.3–8)
RBC # BLD AUTO: 3.43 M/UL (ref 4.2–5.4)
SODIUM SERPL-SCNC: 142 MEQ/L (ref 135–144)
WBC # BLD AUTO: 12.3 K/UL (ref 4.8–10.8)

## 2024-01-23 PROCEDURE — 6370000000 HC RX 637 (ALT 250 FOR IP): Performed by: COLON & RECTAL SURGERY

## 2024-01-23 PROCEDURE — 94760 N-INVAS EAR/PLS OXIMETRY 1: CPT

## 2024-01-23 PROCEDURE — 2580000003 HC RX 258: Performed by: COLON & RECTAL SURGERY

## 2024-01-23 PROCEDURE — 85025 COMPLETE CBC W/AUTO DIFF WBC: CPT

## 2024-01-23 PROCEDURE — 99024 POSTOP FOLLOW-UP VISIT: CPT | Performed by: COLON & RECTAL SURGERY

## 2024-01-23 PROCEDURE — 94761 N-INVAS EAR/PLS OXIMETRY MLT: CPT

## 2024-01-23 PROCEDURE — 6360000002 HC RX W HCPCS: Performed by: COLON & RECTAL SURGERY

## 2024-01-23 PROCEDURE — 80053 COMPREHEN METABOLIC PANEL: CPT

## 2024-01-23 PROCEDURE — 83735 ASSAY OF MAGNESIUM: CPT

## 2024-01-23 PROCEDURE — 6360000002 HC RX W HCPCS: Performed by: INTERNAL MEDICINE

## 2024-01-23 PROCEDURE — 6360000002 HC RX W HCPCS: Performed by: STUDENT IN AN ORGANIZED HEALTH CARE EDUCATION/TRAINING PROGRAM

## 2024-01-23 PROCEDURE — 2700000000 HC OXYGEN THERAPY PER DAY

## 2024-01-23 PROCEDURE — 94640 AIRWAY INHALATION TREATMENT: CPT

## 2024-01-23 PROCEDURE — 36415 COLL VENOUS BLD VENIPUNCTURE: CPT

## 2024-01-23 PROCEDURE — 1210000000 HC MED SURG R&B

## 2024-01-23 PROCEDURE — 2580000003 HC RX 258: Performed by: STUDENT IN AN ORGANIZED HEALTH CARE EDUCATION/TRAINING PROGRAM

## 2024-01-23 RX ORDER — MAGNESIUM SULFATE IN WATER 40 MG/ML
2000 INJECTION, SOLUTION INTRAVENOUS ONCE
Status: COMPLETED | OUTPATIENT
Start: 2024-01-23 | End: 2024-01-23

## 2024-01-23 RX ADMIN — BUDESONIDE AND FORMOTEROL FUMARATE DIHYDRATE 2 PUFF: 160; 4.5 AEROSOL RESPIRATORY (INHALATION) at 07:28

## 2024-01-23 RX ADMIN — PIPERACILLIN AND TAZOBACTAM 3375 MG: 3; .375 INJECTION, POWDER, FOR SOLUTION INTRAVENOUS at 06:25

## 2024-01-23 RX ADMIN — BUDESONIDE AND FORMOTEROL FUMARATE DIHYDRATE 2 PUFF: 160; 4.5 AEROSOL RESPIRATORY (INHALATION) at 17:28

## 2024-01-23 RX ADMIN — Medication 10 ML: at 22:12

## 2024-01-23 RX ADMIN — ROPIVACAINE HYDROCHLORIDE: 5 INJECTION EPIDURAL; INFILTRATION; PERINEURAL at 11:31

## 2024-01-23 RX ADMIN — ROPIVACAINE HYDROCHLORIDE: 5 INJECTION EPIDURAL; INFILTRATION; PERINEURAL at 02:05

## 2024-01-23 RX ADMIN — PAROXETINE 40 MG: 10 TABLET, FILM COATED ORAL at 09:11

## 2024-01-23 RX ADMIN — MAGNESIUM SULFATE HEPTAHYDRATE 2000 MG: 40 INJECTION, SOLUTION INTRAVENOUS at 06:28

## 2024-01-23 RX ADMIN — Medication 10 ML: at 09:12

## 2024-01-23 RX ADMIN — TIOTROPIUM BROMIDE INHALATION SPRAY 2 PUFF: 3.12 SPRAY, METERED RESPIRATORY (INHALATION) at 07:28

## 2024-01-23 RX ADMIN — OXYCODONE 5 MG: 5 TABLET ORAL at 02:10

## 2024-01-23 RX ADMIN — SODIUM CHLORIDE: 9 INJECTION, SOLUTION INTRAVENOUS at 04:07

## 2024-01-23 RX ADMIN — TRAZODONE HYDROCHLORIDE 100 MG: 50 TABLET ORAL at 21:26

## 2024-01-23 RX ADMIN — PIPERACILLIN AND TAZOBACTAM 3375 MG: 3; .375 INJECTION, POWDER, FOR SOLUTION INTRAVENOUS at 21:26

## 2024-01-23 RX ADMIN — ROPIVACAINE HYDROCHLORIDE: 5 INJECTION EPIDURAL; INFILTRATION; PERINEURAL at 20:47

## 2024-01-23 RX ADMIN — ENOXAPARIN SODIUM 40 MG: 100 INJECTION SUBCUTANEOUS at 09:11

## 2024-01-23 RX ADMIN — PIPERACILLIN AND TAZOBACTAM 3375 MG: 3; .375 INJECTION, POWDER, FOR SOLUTION INTRAVENOUS at 13:38

## 2024-01-23 RX ADMIN — PANTOPRAZOLE SODIUM 40 MG: 40 TABLET, DELAYED RELEASE ORAL at 06:25

## 2024-01-23 ASSESSMENT — PAIN SCALES - GENERAL
PAINLEVEL_OUTOF10: 6
PAINLEVEL_OUTOF10: 8
PAINLEVEL_OUTOF10: 0
PAINLEVEL_OUTOF10: 0
PAINLEVEL_OUTOF10: 5

## 2024-01-23 ASSESSMENT — PAIN DESCRIPTION - LOCATION: LOCATION: ABDOMEN

## 2024-01-23 NOTE — CARE COORDINATION
This LSW met with patient at bedside this afternoon. Patient and I discussed discharge plans. Patient plans to return home upon discharge. Patient states that she is supported well by friends and family who are able to assist with shopping, transportation if needed. Patient denies home going needs.  LSW / CM to follow.  Electronically signed by RANDELL Aguayo, JUMANA on 1/23/24 at 3:57 PM EST

## 2024-01-23 NOTE — PROGRESS NOTES
Pt Name: Nicole Wade  Medical Record Number: [id number]  Date of Birth 1960   Admit date 1/18/2024  9:14 PM  Today's Date: 1/23/2024     ASSESSMENT  1. Postop day #1 sigmoid resection for sigmoid diverticulitis  2 epidural working well    PLAN  1.  Continue epidural/Romeo/NG tube  2.  Out of bed  3.  Resume DVT prophylaxis with Lovenox    SUBJECTIVE  Chief complaint: Follow-up sigmoid colectomy  Afebrile, vital signs are stable. She denies any nausea or vomiting, has not passed flatus or had a bowel movement. She is tolerating a Diet NPO Exceptions are: Ice Chips, Popsicles. Her pain is well controlled on current medications.      has a past medical history of Abnormal CT of the chest, Bone spur, Cancer ([facility]), Cervical stenosis of spine, COPD (chronic obstructive pulmonary disease) ([facility]), Coronary artery calcification of native artery - aortic arch, DDD (degenerative disc disease), lumbosacral, CARTER (generalized anxiety disorder), Gastroesophageal reflux disease without esophagitis, Gout, Hyperlipidemia, Insomnia, Mild single current episode of major depressive disorder ([facility]), Neuropathy, Other emphysema ([facility]), Pure hypercholesterolemia, SOB (shortness of breath), and Tobacco abuse.    CURRENT MEDS  Scheduled Meds:   magnesium sulfate  2,000 mg IntraVENous Once    polyethylene glycol  238 g Oral Once    pantoprazole  40 mg Oral QAM AC    PARoxetine  40 mg Oral QAM    traZODone  100 mg Oral Nightly    budesonide-formoterol  2 puff Inhalation BID RT    tiotropium  2 puff Inhalation Daily RT    sodium chloride flush  5-40 mL IntraVENous 2 times per day    [Held by provider] enoxaparin  40 mg SubCUTAneous Daily    piperacillin-tazobactam  3,375 mg IntraVENous Q8H     Continuous Infusions:   fentaNYL (SUBLIMAZE) 2 mcg/mL, ROPivacaine (NAROPIN) 0.1 % in sodium chloride 0.9 % 100 mL epidural 10 mL/hr (01/22/24 1612)    sodium chloride 100 mL/hr at 01/23/24 0617     PRN Meds:.naloxone 0.4 mg in 10 mL sodium chloride

## 2024-01-23 NOTE — PROGRESS NOTES
exam?->CRC    FINDINGS:  Mediastinum: Thyroid is homogeneous in attenuation. No bulky mediastinal  adenopathy. Central airways are patent. Esophagus is normal course and  caliber.  Cardiac size within normal limits without pericardial effusion.    Lungs/pleura: Chronic changes of hyperinflation with postsurgical changes  left upper lobe from wedge resection and interval right lower lobe wedge  resection without evidence for recurrent or new nodule to suggest recurrent  or metastatic disease.  Calcified granuloma right lower lung with chronic  changes background of emphysematous change in central bronchiectasis again  noted.    Upper Abdomen: Visualized portions of the upper abdomen reveal nonobstructing  right nephrolithiasis.    Soft Tissues/Bones: No acute osseous or soft tissue findings. No aggressive  osseous lesion.    Impression  Postsurgical changes wedge resection left upper lobe along with an interval  right lower lobe wedge resection and removal of previously identified  pulmonary nodules without new nodule or mass to suggest recurrent or  metastatic disease.  Chronic changes background without acute pulmonary  pathology.    Visualized portions of the upper abdomen reveal nonobstructing right  nephrolithiasis.      CXR      2-view: Results for orders placed during the hospital encounter of 20    XR CHEST STANDARD (2 VW)    Narrative  Patient MRN: [id number]    : 1960    Age:  59 years    Gender: Female    Order Date: 2020 6:45 PM.    Exam: XR CHEST (2 VW)    Number of Views: 2    Indication:  Cough, left-sided chest pain, popping sound    Comparison: 2018    Findings: Cardiomediastinal silhouette within normal limits. Flattening of the hemidiaphragms. No infiltrate/pneumonia, pneumothorax or pleural effusion. Scarring/linear atelectasis left midlung. Vascular calcifications abdominal aorta    Impression  Impression:  1. Findings suggestive of a component of chronic obstructive  pulmonary disease, clinical correlation recommended..  2. No radiographic evidence of acute cardiopulmonary disease.  3. Vascular calcifications abdominal aorta      Results for orders placed during the hospital encounter of 12/13/18    XR CHEST STANDARD (2 VW)    Narrative  EXAMINATION: XR CHEST (2 VW)    CLINICAL HISTORY: R04.2 Hemoptysis ICD10 history of biopsy on the left on December 7, 2018    COMPARISONS: December 7, 2018    FINDINGS:    Two views of the chest are submitted.  The cardiac silhouette is of normal size configuration.  The mediastinum is unremarkable.  Pulmonary vascular unremarkable.  Right sided trachea. The nodular densities seen within the left perihilar left lower lobe is not well appreciated on the AP view but is better seen opacity seen on the  lateral film. Atelectasis scarring left lower lobe.  No focal infiltrates.  No effusions.  No Pneumothoraces.    Impression  NO ACUTE ACTIVE CARDIOPULMONARY PROCESS.  NODULAR DENSITY MEASURING 2.2 CM ON THE LATERAL VIEW. PLEASE SEE CT SCAN REPORT OF CHEST FROM THIS OCTOBER 26, 2018 FOR ADDITIONAL DETAILS       Portable: Results for orders placed during the hospital encounter of 01/18/24    XR CHEST PORTABLE    Narrative  EXAMINATION:  ONE XRAY VIEW OF THE CHEST    1/21/2024 1:09 pm    COMPARISON:  12/21/2022    HISTORY:  ORDERING SYSTEM PROVIDED HISTORY: Pre op  TECHNOLOGIST PROVIDED HISTORY:  Reason for exam:->Pre op  What reading provider will be dictating this exam?->CRC    FINDINGS:  Heart appears normal in size.  Subsegmental right lower lobe atelectasis is  present medially.  There postop changes inferior to the left hilum.  A mildly  diminished inspiratory effort is noted.  Minimal pulmonary venous  hypertension.  Stable osseous structures.    Impression  1.  Mild right middle lobe linear platelike atelectasis.    2.  Slightly diminished inspiratory effort    3.  Minimal pulmonary venous hypertension.      Echo No results found for this or any

## 2024-01-23 NOTE — PROGRESS NOTES
Hospitalist Daily Progress Note  Name: Nicole Wade  Age: 63 y.o.  Gender: female  CodeStatus: Full Code  Allergies: Other  Adhesive Tape  Codeine    Chief Complaint:No chief complaint on file.      Primary Care Provider: Glenna Gage PA    InpatientTreatment Team: Treatment Team: Attending Provider: Joan Albert MD; Registered Nurse: Christy Britton, RN; Consulting Physician: Akin Darby MD; Surgeon: Akin Darby MD; : Deepika Mendez RN; Registered Nurse: Robb Orellana, RN; : Eli Ryan, RN; Utilization Reviewer: Charlene Moe, JOSUÉ; : Eli Crowell, RANDELL, LSW    Admission Date: 1/18/2024      Subjective: No chest pain, sob.  Abd pain moderately controlled.    Physical Exam  Vitals and nursing note reviewed.   Constitutional:       Appearance: Normal appearance.   Cardiovascular:      Rate and Rhythm: Normal rate and regular rhythm.   Pulmonary:      Effort: Pulmonary effort is normal.      Breath sounds: Normal breath sounds.   Abdominal:      Palpations: Abdomen is soft.      Tenderness: There is abdominal tenderness.   Musculoskeletal:         General: Normal range of motion.   Skin:     General: Skin is warm and dry.   Neurological:      Mental Status: She is alert and oriented to person, place, and time. Mental status is at baseline.         Medications:  Reviewed    Infusion Medications:    fentaNYL (SUBLIMAZE) 2 mcg/mL, ROPivacaine (NAROPIN) 0.1 % in sodium chloride 0.9 % 100 mL epidural 10 mL/hr (01/22/24 1612)    sodium chloride 100 mL/hr at 01/23/24 0617     Scheduled Medications:    polyethylene glycol  238 g Oral Once    pantoprazole  40 mg Oral QAM AC    PARoxetine  40 mg Oral QAM    traZODone  100 mg Oral Nightly    budesonide-formoterol  2 puff Inhalation BID RT    tiotropium  2 puff Inhalation Daily RT    sodium chloride flush  5-40 mL IntraVENous 2 times per day    enoxaparin  40 mg SubCUTAneous Daily     effort    3.  Minimal pulmonary venous hypertension.      Echo No results found for this or any previous visit.            Assessment/Plan:    Active Hospital Problems    Diagnosis Date Noted    Acute diverticulitis [K57.92] 01/18/2024       *Acute diverticulitis.  Continue Zosyn.  Decision was made to proceed with a sigmoid resection after discussion took place between patient and surgeon.  N.p.o. after midnight.  I ordered  LR to be started tomorrow at 6 AM.  Hold Lovenox.  Resume postoperatively.  1/22 - to OR today, post op care per surgery.  1/23 - pod 1, ng in place, pain well controlled, diet per surgery.    *COPD, stable, no exacerbation    *Tobacco addiction.    *DVT prophylaxis.    *Anemia, no evidence of acute blood loss.  Continue to monitor.  Anemia workup in the outpatient setting.        Electronically signed by TAMMY ORDAZ MD on 1/23/2024 at 12:18 PM

## 2024-01-24 LAB
ALBUMIN SERPL-MCNC: 2.5 G/DL (ref 3.5–4.6)
ALP SERPL-CCNC: 76 U/L (ref 40–130)
ALT SERPL-CCNC: 8 U/L (ref 0–33)
ANION GAP SERPL CALCULATED.3IONS-SCNC: 11 MEQ/L (ref 9–15)
AST SERPL-CCNC: 13 U/L (ref 0–35)
BACTERIA BLD CULT ORG #2: NORMAL
BACTERIA BLD CULT: NORMAL
BASOPHILS # BLD: 0 K/UL (ref 0–0.2)
BASOPHILS NFR BLD: 0.1 %
BILIRUB SERPL-MCNC: 0.3 MG/DL (ref 0.2–0.7)
BUN SERPL-MCNC: 9 MG/DL (ref 8–23)
CALCIUM SERPL-MCNC: 8.2 MG/DL (ref 8.5–9.9)
CHLORIDE SERPL-SCNC: 107 MEQ/L (ref 95–107)
CO2 SERPL-SCNC: 26 MEQ/L (ref 20–31)
CREAT SERPL-MCNC: 0.74 MG/DL (ref 0.5–0.9)
EOSINOPHIL # BLD: 0.3 K/UL (ref 0–0.7)
EOSINOPHIL NFR BLD: 2.9 %
ERYTHROCYTE [DISTWIDTH] IN BLOOD BY AUTOMATED COUNT: 14.6 % (ref 11.5–14.5)
GLOBULIN SER CALC-MCNC: 2.3 G/DL (ref 2.3–3.5)
GLUCOSE SERPL-MCNC: 71 MG/DL (ref 70–99)
HCT VFR BLD AUTO: 26 % (ref 37–47)
HGB BLD-MCNC: 8.4 G/DL (ref 12–16)
LYMPHOCYTES # BLD: 1.5 K/UL (ref 1–4.8)
LYMPHOCYTES NFR BLD: 14.2 %
MAGNESIUM SERPL-MCNC: 1.8 MG/DL (ref 1.7–2.4)
MCH RBC QN AUTO: 29.8 PG (ref 27–31.3)
MCHC RBC AUTO-ENTMCNC: 32.3 % (ref 33–37)
MCV RBC AUTO: 92.2 FL (ref 79.4–94.8)
MONOCYTES # BLD: 0.9 K/UL (ref 0.2–0.8)
MONOCYTES NFR BLD: 8.2 %
NEUTROPHILS # BLD: 7.9 K/UL (ref 1.4–6.5)
NEUTS SEG NFR BLD: 73.7 %
PLATELET # BLD AUTO: 286 K/UL (ref 130–400)
POTASSIUM SERPL-SCNC: 4 MEQ/L (ref 3.4–4.9)
PROT SERPL-MCNC: 4.8 G/DL (ref 6.3–8)
RBC # BLD AUTO: 2.82 M/UL (ref 4.2–5.4)
SODIUM SERPL-SCNC: 144 MEQ/L (ref 135–144)
WBC # BLD AUTO: 10.7 K/UL (ref 4.8–10.8)

## 2024-01-24 PROCEDURE — 6360000002 HC RX W HCPCS: Performed by: STUDENT IN AN ORGANIZED HEALTH CARE EDUCATION/TRAINING PROGRAM

## 2024-01-24 PROCEDURE — 6370000000 HC RX 637 (ALT 250 FOR IP): Performed by: COLON & RECTAL SURGERY

## 2024-01-24 PROCEDURE — 2580000003 HC RX 258: Performed by: COLON & RECTAL SURGERY

## 2024-01-24 PROCEDURE — 85025 COMPLETE CBC W/AUTO DIFF WBC: CPT

## 2024-01-24 PROCEDURE — 2580000003 HC RX 258: Performed by: STUDENT IN AN ORGANIZED HEALTH CARE EDUCATION/TRAINING PROGRAM

## 2024-01-24 PROCEDURE — 36415 COLL VENOUS BLD VENIPUNCTURE: CPT

## 2024-01-24 PROCEDURE — 99024 POSTOP FOLLOW-UP VISIT: CPT | Performed by: COLON & RECTAL SURGERY

## 2024-01-24 PROCEDURE — 1210000000 HC MED SURG R&B

## 2024-01-24 PROCEDURE — 6360000002 HC RX W HCPCS: Performed by: COLON & RECTAL SURGERY

## 2024-01-24 PROCEDURE — 94761 N-INVAS EAR/PLS OXIMETRY MLT: CPT

## 2024-01-24 PROCEDURE — 83735 ASSAY OF MAGNESIUM: CPT

## 2024-01-24 PROCEDURE — 80053 COMPREHEN METABOLIC PANEL: CPT

## 2024-01-24 PROCEDURE — 94640 AIRWAY INHALATION TREATMENT: CPT

## 2024-01-24 PROCEDURE — 2700000000 HC OXYGEN THERAPY PER DAY

## 2024-01-24 RX ADMIN — TIOTROPIUM BROMIDE INHALATION SPRAY 2 PUFF: 3.12 SPRAY, METERED RESPIRATORY (INHALATION) at 07:08

## 2024-01-24 RX ADMIN — PIPERACILLIN AND TAZOBACTAM 3375 MG: 3; .375 INJECTION, POWDER, FOR SOLUTION INTRAVENOUS at 15:15

## 2024-01-24 RX ADMIN — ROPIVACAINE HYDROCHLORIDE: 5 INJECTION EPIDURAL; INFILTRATION; PERINEURAL at 06:06

## 2024-01-24 RX ADMIN — PIPERACILLIN AND TAZOBACTAM 3375 MG: 3; .375 INJECTION, POWDER, FOR SOLUTION INTRAVENOUS at 22:09

## 2024-01-24 RX ADMIN — ROPIVACAINE HYDROCHLORIDE: 5 INJECTION EPIDURAL; INFILTRATION; PERINEURAL at 15:15

## 2024-01-24 RX ADMIN — PIPERACILLIN AND TAZOBACTAM 3375 MG: 3; .375 INJECTION, POWDER, FOR SOLUTION INTRAVENOUS at 06:09

## 2024-01-24 RX ADMIN — PANTOPRAZOLE SODIUM 40 MG: 40 TABLET, DELAYED RELEASE ORAL at 06:12

## 2024-01-24 RX ADMIN — BUDESONIDE AND FORMOTEROL FUMARATE DIHYDRATE 2 PUFF: 160; 4.5 AEROSOL RESPIRATORY (INHALATION) at 19:33

## 2024-01-24 RX ADMIN — TRAZODONE HYDROCHLORIDE 100 MG: 50 TABLET ORAL at 22:02

## 2024-01-24 RX ADMIN — Medication 10 ML: at 08:38

## 2024-01-24 RX ADMIN — BUDESONIDE AND FORMOTEROL FUMARATE DIHYDRATE 2 PUFF: 160; 4.5 AEROSOL RESPIRATORY (INHALATION) at 07:08

## 2024-01-24 RX ADMIN — PAROXETINE 40 MG: 10 TABLET, FILM COATED ORAL at 08:37

## 2024-01-24 RX ADMIN — Medication 10 ML: at 22:04

## 2024-01-24 ASSESSMENT — PAIN SCALES - GENERAL: PAINLEVEL_OUTOF10: 0

## 2024-01-24 NOTE — PROGRESS NOTES
Shift assessments complete, see flowsheets. Pt alert and oriented x 4, able to make needs and wants known. Medication administered per MAR, VSS. NG to right nares LIS. Romeo intact, draining clear yellow output. Epidural functioning well. Pt denies any pain at this time. No further needs verbalized at this time. Pt left resting with call light in reach.

## 2024-01-24 NOTE — PLAN OF CARE
Nutrition Problem #1: Inadequate oral intake  Intervention: Food and/or Nutrient Delivery: Continue NPO, Start Parenteral Nutrition (Recommend start PPN at 75 ml/hr (912 kcal, 76 gm protein) and consider TPN if unable to tolerate diet progression in next 24 hrs)

## 2024-01-24 NOTE — PROGRESS NOTES
Comprehensive Nutrition Assessment    Type and Reason for Visit:  Initial, NPO/Clear Liquid    Nutrition Recommendations/Plan:   Recommend start PPN at 75 ml/hr (912 kcal, 76 gm protein) and consider TPN if unable to tolerate diet progression in next 24 hrs      Malnutrition Assessment:  Malnutrition Status:  At risk for malnutrition (Comment) (01/24/24 1322)    Context:  Acute Illness     Findings of the 6 clinical characteristics of malnutrition:  Energy Intake:  50% or less of estimated energy requirements for 5 or more days  Weight Loss:  Unable to assess     Body Fat Loss:  No significant body fat loss     Muscle Mass Loss:  No significant muscle mass loss    Fluid Accumulation:  No significant fluid accumulation     Strength:  Not Performed    Nutrition Assessment:    Pt at risk for malnutrition due to inadequate oral intake, has been NPO/clear liquids x 6 days due to sigmoid resection for sigmoid diverticulitis. However po intake has been inadequate for the past month due to GI symptoms  NGT remains in place. Recommend start PPN and consider TPN if unable to progress diet in the next 24 hrs.   Will monitor ability to tolerate diet progression vs need for PN    Nutrition Related Findings:    PMH: COPD, GERD, hyperlipidemia, lung cancer, status resection presents with abdominal pain.  Since Christmas she has not had full bowel movement, just small amounts.  She has been eating but not much.  Found to have Acute diverticulitis with abscess. S/P Sigmoid resection, mobilization of splenic flexure, left salpingo-oophorectomy, appendectomy 1/22. NGT in place with 650 ml output. Last BM 1/21, no edema noted. Labs/Meds reviewed Wound Type: Surgical Incision       Current Nutrition Intake & Therapies:    Average Meal Intake: NPO  Average Supplements Intake: NPO  Diet NPO Exceptions are: Ice Chips, Popsicles    Anthropometric Measures:  Height: 152.4 cm (5')  Ideal Body Weight (IBW): 100 lbs (45 kg)    Admission

## 2024-01-24 NOTE — PROGRESS NOTES
Hospitalist Daily Progress Note  Name: Nicole Wade  Age: 63 y.o.  Gender: female  CodeStatus: Full Code  Allergies: Other  Adhesive Tape  Codeine    Chief Complaint:No chief complaint on file.      Primary Care Provider: Glenna Gage PA    InpatientTreatment Team: Treatment Team: Attending Provider: Jona Albert MD; Consulting Physician: Akin Darby MD; Surgeon: Akin Darby MD; Registered Nurse: Robb Orellana RN; Respiratory Therapist (Day): Eli Moreau RCP; : Eli Ryan RN; : Eli Crowell MSW, LSW    Admission Date: 1/18/2024      Subjective: No chest pain, sob.  Abd pain improved, desires NG out    Physical Exam  Vitals and nursing note reviewed.   Constitutional:       Appearance: Normal appearance.   Cardiovascular:      Rate and Rhythm: Normal rate and regular rhythm.   Pulmonary:      Effort: Pulmonary effort is normal.      Breath sounds: Normal breath sounds.   Abdominal:      Palpations: Abdomen is soft.      Tenderness: There is abdominal tenderness.   Musculoskeletal:         General: Normal range of motion.   Skin:     General: Skin is warm and dry.   Neurological:      Mental Status: She is alert and oriented to person, place, and time. Mental status is at baseline.         Medications:  Reviewed    Infusion Medications:    fentaNYL (SUBLIMAZE) 2 mcg/mL, ROPivacaine (NAROPIN) 0.1 % in sodium chloride 0.9 % 100 mL epidural 10 mL/hr (01/22/24 1612)    sodium chloride Stopped (01/23/24 1915)     Scheduled Medications:    polyethylene glycol  238 g Oral Once    pantoprazole  40 mg Oral QAM AC    PARoxetine  40 mg Oral QAM    traZODone  100 mg Oral Nightly    budesonide-formoterol  2 puff Inhalation BID RT    tiotropium  2 puff Inhalation Daily RT    sodium chloride flush  5-40 mL IntraVENous 2 times per day    enoxaparin  40 mg SubCUTAneous Daily    piperacillin-tazobactam  3,375 mg IntraVENous Q8H     PRN Meds: naloxone

## 2024-01-25 LAB
ALBUMIN SERPL-MCNC: 2.5 G/DL (ref 3.5–4.6)
ALP SERPL-CCNC: 78 U/L (ref 40–130)
ALT SERPL-CCNC: 9 U/L (ref 0–33)
ANION GAP SERPL CALCULATED.3IONS-SCNC: 15 MEQ/L (ref 9–15)
AST SERPL-CCNC: 12 U/L (ref 0–35)
BASOPHILS # BLD: 0 K/UL (ref 0–0.2)
BASOPHILS NFR BLD: 0.2 %
BILIRUB SERPL-MCNC: 0.3 MG/DL (ref 0.2–0.7)
BUN SERPL-MCNC: 5 MG/DL (ref 8–23)
CALCIUM SERPL-MCNC: 8.3 MG/DL (ref 8.5–9.9)
CHLORIDE SERPL-SCNC: 103 MEQ/L (ref 95–107)
CO2 SERPL-SCNC: 27 MEQ/L (ref 20–31)
CREAT SERPL-MCNC: 0.62 MG/DL (ref 0.5–0.9)
EOSINOPHIL # BLD: 0.8 K/UL (ref 0–0.7)
EOSINOPHIL NFR BLD: 8.1 %
ERYTHROCYTE [DISTWIDTH] IN BLOOD BY AUTOMATED COUNT: 14.6 % (ref 11.5–14.5)
GLOBULIN SER CALC-MCNC: 2.3 G/DL (ref 2.3–3.5)
GLUCOSE SERPL-MCNC: 66 MG/DL (ref 70–99)
HCT VFR BLD AUTO: 26.9 % (ref 37–47)
HGB BLD-MCNC: 8.6 G/DL (ref 12–16)
LYMPHOCYTES # BLD: 1.8 K/UL (ref 1–4.8)
LYMPHOCYTES NFR BLD: 18.1 %
MAGNESIUM SERPL-MCNC: 1.6 MG/DL (ref 1.7–2.4)
MCH RBC QN AUTO: 30 PG (ref 27–31.3)
MCHC RBC AUTO-ENTMCNC: 32 % (ref 33–37)
MCV RBC AUTO: 93.7 FL (ref 79.4–94.8)
MONOCYTES # BLD: 0.6 K/UL (ref 0.2–0.8)
MONOCYTES NFR BLD: 5.9 %
NEUTROPHILS # BLD: 6.5 K/UL (ref 1.4–6.5)
NEUTS SEG NFR BLD: 66.4 %
PLATELET # BLD AUTO: 354 K/UL (ref 130–400)
POTASSIUM SERPL-SCNC: 3.7 MEQ/L (ref 3.4–4.9)
PROT SERPL-MCNC: 4.8 G/DL (ref 6.3–8)
RBC # BLD AUTO: 2.87 M/UL (ref 4.2–5.4)
SODIUM SERPL-SCNC: 145 MEQ/L (ref 135–144)
WBC # BLD AUTO: 9.8 K/UL (ref 4.8–10.8)

## 2024-01-25 PROCEDURE — 1210000000 HC MED SURG R&B

## 2024-01-25 PROCEDURE — 83735 ASSAY OF MAGNESIUM: CPT

## 2024-01-25 PROCEDURE — 6370000000 HC RX 637 (ALT 250 FOR IP): Performed by: COLON & RECTAL SURGERY

## 2024-01-25 PROCEDURE — 6360000002 HC RX W HCPCS: Performed by: COLON & RECTAL SURGERY

## 2024-01-25 PROCEDURE — 2700000000 HC OXYGEN THERAPY PER DAY

## 2024-01-25 PROCEDURE — 36415 COLL VENOUS BLD VENIPUNCTURE: CPT

## 2024-01-25 PROCEDURE — 6360000002 HC RX W HCPCS: Performed by: STUDENT IN AN ORGANIZED HEALTH CARE EDUCATION/TRAINING PROGRAM

## 2024-01-25 PROCEDURE — 94761 N-INVAS EAR/PLS OXIMETRY MLT: CPT

## 2024-01-25 PROCEDURE — 2580000003 HC RX 258: Performed by: COLON & RECTAL SURGERY

## 2024-01-25 PROCEDURE — 99024 POSTOP FOLLOW-UP VISIT: CPT | Performed by: COLON & RECTAL SURGERY

## 2024-01-25 PROCEDURE — 94640 AIRWAY INHALATION TREATMENT: CPT

## 2024-01-25 PROCEDURE — 85025 COMPLETE CBC W/AUTO DIFF WBC: CPT

## 2024-01-25 PROCEDURE — 80053 COMPREHEN METABOLIC PANEL: CPT

## 2024-01-25 PROCEDURE — 2580000003 HC RX 258: Performed by: STUDENT IN AN ORGANIZED HEALTH CARE EDUCATION/TRAINING PROGRAM

## 2024-01-25 RX ORDER — OXYCODONE HYDROCHLORIDE AND ACETAMINOPHEN 5; 325 MG/1; MG/1
1 TABLET ORAL EVERY 4 HOURS PRN
Status: DISCONTINUED | OUTPATIENT
Start: 2024-01-25 | End: 2024-01-26 | Stop reason: HOSPADM

## 2024-01-25 RX ORDER — OXYCODONE HYDROCHLORIDE AND ACETAMINOPHEN 5; 325 MG/1; MG/1
2 TABLET ORAL EVERY 4 HOURS PRN
Status: DISCONTINUED | OUTPATIENT
Start: 2024-01-25 | End: 2024-01-26 | Stop reason: HOSPADM

## 2024-01-25 RX ORDER — HYDROMORPHONE HYDROCHLORIDE 1 MG/ML
1 INJECTION, SOLUTION INTRAMUSCULAR; INTRAVENOUS; SUBCUTANEOUS
Status: DISCONTINUED | OUTPATIENT
Start: 2024-01-25 | End: 2024-01-26

## 2024-01-25 RX ADMIN — TRAZODONE HYDROCHLORIDE 100 MG: 50 TABLET ORAL at 21:09

## 2024-01-25 RX ADMIN — BUDESONIDE AND FORMOTEROL FUMARATE DIHYDRATE 2 PUFF: 160; 4.5 AEROSOL RESPIRATORY (INHALATION) at 07:03

## 2024-01-25 RX ADMIN — PAROXETINE 40 MG: 10 TABLET, FILM COATED ORAL at 08:31

## 2024-01-25 RX ADMIN — ENOXAPARIN SODIUM 40 MG: 100 INJECTION SUBCUTANEOUS at 08:32

## 2024-01-25 RX ADMIN — TIOTROPIUM BROMIDE INHALATION SPRAY 2 PUFF: 3.12 SPRAY, METERED RESPIRATORY (INHALATION) at 07:03

## 2024-01-25 RX ADMIN — PIPERACILLIN AND TAZOBACTAM 3375 MG: 3; .375 INJECTION, POWDER, FOR SOLUTION INTRAVENOUS at 21:17

## 2024-01-25 RX ADMIN — Medication 10 ML: at 08:32

## 2024-01-25 RX ADMIN — Medication 10 ML: at 21:09

## 2024-01-25 RX ADMIN — OXYCODONE HYDROCHLORIDE AND ACETAMINOPHEN 2 TABLET: 5; 325 TABLET ORAL at 21:50

## 2024-01-25 RX ADMIN — PIPERACILLIN AND TAZOBACTAM 3375 MG: 3; .375 INJECTION, POWDER, FOR SOLUTION INTRAVENOUS at 14:39

## 2024-01-25 RX ADMIN — OXYCODONE HYDROCHLORIDE AND ACETAMINOPHEN 2 TABLET: 5; 325 TABLET ORAL at 14:33

## 2024-01-25 RX ADMIN — OXYCODONE HYDROCHLORIDE AND ACETAMINOPHEN 2 TABLET: 5; 325 TABLET ORAL at 09:53

## 2024-01-25 RX ADMIN — BUDESONIDE AND FORMOTEROL FUMARATE DIHYDRATE 2 PUFF: 160; 4.5 AEROSOL RESPIRATORY (INHALATION) at 19:24

## 2024-01-25 RX ADMIN — PIPERACILLIN AND TAZOBACTAM 3375 MG: 3; .375 INJECTION, POWDER, FOR SOLUTION INTRAVENOUS at 06:01

## 2024-01-25 RX ADMIN — ROPIVACAINE HYDROCHLORIDE: 5 INJECTION EPIDURAL; INFILTRATION; PERINEURAL at 02:46

## 2024-01-25 RX ADMIN — PANTOPRAZOLE SODIUM 40 MG: 40 TABLET, DELAYED RELEASE ORAL at 06:01

## 2024-01-25 ASSESSMENT — PAIN SCALES - GENERAL
PAINLEVEL_OUTOF10: 7
PAINLEVEL_OUTOF10: 0
PAINLEVEL_OUTOF10: 7
PAINLEVEL_OUTOF10: 7
PAINLEVEL_OUTOF10: 0
PAINLEVEL_OUTOF10: 6
PAINLEVEL_OUTOF10: 0
PAINLEVEL_OUTOF10: 0

## 2024-01-25 ASSESSMENT — PAIN DESCRIPTION - LOCATION
LOCATION: ABDOMEN

## 2024-01-25 ASSESSMENT — PAIN DESCRIPTION - ORIENTATION
ORIENTATION: MID
ORIENTATION: MID

## 2024-01-25 ASSESSMENT — PAIN DESCRIPTION - DESCRIPTORS
DESCRIPTORS: ACHING
DESCRIPTORS: ACHING
DESCRIPTORS: CRAMPING

## 2024-01-25 NOTE — PROGRESS NOTES
Postop day #2 sigmoid resection for refractory sigmoid diverticulitis    No flatus or bowel movement yet.  OCHOA drain minimal serosanguineous    Epidural and Romeo come out tomorrow.  Will remove NG tube tomorrow.    All questions answered.  Pain controlled.  Patient agreeable to plan.

## 2024-01-25 NOTE — PROGRESS NOTES
NG removed per Dr. Darby. Pt tolerated well. Epidural turned off, anesthesia called regarding DC orders.

## 2024-01-25 NOTE — PROGRESS NOTES
Hospitalist Progress Note      Date of Admission: 1/18/2024  Chief Complaint:    No chief complaint on file.    Subjective:  No new complaints.  No nausea, vomiting, chest pain, or headache      Medications:    Infusion Medications    sodium chloride Stopped (01/23/24 1915)     Scheduled Medications    polyethylene glycol  238 g Oral Once    pantoprazole  40 mg Oral QAM AC    PARoxetine  40 mg Oral QAM    traZODone  100 mg Oral Nightly    budesonide-formoterol  2 puff Inhalation BID RT    tiotropium  2 puff Inhalation Daily RT    sodium chloride flush  5-40 mL IntraVENous 2 times per day    enoxaparin  40 mg SubCUTAneous Daily    piperacillin-tazobactam  3,375 mg IntraVENous Q8H     PRN Meds: HYDROmorphone **OR** HYDROmorphone, oxyCODONE-acetaminophen **OR** oxyCODONE-acetaminophen, albuterol sulfate HFA, sodium chloride flush, sodium chloride, ondansetron **OR** ondansetron, polyethylene glycol, [DISCONTINUED] acetaminophen **OR** acetaminophen    Intake/Output Summary (Last 24 hours) at 1/25/2024 1853  Last data filed at 1/25/2024 1847  Gross per 24 hour   Intake 1416.66 ml   Output 1100 ml   Net 316.66 ml     Exam:  BP (!) 114/58   Pulse 77   Temp 97.8 °F (36.6 °C) (Oral)   Resp 18   Ht 1.524 m (5')   Wt 57.4 kg (126 lb 8.7 oz)   LMP  (LMP Unknown)   SpO2 94%   BMI 24.71 kg/m²   Head: Normocephalic, atraumatic  Sclera clear  Neck JVD flat  Lungs: normal effort of breathing    Labs:   Recent Labs     01/23/24  0455 01/24/24  0432 01/25/24  0528   WBC 12.3* 10.7 9.8   HGB 10.1* 8.4* 8.6*   HCT 31.9* 26.0* 26.9*    286 354     Recent Labs     01/23/24  0455 01/24/24  0432 01/25/24  0528    144 145*   K 4.4 4.0 3.7    107 103   CO2 24 26 27   BUN 7* 9 5*   CREATININE 0.95* 0.74 0.62   CALCIUM 8.1* 8.2* 8.3*   AST 10 13 12   ALT 7 8 9   BILITOT 0.6 0.3 0.3   ALKPHOS 83 76 78     No results for input(s): \"INR\" in the last 72 hours.  No results for input(s): \"CKTOTAL\", \"TROPONINI\" in the last

## 2024-01-25 NOTE — ANESTHESIA POST-OP
Patient in her room eating lunch. Called to pull epidural catheter out per surgeons order. Site dry and intact, positive motor and sensory bilateral lower extremities, epidural catheter fully intact. Denies fever and chills. Surgical staff to follow with pain management

## 2024-01-25 NOTE — PROGRESS NOTES
Postop day #3 sigmoid resection for refractory sigmoid diverticulitis    Passing flatus.  Epidural Romeo in NG tube removed.    Patient started on clear liquids.    Patient is eager to begin ambulating.    Abdomen soft OCHOA drain serosanguineous.  All questions answered.  Patient progressing well

## 2024-01-25 NOTE — PROGRESS NOTES
Shift assessments complete, see flowsheets. Pt alert and oriented x 4, able to make needs and wants known. Medication administered per MAR, VSS. NG to right nares LIS. Romeo intact, draining clear yellow output. Epidural functioning well. No further needs verbalized at this time. Pt left resting with call light within reach.

## 2024-01-25 NOTE — CARE COORDINATION
MET W/PT TO ASSESS NEEDS AND DISCUSS DISCHARGE PLAN WHICH IS HOME ALONE. CURRENTLY DENIES HHC NEEDS. ON O2 NC. WILL FOLLOW FOR O2 NEEDS. PT WEARS O2 PRN AT HS. WILL FOLLOW.

## 2024-01-26 VITALS
TEMPERATURE: 98.2 F | DIASTOLIC BLOOD PRESSURE: 72 MMHG | HEIGHT: 60 IN | WEIGHT: 123.9 LBS | HEART RATE: 87 BPM | RESPIRATION RATE: 18 BRPM | SYSTOLIC BLOOD PRESSURE: 124 MMHG | OXYGEN SATURATION: 92 % | BODY MASS INDEX: 24.32 KG/M2

## 2024-01-26 LAB
ALBUMIN SERPL-MCNC: 2.6 G/DL (ref 3.5–4.6)
ALP SERPL-CCNC: 80 U/L (ref 40–130)
ALT SERPL-CCNC: 9 U/L (ref 0–33)
ANION GAP SERPL CALCULATED.3IONS-SCNC: 16 MEQ/L (ref 9–15)
AST SERPL-CCNC: 11 U/L (ref 0–35)
BASOPHILS # BLD: 0 K/UL (ref 0–0.2)
BASOPHILS NFR BLD: 0.3 %
BILIRUB SERPL-MCNC: 0.3 MG/DL (ref 0.2–0.7)
BUN SERPL-MCNC: 4 MG/DL (ref 8–23)
CALCIUM SERPL-MCNC: 8.5 MG/DL (ref 8.5–9.9)
CHLORIDE SERPL-SCNC: 102 MEQ/L (ref 95–107)
CO2 SERPL-SCNC: 26 MEQ/L (ref 20–31)
CREAT SERPL-MCNC: 0.57 MG/DL (ref 0.5–0.9)
EOSINOPHIL # BLD: 0.8 K/UL (ref 0–0.7)
EOSINOPHIL NFR BLD: 7.5 %
ERYTHROCYTE [DISTWIDTH] IN BLOOD BY AUTOMATED COUNT: 14.6 % (ref 11.5–14.5)
GLOBULIN SER CALC-MCNC: 2.6 G/DL (ref 2.3–3.5)
GLUCOSE SERPL-MCNC: 66 MG/DL (ref 70–99)
HCT VFR BLD AUTO: 29.3 % (ref 37–47)
HGB BLD-MCNC: 9.4 G/DL (ref 12–16)
LYMPHOCYTES # BLD: 2.3 K/UL (ref 1–4.8)
LYMPHOCYTES NFR BLD: 22.3 %
MAGNESIUM SERPL-MCNC: 1.5 MG/DL (ref 1.7–2.4)
MCH RBC QN AUTO: 30 PG (ref 27–31.3)
MCHC RBC AUTO-ENTMCNC: 32.1 % (ref 33–37)
MCV RBC AUTO: 93.6 FL (ref 79.4–94.8)
MONOCYTES # BLD: 0.6 K/UL (ref 0.2–0.8)
MONOCYTES NFR BLD: 6.1 %
NEUTROPHILS # BLD: 6.3 K/UL (ref 1.4–6.5)
NEUTS SEG NFR BLD: 61.6 %
PLATELET # BLD AUTO: 425 K/UL (ref 130–400)
POTASSIUM SERPL-SCNC: 3.3 MEQ/L (ref 3.4–4.9)
PROT SERPL-MCNC: 5.2 G/DL (ref 6.3–8)
RBC # BLD AUTO: 3.13 M/UL (ref 4.2–5.4)
SODIUM SERPL-SCNC: 144 MEQ/L (ref 135–144)
WBC # BLD AUTO: 10.3 K/UL (ref 4.8–10.8)

## 2024-01-26 PROCEDURE — 99024 POSTOP FOLLOW-UP VISIT: CPT | Performed by: COLON & RECTAL SURGERY

## 2024-01-26 PROCEDURE — 85025 COMPLETE CBC W/AUTO DIFF WBC: CPT

## 2024-01-26 PROCEDURE — 94640 AIRWAY INHALATION TREATMENT: CPT

## 2024-01-26 PROCEDURE — 6360000002 HC RX W HCPCS: Performed by: INTERNAL MEDICINE

## 2024-01-26 PROCEDURE — 6370000000 HC RX 637 (ALT 250 FOR IP): Performed by: COLON & RECTAL SURGERY

## 2024-01-26 PROCEDURE — 2700000000 HC OXYGEN THERAPY PER DAY

## 2024-01-26 PROCEDURE — 83735 ASSAY OF MAGNESIUM: CPT

## 2024-01-26 PROCEDURE — 94761 N-INVAS EAR/PLS OXIMETRY MLT: CPT

## 2024-01-26 PROCEDURE — 6360000002 HC RX W HCPCS: Performed by: COLON & RECTAL SURGERY

## 2024-01-26 PROCEDURE — 36415 COLL VENOUS BLD VENIPUNCTURE: CPT

## 2024-01-26 PROCEDURE — 80053 COMPREHEN METABOLIC PANEL: CPT

## 2024-01-26 PROCEDURE — 2580000003 HC RX 258: Performed by: COLON & RECTAL SURGERY

## 2024-01-26 PROCEDURE — 6370000000 HC RX 637 (ALT 250 FOR IP): Performed by: INTERNAL MEDICINE

## 2024-01-26 RX ORDER — POTASSIUM CHLORIDE 20 MEQ/1
40 TABLET, EXTENDED RELEASE ORAL PRN
Status: DISCONTINUED | OUTPATIENT
Start: 2024-01-26 | End: 2024-01-26 | Stop reason: HOSPADM

## 2024-01-26 RX ORDER — POTASSIUM CHLORIDE 7.45 MG/ML
10 INJECTION INTRAVENOUS PRN
Status: DISCONTINUED | OUTPATIENT
Start: 2024-01-26 | End: 2024-01-26 | Stop reason: HOSPADM

## 2024-01-26 RX ORDER — METRONIDAZOLE 500 MG/1
500 TABLET ORAL EVERY 8 HOURS SCHEDULED
Qty: 21 TABLET | Refills: 0 | Status: SHIPPED | OUTPATIENT
Start: 2024-01-26 | End: 2024-02-02

## 2024-01-26 RX ORDER — POTASSIUM CHLORIDE 20 MEQ/1
40 TABLET, EXTENDED RELEASE ORAL
Status: DISCONTINUED | OUTPATIENT
Start: 2024-01-26 | End: 2024-01-26 | Stop reason: HOSPADM

## 2024-01-26 RX ORDER — METRONIDAZOLE 500 MG/1
500 TABLET ORAL EVERY 8 HOURS SCHEDULED
Status: DISCONTINUED | OUTPATIENT
Start: 2024-01-26 | End: 2024-01-26 | Stop reason: HOSPADM

## 2024-01-26 RX ORDER — CIPROFLOXACIN 500 MG/1
500 TABLET, FILM COATED ORAL EVERY 12 HOURS SCHEDULED
Qty: 14 TABLET | Refills: 0 | Status: SHIPPED | OUTPATIENT
Start: 2024-01-26 | End: 2024-02-02

## 2024-01-26 RX ORDER — MAGNESIUM SULFATE IN WATER 40 MG/ML
2000 INJECTION, SOLUTION INTRAVENOUS ONCE
Status: COMPLETED | OUTPATIENT
Start: 2024-01-26 | End: 2024-01-26

## 2024-01-26 RX ORDER — OXYCODONE HYDROCHLORIDE AND ACETAMINOPHEN 5; 325 MG/1; MG/1
1 TABLET ORAL EVERY 6 HOURS PRN
Qty: 12 TABLET | Refills: 0 | Status: SHIPPED | OUTPATIENT
Start: 2024-01-26 | End: 2024-01-29

## 2024-01-26 RX ORDER — CIPROFLOXACIN 500 MG/1
500 TABLET, FILM COATED ORAL EVERY 12 HOURS SCHEDULED
Status: DISCONTINUED | OUTPATIENT
Start: 2024-01-26 | End: 2024-01-26 | Stop reason: HOSPADM

## 2024-01-26 RX ADMIN — ENOXAPARIN SODIUM 40 MG: 100 INJECTION SUBCUTANEOUS at 08:29

## 2024-01-26 RX ADMIN — TIOTROPIUM BROMIDE INHALATION SPRAY 2 PUFF: 3.12 SPRAY, METERED RESPIRATORY (INHALATION) at 07:31

## 2024-01-26 RX ADMIN — METRONIDAZOLE 500 MG: 500 TABLET ORAL at 14:28

## 2024-01-26 RX ADMIN — PANTOPRAZOLE SODIUM 40 MG: 40 TABLET, DELAYED RELEASE ORAL at 06:03

## 2024-01-26 RX ADMIN — PAROXETINE 40 MG: 10 TABLET, FILM COATED ORAL at 08:29

## 2024-01-26 RX ADMIN — OXYCODONE HYDROCHLORIDE AND ACETAMINOPHEN 2 TABLET: 5; 325 TABLET ORAL at 08:30

## 2024-01-26 RX ADMIN — POTASSIUM CHLORIDE 40 MEQ: 1500 TABLET, EXTENDED RELEASE ORAL at 09:05

## 2024-01-26 RX ADMIN — PIPERACILLIN AND TAZOBACTAM 3375 MG: 3; .375 INJECTION, POWDER, FOR SOLUTION INTRAVENOUS at 05:29

## 2024-01-26 RX ADMIN — Medication 10 ML: at 08:30

## 2024-01-26 RX ADMIN — BUDESONIDE AND FORMOTEROL FUMARATE DIHYDRATE 2 PUFF: 160; 4.5 AEROSOL RESPIRATORY (INHALATION) at 07:31

## 2024-01-26 RX ADMIN — MAGNESIUM SULFATE HEPTAHYDRATE 2000 MG: 40 INJECTION, SOLUTION INTRAVENOUS at 14:30

## 2024-01-26 ASSESSMENT — PAIN DESCRIPTION - ORIENTATION: ORIENTATION: MID

## 2024-01-26 ASSESSMENT — PAIN DESCRIPTION - LOCATION: LOCATION: ABDOMEN

## 2024-01-26 ASSESSMENT — PAIN SCALES - GENERAL
PAINLEVEL_OUTOF10: 0
PAINLEVEL_OUTOF10: 7

## 2024-01-26 NOTE — PROGRESS NOTES
IV removed, no complications. Pt educated on discharge instructions. Pt verbalized understanding. Pt given pain medication prescription. Pt left to go home.

## 2024-01-26 NOTE — PROGRESS NOTES
CLINICAL PHARMACY NOTE: MEDS TO BEDS    Total # of Prescriptions Filled: 2   The following medications were delivered to the patient:  Ciprofloxacin 500 mg Tab  Metronidazole 500 mg Tab    Additional Documentation:

## 2024-01-26 NOTE — PLAN OF CARE
Problem: Discharge Planning  Goal: Discharge to home or other facility with appropriate resources  Outcome: Completed     Problem: Pain  Goal: Verbalizes/displays adequate comfort level or baseline comfort level  Outcome: Completed     Problem: Safety - Adult  Goal: Free from fall injury  Outcome: Completed     Problem: Gastrointestinal - Adult  Goal: Minimal or absence of nausea and vomiting  Outcome: Completed  Goal: Maintains or returns to baseline bowel function  Outcome: Completed  Goal: Maintains adequate nutritional intake  Outcome: Completed     Problem: Nutrition Deficit:  Goal: Optimize nutritional status  Outcome: Completed

## 2024-01-26 NOTE — PROGRESS NOTES
DC orders placed per Dr. Cason. Per Dr. Cason continue with IV mag prior, no need for K replacement, replaced this am. IV mag started.

## 2024-01-26 NOTE — PROGRESS NOTES
Pt Name: Nicole Wade  Medical Record Number: [id number]  Date of Birth 1960   Admit date 1/18/2024  9:14 PM  Today's Date: 1/26/2024     ASSESSMENT  1. Hospital day # 8  2 postop day #4 sigmoid resection for acute diverticulitis  3.  Bowels working    PLAN  1.  Advance diet  2.  DC Zosyn  3.  Ambulate  4.  Discharge later today or possibly tomorrow  5.  DC OCHOA drain    SUBJECTIVE  Chief complaint: Follow-up sigmoid resection  Afebrile, vital signs are stable. She denies any nausea or vomiting, bowels working. She is tolerating a ADULT DIET; Clear Liquid. Her pain is well controlled on current medications. She has been ambulating in the halls.      has a past medical history of Abnormal CT of the chest, Bone spur, Cancer ([facility]), Cervical stenosis of spine, COPD (chronic obstructive pulmonary disease) ([facility]), Coronary artery calcification of native artery - aortic arch, DDD (degenerative disc disease), lumbosacral, CARTER (generalized anxiety disorder), Gastroesophageal reflux disease without esophagitis, Gout, Hyperlipidemia, Insomnia, Mild single current episode of major depressive disorder ([facility]), Neuropathy, Other emphysema ([facility]), Pure hypercholesterolemia, SOB (shortness of breath), and Tobacco abuse.    CURRENT MEDS  Scheduled Meds:   polyethylene glycol  238 g Oral Once    pantoprazole  40 mg Oral QAM AC    PARoxetine  40 mg Oral QAM    traZODone  100 mg Oral Nightly    budesonide-formoterol  2 puff Inhalation BID RT    tiotropium  2 puff Inhalation Daily RT    sodium chloride flush  5-40 mL IntraVENous 2 times per day    enoxaparin  40 mg SubCUTAneous Daily     Continuous Infusions:   sodium chloride Stopped (01/23/24 1915)     PRN Meds:.potassium chloride **OR** potassium alternative oral replacement **OR** potassium chloride, HYDROmorphone **OR** HYDROmorphone, oxyCODONE-acetaminophen **OR** oxyCODONE-acetaminophen, albuterol sulfate HFA, sodium chloride flush, sodium chloride, ondansetron **OR**

## 2024-01-26 NOTE — DISCHARGE SUMMARY
Physician Discharge Summary     Patient ID:  Nicole Wade  [id number]  63 y.o.  1960    Admit date: 1/18/2024    Discharge date and time: 1/26/2024    Admitting Physician: Jordan Sparks MD     Discharge Physician: Wilner    Admission Diagnoses: Acute diverticulitis [K57.92]    Discharge Diagnoses: Same    Admission Condition: poor    Discharged Condition: good    Indication for Admission: Acute diverticulitis    Hospital Course: Patient was admitted to the hospital on 1/18/2024 for acute diverticulitis, the details of which can be found in the history and physical.    She was started on IV antibiotics for the next 48 hours.  Surgery was consulted for treatment.  The details of their consult can be found in the record.    I was asked to see this patient regarding refractory diverticulitis by my partner.    Patient was kept on IV fluids along with pain control.  Laboratory values were followed during hospital stay.  DVT prophylaxis was given.    She was taken to the operating room on 1/22/2024 for sigmoid resection, left salpingo-oophorectomy and appendectomy as outlined in the operative report.    She was taken to the floor postoperatively pain control was given.  She was continued on Zosyn postoperatively.    She ambulated early and often.  Her epidural catheter was kept in for 3 days.  Her Romeo catheter and epidural was removed on postop day #3.  Her nasogastric tube was removed at the same time.    She was started on liquids and after bowel function was started on low fiber diet.  Hep-Lock fluids.    She continued to do well and improved.    On postop day #4 her pain was controlled on oral medication.  She was given discharge instructions regarding activity, medication, follow-up and wound care at the bedside.  All questions were answered.  Follow-up appointment for 1 week.  Hospitalist prescribed 1 week of oral antibiotics using Cipro and Flagyl.  Pain medication was given.    Histology was reviewed  (PRILOSEC) 20 MG delayed release capsule take 1 capsule by mouth once daily  Qty: 90 capsule, Refills: 1    Associated Diagnoses: Gastroesophageal reflux disease without esophagitis      !! varenicline (CHANTIX) 0.5 MG tablet 0.5mg DAILY for 3 days followed by 0.5mg TWICE DAILY for 4 days followed by 1mg TWICE DAILY  Qty: 57 tablet, Refills: 0    Associated Diagnoses: Personal history of tobacco use, presenting hazards to health      !! varenicline (CHANTIX) 1 MG tablet Take 1 tablet by mouth 2 times daily  Qty: 180 tablet, Refills: 1    Associated Diagnoses: Personal history of tobacco use, presenting hazards to health      busPIRone (BUSPAR) 10 MG tablet take 1 tablet by mouth twice a day  Qty: 180 tablet, Refills: 1      Respiratory Therapy Supplies (NEBULIZER/TUBING/MOUTHPIECE) KIT 1 kit by Does not apply route daily as needed (SOB or wheezing)  Qty: 1 kit, Refills: 0      traZODone (DESYREL) 100 MG tablet take 1 tablet by mouth nightly if needed for sleep  Qty: 30 tablet, Refills: 5    Associated Diagnoses: Insomnia, unspecified type      atorvastatin (LIPITOR) 20 MG tablet take 1 tablet by mouth once daily  Qty: 30 tablet, Refills: 0    Associated Diagnoses: Hyperlipidemia, unspecified hyperlipidemia type      fluticasone-umeclidin-vilant (TRELEGY ELLIPTA) 200-62.5-25 MCG/ACT AEPB inhaler Inhale 1 puff into the lungs daily  Qty: 1 each, Refills: 5    Associated Diagnoses: Chronic obstructive pulmonary disease, unspecified COPD type (HCC)      albuterol (PROVENTIL) (5 MG/ML) 0.5% nebulizer solution Take 1 mL by nebulization 4 times daily as needed for Wheezing  Qty: 120 each, Refills: 3    Associated Diagnoses: Other emphysema (HCC)       !! - Potential duplicate medications found. Please discuss with provider.        Activity: activity as tolerated  Diet: regular diet  Wound Care: keep wound clean and dry    Follow-up with Wilner in 1 week.    Signed:  Wilner  1/26/2024  4:13 PM

## 2024-01-26 NOTE — CARE COORDINATION
MET WITH PATIENT FAMILY AT BEDSIDE TO DISCUSS ANY DC NEEDS PT WILL RETURN HOME LATER TODAY DENIES NEEDS

## 2024-01-26 NOTE — DISCHARGE INSTRUCTIONS
May shower    May keep dressings over incision as needed    No lifting greater than 10 pounds for the next 2 weeks    May take stairs    No driving while on pain medication

## 2024-01-27 LAB
CULTURE WOUND: ABNORMAL
ORGANISM: ABNORMAL
ORGANISM: ABNORMAL

## 2024-01-29 ENCOUNTER — TELEPHONE (OUTPATIENT)
Dept: INTERNAL MEDICINE | Age: 64
End: 2024-01-29

## 2024-01-29 NOTE — TELEPHONE ENCOUNTER
Care Transitions Initial Follow Up Call    Outreach made within 2 business days of discharge: Yes    Patient: Nicole Wade Patient : 1960   MRN: [id number]  Reason for Admission: There are no discharge diagnoses documented for the most recent discharge.  Discharge Date: 24       Spoke with: PT    Discharge department/facility: [facility]    TCM Interactive Patient Contact:  Was patient able to fill all prescriptions: Yes  Was patient instructed to bring all medications to the follow-up visit: Yes  Is patient taking all medications as directed in the discharge summary? Yes  Does patient understand their discharge instructions: Yes  Does patient have questions or concerns that need addressed prior to 7-14 day follow up office visit: no    Scheduled appointment with PCP within 7-14 days    Follow Up  Future Appointments   Date Time Provider Department Center   2024  1:00 PM Glenna Gage PA [facility]   2024  9:30 AM [facility] CT ROOM 1 [facility] RAD   2024 10:00 AM Ilia Duggan MD [facility] Kathryn Dean, MA

## 2024-02-02 ENCOUNTER — TELEPHONE (OUTPATIENT)
Dept: INTERNAL MEDICINE | Age: 64
End: 2024-02-02

## 2024-02-05 DIAGNOSIS — J44.9 CHRONIC OBSTRUCTIVE PULMONARY DISEASE, UNSPECIFIED COPD TYPE (HCC): ICD-10-CM

## 2024-02-06 ENCOUNTER — TELEPHONE (OUTPATIENT)
Dept: INTERNAL MEDICINE | Age: 64
End: 2024-02-06

## 2024-02-06 ENCOUNTER — OFFICE VISIT (OUTPATIENT)
Dept: SURGERY | Age: 64
End: 2024-02-06

## 2024-02-06 VITALS
HEIGHT: 60 IN | OXYGEN SATURATION: 98 % | WEIGHT: 123 LBS | TEMPERATURE: 97.9 F | HEART RATE: 97 BPM | BODY MASS INDEX: 24.15 KG/M2

## 2024-02-06 DIAGNOSIS — J44.9 CHRONIC OBSTRUCTIVE PULMONARY DISEASE, UNSPECIFIED COPD TYPE (HCC): ICD-10-CM

## 2024-02-06 DIAGNOSIS — K57.92 ACUTE DIVERTICULITIS: Primary | ICD-10-CM

## 2024-02-06 PROCEDURE — 99024 POSTOP FOLLOW-UP VISIT: CPT | Performed by: COLON & RECTAL SURGERY

## 2024-02-06 RX ORDER — FLUTICASONE FUROATE, UMECLIDINIUM BROMIDE AND VILANTEROL TRIFENATATE 200; 62.5; 25 UG/1; UG/1; UG/1
1 POWDER RESPIRATORY (INHALATION) DAILY
Qty: 1 EACH | Refills: 5 | Status: CANCELLED | OUTPATIENT
Start: 2024-02-06

## 2024-02-06 RX ORDER — FLUTICASONE FUROATE, UMECLIDINIUM BROMIDE AND VILANTEROL TRIFENATATE 200; 62.5; 25 UG/1; UG/1; UG/1
POWDER RESPIRATORY (INHALATION)
Qty: 1 EACH | Refills: 3 | Status: SHIPPED | OUTPATIENT
Start: 2024-02-06

## 2024-02-06 NOTE — TELEPHONE ENCOUNTER
Pharmacist called requesting a new prescription/refill for the TRELEGY inhaler, please    Thank you

## 2024-02-06 NOTE — TELEPHONE ENCOUNTER
Comments:     Last Office Visit (last PCP visit):   1/18/2024    Next Visit Date:  Future Appointments   Date Time Provider Department Center   2/6/2024  2:45 PM Akin Darby MD [facility]   4/19/2024  9:30 AM [facility] CT ROOM 1 [facility] RAD   4/19/2024 10:00 AM Ilia Duggan MD [facility]       **If hasn't been seen in over a year OR hasn't followed up according to last diabetes/ADHD visit, make appointment for patient before sending refill to provider.    Rx requested:  Requested Prescriptions     Pending Prescriptions Disp Refills    TRELEGY ELLIPTA 200-62.5-25 MCG/ACT AEPB inhaler [Pharmacy Med Name: Trelegy Ellipta 200 mcg-62.5 mcg-25 mcg powder for inhalation]  3     Sig: inhale 1 puff into the lungs once DAILY

## 2024-02-06 NOTE — PROGRESS NOTES
Subjective:      Patient ID: Nicole Wade is a 63 y.o. female who presents for:  Chief Complaint   Patient presents with    Post-Op Check       She returns to the office 2 weeks out from a sigmoid resection for perforated complicated sigmoid diverticulitis.    She is eating well.  Her bowels are working.  Denies nausea or vomiting.  Denies fever        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 10/31/2018    On lung screen done 10/26/18    DDD (degenerative disc disease), lumbosacral 3/12/2018    Used to see pain management     CARTER (generalized anxiety disorder) 3/12/2018    [facility]    Gastroesophageal reflux disease without esophagitis 3/12/2018    EGD over 10 years    Gout     Hyperlipidemia     Insomnia 3/12/2018    Mild single current episode of major depressive disorder ([facility]) 3/12/2018    Neuropathy 3/12/2018    Clinical diagnosis, EMG    Other emphysema ([facility]) 3/12/2018    No recent PFT    Pure hypercholesterolemia 3/12/2018    SOB (shortness of breath)     Tobacco abuse 3/12/2018     Past Surgical History:   Procedure Laterality Date     SECTION      COLECTOMY N/A 2024    Sigmoid colectomy, possible diverting ileostomy Left salpingectomy and Left oophorectomy performed by Akin Darby MD at [facility]    COLONOSCOPY      HERNIA REPAIR      LUNG BIOPSY Left 2018    CT guided Left Lung Biopsy by Dr Jatinder Plummer    LUNG REMOVAL, PARTIAL Left     THORACOSCOPY Right 2022    RIGHT THORACOSCOPIC LOWER LOBE WEDGE performed by Ilia Duggan MD at [facility] OR    TONSILLECTOMY      VENTRAL HERNIA REPAIR N/A 2022    REPAIR OF VENTRAL HERNIA WITH UMBILECTOMY performed by Storm Lang MD at [facility] OR     Social History     Socioeconomic History    Marital

## 2024-02-09 ENCOUNTER — TELEPHONE (OUTPATIENT)
Dept: INTERNAL MEDICINE | Age: 64
End: 2024-02-09

## 2024-02-09 RX ORDER — BUSPIRONE HYDROCHLORIDE 10 MG/1
10 TABLET ORAL 2 TIMES DAILY
Qty: 180 TABLET | Refills: 1 | Status: SHIPPED | OUTPATIENT
Start: 2024-02-09

## 2024-02-09 NOTE — TELEPHONE ENCOUNTER
Pt called and stated [facility] does not have all of her prescriptions. Patient will need called back for clarification.     Thank you

## 2024-02-16 ENCOUNTER — OFFICE VISIT (OUTPATIENT)
Dept: SURGERY | Age: 64
End: 2024-02-16

## 2024-02-16 VITALS
OXYGEN SATURATION: 96 % | TEMPERATURE: 97.1 F | HEIGHT: 60 IN | BODY MASS INDEX: 24.15 KG/M2 | HEART RATE: 84 BPM | WEIGHT: 123 LBS

## 2024-02-16 DIAGNOSIS — K57.92 ACUTE DIVERTICULITIS: Primary | ICD-10-CM

## 2024-02-16 PROCEDURE — 99024 POSTOP FOLLOW-UP VISIT: CPT | Performed by: COLON & RECTAL SURGERY

## 2024-02-16 NOTE — PROGRESS NOTES
Subjective:      Patient ID: Nicole Wade is a 63 y.o. female who presents for:  Chief Complaint   Patient presents with    Post-Op Check       She returns to the office following a sigmoid resection for acute on chronic sigmoid diverticulitis    Patient doing well.  Bowels working well.  Denies nausea vomiting.  Denies abdominal pain        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 10/31/2018    On lung screen done 10/26/18    DDD (degenerative disc disease), lumbosacral 3/12/2018    Used to see pain management     CARTER (generalized anxiety disorder) 3/12/2018    [facility]    Gastroesophageal reflux disease without esophagitis 3/12/2018    EGD over 10 years    Gout     Hyperlipidemia     Insomnia 3/12/2018    Mild single current episode of major depressive disorder (HCC) 3/12/2018    Neuropathy 3/12/2018    Clinical diagnosis, EMG    Other emphysema (HCC) 3/12/2018    No recent PFT    Pure hypercholesterolemia 3/12/2018    SOB (shortness of breath)     Tobacco abuse 3/12/2018     Past Surgical History:   Procedure Laterality Date     SECTION      COLECTOMY N/A 2024    Sigmoid colectomy, possible diverting ileostomy Left salpingectomy and Left oophorectomy performed by Akin Darby MD [facility] [place]    COLONOSCOPY      HERNIA REPAIR      LUNG BIOPSY Left 2018    CT guided Left Lung Biopsy by Dr Jatinder Plummer    LUNG REMOVAL, PARTIAL Left     THORACOSCOPY Right 2022    RIGHT THORACOSCOPIC LOWER LOBE WEDGE performed by Ilia Duggan MD at [facility] [place]    TONSILLECTOMY      VENTRAL HERNIA REPAIR N/A 2022    REPAIR OF VENTRAL HERNIA WITH UMBILECTOMY performed by Storm Lang MD at [facility] OR     Social History     Socioeconomic History    Marital status: Single

## 2024-03-13 DIAGNOSIS — E78.5 HYPERLIPIDEMIA, UNSPECIFIED HYPERLIPIDEMIA TYPE: ICD-10-CM

## 2024-03-13 SDOH — HEALTH STABILITY: PHYSICAL HEALTH: ON AVERAGE, HOW MANY MINUTES DO YOU ENGAGE IN EXERCISE AT THIS LEVEL?: 20 MIN

## 2024-03-13 SDOH — HEALTH STABILITY: PHYSICAL HEALTH: ON AVERAGE, HOW MANY DAYS PER WEEK DO YOU ENGAGE IN MODERATE TO STRENUOUS EXERCISE (LIKE A BRISK WALK)?: 2 DAYS

## 2024-03-13 ASSESSMENT — PATIENT HEALTH QUESTIONNAIRE - PHQ9
SUM OF ALL RESPONSES TO PHQ QUESTIONS 1-9: 16
7. TROUBLE CONCENTRATING ON THINGS, SUCH AS READING THE NEWSPAPER OR WATCHING TELEVISION: 2
SUM OF ALL RESPONSES TO PHQ QUESTIONS 1-9: 16
10. IF YOU CHECKED OFF ANY PROBLEMS, HOW DIFFICULT HAVE THESE PROBLEMS MADE IT FOR YOU TO DO YOUR WORK, TAKE CARE OF THINGS AT HOME, OR GET ALONG WITH OTHER PEOPLE: 2
6. FEELING BAD ABOUT YOURSELF - OR THAT YOU ARE A FAILURE OR HAVE LET YOURSELF OR YOUR FAMILY DOWN: 1
5. POOR APPETITE OR OVEREATING: 1
4. FEELING TIRED OR HAVING LITTLE ENERGY: 3
8. MOVING OR SPEAKING SO SLOWLY THAT OTHER PEOPLE COULD HAVE NOTICED. OR THE OPPOSITE, BEING SO FIGETY OR RESTLESS THAT YOU HAVE BEEN MOVING AROUND A LOT MORE THAN USUAL: 0
SUM OF ALL RESPONSES TO PHQ QUESTIONS 1-9: 16
9. THOUGHTS THAT YOU WOULD BE BETTER OFF DEAD, OR OF HURTING YOURSELF: 0
1. LITTLE INTEREST OR PLEASURE IN DOING THINGS: 3
SUM OF ALL RESPONSES TO PHQ QUESTIONS 1-9: 16
3. TROUBLE FALLING OR STAYING ASLEEP: 3
2. FEELING DOWN, DEPRESSED OR HOPELESS: 3
SUM OF ALL RESPONSES TO PHQ9 QUESTIONS 1 & 2: 6

## 2024-03-13 ASSESSMENT — LIFESTYLE VARIABLES
HOW MANY STANDARD DRINKS CONTAINING ALCOHOL DO YOU HAVE ON A TYPICAL DAY: 0
HOW OFTEN DO YOU HAVE A DRINK CONTAINING ALCOHOL: 1
HOW OFTEN DO YOU HAVE SIX OR MORE DRINKS ON ONE OCCASION: 1
HOW OFTEN DO YOU HAVE A DRINK CONTAINING ALCOHOL: NEVER
HOW MANY STANDARD DRINKS CONTAINING ALCOHOL DO YOU HAVE ON A TYPICAL DAY: PATIENT DOES NOT DRINK

## 2024-03-13 NOTE — TELEPHONE ENCOUNTER
Comments: patient is being seen tomorrow 3/14/24    Last Office Visit (last PCP visit):   1/18/2024    Next Visit Date:  Future Appointments   Date Time Provider Department Center   3/14/2024 11:00 AM Glenna Gage PA [facility]   4/19/2024  9:30 AM [facility]   4/19/2024 10:00 AM Robke, Ilia M, MD [facility]       **If hasn't been seen in over a year OR hasn't followed up according to last diabetes/ADHD visit, make appointment for patient before sending refill to provider.    Rx requested:  Requested Prescriptions     Pending Prescriptions Disp Refills    atorvastatin (LIPITOR) 20 MG tablet [Pharmacy Med Name: atorvastatin 20 mg tablet] 90 tablet 1     Sig: TAKE 1 TABLET BY MOUTH AT BEDTIME DAILY

## 2024-03-14 ENCOUNTER — OFFICE VISIT (OUTPATIENT)
Dept: INTERNAL MEDICINE | Age: 64
End: 2024-03-14

## 2024-03-14 VITALS
WEIGHT: 120 LBS | SYSTOLIC BLOOD PRESSURE: 114 MMHG | HEART RATE: 108 BPM | HEIGHT: 60 IN | BODY MASS INDEX: 23.56 KG/M2 | OXYGEN SATURATION: 95 % | TEMPERATURE: 97.4 F | DIASTOLIC BLOOD PRESSURE: 70 MMHG

## 2024-03-14 DIAGNOSIS — Z00.00 MEDICARE ANNUAL WELLNESS VISIT, SUBSEQUENT: Primary | ICD-10-CM

## 2024-03-14 DIAGNOSIS — F32.A DEPRESSION, UNSPECIFIED DEPRESSION TYPE: ICD-10-CM

## 2024-03-14 DIAGNOSIS — F32.0 MILD SINGLE CURRENT EPISODE OF MAJOR DEPRESSIVE DISORDER (HCC): ICD-10-CM

## 2024-03-14 DIAGNOSIS — Z87.891 PERSONAL HISTORY OF TOBACCO USE, PRESENTING HAZARDS TO HEALTH: ICD-10-CM

## 2024-03-14 DIAGNOSIS — F41.1 GAD (GENERALIZED ANXIETY DISORDER): ICD-10-CM

## 2024-03-14 DIAGNOSIS — J44.9 CHRONIC OBSTRUCTIVE PULMONARY DISEASE, UNSPECIFIED COPD TYPE (HCC): ICD-10-CM

## 2024-03-14 DIAGNOSIS — K21.9 GASTROESOPHAGEAL REFLUX DISEASE WITHOUT ESOPHAGITIS: ICD-10-CM

## 2024-03-14 DIAGNOSIS — E78.5 HYPERLIPIDEMIA, UNSPECIFIED HYPERLIPIDEMIA TYPE: ICD-10-CM

## 2024-03-14 DIAGNOSIS — G47.00 INSOMNIA, UNSPECIFIED TYPE: ICD-10-CM

## 2024-03-14 RX ORDER — OMEPRAZOLE 20 MG/1
20 CAPSULE, DELAYED RELEASE ORAL DAILY
Qty: 90 CAPSULE | Refills: 1 | Status: SHIPPED | OUTPATIENT
Start: 2024-03-14

## 2024-03-14 RX ORDER — TRAZODONE HYDROCHLORIDE 100 MG/1
100 TABLET ORAL NIGHTLY
Qty: 30 TABLET | Refills: 5 | Status: SHIPPED | OUTPATIENT
Start: 2024-03-14

## 2024-03-14 RX ORDER — FLUOXETINE HYDROCHLORIDE 20 MG/1
20 CAPSULE ORAL DAILY
Qty: 30 CAPSULE | Refills: 0 | Status: SHIPPED | OUTPATIENT
Start: 2024-03-14

## 2024-03-14 RX ORDER — ATORVASTATIN CALCIUM 20 MG/1
TABLET, FILM COATED ORAL
Qty: 90 TABLET | Refills: 1 | Status: SHIPPED | OUTPATIENT
Start: 2024-03-14

## 2024-03-14 RX ORDER — ALBUTEROL SULFATE 90 UG/1
AEROSOL, METERED RESPIRATORY (INHALATION)
Qty: 25.5 G | Refills: 0 | Status: SHIPPED | OUTPATIENT
Start: 2024-03-14

## 2024-03-14 SDOH — ECONOMIC STABILITY: FOOD INSECURITY: WITHIN THE PAST 12 MONTHS, THE FOOD YOU BOUGHT JUST DIDN'T LAST AND YOU DIDN'T HAVE MONEY TO GET MORE.: NEVER TRUE

## 2024-03-14 SDOH — ECONOMIC STABILITY: FOOD INSECURITY: WITHIN THE PAST 12 MONTHS, YOU WORRIED THAT YOUR FOOD WOULD RUN OUT BEFORE YOU GOT MONEY TO BUY MORE.: NEVER TRUE

## 2024-03-14 SDOH — ECONOMIC STABILITY: INCOME INSECURITY: HOW HARD IS IT FOR YOU TO PAY FOR THE VERY BASICS LIKE FOOD, HOUSING, MEDICAL CARE, AND HEATING?: NOT HARD AT ALL

## 2024-03-14 NOTE — PATIENT INSTRUCTIONS
D per day. It is possible to meet your calcium requirement with diet alone, but a vitamin D supplement is usually necessary to meet this goal.  When exposed to the sun, use a sunscreen that protects against both UVA and UVB radiation with an SPF of 30 or greater. Reapply every 2 to 3 hours or after sweating, drying off with a towel, or swimming.  Always wear a seat belt when traveling in a car. Always wear a helmet when riding a bicycle or motorcycle.

## 2024-03-14 NOTE — PROGRESS NOTES
4 days followed by 1mg TWICE DAILY  Patient not taking: Reported on 3/14/2024  Glenna Gage PA   varenicline (CHANTIX) 1 MG tablet Take 1 tablet by mouth 2 times daily  Patient not taking: Reported on 3/14/2024  Glenna Gage PA       CareTeam (Including outside providers/suppliers regularly involved in providing care):   Patient Care Team:  Glenna Gage PA as PCP - General (Physician Assistant)  Glenna Gage PA as PCP - Empaneled Provider  Gudelia De Jesus, RN as Nurse Navigator     Reviewed and updated this visit:  Tobacco  Allergies  Meds  Problems  Med Hx  Surg Hx  Soc Hx  Fam Hx

## 2024-03-22 DIAGNOSIS — E78.5 HYPERLIPIDEMIA, UNSPECIFIED HYPERLIPIDEMIA TYPE: ICD-10-CM

## 2024-03-22 DIAGNOSIS — Z00.00 MEDICARE ANNUAL WELLNESS VISIT, SUBSEQUENT: ICD-10-CM

## 2024-03-22 LAB
ALBUMIN SERPL-MCNC: 4.3 G/DL (ref 3.5–4.6)
ALP SERPL-CCNC: 130 U/L (ref 40–130)
ALT SERPL-CCNC: 21 U/L (ref 0–33)
ANION GAP SERPL CALCULATED.3IONS-SCNC: 13 MEQ/L (ref 9–15)
AST SERPL-CCNC: 21 U/L (ref 0–35)
BASOPHILS # BLD: 0 K/UL (ref 0–0.2)
BASOPHILS NFR BLD: 0.3 %
BILIRUB SERPL-MCNC: 0.4 MG/DL (ref 0.2–0.7)
BUN SERPL-MCNC: 8 MG/DL (ref 8–23)
CALCIUM SERPL-MCNC: 9.4 MG/DL (ref 8.5–9.9)
CHLORIDE SERPL-SCNC: 103 MEQ/L (ref 95–107)
CHOLEST SERPL-MCNC: 160 MG/DL (ref 0–199)
CO2 SERPL-SCNC: 27 MEQ/L (ref 20–31)
CREAT SERPL-MCNC: 0.85 MG/DL (ref 0.5–0.9)
EOSINOPHIL # BLD: 0.4 K/UL (ref 0–0.7)
EOSINOPHIL NFR BLD: 5.9 %
ERYTHROCYTE [DISTWIDTH] IN BLOOD BY AUTOMATED COUNT: 14.7 % (ref 11.5–14.5)
GLOBULIN SER CALC-MCNC: 2.3 G/DL (ref 2.3–3.5)
GLUCOSE SERPL-MCNC: 103 MG/DL (ref 70–99)
HCT VFR BLD AUTO: 38.8 % (ref 37–47)
HDLC SERPL-MCNC: 55 MG/DL (ref 40–59)
HGB BLD-MCNC: 12.3 G/DL (ref 12–16)
LDLC SERPL CALC-MCNC: 81 MG/DL (ref 0–129)
LYMPHOCYTES # BLD: 2.7 K/UL (ref 1–4.8)
LYMPHOCYTES NFR BLD: 38.1 %
MCH RBC QN AUTO: 29.1 PG (ref 27–31.3)
MCHC RBC AUTO-ENTMCNC: 31.7 % (ref 33–37)
MCV RBC AUTO: 91.9 FL (ref 79.4–94.8)
MONOCYTES # BLD: 0.5 K/UL (ref 0.2–0.8)
MONOCYTES NFR BLD: 7.1 %
NEUTROPHILS # BLD: 3.4 K/UL (ref 1.4–6.5)
NEUTS SEG NFR BLD: 48.5 %
PLATELET # BLD AUTO: 283 K/UL (ref 130–400)
POTASSIUM SERPL-SCNC: 4 MEQ/L (ref 3.4–4.9)
PROT SERPL-MCNC: 6.6 G/DL (ref 6.3–8)
RBC # BLD AUTO: 4.22 M/UL (ref 4.2–5.4)
SODIUM SERPL-SCNC: 143 MEQ/L (ref 135–144)
TRIGL SERPL-MCNC: 118 MG/DL (ref 0–150)
WBC # BLD AUTO: 7.1 K/UL (ref 4.8–10.8)

## 2024-04-04 ENCOUNTER — OFFICE VISIT (OUTPATIENT)
Dept: INTERNAL MEDICINE | Age: 64
End: 2024-04-04
Payer: MEDICARE

## 2024-04-04 VITALS
BODY MASS INDEX: 23.75 KG/M2 | WEIGHT: 121 LBS | TEMPERATURE: 97.1 F | DIASTOLIC BLOOD PRESSURE: 78 MMHG | HEART RATE: 99 BPM | HEIGHT: 60 IN | SYSTOLIC BLOOD PRESSURE: 124 MMHG | OXYGEN SATURATION: 95 %

## 2024-04-04 DIAGNOSIS — M54.2 CHRONIC NECK PAIN: ICD-10-CM

## 2024-04-04 DIAGNOSIS — M54.6 ACUTE BILATERAL THORACIC BACK PAIN: ICD-10-CM

## 2024-04-04 DIAGNOSIS — F32.A DEPRESSION, UNSPECIFIED DEPRESSION TYPE: Primary | ICD-10-CM

## 2024-04-04 DIAGNOSIS — G89.29 CHRONIC NECK PAIN: ICD-10-CM

## 2024-04-04 DIAGNOSIS — Z87.442 H/O RENAL CALCULI: ICD-10-CM

## 2024-04-04 LAB
BILIRUBIN, POC: ABNORMAL
BLOOD URINE, POC: ABNORMAL
CLARITY, POC: CLEAR
COLOR, POC: ABNORMAL
GLUCOSE URINE, POC: ABNORMAL
KETONES, POC: ABNORMAL
LEUKOCYTE EST, POC: ABNORMAL
NITRITE, POC: ABNORMAL
PH, POC: 6
PROTEIN, POC: ABNORMAL
SPECIFIC GRAVITY, POC: 1.03
UROBILINOGEN, POC: ABNORMAL

## 2024-04-04 PROCEDURE — 99214 OFFICE O/P EST MOD 30 MIN: CPT | Performed by: PHYSICIAN ASSISTANT

## 2024-04-04 PROCEDURE — G8420 CALC BMI NORM PARAMETERS: HCPCS | Performed by: PHYSICIAN ASSISTANT

## 2024-04-04 PROCEDURE — 4004F PT TOBACCO SCREEN RCVD TLK: CPT | Performed by: PHYSICIAN ASSISTANT

## 2024-04-04 PROCEDURE — G8427 DOCREV CUR MEDS BY ELIG CLIN: HCPCS | Performed by: PHYSICIAN ASSISTANT

## 2024-04-04 PROCEDURE — 3017F COLORECTAL CA SCREEN DOC REV: CPT | Performed by: PHYSICIAN ASSISTANT

## 2024-04-04 PROCEDURE — 81003 URINALYSIS AUTO W/O SCOPE: CPT | Performed by: PHYSICIAN ASSISTANT

## 2024-04-04 RX ORDER — FLUOXETINE HYDROCHLORIDE 40 MG/1
40 CAPSULE ORAL DAILY
Qty: 90 CAPSULE | Refills: 0 | Status: SHIPPED | OUTPATIENT
Start: 2024-04-04

## 2024-04-04 RX ORDER — TAMSULOSIN HYDROCHLORIDE 0.4 MG/1
0.4 CAPSULE ORAL DAILY
Qty: 30 CAPSULE | Refills: 0 | Status: SHIPPED | OUTPATIENT
Start: 2024-04-04

## 2024-04-04 ASSESSMENT — ENCOUNTER SYMPTOMS: BACK PAIN: 1

## 2024-04-04 NOTE — PROGRESS NOTES
Nicole Wade (: 1960) is a 63 y.o. female, Established patient, here for evaluation of the following chief complaint(s):  Depression (Medication check.  Pt doesn't feel like the medication is working well for her.  She woke up yesterday feeling like she was a not good. ) and Back Pain (In the last week she was experiencing lower middle back pain.  And lower abdominal pain. )        ASSESSMENT/PLAN:  1. Depression, unspecified depression type  - will go ahead and increase the Prozac dose  -She will call immediately if she starts to feel worse  -She will also follow-up if this fails to improve her depression  - FLUoxetine (PROZAC) 40 MG capsule; Take 1 capsule by mouth daily  Dispense: 90 capsule; Refill: 0    2. Acute bilateral thoracic back pain  3. H/O renal calculi  - POCT Urinalysis No Micro (Auto)-showed small leukocytes and 3+ RBCs  -Due to the history of kidney stones we will start her on Flomax  -Also sending a culture out  - Culture, Urine; Future  - tamsulosin (FLOMAX) 0.4 MG capsule; Take 1 capsule by mouth daily  Dispense: 30 capsule; Refill: 0    4. Chronic neck pain  - CTA NECK W WO CONTRAST; Future        No follow-ups on file.    SUBJECTIVE/OBJECTIVE:  HPI      Depression follow-up  She was started on Prozac 20 mg 3 weeks ago  She had just weaned off Paxil due to that not working  She is not liking the Prozac and the way it makes her feel  Denies chest pain, SI and HI      Mid to low back pain  Started last week  Some lower abdominal pressure  No dysuria or frequency  Denies fever  She had kidneys stone seen on CT abd recently        Chronic neck  pian  States she has had narrowing in the spine in the past  This is affecting  the left arm, causing some aching     Review of Systems   Genitourinary:  Negative for dysuria, frequency and urgency.   Musculoskeletal:  Positive for back pain. Negative for neck pain and neck stiffness.   Psychiatric/Behavioral:  Positive for depression and

## 2024-04-06 LAB — BACTERIA UR CULT: NORMAL

## 2024-04-16 ENCOUNTER — HOSPITAL ENCOUNTER (OUTPATIENT)
Dept: CT IMAGING | Age: 64
Discharge: HOME | End: 2024-04-18
Payer: MEDICARE

## 2024-04-16 DIAGNOSIS — M54.2 CHRONIC NECK PAIN: ICD-10-CM

## 2024-04-16 DIAGNOSIS — G89.29 CHRONIC NECK PAIN: ICD-10-CM

## 2024-04-16 PROCEDURE — 72125 CT NECK SPINE W/O DYE: CPT

## 2024-04-18 NOTE — TELEPHONE ENCOUNTER
Comments:     Last Office Visit (last PCP visit):   1/25/2022    Next Visit Date:  Future Appointments   Date Time Provider [facility]   9/30/2022 10:30 AM [facility]   11/14/2022  9:30 AM [facility] CT ROOM 1 [facility] RAD   11/14/2022 10:00 AM Kristian Roach MD [facility] [place]       **If hasn't been seen in over a year OR hasn't followed up according to last diabetes/ADHD visit, make appointment for patient before sending refill to provider.     Rx requested:  Requested Prescriptions     Pending Prescriptions Disp Refills    fluticasone (FLONASE) 50 MCG/ACT nasal spray [Pharmacy Med Name: FLUTICASONE PROP 50 MCG SPRAY] 16 g 3     Sig: instill 1 spray into each nostril daily No fracture seen on x-ray imaging.  You can alternate Motrin and Tylenol as needed for foot pain or perform warm bath soaks or apply ice to the foot.

## 2024-04-19 ENCOUNTER — HOSPITAL ENCOUNTER (OUTPATIENT)
Dept: CT IMAGING | Age: 64
End: 2024-04-19
Payer: MEDICARE

## 2024-04-19 DIAGNOSIS — C7A.090 ATYPICAL CARCINOID LUNG TUMOR (HCC): ICD-10-CM

## 2024-04-19 PROCEDURE — 71250 CT THORAX DX C-: CPT

## 2024-04-24 ENCOUNTER — TELEPHONE (OUTPATIENT)
Dept: CARDIOTHORACIC SURGERY | Age: 64
End: 2024-04-24

## 2024-04-24 DIAGNOSIS — Z87.442 H/O RENAL CALCULI: ICD-10-CM

## 2024-04-24 NOTE — TELEPHONE ENCOUNTER
Comments:     Last Office Visit (last PCP visit):   4/4/2024    Next Visit Date:  Future Appointments   Date Time Provider Department Center   9/16/2024 11:00 AM Glenna Gage PA [facility]       **If hasn't been seen in over a year OR hasn't followed up according to last diabetes/ADHD visit, make appointment for patient before sending refill to provider.    Rx requested:  Requested Prescriptions     Pending Prescriptions Disp Refills    tamsulosin (FLOMAX) 0.4 MG capsule [Pharmacy Med Name: tamsulosin 0.4 mg capsule] 30 capsule 0     Sig: Take 1 capsule by mouth daily

## 2024-04-24 NOTE — TELEPHONE ENCOUNTER
Patient had her 5-year follow-up CAT scan for resection of an atypical carcinoid.  Due to a scheduling mishap I was unable to see her in clinic and she requested follow-up by phone.  She reports that she is doing fine.  She reports no changes to her health or any trouble breathing.  CAT scan was reviewed and shows stable postoperative changes with no sign of recurrent disease.  Since this is just over 5 years of follow-up I recommended that she just do her regular follow-up with her doctor and she does not need to see me again for a scheduled visit.

## 2024-04-30 RX ORDER — TAMSULOSIN HYDROCHLORIDE 0.4 MG/1
0.4 CAPSULE ORAL DAILY
Qty: 30 CAPSULE | Refills: 0 | Status: SHIPPED | OUTPATIENT
Start: 2024-04-30

## 2024-05-02 ENCOUNTER — PATIENT MESSAGE (OUTPATIENT)
Dept: INTERNAL MEDICINE | Age: 64
End: 2024-05-02

## 2024-05-02 NOTE — TELEPHONE ENCOUNTER
From: Nicole Wade  To: Glenna Gage  Sent: 5/2/2024 10:00 AM EDT  Subject: Medication     We need to explore some different ways to deal with depression. I have been feeling like I am hitting rock bottom.   Also want to look into airsupra inhaler.   Thank you.

## 2024-05-03 DIAGNOSIS — J44.9 CHRONIC OBSTRUCTIVE PULMONARY DISEASE, UNSPECIFIED COPD TYPE (HCC): ICD-10-CM

## 2024-05-06 RX ORDER — ALBUTEROL SULFATE 90 UG/1
AEROSOL, METERED RESPIRATORY (INHALATION)
Qty: 25.5 G | Refills: 0 | OUTPATIENT
Start: 2024-05-06

## 2024-05-06 RX ORDER — ALBUTEROL SULFATE 90 UG/1
AEROSOL, METERED RESPIRATORY (INHALATION)
Qty: 25.5 G | Refills: 0 | Status: SHIPPED | OUTPATIENT
Start: 2024-05-06

## 2024-05-06 NOTE — TELEPHONE ENCOUNTER
Comments:     Last Office Visit (last PCP visit):   4/4/2024    Next Visit Date:  Future Appointments   Date Time Provider Department Center   9/16/2024 11:00 AM Glenna Gage PA [facility]       **If hasn't been seen in over a year OR hasn't followed up according to last diabetes/ADHD visit, make appointment for patient before sending refill to provider.    Rx requested:  Requested Prescriptions     Pending Prescriptions Disp Refills    albuterol sulfate HFA (PROVENTIL;VENTOLIN;PROAIR) 108 (90 Base) MCG/ACT inhaler [Pharmacy Med Name: albuterol sulfate HFA 90 mcg/actuation aerosol inhaler] 25.5 g 0     Sig: inhale 2 (TWO) puffs into the lungs EVERY 4 HOURS if needed

## 2024-05-08 ENCOUNTER — PATIENT MESSAGE (OUTPATIENT)
Dept: INTERNAL MEDICINE | Age: 64
End: 2024-05-08

## 2024-05-09 NOTE — TELEPHONE ENCOUNTER
Pt is unsure who she should be seeing for Pain management.  I didn't see a referral in the computer.

## 2024-05-09 NOTE — TELEPHONE ENCOUNTER
From: Nicole Wade  To: Glenna Gage  Sent: 5/8/2024 9:43 AM EDT  Subject: Pain management     Who am I supposed to be seeing for pain management.

## 2024-05-14 DIAGNOSIS — M51.37 DDD (DEGENERATIVE DISC DISEASE), LUMBOSACRAL: Primary | ICD-10-CM

## 2024-05-20 DIAGNOSIS — F32.A DEPRESSION, UNSPECIFIED DEPRESSION TYPE: ICD-10-CM

## 2024-05-20 DIAGNOSIS — J44.9 CHRONIC OBSTRUCTIVE PULMONARY DISEASE, UNSPECIFIED COPD TYPE (HCC): ICD-10-CM

## 2024-05-20 DIAGNOSIS — Z87.442 H/O RENAL CALCULI: ICD-10-CM

## 2024-05-21 RX ORDER — FLUOXETINE HYDROCHLORIDE 40 MG/1
40 CAPSULE ORAL DAILY
Qty: 90 CAPSULE | Refills: 0 | Status: SHIPPED | OUTPATIENT
Start: 2024-05-21

## 2024-05-21 RX ORDER — TAMSULOSIN HYDROCHLORIDE 0.4 MG/1
0.4 CAPSULE ORAL DAILY
Qty: 30 CAPSULE | Refills: 0 | Status: SHIPPED | OUTPATIENT
Start: 2024-05-21

## 2024-05-21 RX ORDER — FLUTICASONE FUROATE, UMECLIDINIUM BROMIDE AND VILANTEROL TRIFENATATE 200; 62.5; 25 UG/1; UG/1; UG/1
POWDER RESPIRATORY (INHALATION)
Qty: 1 EACH | Refills: 3 | Status: SHIPPED | OUTPATIENT
Start: 2024-05-21

## 2024-05-21 NOTE — TELEPHONE ENCOUNTER
Comments:     Last Office Visit (last PCP visit):   4/4/2024    Next Visit Date:  Future Appointments   Date Time Provider Department Center   5/22/2024 11:30 AM Nicholas Corona DO [facility]   9/16/2024 11:00 AM Glenna Gage PA [facility]       **If hasn't been seen in over a year OR hasn't followed up according to last diabetes/ADHD visit, make appointment for patient before sending refill to provider.    Rx requested:  Requested Prescriptions     Pending Prescriptions Disp Refills    TRELEGY ELLIPTA 200-62.5-25 MCG/ACT AEPB inhaler [Pharmacy Med Name: Trelegy Ellipta 200 mcg-62.5 mcg-25 mcg powder for inhalation]  3     Sig: inhale 1 puff into the lungs once DAILY    tamsulosin (FLOMAX) 0.4 MG capsule [Pharmacy Med Name: tamsulosin 0.4 mg capsule] 30 capsule 0     Sig: Take 1 capsule by mouth daily    FLUoxetine (PROZAC) 40 MG capsule [Pharmacy Med Name: fluoxetine 40 mg capsule] 90 capsule 0     Sig: Take 1 capsule by mouth daily

## 2024-05-22 ENCOUNTER — INITIAL CONSULT (OUTPATIENT)
Age: 64
End: 2024-05-22
Payer: MEDICARE

## 2024-05-22 VITALS
SYSTOLIC BLOOD PRESSURE: 112 MMHG | HEIGHT: 60 IN | WEIGHT: 120.2 LBS | DIASTOLIC BLOOD PRESSURE: 60 MMHG | TEMPERATURE: 96.8 F | BODY MASS INDEX: 23.6 KG/M2

## 2024-05-22 DIAGNOSIS — M48.062 SPINAL STENOSIS OF LUMBAR REGION WITH NEUROGENIC CLAUDICATION: ICD-10-CM

## 2024-05-22 DIAGNOSIS — M54.12 CERVICAL RADICULOPATHY: Primary | ICD-10-CM

## 2024-05-22 PROCEDURE — 99204 OFFICE O/P NEW MOD 45 MIN: CPT | Performed by: STUDENT IN AN ORGANIZED HEALTH CARE EDUCATION/TRAINING PROGRAM

## 2024-05-22 PROCEDURE — 4004F PT TOBACCO SCREEN RCVD TLK: CPT | Performed by: STUDENT IN AN ORGANIZED HEALTH CARE EDUCATION/TRAINING PROGRAM

## 2024-05-22 PROCEDURE — G8420 CALC BMI NORM PARAMETERS: HCPCS | Performed by: STUDENT IN AN ORGANIZED HEALTH CARE EDUCATION/TRAINING PROGRAM

## 2024-05-22 PROCEDURE — 3017F COLORECTAL CA SCREEN DOC REV: CPT | Performed by: STUDENT IN AN ORGANIZED HEALTH CARE EDUCATION/TRAINING PROGRAM

## 2024-05-22 PROCEDURE — G8427 DOCREV CUR MEDS BY ELIG CLIN: HCPCS | Performed by: STUDENT IN AN ORGANIZED HEALTH CARE EDUCATION/TRAINING PROGRAM

## 2024-05-22 RX ORDER — MELOXICAM 7.5 MG/1
7.5 TABLET ORAL DAILY
Qty: 60 TABLET | Refills: 2 | Status: SHIPPED | OUTPATIENT
Start: 2024-05-22

## 2024-05-22 RX ORDER — BACLOFEN 5 MG/1
5 TABLET ORAL 3 TIMES DAILY PRN
Qty: 90 TABLET | Refills: 1 | Status: SHIPPED | OUTPATIENT
Start: 2024-05-22

## 2024-05-22 ASSESSMENT — ENCOUNTER SYMPTOMS: BACK PAIN: 1

## 2024-05-22 NOTE — PATIENT INSTRUCTIONS
Patient Education        Neck Pain: Care Instructions  Your Care Instructions     You can have neck pain anywhere from the bottom of your head to the top of your shoulders. It can spread to the upper back or arms. Injuries, painting a ceiling, sleeping with your neck twisted, staying in one position for too long, and many other activities can cause neck pain.  Most neck pain gets better with home care. Your doctor may recommend medicine to relieve pain or relax your muscles. He or she may suggest exercise and physical therapy to increase flexibility and relieve stress. You may need to wear a special (cervical) collar to support your neck for a day or two.  Follow-up care is a key part of your treatment and safety. Be sure to make and go to all appointments, and call your doctor if you are having problems. It's also a good idea to know your test results and keep a list of the medicines you take.  How can you care for yourself at home?  Try using a heating pad on a low or medium setting for 15 to 20 minutes every 2 or 3 hours. Try a warm shower in place of one session with the heating pad.  You can also try an ice pack for 10 to 15 minutes every 2 to 3 hours. Put a thin cloth between the ice and your skin.  Take pain medicines exactly as directed.  If the doctor gave you a prescription medicine for pain, take it as prescribed.  If you are not taking a prescription pain medicine, ask your doctor if you can take an over-the-counter medicine.  If your doctor recommends a cervical collar, wear it exactly as directed.  When should you call for help?   Call your doctor now or seek immediate medical care if:    You have new or worsening numbness in your arms, buttocks or legs.     You have new or worsening weakness in your arms or legs. (This could make it hard to stand up.)     You lose control of your bladder or bowels.   Watch closely for changes in your health, and be sure to contact your doctor if:    Your neck pain is

## 2024-05-22 NOTE — PROGRESS NOTES
HPI  Onset: For many years  Location: Back and neck pain   Quality: aching, burning/hot, shooting, stabbing, and throbbing  Pain states that her pain is at a 8 at rest and a 10 with activity on the pain scale.   The pain is present: Constantly  and consistent  The pain is exacerbated by: Walking, Bending, Lifting, Exercise, Sitting, and Standing and alleviated by: Heat and Lying down.  The patient states the pain interferes with her  ADL's : Yes Transferring , Ambulating , and Sleeping  Recent falls: no  Bladder bowel dysfunction:yes - Bowel Resection in Jan.2024.  She is taking the following medications for pain:   None  Prior treatments tried for chief complaint listed above: nothing recent  Surgery: no  Physical Therapy: yes: Many years ago.  Chiropractor: yes: Many years ago.   Acupuncture: no  Massage Therapy: yes: Many years ago.    Behavior/Wellness/Psychological support: yes  Expectations of Treatment at the Pain Center: The patient presents today for evaluation with the expectation that they will be able to perform their ADL's without excruciating pain.   Patient denies the following symptoms: Ataxia, saddle anesthesia, nausea, fever, vomiting, or recent antibiotics.

## 2024-05-22 NOTE — ASSESSMENT & PLAN NOTE
Chronic illness with a severe exacerbation and/or progression which affects ADL's. Pain has been present for multiple years and is expected to last at least a year. Patient is unable to sleep, transfer, nor ambulate. Goals of care include pain relief >50% and ability to perform ADLs with less pain.    63-year-old female history of chronic neck pain with radiation to both upper extremities (left worse than right) for multiple years.  Pain is extremely debilitating affects ADLs.  Pain score 6 or greater with activity.  Pain limits mobility and functionality.  Patient also complains of severe numbness and tingling left arm which causes her to drop items.    Of note, patient states she is trialed multiple medications and injections in the past.  She is currently not on any pain medication therapy.  She has trialed gabapentin and Lyrica with negative effect    On physical examination, positive Spurling's bilaterally.  More significant on the left side.    CT scan of cervical spine reviewed.  Multilevel degenerative changes noted.    Given her history, physical exam findings, imaging, lack response to current measures, I believe she will benefit from following:    - Cervical epidural steroid injection ordered  - Begin baclofen 5 mg 3 times daily as needed  - Begin meloxicam 7.5 mg twice daily as needed

## 2024-05-22 NOTE — PROGRESS NOTES
[facility] PHYSICIANS [place] [facility], [facility] PAIN MANAGEMENT  [address] [facility] [address]  Dept: [phone]  Dept Fax: [phone]  Loc: [phone]     5/22/2024    Visit type: New patient    Reason for Visit: Back Pain and Neck Pain       ASSESSMENT/PLAN   1. Cervical radiculopathy  Assessment & Plan:     Chronic illness with a severe exacerbation and/or progression which affects ADL's. Pain has been present for multiple years and is expected to last at least a year. Patient is unable to sleep, transfer, nor ambulate. Goals of care include pain relief >50% and ability to perform ADLs with less pain.    63-year-old female history of chronic neck pain with radiation to both upper extremities (left worse than right) for multiple years.  Pain is extremely debilitating affects ADLs.  Pain score 6 or greater with activity.  Pain limits mobility and functionality.  Patient also complains of severe numbness and tingling left arm which causes her to drop items.    Of note, patient states she is trialed multiple medications and injections in the past.  She is currently not on any pain medication therapy.  She has trialed gabapentin and Lyrica with negative effect    On physical examination, positive Spurling's bilaterally.  More significant on the left side.    CT scan of cervical spine reviewed.  Multilevel degenerative changes noted.    Given her history, physical exam findings, imaging, lack response to current measures, I believe she will benefit from following:    - Cervical epidural steroid injection ordered  - Begin baclofen 5 mg 3 times daily as needed  - Begin meloxicam 7.5 mg twice daily as needed    Orders:  -     meloxicam (MOBIC) 7.5 MG tablet; Take 1 tablet by mouth daily, Disp-60 tablet, R-2Normal  -     Baclofen (LIORESAL) 5 MG tablet; Take 1 tablet by mouth 3 times daily as needed (spasms), Disp-90 tablet, R-1Normal  -     TX NJX DX/THER SBST INTRLMNR CRV/THRC

## 2024-05-22 NOTE — ASSESSMENT & PLAN NOTE
Chronic illness with a severe exacerbation and/or progression which affects ADL's. Pain has been present for multiple years and is expected to last at least a year. Patient is unable to sleep, transfer, nor ambulate. Goals of care include pain relief >50% and ability to perform ADLs with less pain.    63-year-old female history of chronic low back pain with radiation into both lower extremities for multiple years.  Pain is extremely debilitating affects ADLs.  Pain score 6 or greater with activity.  Pain limits mobility and functionality.  Patient also complains of severe numbness and tingling left arm which causes her to drop items.    Of note, patient states she is trialed multiple medications and injections in the past.  She is currently not on any pain medication therapy.  She has trialed gabapentin and Lyrica with negative effect.    No recent imaging on file.    -Will order lumbar MRI for spine evaluation  -Begin Baclofen 5mg TID prn  -Begin Meloxicam 7.5mg BID prn

## 2024-05-30 ENCOUNTER — HOSPITAL ENCOUNTER (OUTPATIENT)
Dept: MRI IMAGING | Age: 64
Discharge: HOME | End: 2024-06-01
Attending: STUDENT IN AN ORGANIZED HEALTH CARE EDUCATION/TRAINING PROGRAM
Payer: MEDICARE

## 2024-05-30 DIAGNOSIS — M48.062 SPINAL STENOSIS OF LUMBAR REGION WITH NEUROGENIC CLAUDICATION: ICD-10-CM

## 2024-05-30 PROCEDURE — 72148 MRI LUMBAR SPINE W/O DYE: CPT

## 2024-06-11 ENCOUNTER — PREP FOR PROCEDURE (OUTPATIENT)
Dept: NEUROSURGERY | Age: 64
End: 2024-06-11

## 2024-06-16 DIAGNOSIS — J44.9 CHRONIC OBSTRUCTIVE PULMONARY DISEASE, UNSPECIFIED COPD TYPE (HCC): ICD-10-CM

## 2024-06-17 DIAGNOSIS — Z87.442 H/O RENAL CALCULI: ICD-10-CM

## 2024-06-17 NOTE — TELEPHONE ENCOUNTER
Comments:     Last Office Visit (last PCP visit):   4/4/2024    Next Visit Date:  Future Appointments   Date Time Provider Department Center   8/13/2024  1:30 PM Nicholas Corona DO [facility]   9/16/2024 11:00 AM Glenna Gage PA [facility]       **If hasn't been seen in over a year OR hasn't followed up according to last diabetes/ADHD visit, make appointment for patient before sending refill to provider.    Rx requested:  Requested Prescriptions     Pending Prescriptions Disp Refills    tamsulosin (FLOMAX) 0.4 MG capsule [Pharmacy Med Name: tamsulosin 0.4 mg capsule] 30 capsule 0     Sig: Take 1 capsule by mouth daily

## 2024-06-17 NOTE — TELEPHONE ENCOUNTER
Comments:     Last Office Visit (last PCP visit):   4/4/2024    Next Visit Date:  Future Appointments   Date Time Provider Department Center   8/13/2024  1:30 PM Nicholas Corona DO [facility]   9/16/2024 11:00 AM Glenna Gage [facility]       **If hasn't been seen in over a year OR hasn't followed up according to last diabetes/ADHD visit, make appointment for patient before sending refill to provider.    Rx requested:  Requested Prescriptions     Pending Prescriptions Disp Refills    albuterol sulfate HFA (PROVENTIL;VENTOLIN;PROAIR) 108 (90 Base) MCG/ACT inhaler [Pharmacy Med Name: albuterol sulfate HFA 90 mcg/actuation aerosol inhaler] 25.5 g 0     Sig: inhale 2 (TWO) puffs into the lungs EVERY 4 HOURS if needed

## 2024-06-18 RX ORDER — TAMSULOSIN HYDROCHLORIDE 0.4 MG/1
0.4 CAPSULE ORAL DAILY
Qty: 90 CAPSULE | Refills: 0 | Status: SHIPPED | OUTPATIENT
Start: 2024-06-18

## 2024-06-18 RX ORDER — ALBUTEROL SULFATE 90 UG/1
AEROSOL, METERED RESPIRATORY (INHALATION)
Qty: 25.5 G | Refills: 0 | Status: SHIPPED | OUTPATIENT
Start: 2024-06-18

## 2024-06-20 DIAGNOSIS — M48.062 SPINAL STENOSIS OF LUMBAR REGION WITH NEUROGENIC CLAUDICATION: ICD-10-CM

## 2024-06-20 DIAGNOSIS — M54.12 CERVICAL RADICULOPATHY: ICD-10-CM

## 2024-06-20 RX ORDER — BACLOFEN 5 MG/1
5 TABLET ORAL 3 TIMES DAILY PRN
Qty: 90 TABLET | Refills: 1 | OUTPATIENT
Start: 2024-06-20

## 2024-06-26 DIAGNOSIS — M48.062 SPINAL STENOSIS OF LUMBAR REGION WITH NEUROGENIC CLAUDICATION: ICD-10-CM

## 2024-06-26 DIAGNOSIS — M54.12 CERVICAL RADICULOPATHY: ICD-10-CM

## 2024-06-26 RX ORDER — BACLOFEN 5 MG/1
5 TABLET ORAL 3 TIMES DAILY PRN
Qty: 90 TABLET | Refills: 1 | OUTPATIENT
Start: 2024-06-26

## 2024-06-28 DIAGNOSIS — M54.12 CERVICAL RADICULOPATHY: ICD-10-CM

## 2024-06-28 DIAGNOSIS — M48.062 SPINAL STENOSIS OF LUMBAR REGION WITH NEUROGENIC CLAUDICATION: ICD-10-CM

## 2024-07-01 RX ORDER — BACLOFEN 5 MG/1
5 TABLET ORAL 3 TIMES DAILY PRN
Qty: 90 TABLET | Refills: 1 | OUTPATIENT
Start: 2024-07-01

## 2024-07-16 DIAGNOSIS — J44.9 CHRONIC OBSTRUCTIVE PULMONARY DISEASE, UNSPECIFIED COPD TYPE (HCC): ICD-10-CM

## 2024-07-16 NOTE — TELEPHONE ENCOUNTER
Comments:     Last Office Visit (last PCP visit):   4/4/2024    Next Visit Date:  Future Appointments   Date Time Provider Department Center   8/13/2024  1:30 PM Nicholas Corona DO [facility]   9/16/2024 11:00 AM Glenna Gage [facility]       **If hasn't been seen in over a year OR hasn't followed up according to last diabetes/ADHD visit, make appointment for patient before sending refill to provider.    Rx requested:  Requested Prescriptions     Pending Prescriptions Disp Refills    albuterol sulfate HFA (PROVENTIL;VENTOLIN;PROAIR) 108 (90 Base) MCG/ACT inhaler 25.5 g 0

## 2024-07-17 NOTE — TELEPHONE ENCOUNTER
Patient called to refill inhaler. I explained there is a refill request for this medication. I informed the patient we are waiting for Glenna to send it to her pharmacy. She asked:  \"I have to wait until tomorrow?\" I stated she is out of the office until tomorrow. She stated she has been trying to get this done since Monday and that she needed it today.  I explained Glenna is not in the office today and if I could help her with anything else,  she did hang up on me.    Thank you

## 2024-07-18 ENCOUNTER — PATIENT MESSAGE (OUTPATIENT)
Dept: INTERNAL MEDICINE | Age: 64
End: 2024-07-18

## 2024-07-18 ENCOUNTER — APPOINTMENT (OUTPATIENT)
Dept: GENERAL RADIOLOGY | Age: 64
End: 2024-07-18
Attending: STUDENT IN AN ORGANIZED HEALTH CARE EDUCATION/TRAINING PROGRAM
Payer: MEDICARE

## 2024-07-18 ENCOUNTER — HOSPITAL ENCOUNTER (OUTPATIENT)
Age: 64
Setting detail: OUTPATIENT SURGERY
Discharge: HOME HEALTH | End: 2024-07-18
Attending: STUDENT IN AN ORGANIZED HEALTH CARE EDUCATION/TRAINING PROGRAM | Admitting: STUDENT IN AN ORGANIZED HEALTH CARE EDUCATION/TRAINING PROGRAM
Payer: MEDICARE

## 2024-07-18 VITALS
WEIGHT: 130 LBS | BODY MASS INDEX: 25.52 KG/M2 | SYSTOLIC BLOOD PRESSURE: 123 MMHG | TEMPERATURE: 97.8 F | HEART RATE: 80 BPM | DIASTOLIC BLOOD PRESSURE: 62 MMHG | HEIGHT: 60 IN | OXYGEN SATURATION: 95 % | RESPIRATION RATE: 16 BRPM

## 2024-07-18 DIAGNOSIS — M54.12 CERVICAL RADICULOPATHY: ICD-10-CM

## 2024-07-18 DIAGNOSIS — M48.062 SPINAL STENOSIS OF LUMBAR REGION WITH NEUROGENIC CLAUDICATION: ICD-10-CM

## 2024-07-18 PROCEDURE — 3600000003 HC SURGERY LEVEL 3 BASE: Performed by: STUDENT IN AN ORGANIZED HEALTH CARE EDUCATION/TRAINING PROGRAM

## 2024-07-18 PROCEDURE — 6370000000 HC RX 637 (ALT 250 FOR IP): Performed by: STUDENT IN AN ORGANIZED HEALTH CARE EDUCATION/TRAINING PROGRAM

## 2024-07-18 PROCEDURE — 2580000003 HC RX 258: Performed by: STUDENT IN AN ORGANIZED HEALTH CARE EDUCATION/TRAINING PROGRAM

## 2024-07-18 PROCEDURE — 7100000010 HC PHASE II RECOVERY - FIRST 15 MIN: Performed by: STUDENT IN AN ORGANIZED HEALTH CARE EDUCATION/TRAINING PROGRAM

## 2024-07-18 PROCEDURE — 77002 NEEDLE LOCALIZATION BY XRAY: CPT

## 2024-07-18 PROCEDURE — 3600000013 HC SURGERY LEVEL 3 ADDTL 15MIN: Performed by: STUDENT IN AN ORGANIZED HEALTH CARE EDUCATION/TRAINING PROGRAM

## 2024-07-18 PROCEDURE — 6360000002 HC RX W HCPCS: Performed by: STUDENT IN AN ORGANIZED HEALTH CARE EDUCATION/TRAINING PROGRAM

## 2024-07-18 PROCEDURE — 2709999900 HC NON-CHARGEABLE SUPPLY: Performed by: STUDENT IN AN ORGANIZED HEALTH CARE EDUCATION/TRAINING PROGRAM

## 2024-07-18 PROCEDURE — 2500000003 HC RX 250 WO HCPCS: Performed by: STUDENT IN AN ORGANIZED HEALTH CARE EDUCATION/TRAINING PROGRAM

## 2024-07-18 PROCEDURE — 62321 NJX INTERLAMINAR CRV/THRC: CPT | Performed by: STUDENT IN AN ORGANIZED HEALTH CARE EDUCATION/TRAINING PROGRAM

## 2024-07-18 RX ORDER — LORAZEPAM 1 MG/1
1 TABLET ORAL
Status: COMPLETED | OUTPATIENT
Start: 2024-07-18 | End: 2024-07-18

## 2024-07-18 RX ORDER — METHYLPREDNISOLONE ACETATE 80 MG/ML
80 INJECTION, SUSPENSION INTRA-ARTICULAR; INTRALESIONAL; INTRAMUSCULAR; SOFT TISSUE
Status: DISCONTINUED | OUTPATIENT
Start: 2024-07-18 | End: 2024-07-18 | Stop reason: HOSPADM

## 2024-07-18 RX ORDER — LIDOCAINE HYDROCHLORIDE 10 MG/ML
INJECTION, SOLUTION EPIDURAL; INFILTRATION; INTRACAUDAL; PERINEURAL PRN
Status: DISCONTINUED | OUTPATIENT
Start: 2024-07-18 | End: 2024-07-18 | Stop reason: ALTCHOICE

## 2024-07-18 RX ORDER — BUPIVACAINE HYDROCHLORIDE 2.5 MG/ML
INJECTION, SOLUTION EPIDURAL; INFILTRATION; INTRACAUDAL PRN
Status: DISCONTINUED | OUTPATIENT
Start: 2024-07-18 | End: 2024-07-18 | Stop reason: ALTCHOICE

## 2024-07-18 RX ORDER — SODIUM CHLORIDE 9 MG/ML
20 INJECTION, SOLUTION INTRAMUSCULAR; INTRAVENOUS; SUBCUTANEOUS
Status: DISCONTINUED | OUTPATIENT
Start: 2024-07-18 | End: 2024-07-18 | Stop reason: HOSPADM

## 2024-07-18 RX ORDER — ALBUTEROL SULFATE 90 UG/1
AEROSOL, METERED RESPIRATORY (INHALATION)
Qty: 25.5 G | Refills: 1 | Status: SHIPPED | OUTPATIENT
Start: 2024-07-18

## 2024-07-18 RX ORDER — METHYLPREDNISOLONE ACETATE 80 MG/ML
INJECTION, SUSPENSION INTRA-ARTICULAR; INTRALESIONAL; INTRAMUSCULAR; SOFT TISSUE PRN
Status: DISCONTINUED | OUTPATIENT
Start: 2024-07-18 | End: 2024-07-18 | Stop reason: ALTCHOICE

## 2024-07-18 RX ORDER — IOPAMIDOL 408 MG/ML
INJECTION, SOLUTION INTRAVASCULAR PRN
Status: DISCONTINUED | OUTPATIENT
Start: 2024-07-18 | End: 2024-07-18 | Stop reason: ALTCHOICE

## 2024-07-18 RX ORDER — LIDOCAINE HYDROCHLORIDE 10 MG/ML
15 INJECTION, SOLUTION EPIDURAL; INFILTRATION; INTRACAUDAL; PERINEURAL
Status: DISCONTINUED | OUTPATIENT
Start: 2024-07-18 | End: 2024-07-18 | Stop reason: HOSPADM

## 2024-07-18 RX ORDER — BUPIVACAINE HYDROCHLORIDE 2.5 MG/ML
10 INJECTION, SOLUTION EPIDURAL; INFILTRATION; INTRACAUDAL
Status: DISCONTINUED | OUTPATIENT
Start: 2024-07-18 | End: 2024-07-18 | Stop reason: HOSPADM

## 2024-07-18 RX ORDER — LIDOCAINE HYDROCHLORIDE 20 MG/ML
10 INJECTION, SOLUTION EPIDURAL; INFILTRATION; INTRACAUDAL; PERINEURAL
Status: DISCONTINUED | OUTPATIENT
Start: 2024-07-18 | End: 2024-07-18 | Stop reason: HOSPADM

## 2024-07-18 RX ORDER — DEXAMETHASONE SODIUM PHOSPHATE 10 MG/ML
10 INJECTION, SOLUTION INTRAMUSCULAR; INTRAVENOUS
Status: DISCONTINUED | OUTPATIENT
Start: 2024-07-18 | End: 2024-07-18 | Stop reason: HOSPADM

## 2024-07-18 RX ORDER — SODIUM CHLORIDE 9 MG/ML
INJECTION, SOLUTION INTRAMUSCULAR; INTRAVENOUS; SUBCUTANEOUS PRN
Status: DISCONTINUED | OUTPATIENT
Start: 2024-07-18 | End: 2024-07-18 | Stop reason: ALTCHOICE

## 2024-07-18 RX ORDER — BACLOFEN 5 MG/1
10 TABLET ORAL 3 TIMES DAILY PRN
Qty: 180 TABLET | Refills: 1 | Status: SHIPPED | OUTPATIENT
Start: 2024-07-18

## 2024-07-18 RX ADMIN — LORAZEPAM 1 MG: 1 TABLET ORAL at 08:01

## 2024-07-18 ASSESSMENT — PAIN - FUNCTIONAL ASSESSMENT
PAIN_FUNCTIONAL_ASSESSMENT: 0-10
PAIN_FUNCTIONAL_ASSESSMENT: 0-10

## 2024-07-18 ASSESSMENT — PAIN DESCRIPTION - DESCRIPTORS: DESCRIPTORS: DULL

## 2024-07-18 NOTE — TELEPHONE ENCOUNTER
From: Nicole Wade  To: Glenna Gage  Sent: 7/18/2024 3:47 PM EDT  Subject: Albuterol    For months now I've been getting 3 of my inhalers. This hot weather and humidity has been very hard for me to breathe. [facility] has started that there is only one. Can you please relieve this problem, Please. Thank you   Nicole Wade

## 2024-07-18 NOTE — TELEPHONE ENCOUNTER
Last Office Visit (last PCP visit):   4/4/2024    Next Visit Date:  Future Appointments   Date Time Provider Department Center   8/13/2024  1:30 PM Nicholas Corona DO [facility]   9/16/2024 11:00 AM Glenna Gage [facility]       **If hasn't been seen in over a year OR hasn't followed up according to last diabetes/ADHD visit, make appointment for patient before sending refill to provider.    Rx requested:  Requested Prescriptions     Pending Prescriptions Disp Refills    busPIRone (BUSPAR) 10 MG tablet [Pharmacy Med Name: buspirone 10 mg tablet] 180 tablet 1     Sig: Take 1 tablet by mouth 2 times daily

## 2024-07-18 NOTE — DISCHARGE INSTRUCTIONS
Do NOT submerge in water for 48 hours.   Follow all pre-op instructions from Dr. Corona's office.    If you received oral Ativan or IV versed:  Do not drive or operate machinery for 24hrs after discharge.  Do not drink alcohol, take tranquilizers, sleeping medication, or any other medication not directly instructed by your physician.   Do not make any important decisions or sign any legal documents for 24hrs.  Have someone with you for 24hrs after procedure to assist you as needed.    OPERATIVE SITE:  A small amount of bleeding or drainage after injection is normal. Your physician will provide you with specific instructions on how to care for your surgical site and/or dressing.   Try not to touch your site unless necessary.  Always wash your hands BEFORE and AFTER changing your dressing if instructed by your physician.   Proper handwashing includes wetting your hands with clean water, applying soap, lathering your hands by rubbing them together with soap for 20 seconds, rinsing them with clean water, and drying them with a clean towel. If soap and water is not available, alcohol-based  may be applied by rubbing the hands together and allowing them to dry for 20 seconds.      PAIN:  Pain after a procedure is normal and should be expected. When you go home, the anesthesia wears off, and you may experience increased discomfort. Your provider will give you specific instructions on what pain medication to take at home. Listed below is additional information on treating pain after a procedure:  Pain medication can give you an upset stomach. Unless your provider has instructed you not to eat or drink, the pain medication should be taken with a small amount of food. Eating will decrease the chance of an upset stomach.  Pain medication can take about 20-30 minutes to start working, so do not wait until the pain worsens before taking a dose. Remember, always take over-the-counter and prescription drugs only as

## 2024-07-18 NOTE — PROCEDURES
PROCEDURE NOTE  Date: 2024   Name: Nicole Wade  YOB: 1960                            [facility]  Neurosurgery and Pain Management Center  [address] [place] [address], [address]  [place], [place], [address]  P: [phone]  F: [phone]      INTERLAMINAR EPIDURAL INJECTION      Patient Name: Nicole Wade : 1960  Date:24  Physician: Nicholas Corona DO       Nicole Wade  is here today for interventional pain management.    Preoperatively, the patient presents with radicular pain in the affected area as per history and exam. Patient has failed NSAIDs, PT/HEP, and conservative treatment. Patient has significant psychological and functional impairment due to this condition. Standard ASIPP guidelines were followed and sterile technique used.  Area was cleaned with Chloraprep.  Informed consent was obtained.  Fluoroscopic guidance was used for this procedure.     Discussed the risks including but not limited to bleeding, infection, worsened pain, damage to surrounding structures, side effects, toxicity, allergic reactions to medications used, need for surgery, pneumothorax, abdominal and visceral injury, as well as catastrophic injury such as vision loss, paralysis, spinal cord or plexus injury, stroke, bowel or bladder incontinence, chest tube insertion, ventilator dependence, and death. Discussed the risks, benefits, and alternatives to the procedure including no procedure at all. Discussed that we cannot undo any permanent neurologic or orthopaedic damage or change the course of any underlying disease. After thorough discussion, patient expressed understanding and willingness to proceed. Written consent was obtained and is in the chart. Verbal consent to proceed was obtained.    Epidural Injection:  Level: C7-T1  Needle: 20g 3.5in Tuohy  Injectate: Methyl-prednisone 80mg. Bupivacaine 0.25% 2mL. 1 mL preservative free normal saline. Total volume 4mL.     Technique: Under

## 2024-07-19 RX ORDER — BUSPIRONE HYDROCHLORIDE 10 MG/1
10 TABLET ORAL 2 TIMES DAILY
Qty: 180 TABLET | Refills: 1 | Status: SHIPPED | OUTPATIENT
Start: 2024-07-19

## 2024-08-14 DIAGNOSIS — K21.9 GASTROESOPHAGEAL REFLUX DISEASE WITHOUT ESOPHAGITIS: ICD-10-CM

## 2024-08-14 DIAGNOSIS — F32.A DEPRESSION, UNSPECIFIED DEPRESSION TYPE: ICD-10-CM

## 2024-08-14 NOTE — TELEPHONE ENCOUNTER
Comments:     Last Office Visit (last PCP visit):   4/4/2024    Next Visit Date:  Future Appointments   Date Time Provider Department Center   8/14/2024  9:45 AM Nicholas Corona DO [facility]   9/16/2024 11:00 AM Glenna Gage PA [facility]       **If hasn't been seen in over a year OR hasn't followed up according to last diabetes/ADHD visit, make appointment for patient before sending refill to provider.    Rx requested:  Requested Prescriptions     Pending Prescriptions Disp Refills    omeprazole (PRILOSEC) 20 MG delayed release capsule [Pharmacy Med Name: omeprazole 20 mg capsule,delayed release] 90 capsule 1     Sig: Take 1 capsule by mouth daily    FLUoxetine (PROZAC) 40 MG capsule [Pharmacy Med Name: fluoxetine 40 mg capsule] 90 capsule 0     Sig: Take 1 capsule by mouth daily

## 2024-08-19 RX ORDER — OMEPRAZOLE 20 MG/1
20 CAPSULE, DELAYED RELEASE ORAL DAILY
Qty: 90 CAPSULE | Refills: 1 | Status: SHIPPED | OUTPATIENT
Start: 2024-08-19

## 2024-08-19 RX ORDER — FLUOXETINE HYDROCHLORIDE 40 MG/1
40 CAPSULE ORAL DAILY
Qty: 90 CAPSULE | Refills: 1 | Status: SHIPPED | OUTPATIENT
Start: 2024-08-19

## 2024-09-04 ENCOUNTER — OFFICE VISIT (OUTPATIENT)
Age: 64
End: 2024-09-04
Payer: MEDICARE

## 2024-09-04 VITALS
HEIGHT: 60 IN | DIASTOLIC BLOOD PRESSURE: 80 MMHG | WEIGHT: 130 LBS | BODY MASS INDEX: 25.52 KG/M2 | SYSTOLIC BLOOD PRESSURE: 118 MMHG | TEMPERATURE: 97.9 F

## 2024-09-04 DIAGNOSIS — M53.3 SACROILIAC JOINT PAIN: Primary | ICD-10-CM

## 2024-09-04 DIAGNOSIS — M48.062 SPINAL STENOSIS OF LUMBAR REGION WITH NEUROGENIC CLAUDICATION: ICD-10-CM

## 2024-09-04 DIAGNOSIS — M54.12 CERVICAL RADICULOPATHY: ICD-10-CM

## 2024-09-04 PROCEDURE — 99214 OFFICE O/P EST MOD 30 MIN: CPT | Performed by: STUDENT IN AN ORGANIZED HEALTH CARE EDUCATION/TRAINING PROGRAM

## 2024-09-04 PROCEDURE — G8419 CALC BMI OUT NRM PARAM NOF/U: HCPCS | Performed by: STUDENT IN AN ORGANIZED HEALTH CARE EDUCATION/TRAINING PROGRAM

## 2024-09-04 PROCEDURE — 4004F PT TOBACCO SCREEN RCVD TLK: CPT | Performed by: STUDENT IN AN ORGANIZED HEALTH CARE EDUCATION/TRAINING PROGRAM

## 2024-09-04 PROCEDURE — 3017F COLORECTAL CA SCREEN DOC REV: CPT | Performed by: STUDENT IN AN ORGANIZED HEALTH CARE EDUCATION/TRAINING PROGRAM

## 2024-09-04 PROCEDURE — G8427 DOCREV CUR MEDS BY ELIG CLIN: HCPCS | Performed by: STUDENT IN AN ORGANIZED HEALTH CARE EDUCATION/TRAINING PROGRAM

## 2024-09-04 RX ORDER — MELOXICAM 7.5 MG/1
7.5 TABLET ORAL DAILY
Qty: 180 TABLET | Refills: 1 | Status: SHIPPED | OUTPATIENT
Start: 2024-09-04

## 2024-09-04 RX ORDER — BACLOFEN 5 MG/1
5 TABLET ORAL 3 TIMES DAILY PRN
Qty: 180 TABLET | Refills: 1 | Status: SHIPPED | OUTPATIENT
Start: 2024-09-04

## 2024-09-04 RX ORDER — METHYLPREDNISOLONE 4 MG
TABLET, DOSE PACK ORAL
Qty: 21 TABLET | Refills: 0 | Status: SHIPPED | OUTPATIENT
Start: 2024-09-04 | End: 2024-09-10

## 2024-09-04 ASSESSMENT — ENCOUNTER SYMPTOMS: BACK PAIN: 1

## 2024-09-04 NOTE — PROGRESS NOTES
[facility] PHYSICIANS [place] [facility], [facility] PAIN MANAGEMENT  [address] [facility] [address]  Dept: [phone]  Dept Fax: [phone]  Loc: [phone]     9/4/2024    Visit type: Established     Reason for Visit: Follow-up, Lower Back Pain, and Hip Pain (left)       ASSESSMENT/PLAN   1. Sacroiliac joint pain  Overview:  Chronic illness with a severe exacerbation and/or progression which affects ADL's. Pain has been present for multiple years and is expected to last at least a year. Patient is unable to sleep, transfer, nor ambulate. Goals of care include pain relief >50% and ability to perform ADLs with less pain.    Pain is a 6 or greater with activity. Pain is extremely debilitating and affects ADLs.     Pain is located left low back/buttock with occasional radiation to the lateral thigh/groin/hip.  Patient displays tenderness at the left SI joint.  Sukumar's test, Gaenslen's tests, and SI joint compression test are all positive left .  Assessment & Plan:     -Left SI joint injection ordered  Orders:  -     INJECT SI JOINT ARTHRGRPHY&/ANES/STEROID W/IMAGE; Future  -     methylPREDNISolone (MEDROL DOSEPACK) 4 MG tablet; Take by mouth. Take as directed, Disp-21 tablet, R-0Normal  -     Baclofen (LIORESAL) 5 MG tablet; Take 1 tablet by mouth 3 times daily as needed (spasms), Disp-180 tablet, R-1Normal  -     meloxicam (MOBIC) 7.5 MG tablet; Take 1 tablet by mouth daily, Disp-180 tablet, R-1Normal  2. Cervical radiculopathy  Overview:  Chronic illness with a severe exacerbation and/or progression which affects ADL's. Pain has been present for multiple years and is expected to last at least a year. Patient is unable to sleep, transfer, nor ambulate. Goals of care include pain relief >50% and ability to perform ADLs with less pain.     63-year-old female history of chronic neck pain with radiation to both upper extremities (left worse than right) for multiple years.  Pain is

## 2024-09-04 NOTE — PROGRESS NOTES
HPI  Location:low back, left hip  Patient states that her pain is at a 8 at rest and a 10 with activity on the pain scale.   Patient states that she received 0 % of pain relief after C7-T1 EPIDURAL STEROID INJECTION on 7/18/24.  Patient is currently prescribed Baclofen (LIORESAL) 5 MG . Patient states she receives substantial relief from medication.  Patient is currently prescribed meloxicam (MOBIC) 7.5 MG . Patient states she receives substantial relief from medication..

## 2024-09-05 ENCOUNTER — PREP FOR PROCEDURE (OUTPATIENT)
Age: 64
End: 2024-09-05

## 2024-09-09 DIAGNOSIS — Z87.442 H/O RENAL CALCULI: ICD-10-CM

## 2024-09-09 DIAGNOSIS — E78.5 HYPERLIPIDEMIA, UNSPECIFIED HYPERLIPIDEMIA TYPE: ICD-10-CM

## 2024-09-09 DIAGNOSIS — J44.9 CHRONIC OBSTRUCTIVE PULMONARY DISEASE, UNSPECIFIED COPD TYPE (HCC): ICD-10-CM

## 2024-09-10 RX ORDER — TAMSULOSIN HYDROCHLORIDE 0.4 MG/1
0.4 CAPSULE ORAL DAILY
Qty: 90 CAPSULE | Refills: 0 | Status: SHIPPED | OUTPATIENT
Start: 2024-09-10

## 2024-09-10 RX ORDER — ATORVASTATIN CALCIUM 20 MG/1
TABLET, FILM COATED ORAL
Qty: 90 TABLET | Refills: 1 | Status: SHIPPED | OUTPATIENT
Start: 2024-09-10

## 2024-09-10 RX ORDER — FLUTICASONE FUROATE, UMECLIDINIUM BROMIDE AND VILANTEROL TRIFENATATE 200; 62.5; 25 UG/1; UG/1; UG/1
POWDER RESPIRATORY (INHALATION)
Qty: 1 EACH | Refills: 3 | Status: SHIPPED | OUTPATIENT
Start: 2024-09-10

## 2024-09-16 ENCOUNTER — OFFICE VISIT (OUTPATIENT)
Dept: INTERNAL MEDICINE | Age: 64
End: 2024-09-16
Payer: MEDICARE

## 2024-09-16 VITALS
SYSTOLIC BLOOD PRESSURE: 130 MMHG | WEIGHT: 121.8 LBS | HEART RATE: 88 BPM | TEMPERATURE: 97.1 F | OXYGEN SATURATION: 95 % | DIASTOLIC BLOOD PRESSURE: 70 MMHG | BODY MASS INDEX: 23.79 KG/M2

## 2024-09-16 DIAGNOSIS — Z87.891 PERSONAL HISTORY OF TOBACCO USE, PRESENTING HAZARDS TO HEALTH: ICD-10-CM

## 2024-09-16 DIAGNOSIS — F41.1 GAD (GENERALIZED ANXIETY DISORDER): ICD-10-CM

## 2024-09-16 DIAGNOSIS — R73.03 PRE-DIABETES: Primary | ICD-10-CM

## 2024-09-16 LAB — HBA1C MFR BLD: 5.9 %

## 2024-09-16 PROCEDURE — 99213 OFFICE O/P EST LOW 20 MIN: CPT | Performed by: STUDENT IN AN ORGANIZED HEALTH CARE EDUCATION/TRAINING PROGRAM

## 2024-09-16 PROCEDURE — 4004F PT TOBACCO SCREEN RCVD TLK: CPT | Performed by: STUDENT IN AN ORGANIZED HEALTH CARE EDUCATION/TRAINING PROGRAM

## 2024-09-16 PROCEDURE — 3017F COLORECTAL CA SCREEN DOC REV: CPT | Performed by: STUDENT IN AN ORGANIZED HEALTH CARE EDUCATION/TRAINING PROGRAM

## 2024-09-16 PROCEDURE — G8420 CALC BMI NORM PARAMETERS: HCPCS | Performed by: STUDENT IN AN ORGANIZED HEALTH CARE EDUCATION/TRAINING PROGRAM

## 2024-09-16 PROCEDURE — G8427 DOCREV CUR MEDS BY ELIG CLIN: HCPCS | Performed by: STUDENT IN AN ORGANIZED HEALTH CARE EDUCATION/TRAINING PROGRAM

## 2024-09-16 PROCEDURE — 83036 HEMOGLOBIN GLYCOSYLATED A1C: CPT | Performed by: STUDENT IN AN ORGANIZED HEALTH CARE EDUCATION/TRAINING PROGRAM

## 2024-09-16 RX ORDER — VARENICLINE TARTRATE 0.5 MG/1
TABLET, FILM COATED ORAL
Qty: 57 TABLET | Refills: 0 | Status: SHIPPED | OUTPATIENT
Start: 2024-09-16

## 2024-09-16 RX ORDER — VARENICLINE TARTRATE 1 MG/1
1 TABLET, FILM COATED ORAL 2 TIMES DAILY
Qty: 180 TABLET | Refills: 1 | Status: SHIPPED | OUTPATIENT
Start: 2024-09-16

## 2024-09-16 RX ORDER — BUSPIRONE HYDROCHLORIDE 10 MG/1
10 TABLET ORAL DAILY
Qty: 180 TABLET | Refills: 1 | Status: SHIPPED | OUTPATIENT
Start: 2024-09-16

## 2024-09-25 ENCOUNTER — OFFICE VISIT (OUTPATIENT)
Dept: INTERNAL MEDICINE | Age: 64
End: 2024-09-25
Payer: MEDICARE

## 2024-09-25 VITALS
HEART RATE: 77 BPM | SYSTOLIC BLOOD PRESSURE: 124 MMHG | WEIGHT: 119.8 LBS | DIASTOLIC BLOOD PRESSURE: 72 MMHG | BODY MASS INDEX: 23.4 KG/M2 | OXYGEN SATURATION: 95 % | TEMPERATURE: 97.4 F

## 2024-09-25 DIAGNOSIS — F32.0 MILD SINGLE CURRENT EPISODE OF MAJOR DEPRESSIVE DISORDER (HCC): ICD-10-CM

## 2024-09-25 DIAGNOSIS — C7A.090 ATYPICAL CARCINOID LUNG TUMOR (HCC): Primary | ICD-10-CM

## 2024-09-25 DIAGNOSIS — F41.1 GAD (GENERALIZED ANXIETY DISORDER): ICD-10-CM

## 2024-09-25 PROCEDURE — 3017F COLORECTAL CA SCREEN DOC REV: CPT | Performed by: STUDENT IN AN ORGANIZED HEALTH CARE EDUCATION/TRAINING PROGRAM

## 2024-09-25 PROCEDURE — 4004F PT TOBACCO SCREEN RCVD TLK: CPT | Performed by: STUDENT IN AN ORGANIZED HEALTH CARE EDUCATION/TRAINING PROGRAM

## 2024-09-25 PROCEDURE — G8427 DOCREV CUR MEDS BY ELIG CLIN: HCPCS | Performed by: STUDENT IN AN ORGANIZED HEALTH CARE EDUCATION/TRAINING PROGRAM

## 2024-09-25 PROCEDURE — 99213 OFFICE O/P EST LOW 20 MIN: CPT | Performed by: STUDENT IN AN ORGANIZED HEALTH CARE EDUCATION/TRAINING PROGRAM

## 2024-09-25 PROCEDURE — G8420 CALC BMI NORM PARAMETERS: HCPCS | Performed by: STUDENT IN AN ORGANIZED HEALTH CARE EDUCATION/TRAINING PROGRAM

## 2024-10-01 ENCOUNTER — HOSPITAL ENCOUNTER (OUTPATIENT)
Age: 64
Setting detail: OUTPATIENT SURGERY
Discharge: HOME | End: 2024-10-01
Attending: STUDENT IN AN ORGANIZED HEALTH CARE EDUCATION/TRAINING PROGRAM | Admitting: STUDENT IN AN ORGANIZED HEALTH CARE EDUCATION/TRAINING PROGRAM
Payer: MEDICARE

## 2024-10-01 ENCOUNTER — APPOINTMENT (OUTPATIENT)
Dept: GENERAL RADIOLOGY | Age: 64
End: 2024-10-01
Attending: STUDENT IN AN ORGANIZED HEALTH CARE EDUCATION/TRAINING PROGRAM
Payer: MEDICARE

## 2024-10-01 VITALS
RESPIRATION RATE: 16 BRPM | DIASTOLIC BLOOD PRESSURE: 65 MMHG | WEIGHT: 120 LBS | HEIGHT: 60 IN | HEART RATE: 87 BPM | OXYGEN SATURATION: 95 % | BODY MASS INDEX: 23.56 KG/M2 | SYSTOLIC BLOOD PRESSURE: 135 MMHG | TEMPERATURE: 96.7 F

## 2024-10-01 PROCEDURE — 6360000002 HC RX W HCPCS: Performed by: STUDENT IN AN ORGANIZED HEALTH CARE EDUCATION/TRAINING PROGRAM

## 2024-10-01 PROCEDURE — 3600000002 HC SURGERY LEVEL 2 BASE: Performed by: STUDENT IN AN ORGANIZED HEALTH CARE EDUCATION/TRAINING PROGRAM

## 2024-10-01 PROCEDURE — 27096 INJECT SACROILIAC JOINT: CPT | Performed by: STUDENT IN AN ORGANIZED HEALTH CARE EDUCATION/TRAINING PROGRAM

## 2024-10-01 PROCEDURE — 2709999900 HC NON-CHARGEABLE SUPPLY: Performed by: STUDENT IN AN ORGANIZED HEALTH CARE EDUCATION/TRAINING PROGRAM

## 2024-10-01 PROCEDURE — 7100000010 HC PHASE II RECOVERY - FIRST 15 MIN: Performed by: STUDENT IN AN ORGANIZED HEALTH CARE EDUCATION/TRAINING PROGRAM

## 2024-10-01 PROCEDURE — 2500000003 HC RX 250 WO HCPCS: Performed by: STUDENT IN AN ORGANIZED HEALTH CARE EDUCATION/TRAINING PROGRAM

## 2024-10-01 RX ORDER — DEXAMETHASONE SODIUM PHOSPHATE 10 MG/ML
10 INJECTION, SOLUTION INTRAMUSCULAR; INTRAVENOUS
Status: DISCONTINUED | OUTPATIENT
Start: 2024-10-01 | End: 2024-10-01 | Stop reason: HOSPADM

## 2024-10-01 RX ORDER — METHYLPREDNISOLONE ACETATE 80 MG/ML
INJECTION, SUSPENSION INTRA-ARTICULAR; INTRALESIONAL; INTRAMUSCULAR; SOFT TISSUE PRN
Status: DISCONTINUED | OUTPATIENT
Start: 2024-10-01 | End: 2024-10-01 | Stop reason: ALTCHOICE

## 2024-10-01 RX ORDER — BUPIVACAINE HYDROCHLORIDE 2.5 MG/ML
INJECTION, SOLUTION EPIDURAL; INFILTRATION; INTRACAUDAL PRN
Status: DISCONTINUED | OUTPATIENT
Start: 2024-10-01 | End: 2024-10-01 | Stop reason: ALTCHOICE

## 2024-10-01 RX ORDER — LIDOCAINE HYDROCHLORIDE 10 MG/ML
INJECTION, SOLUTION EPIDURAL; INFILTRATION; INTRACAUDAL; PERINEURAL PRN
Status: DISCONTINUED | OUTPATIENT
Start: 2024-10-01 | End: 2024-10-01 | Stop reason: ALTCHOICE

## 2024-10-01 RX ORDER — LIDOCAINE HYDROCHLORIDE 20 MG/ML
10 INJECTION, SOLUTION EPIDURAL; INFILTRATION; INTRACAUDAL; PERINEURAL
Status: DISCONTINUED | OUTPATIENT
Start: 2024-10-01 | End: 2024-10-01 | Stop reason: HOSPADM

## 2024-10-01 RX ORDER — BUPIVACAINE HYDROCHLORIDE 2.5 MG/ML
10 INJECTION, SOLUTION EPIDURAL; INFILTRATION; INTRACAUDAL
Status: DISCONTINUED | OUTPATIENT
Start: 2024-10-01 | End: 2024-10-01 | Stop reason: HOSPADM

## 2024-10-01 RX ORDER — LIDOCAINE HYDROCHLORIDE 10 MG/ML
15 INJECTION, SOLUTION EPIDURAL; INFILTRATION; INTRACAUDAL; PERINEURAL
Status: DISCONTINUED | OUTPATIENT
Start: 2024-10-01 | End: 2024-10-01 | Stop reason: HOSPADM

## 2024-10-01 RX ORDER — LORAZEPAM 1 MG/1
1 TABLET ORAL
Status: DISCONTINUED | OUTPATIENT
Start: 2024-10-01 | End: 2024-10-01 | Stop reason: HOSPADM

## 2024-10-01 RX ORDER — SODIUM CHLORIDE 9 MG/ML
20 INJECTION, SOLUTION INTRAMUSCULAR; INTRAVENOUS; SUBCUTANEOUS
Status: DISCONTINUED | OUTPATIENT
Start: 2024-10-01 | End: 2024-10-01 | Stop reason: HOSPADM

## 2024-10-01 RX ORDER — IOPAMIDOL 408 MG/ML
INJECTION, SOLUTION INTRATHECAL PRN
Status: DISCONTINUED | OUTPATIENT
Start: 2024-10-01 | End: 2024-10-01 | Stop reason: ALTCHOICE

## 2024-10-01 RX ORDER — METHYLPREDNISOLONE ACETATE 80 MG/ML
80 INJECTION, SUSPENSION INTRA-ARTICULAR; INTRALESIONAL; INTRAMUSCULAR; SOFT TISSUE
Status: DISCONTINUED | OUTPATIENT
Start: 2024-10-01 | End: 2024-10-01 | Stop reason: HOSPADM

## 2024-10-01 ASSESSMENT — PAIN - FUNCTIONAL ASSESSMENT
PAIN_FUNCTIONAL_ASSESSMENT: 0-10
PAIN_FUNCTIONAL_ASSESSMENT: 0-10

## 2024-10-01 NOTE — DISCHARGE INSTRUCTIONS
Where you get your shot depends on where your pain is.  Facet joints connect your vertebrae to each other along the back of your spine. Problems in these joints can cause long-term (chronic) pain in the neck or back.  You may have numbness for a few hours. The numbness could be in your neck, back, arm or leg. It depends on which facet joint was treated.  You may feel pain relief right away. Sometimes the pain returns after the numbing medicine wears off. Your shot may also have included a steroid. Steroids take a few days to work. And they don't work for everyone.  This care sheet gives you a general idea about how long it will take for you to recover. But each person recovers at a different pace. Follow the steps below to feel better as quickly as possible.  How can you care for yourself at home?  Activity    You may want to do less than normal for a few days. But you may also be able to return to your daily routine.     You may shower if your doctor okays it. Do not take a bath for the first 24 hours, or until your doctor tells you it is okay.   Diet    You can eat your normal diet.   Medicines    Be safe with medicines. Read and follow all instructions on the label.  If the doctor gave you a prescription medicine for pain, take it as prescribed.  If you are not taking a prescription pain medicine, ask your doctor if you can take an over-the-counter medicine.   Ice    If the site of your shot feels sore or tender, put ice or a cold pack on it for 10 to 20 minutes at a time. Put a thin cloth between the ice and your skin.   Follow-up care is a key part of your treatment and safety. Be sure to make and go to all appointments, and call your doctor if you are having problems. It's also a good idea to know your test results and keep a list of the medicines you take.  When should you call for help?   Call 911 anytime you think you may need emergency care. For example, call if:    You are unable to move an arm or a leg

## 2024-10-01 NOTE — PROCEDURES
PROCEDURE NOTE  Date: 10/1/2024   Name: Nicole Wade  YOB: 1960                          [facility]  Neurosurgery and Pain Management Center  [address] [place] [address], [address]  [place], [address]  P: [phone]  F: [phone]              Sacroiliac Joint Injection     Patient Name: Nicole Wade : 1960  Date:10/01/24  Physician: Nicholas Corona DO        Nicole Wade is here today for interventional pain management.    Preoperatively, the patient presents with SI joint mediated pain, as per history and exam. Patient has failed NSAIDs, PT, and conservative treatment. Patient has significant psychological and functional impairment due to this condition.    Discussed the risks including but not limited to bleeding, infection, worsened pain, damage to surrounding structures, side effects, toxicity, allergic reactions to medications used, need for surgery, abdominal and visceral injury, as well as catastrophic injury such as vision loss, paralysis, spinal cord or plexus injury, stroke, bowel or bladder incontinence, chest tube insertion, ventilator dependence, and death. Discussed the risks, benefits, and alternatives to the procedure including no procedure at all. Discussed that we cannot undo any permanent neurologic or orthopaedic damage or change the course of any underlying disease. After thorough discussion, patient expressed understanding and willingness to proceed. Written consent was obtained and is in the chart. Verbal consent to proceed was obtained.    Standard ASI guidelines were followed and sterile technique used.  Area was cleaned with ChloraPrep.  Informed consent was obtained.  Fluoroscopic guidance was used for this procedure.    S.I. JOINT:  Left  Appropriate length spinal needle (22g 3.5in) was advanced to the S.I. Joint.  Negative aspiration was achieved. Injection of contrast dye demonstrated limited spread and was free of any intravascular spread

## 2024-10-01 NOTE — PROGRESS NOTES
Pt received from procedure room via wheelchair. Report received from OR nurse. Pt transferred and ambulating without difficulty.  Neuro intact.  Pt tolerated procedure well. Dressing dry and intact. Reviewed written discharge instructions with patient. Copy of written discharge instructions given to patient.   Pain 0/10  Neuro Assessment  Discharge via ambulation

## 2024-10-03 ENCOUNTER — TELEPHONE (OUTPATIENT)
Age: 64
End: 2024-10-03

## 2024-10-03 NOTE — TELEPHONE ENCOUNTER
Patient advised to use ice for soreness due to injection, go to ER if pain exceeds \"normal\" pain level.

## 2024-10-03 NOTE — TELEPHONE ENCOUNTER
Pt wanted to let Dr. Corona know that after the injection she was fine for a day or two, but today it is coming back. Please Advise.

## 2024-10-09 DIAGNOSIS — F32.0 MILD SINGLE CURRENT EPISODE OF MAJOR DEPRESSIVE DISORDER (HCC): Primary | ICD-10-CM

## 2024-10-09 RX ORDER — DULOXETIN HYDROCHLORIDE 60 MG/1
60 CAPSULE, DELAYED RELEASE ORAL DAILY
Qty: 30 CAPSULE | Refills: 0 | Status: SHIPPED | OUTPATIENT
Start: 2024-10-09 | End: 2024-11-08

## 2024-10-25 ENCOUNTER — OFFICE VISIT (OUTPATIENT)
Dept: INTERNAL MEDICINE | Age: 64
End: 2024-10-25
Payer: MEDICARE

## 2024-10-25 VITALS
OXYGEN SATURATION: 99 % | SYSTOLIC BLOOD PRESSURE: 112 MMHG | HEART RATE: 74 BPM | DIASTOLIC BLOOD PRESSURE: 76 MMHG | RESPIRATION RATE: 16 BRPM

## 2024-10-25 DIAGNOSIS — J43.8 OTHER EMPHYSEMA (HCC): Primary | ICD-10-CM

## 2024-10-25 DIAGNOSIS — F32.0 MILD SINGLE CURRENT EPISODE OF MAJOR DEPRESSIVE DISORDER (HCC): ICD-10-CM

## 2024-10-25 DIAGNOSIS — J44.9 CHRONIC OBSTRUCTIVE PULMONARY DISEASE, UNSPECIFIED COPD TYPE (HCC): ICD-10-CM

## 2024-10-25 DIAGNOSIS — R51.9 UNILATERAL HEADACHE: ICD-10-CM

## 2024-10-25 PROBLEM — K57.92 ACUTE DIVERTICULITIS: Status: RESOLVED | Noted: 2024-01-18 | Resolved: 2024-10-25

## 2024-10-25 PROBLEM — I73.9 CLAUDICATION (HCC): Status: ACTIVE | Noted: 2024-10-25

## 2024-10-25 PROBLEM — I20.89 ANGINA AT REST (HCC): Status: ACTIVE | Noted: 2024-10-25

## 2024-10-25 PROCEDURE — 3017F COLORECTAL CA SCREEN DOC REV: CPT | Performed by: STUDENT IN AN ORGANIZED HEALTH CARE EDUCATION/TRAINING PROGRAM

## 2024-10-25 PROCEDURE — 99213 OFFICE O/P EST LOW 20 MIN: CPT | Performed by: STUDENT IN AN ORGANIZED HEALTH CARE EDUCATION/TRAINING PROGRAM

## 2024-10-25 PROCEDURE — G8420 CALC BMI NORM PARAMETERS: HCPCS | Performed by: STUDENT IN AN ORGANIZED HEALTH CARE EDUCATION/TRAINING PROGRAM

## 2024-10-25 PROCEDURE — G8427 DOCREV CUR MEDS BY ELIG CLIN: HCPCS | Performed by: STUDENT IN AN ORGANIZED HEALTH CARE EDUCATION/TRAINING PROGRAM

## 2024-10-25 PROCEDURE — G8484 FLU IMMUNIZE NO ADMIN: HCPCS | Performed by: STUDENT IN AN ORGANIZED HEALTH CARE EDUCATION/TRAINING PROGRAM

## 2024-10-25 PROCEDURE — 4004F PT TOBACCO SCREEN RCVD TLK: CPT | Performed by: STUDENT IN AN ORGANIZED HEALTH CARE EDUCATION/TRAINING PROGRAM

## 2024-10-25 PROCEDURE — 3023F SPIROM DOC REV: CPT | Performed by: STUDENT IN AN ORGANIZED HEALTH CARE EDUCATION/TRAINING PROGRAM

## 2024-10-25 RX ORDER — ALBUTEROL SULFATE 90 UG/1
INHALANT RESPIRATORY (INHALATION)
Qty: 25.5 G | Refills: 1 | Status: SHIPPED | OUTPATIENT
Start: 2024-10-25

## 2024-10-25 RX ORDER — DULOXETIN HYDROCHLORIDE 60 MG/1
60 CAPSULE, DELAYED RELEASE ORAL DAILY
Qty: 90 CAPSULE | Refills: 0 | Status: SHIPPED | OUTPATIENT
Start: 2024-10-25 | End: 2025-01-23

## 2024-10-25 NOTE — ASSESSMENT & PLAN NOTE
She declines referral to Psychiatry   Consider gene site testing   Continue Duloxetine   Recently off SSRI and Buspar

## 2024-10-25 NOTE — PROGRESS NOTES
previously discussed trial of Duloxetine.     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 She is curious what her surveillance imaging should be going forward.         9/16/24:   Ms. Nicole Wade is a 64 yo female with pmh of CAD, COPD, carcinoid lung tumor, GERD, CKD IIK, hld, CARTER, tobacco dependence, pre-DM who presents to [facility] care. She is unhappy with her mood lately. She notes she feels very \"blah\". Will sit on the couch all day and have no motivation. Has been on Prozac and Buspar long term. Previously followed with Psychiatry.       CrCl cannot be calculated (Patient's most recent lab result is older than the maximum 180 days allowed.).          Review of Systems   See HPI for ROS, otherwise negative     Objective   Physical Exam     Physical Exam  HENT:      Head: Normocephalic and atraumatic.   Cardiovascular:      Heart sounds: No murmur heard.  Pulmonary:      Effort: Pulmonary effort is normal. No respiratory distress.   Neurological:      Mental Status: She is alert.   Psychiatric:         Mood and Affect: Mood normal.         Thought Content: Thought content normal.           Please note, this report has been partially produced using speech recognition software and may cause  and /or contain errors related to that system including grammar, punctuation and spelling as well as words and phrases that may seem inappropriate. If there are questions or concerns please feel free to contact me to clarify.    An electronic signature was used to authenticate this note.    --Whitney Schmidt, DO

## 2024-10-25 NOTE — ASSESSMENT & PLAN NOTE
No new neurologic sx   Possible migraine or in setting of tobacco cessation   Given hx of carcinoid tumor, get CT head

## 2024-10-28 ENCOUNTER — OFFICE VISIT (OUTPATIENT)
Age: 64
End: 2024-10-28
Payer: MEDICARE

## 2024-10-28 VITALS
WEIGHT: 120 LBS | SYSTOLIC BLOOD PRESSURE: 118 MMHG | TEMPERATURE: 97.1 F | HEIGHT: 60 IN | BODY MASS INDEX: 23.56 KG/M2 | DIASTOLIC BLOOD PRESSURE: 76 MMHG

## 2024-10-28 DIAGNOSIS — M48.062 SPINAL STENOSIS OF LUMBAR REGION WITH NEUROGENIC CLAUDICATION: Primary | ICD-10-CM

## 2024-10-28 DIAGNOSIS — M54.12 CERVICAL RADICULOPATHY: ICD-10-CM

## 2024-10-28 DIAGNOSIS — M53.3 SACROILIAC JOINT PAIN: ICD-10-CM

## 2024-10-28 PROCEDURE — G8428 CUR MEDS NOT DOCUMENT: HCPCS | Performed by: STUDENT IN AN ORGANIZED HEALTH CARE EDUCATION/TRAINING PROGRAM

## 2024-10-28 PROCEDURE — 4004F PT TOBACCO SCREEN RCVD TLK: CPT | Performed by: STUDENT IN AN ORGANIZED HEALTH CARE EDUCATION/TRAINING PROGRAM

## 2024-10-28 PROCEDURE — G8420 CALC BMI NORM PARAMETERS: HCPCS | Performed by: STUDENT IN AN ORGANIZED HEALTH CARE EDUCATION/TRAINING PROGRAM

## 2024-10-28 PROCEDURE — G8484 FLU IMMUNIZE NO ADMIN: HCPCS | Performed by: STUDENT IN AN ORGANIZED HEALTH CARE EDUCATION/TRAINING PROGRAM

## 2024-10-28 PROCEDURE — G2211 COMPLEX E/M VISIT ADD ON: HCPCS | Performed by: STUDENT IN AN ORGANIZED HEALTH CARE EDUCATION/TRAINING PROGRAM

## 2024-10-28 PROCEDURE — 99214 OFFICE O/P EST MOD 30 MIN: CPT | Performed by: STUDENT IN AN ORGANIZED HEALTH CARE EDUCATION/TRAINING PROGRAM

## 2024-10-28 PROCEDURE — 3017F COLORECTAL CA SCREEN DOC REV: CPT | Performed by: STUDENT IN AN ORGANIZED HEALTH CARE EDUCATION/TRAINING PROGRAM

## 2024-10-28 RX ORDER — BACLOFEN 10 MG/1
10 TABLET ORAL 3 TIMES DAILY PRN
Qty: 90 TABLET | Refills: 3 | Status: SHIPPED | OUTPATIENT
Start: 2024-10-28

## 2024-10-28 ASSESSMENT — ENCOUNTER SYMPTOMS: BACK PAIN: 1

## 2024-10-28 NOTE — PROGRESS NOTES
HPI  Location:back and bilateral leg pain  Patient states that her pain is at a 7 at rest and a 10 with activity on the pain scale.   Patient states that she received 90 % of pain relief after Left SI JOINT INJECTION ON 10/01/2024.  Patient is currently prescribed Baclofen (LIORESAL) 5 MG tablet . Patient states she receives some relief from medication.  Patient is currently prescribed meloxicam (MOBIC) 7.5 MG tablet. Patient states she receives some relief from medication.    
that requires ongoing medical management. The nature of this condition and indicated treatment requires a longitudinal relationship and continual monitoring over time for appropriate safety and response. Shared goals were created and discussed including a reduction in pain intensity and improvement in ability to complete activities of daily living.     Assessment & Plan:     10/1/24: Left SI joint injection. 90% relief. Positive response. Consider repeat injection and potential SI joint fusion based on procedure relief time.   Orders:  -     baclofen (LIORESAL) 10 MG tablet; Take 1 tablet by mouth 3 times daily as needed (spasms), Disp-90 tablet, R-3Normal  3. Cervical radiculopathy  Overview:  Chronic illness with a severe exacerbation and/or progression which affects ADL's. Pain has been present for multiple years and is expected to last at least a year. Patient is unable to sleep, transfer, nor ambulate. Goals of care include pain relief >50% and ability to perform ADLs with less pain.     63-year-old female history of chronic neck pain with radiation to both upper extremities (left worse than right) for multiple years.  Pain is extremely debilitating affects ADLs.  Pain score 6 or greater with activity.  Pain limits mobility and functionality.  Patient also complains of severe numbness and tingling left arm which causes her to drop items.     Of note, patient states she is trialed multiple medications and injections in the past.  She is currently not on any pain medication therapy.  She has trialed gabapentin and Lyrica with negative effect     On physical examination, positive Spurling's bilaterally.  More significant on the left side.     CT scan of cervical spine reviewed.  Multilevel degenerative changes noted.    I am the primary provider for this patient's complex chronic pain condition that requires ongoing medical management. The nature of this condition and indicated treatment requires a longitudinal

## 2024-10-28 NOTE — ASSESSMENT & PLAN NOTE
10/1/24: Left SI joint injection. 90% relief. Positive response. Consider repeat injection and potential SI joint fusion based on procedure relief time.

## 2024-10-31 ENCOUNTER — PREP FOR PROCEDURE (OUTPATIENT)
Age: 64
End: 2024-10-31

## 2024-11-05 ENCOUNTER — APPOINTMENT (OUTPATIENT)
Dept: GENERAL RADIOLOGY | Age: 64
End: 2024-11-05
Attending: STUDENT IN AN ORGANIZED HEALTH CARE EDUCATION/TRAINING PROGRAM
Payer: MEDICARE

## 2024-11-05 ENCOUNTER — HOSPITAL ENCOUNTER (OUTPATIENT)
Age: 64
Setting detail: OUTPATIENT SURGERY
Discharge: HOME | End: 2024-11-05
Attending: STUDENT IN AN ORGANIZED HEALTH CARE EDUCATION/TRAINING PROGRAM | Admitting: STUDENT IN AN ORGANIZED HEALTH CARE EDUCATION/TRAINING PROGRAM
Payer: MEDICARE

## 2024-11-05 VITALS
SYSTOLIC BLOOD PRESSURE: 127 MMHG | RESPIRATION RATE: 16 BRPM | DIASTOLIC BLOOD PRESSURE: 66 MMHG | HEART RATE: 80 BPM | TEMPERATURE: 97.7 F | OXYGEN SATURATION: 94 %

## 2024-11-05 PROCEDURE — 2580000003 HC RX 258: Performed by: STUDENT IN AN ORGANIZED HEALTH CARE EDUCATION/TRAINING PROGRAM

## 2024-11-05 PROCEDURE — 2709999900 HC NON-CHARGEABLE SUPPLY: Performed by: STUDENT IN AN ORGANIZED HEALTH CARE EDUCATION/TRAINING PROGRAM

## 2024-11-05 PROCEDURE — 2500000003 HC RX 250 WO HCPCS: Performed by: STUDENT IN AN ORGANIZED HEALTH CARE EDUCATION/TRAINING PROGRAM

## 2024-11-05 PROCEDURE — 3600000002 HC SURGERY LEVEL 2 BASE: Performed by: STUDENT IN AN ORGANIZED HEALTH CARE EDUCATION/TRAINING PROGRAM

## 2024-11-05 PROCEDURE — 6360000002 HC RX W HCPCS: Performed by: STUDENT IN AN ORGANIZED HEALTH CARE EDUCATION/TRAINING PROGRAM

## 2024-11-05 PROCEDURE — 7100000010 HC PHASE II RECOVERY - FIRST 15 MIN: Performed by: STUDENT IN AN ORGANIZED HEALTH CARE EDUCATION/TRAINING PROGRAM

## 2024-11-05 PROCEDURE — 62323 NJX INTERLAMINAR LMBR/SAC: CPT | Performed by: STUDENT IN AN ORGANIZED HEALTH CARE EDUCATION/TRAINING PROGRAM

## 2024-11-05 RX ORDER — DEXAMETHASONE SODIUM PHOSPHATE 10 MG/ML
10 INJECTION, SOLUTION INTRAMUSCULAR; INTRAVENOUS
Status: DISCONTINUED | OUTPATIENT
Start: 2024-11-05 | End: 2024-11-05 | Stop reason: HOSPADM

## 2024-11-05 RX ORDER — LIDOCAINE HYDROCHLORIDE 10 MG/ML
15 INJECTION, SOLUTION EPIDURAL; INFILTRATION; INTRACAUDAL; PERINEURAL
Status: DISCONTINUED | OUTPATIENT
Start: 2024-11-05 | End: 2024-11-05 | Stop reason: HOSPADM

## 2024-11-05 RX ORDER — BUPIVACAINE HYDROCHLORIDE 2.5 MG/ML
INJECTION, SOLUTION EPIDURAL; INFILTRATION; INTRACAUDAL PRN
Status: DISCONTINUED | OUTPATIENT
Start: 2024-11-05 | End: 2024-11-05 | Stop reason: ALTCHOICE

## 2024-11-05 RX ORDER — METHYLPREDNISOLONE ACETATE 80 MG/ML
INJECTION, SUSPENSION INTRA-ARTICULAR; INTRALESIONAL; INTRAMUSCULAR; SOFT TISSUE PRN
Status: DISCONTINUED | OUTPATIENT
Start: 2024-11-05 | End: 2024-11-05 | Stop reason: ALTCHOICE

## 2024-11-05 RX ORDER — IOPAMIDOL 408 MG/ML
INJECTION, SOLUTION INTRAVASCULAR PRN
Status: DISCONTINUED | OUTPATIENT
Start: 2024-11-05 | End: 2024-11-05 | Stop reason: ALTCHOICE

## 2024-11-05 RX ORDER — LORAZEPAM 1 MG/1
1 TABLET ORAL
Status: DISCONTINUED | OUTPATIENT
Start: 2024-11-05 | End: 2024-11-05 | Stop reason: HOSPADM

## 2024-11-05 RX ORDER — LIDOCAINE HYDROCHLORIDE 20 MG/ML
10 INJECTION, SOLUTION EPIDURAL; INFILTRATION; INTRACAUDAL; PERINEURAL
Status: DISCONTINUED | OUTPATIENT
Start: 2024-11-05 | End: 2024-11-05 | Stop reason: HOSPADM

## 2024-11-05 RX ORDER — SODIUM CHLORIDE 9 MG/ML
20 INJECTION, SOLUTION INTRAMUSCULAR; INTRAVENOUS; SUBCUTANEOUS
Status: DISCONTINUED | OUTPATIENT
Start: 2024-11-05 | End: 2024-11-05 | Stop reason: HOSPADM

## 2024-11-05 RX ORDER — LIDOCAINE HYDROCHLORIDE 10 MG/ML
INJECTION, SOLUTION EPIDURAL; INFILTRATION; INTRACAUDAL; PERINEURAL PRN
Status: DISCONTINUED | OUTPATIENT
Start: 2024-11-05 | End: 2024-11-05 | Stop reason: ALTCHOICE

## 2024-11-05 RX ORDER — SODIUM CHLORIDE 9 MG/ML
INJECTION, SOLUTION INTRAMUSCULAR; INTRAVENOUS; SUBCUTANEOUS PRN
Status: DISCONTINUED | OUTPATIENT
Start: 2024-11-05 | End: 2024-11-05 | Stop reason: ALTCHOICE

## 2024-11-05 RX ORDER — METHYLPREDNISOLONE ACETATE 80 MG/ML
80 INJECTION, SUSPENSION INTRA-ARTICULAR; INTRALESIONAL; INTRAMUSCULAR; SOFT TISSUE
Status: DISCONTINUED | OUTPATIENT
Start: 2024-11-05 | End: 2024-11-05 | Stop reason: HOSPADM

## 2024-11-05 RX ORDER — BUPIVACAINE HYDROCHLORIDE 2.5 MG/ML
10 INJECTION, SOLUTION EPIDURAL; INFILTRATION; INTRACAUDAL
Status: DISCONTINUED | OUTPATIENT
Start: 2024-11-05 | End: 2024-11-05 | Stop reason: HOSPADM

## 2024-11-05 ASSESSMENT — PAIN - FUNCTIONAL ASSESSMENT
PAIN_FUNCTIONAL_ASSESSMENT: 0-10
PAIN_FUNCTIONAL_ASSESSMENT: 0-10

## 2024-11-05 NOTE — DISCHARGE INSTRUCTIONS
directed by your provider.  Unless otherwise instructed by your provider, applying ice on or around your surgical site can be a great way to help minimize pain and swelling after surgery.  Apply ice to the affected area a minimum of 4 times daily for no longer than 15-20 minutes at a time. It is very important to protect your skin by NOT applying ice or ice pack directly to your skin. Always have a towel or pillow case between your skin and the ice. Frozen vegetable packs like peas or corn make great inexpensive alternatives for use as an icepack.    FOLLOW UP CARE - Call your Physician if any of the following occur:  Increased swelling, redness, warmth, hardness around operative area,  Blood soaked dressings (small amounts of oozing may be normal),  Numb, tingling, or cold fingers or toes (for surgeries on extremities)  Fever over 101° F,  Increased drainage, puss, and/or odor from surgical site,  Pain not relieved by medications ordered  Unable to urinate    FLUIDS AND DIET:  An upset stomach or feeling sick (nausea) can commonly occur after surgery and/or pain medication use. To help minimize nausea:  Do not eat a heavy meal soon after your surgery,    Start with water or other clear liquids,  Advance to mild or bland items like Jell-O, dry toast, crackers, etc.,  Avoid caffeine,  Do not drink alcohol for at least 24 hours after surgery,  Your physician may prescribe anti-nausea medication if your nausea continues,  If you are free from nausea for 24hrs, you can advance to your normal diet as tolerated.       Lumbar Epidural Steroid Injection: What to Expect at Home  Your Recovery  During your lumbar epidural injection, your doctor injected steroid medicine into the area around your spinal cord to help with pain, tingling, or numbness.  Steroids don't always work. And when they do, it takes a few days. But the pain relief can last for several days to a few months or longer.  Your injection may also have included

## 2024-11-05 NOTE — PROGRESS NOTES
Pt received from procedure room via wheelchair. Report received from OR nurse. Pt transferred and ambulating without difficulty.  Neuro intact.  Pt tolerated procedure well. Dressing dry and intact. Reviewed written discharge instructions with patient. Copy of written discharge instructions given to patient.

## 2024-11-05 NOTE — PROCEDURES
fluoroscopic guidance, the needle was guided into the epidural space utilizing loss of resistance technique. There was no evidence for intravascular nor intrathecal uptake.  After negative aspiration, contrast was then injected, and the findings were consistent with epidural spread in 2 views.     The above-mentioned injectate was slowly administered and the needle subsequently withdrawn.    Patient tolerated the procedure well, no obvious complications occurred during the procedure.  Patient was appropriately monitored and discharged home in stable condition with their usual motor strength. No bladder or bowel movement complications. Post Op instructions were given to patient. Patient told to resume blood thinners if stopped prior to procedure. Relevant and recent imaging reviewed with patient today.            Nicholas Corona DO

## 2024-11-14 ENCOUNTER — HOSPITAL ENCOUNTER (OUTPATIENT)
Dept: CT IMAGING | Age: 64
Discharge: HOME | End: 2024-11-16
Attending: STUDENT IN AN ORGANIZED HEALTH CARE EDUCATION/TRAINING PROGRAM
Payer: MEDICARE

## 2024-11-14 DIAGNOSIS — R51.9 UNILATERAL HEADACHE: ICD-10-CM

## 2024-11-14 PROCEDURE — 70450 CT HEAD/BRAIN W/O DYE: CPT

## 2024-12-09 ENCOUNTER — OFFICE VISIT (OUTPATIENT)
Age: 64
End: 2024-12-09
Payer: MEDICARE

## 2024-12-09 VITALS — WEIGHT: 120 LBS | HEIGHT: 60 IN | BODY MASS INDEX: 23.56 KG/M2 | TEMPERATURE: 96.8 F

## 2024-12-09 DIAGNOSIS — M54.81 BILATERAL OCCIPITAL NEURALGIA: ICD-10-CM

## 2024-12-09 DIAGNOSIS — M53.3 SACROILIAC JOINT PAIN: ICD-10-CM

## 2024-12-09 DIAGNOSIS — M48.062 SPINAL STENOSIS OF LUMBAR REGION WITH NEUROGENIC CLAUDICATION: Primary | ICD-10-CM

## 2024-12-09 DIAGNOSIS — M54.12 CERVICAL RADICULOPATHY: ICD-10-CM

## 2024-12-09 PROCEDURE — G8484 FLU IMMUNIZE NO ADMIN: HCPCS | Performed by: STUDENT IN AN ORGANIZED HEALTH CARE EDUCATION/TRAINING PROGRAM

## 2024-12-09 PROCEDURE — 64450 NJX AA&/STRD OTHER PN/BRANCH: CPT | Performed by: STUDENT IN AN ORGANIZED HEALTH CARE EDUCATION/TRAINING PROGRAM

## 2024-12-09 PROCEDURE — G8420 CALC BMI NORM PARAMETERS: HCPCS | Performed by: STUDENT IN AN ORGANIZED HEALTH CARE EDUCATION/TRAINING PROGRAM

## 2024-12-09 PROCEDURE — 99214 OFFICE O/P EST MOD 30 MIN: CPT | Performed by: STUDENT IN AN ORGANIZED HEALTH CARE EDUCATION/TRAINING PROGRAM

## 2024-12-09 PROCEDURE — 3017F COLORECTAL CA SCREEN DOC REV: CPT | Performed by: STUDENT IN AN ORGANIZED HEALTH CARE EDUCATION/TRAINING PROGRAM

## 2024-12-09 PROCEDURE — G8427 DOCREV CUR MEDS BY ELIG CLIN: HCPCS | Performed by: STUDENT IN AN ORGANIZED HEALTH CARE EDUCATION/TRAINING PROGRAM

## 2024-12-09 PROCEDURE — 4004F PT TOBACCO SCREEN RCVD TLK: CPT | Performed by: STUDENT IN AN ORGANIZED HEALTH CARE EDUCATION/TRAINING PROGRAM

## 2024-12-09 PROCEDURE — 64405 NJX AA&/STRD GR OCPL NRV: CPT | Performed by: STUDENT IN AN ORGANIZED HEALTH CARE EDUCATION/TRAINING PROGRAM

## 2024-12-09 RX ORDER — BUPIVACAINE HYDROCHLORIDE 2.5 MG/ML
5 INJECTION, SOLUTION EPIDURAL; INFILTRATION; INTRACAUDAL ONCE
Status: COMPLETED | OUTPATIENT
Start: 2024-12-09 | End: 2024-12-09

## 2024-12-09 RX ORDER — DEXAMETHASONE SODIUM PHOSPHATE 10 MG/ML
10 INJECTION, SOLUTION INTRAMUSCULAR; INTRAVENOUS ONCE
Status: COMPLETED | OUTPATIENT
Start: 2024-12-09 | End: 2024-12-09

## 2024-12-09 RX ADMIN — BUPIVACAINE HYDROCHLORIDE 5 MG: 2.5 INJECTION, SOLUTION EPIDURAL; INFILTRATION; INTRACAUDAL at 11:44

## 2024-12-09 RX ADMIN — DEXAMETHASONE SODIUM PHOSPHATE 10 MG: 10 INJECTION, SOLUTION INTRAMUSCULAR; INTRAVENOUS at 11:44

## 2024-12-09 ASSESSMENT — ENCOUNTER SYMPTOMS: BACK PAIN: 1

## 2024-12-09 NOTE — PROGRESS NOTES
HPI  Location: back and bilateral leg pain   Patient states that her pain is at a 7 at rest and a 8 with activity on the pain scale.   Patient states that she received 90% of pain relief after L5-S1 SHANTHI on 11/05/2024.  Patient is currently prescribed Baclofen (LIORESAL) 5 MG tablet . Patient states she receives substantial relief from medication.  Patient is currently prescribed meloxicam (MOBIC) 7.5 MG tablet. Patient states she receives substantial relief from medication.    
living.     Medication Management:  Baclofen 10mg TID prn  Meloxicam 7.5mg BID prn    I am the primary provider for this patient's complex chronic pain condition that requires ongoing medical management. The nature of this condition and indicated treatment requires a longitudinal relationship and continual monitoring over time for appropriate safety and response. Shared goals were created and discussed including a reduction in pain intensity and improvement in ability to complete activities of daily living.     7/18/24: C7-T1 SHANTHI. 0% relief. Negative response   Assessment & Plan:   Chronic, at goal (stable), continue current treatment plan  4. Bilateral occipital neuralgia  Overview:  Chronic illness with a severe exacerbation and/or progression which affects ADL's. Pain has been present for multiple years and is expected to last at least a year. Patient is unable to sleep, transfer, nor ambulate. Goals of care include pain relief >50% and ability to perform ADLs with less pain.     63-year-old female history of chronic neck/headache pain for multiple years. Pain is extremely debilitating and affects ADLs. Pain score 6 or greater with activity. Pain limits mobility and functionality.     Upon assessment, pain is reproducible with palpation to the occipital ridge near the greater and lesser occipital nerves bilaterally. Pain is associated with allodynia and throbbing/pulsating sensation.   Assessment & Plan:     -bilateral occipital nerve blocks ordered  Orders:  -     INJECT NERV BLCK,GREAT OCCIPTL  -     BUPivacaine (PF) (MARCAINE) 0.25 % injection 5 mg; 5 mg, Injection, ONCE, 1 dose, On Mon 12/9/24 at 1200  -     dexAMETHasone (PF) (DECADRON) injection 10 mg; 10 mg, Other, ONCE, 1 dose, On Mon 12/9/24 at 1200    Return in about 4 weeks (around 1/6/2025) for bilateral SI joint injection.  Orders Placed This Encounter   Medications    BUPivacaine (PF) (MARCAINE) 0.25 % injection 5 mg    dexAMETHasone (PF) (DECADRON)

## 2024-12-11 ENCOUNTER — CARE COORDINATION (OUTPATIENT)
Dept: CARE COORDINATION | Age: 64
End: 2024-12-11

## 2024-12-11 NOTE — CARE COORDINATION
Ambulatory Care Coordination Note     12/11/2024 3:18 PM     Patient outreach attempt by this ACM today to offer care management services. ACM was unable to reach the patient by telephone today;   left voice message requesting a return phone call to this ACM.  letter mailed requesting patient  to contact this ACM.      ACM: Albania Menezes RN         PCP/Specialist follow up:       Follow Up:   Plan for next ACM outreach in approximately 1-2 days  to complete:  - outreach attempt to offer care management services.

## 2024-12-16 ENCOUNTER — CARE COORDINATION (OUTPATIENT)
Dept: CARE COORDINATION | Age: 64
End: 2024-12-16

## 2024-12-16 NOTE — CARE COORDINATION
Ambulatory Care Coordination Note     2024 9:06 AM     Patient Current Location:  Home: [address]  [place] [address]     This patient was received as a referral from [facility] health report .    ACM contacted the patient by telephone. Verified name and  with patient as identifiers. Provided introduction to self, and explanation of the ACM role.   Patient declined care management services at this time.          ACM: Albania Menezes RN     Challenges to be reviewed by the provider   Additional needs identified to be addressed with provider No  none               Method of communication with provider: none.    Utilization: N/A - Initial Call     Care Summary Note: ACM spoke to patient.  Introduced [facility] program role of ACM goals and benefits.  Patient states she is managing very well.  Patient states she quit smoking in October around the .  ACM updated chart.  Patient does have concerns regarding Cymbalta.  She heard there was a recall.  Patient has follow-up with PCP today.  Will have discussion on different medication.  Patient reports COPD is stable has no issues or concerns declined care coordination    PCP/Specialist follow up:   Future Appointments         Provider Specialty Dept Phone    2024 2:00 PM Whitney Schmidt DO Internal Medicine [phone]            Follow Up:   No further Ambulatory Care Management follow-up scheduled at this time.  Patient  has Ambulatory Care Manager's contact information for any further questions, concerns or needs.

## 2024-12-20 ENCOUNTER — PREP FOR PROCEDURE (OUTPATIENT)
Age: 64
End: 2024-12-20

## 2025-01-07 ENCOUNTER — HOSPITAL ENCOUNTER (OUTPATIENT)
Age: 65
Setting detail: OUTPATIENT SURGERY
Discharge: HOME | End: 2025-01-07
Attending: STUDENT IN AN ORGANIZED HEALTH CARE EDUCATION/TRAINING PROGRAM | Admitting: STUDENT IN AN ORGANIZED HEALTH CARE EDUCATION/TRAINING PROGRAM
Payer: MEDICARE

## 2025-01-07 ENCOUNTER — APPOINTMENT (OUTPATIENT)
Dept: GENERAL RADIOLOGY | Age: 65
End: 2025-01-07
Attending: STUDENT IN AN ORGANIZED HEALTH CARE EDUCATION/TRAINING PROGRAM
Payer: MEDICARE

## 2025-01-07 VITALS
DIASTOLIC BLOOD PRESSURE: 80 MMHG | HEIGHT: 60 IN | BODY MASS INDEX: 25.52 KG/M2 | SYSTOLIC BLOOD PRESSURE: 138 MMHG | HEART RATE: 86 BPM | RESPIRATION RATE: 16 BRPM | WEIGHT: 130 LBS | OXYGEN SATURATION: 96 % | TEMPERATURE: 97.3 F

## 2025-01-07 PROCEDURE — 6370000000 HC RX 637 (ALT 250 FOR IP): Performed by: STUDENT IN AN ORGANIZED HEALTH CARE EDUCATION/TRAINING PROGRAM

## 2025-01-07 PROCEDURE — 2709999900 HC NON-CHARGEABLE SUPPLY: Performed by: STUDENT IN AN ORGANIZED HEALTH CARE EDUCATION/TRAINING PROGRAM

## 2025-01-07 PROCEDURE — 27096 INJECT SACROILIAC JOINT: CPT | Performed by: STUDENT IN AN ORGANIZED HEALTH CARE EDUCATION/TRAINING PROGRAM

## 2025-01-07 PROCEDURE — 7100000010 HC PHASE II RECOVERY - FIRST 15 MIN: Performed by: STUDENT IN AN ORGANIZED HEALTH CARE EDUCATION/TRAINING PROGRAM

## 2025-01-07 PROCEDURE — 3600000002 HC SURGERY LEVEL 2 BASE: Performed by: STUDENT IN AN ORGANIZED HEALTH CARE EDUCATION/TRAINING PROGRAM

## 2025-01-07 PROCEDURE — 6360000002 HC RX W HCPCS: Performed by: STUDENT IN AN ORGANIZED HEALTH CARE EDUCATION/TRAINING PROGRAM

## 2025-01-07 RX ORDER — METHYLPREDNISOLONE ACETATE 80 MG/ML
80 INJECTION, SUSPENSION INTRA-ARTICULAR; INTRALESIONAL; INTRAMUSCULAR; SOFT TISSUE
Status: DISCONTINUED | OUTPATIENT
Start: 2025-01-07 | End: 2025-01-07

## 2025-01-07 RX ORDER — DEXAMETHASONE SODIUM PHOSPHATE 10 MG/ML
10 INJECTION, SOLUTION INTRAMUSCULAR; INTRAVENOUS
Status: DISCONTINUED | OUTPATIENT
Start: 2025-01-07 | End: 2025-01-07 | Stop reason: HOSPADM

## 2025-01-07 RX ORDER — BUPIVACAINE HYDROCHLORIDE 2.5 MG/ML
10 INJECTION, SOLUTION EPIDURAL; INFILTRATION; INTRACAUDAL
Status: DISCONTINUED | OUTPATIENT
Start: 2025-01-07 | End: 2025-01-07

## 2025-01-07 RX ORDER — METHYLPREDNISOLONE ACETATE 80 MG/ML
INJECTION, SUSPENSION INTRA-ARTICULAR; INTRALESIONAL; INTRAMUSCULAR; SOFT TISSUE PRN
Status: DISCONTINUED | OUTPATIENT
Start: 2025-01-07 | End: 2025-01-07 | Stop reason: ALTCHOICE

## 2025-01-07 RX ORDER — LORAZEPAM 1 MG/1
1 TABLET ORAL
Status: DISCONTINUED | OUTPATIENT
Start: 2025-01-07 | End: 2025-01-07

## 2025-01-07 RX ORDER — DEXAMETHASONE SODIUM PHOSPHATE 10 MG/ML
10 INJECTION, SOLUTION INTRAMUSCULAR; INTRAVENOUS
Status: DISCONTINUED | OUTPATIENT
Start: 2025-01-07 | End: 2025-01-07

## 2025-01-07 RX ORDER — BUPIVACAINE HYDROCHLORIDE 2.5 MG/ML
10 INJECTION, SOLUTION EPIDURAL; INFILTRATION; INTRACAUDAL
Status: DISCONTINUED | OUTPATIENT
Start: 2025-01-07 | End: 2025-01-07 | Stop reason: HOSPADM

## 2025-01-07 RX ORDER — LIDOCAINE HYDROCHLORIDE 10 MG/ML
15 INJECTION, SOLUTION EPIDURAL; INFILTRATION; INTRACAUDAL; PERINEURAL
Status: DISCONTINUED | OUTPATIENT
Start: 2025-01-07 | End: 2025-01-07 | Stop reason: HOSPADM

## 2025-01-07 RX ORDER — IOPAMIDOL 408 MG/ML
INJECTION, SOLUTION INTRAVASCULAR PRN
Status: DISCONTINUED | OUTPATIENT
Start: 2025-01-07 | End: 2025-01-07 | Stop reason: ALTCHOICE

## 2025-01-07 RX ORDER — SODIUM CHLORIDE 9 MG/ML
20 INJECTION, SOLUTION INTRAMUSCULAR; INTRAVENOUS; SUBCUTANEOUS
Status: DISCONTINUED | OUTPATIENT
Start: 2025-01-07 | End: 2025-01-07

## 2025-01-07 RX ORDER — LIDOCAINE HYDROCHLORIDE 20 MG/ML
10 INJECTION, SOLUTION EPIDURAL; INFILTRATION; INTRACAUDAL; PERINEURAL
Status: DISCONTINUED | OUTPATIENT
Start: 2025-01-07 | End: 2025-01-07 | Stop reason: HOSPADM

## 2025-01-07 RX ORDER — BUPIVACAINE HYDROCHLORIDE 2.5 MG/ML
INJECTION, SOLUTION EPIDURAL; INFILTRATION; INTRACAUDAL PRN
Status: DISCONTINUED | OUTPATIENT
Start: 2025-01-07 | End: 2025-01-07 | Stop reason: ALTCHOICE

## 2025-01-07 RX ORDER — LIDOCAINE HYDROCHLORIDE 10 MG/ML
INJECTION, SOLUTION EPIDURAL; INFILTRATION; INTRACAUDAL; PERINEURAL PRN
Status: DISCONTINUED | OUTPATIENT
Start: 2025-01-07 | End: 2025-01-07 | Stop reason: ALTCHOICE

## 2025-01-07 RX ORDER — LORAZEPAM 1 MG/1
1 TABLET ORAL
Status: COMPLETED | OUTPATIENT
Start: 2025-01-07 | End: 2025-01-07

## 2025-01-07 RX ORDER — METHYLPREDNISOLONE ACETATE 80 MG/ML
80 INJECTION, SUSPENSION INTRA-ARTICULAR; INTRALESIONAL; INTRAMUSCULAR; SOFT TISSUE
Status: DISCONTINUED | OUTPATIENT
Start: 2025-01-07 | End: 2025-01-07 | Stop reason: HOSPADM

## 2025-01-07 RX ORDER — SODIUM CHLORIDE 9 MG/ML
20 INJECTION, SOLUTION INTRAMUSCULAR; INTRAVENOUS; SUBCUTANEOUS
Status: DISCONTINUED | OUTPATIENT
Start: 2025-01-07 | End: 2025-01-07 | Stop reason: HOSPADM

## 2025-01-07 RX ORDER — LIDOCAINE HYDROCHLORIDE 10 MG/ML
15 INJECTION, SOLUTION EPIDURAL; INFILTRATION; INTRACAUDAL; PERINEURAL
Status: DISCONTINUED | OUTPATIENT
Start: 2025-01-07 | End: 2025-01-07

## 2025-01-07 RX ORDER — LIDOCAINE HYDROCHLORIDE 20 MG/ML
10 INJECTION, SOLUTION EPIDURAL; INFILTRATION; INTRACAUDAL; PERINEURAL
Status: DISCONTINUED | OUTPATIENT
Start: 2025-01-07 | End: 2025-01-07

## 2025-01-07 RX ADMIN — LORAZEPAM 1 MG: 1 TABLET ORAL at 08:22

## 2025-01-07 ASSESSMENT — PAIN - FUNCTIONAL ASSESSMENT
PAIN_FUNCTIONAL_ASSESSMENT: 0-10
PAIN_FUNCTIONAL_ASSESSMENT: 0-10

## 2025-01-07 NOTE — PROCEDURES
PROCEDURE NOTE  Date: 2025   Name: Nicole Wade  YOB: 1960                          [facility]  Neurosurgery and Pain Management Center  [address] [place] [address], [address]  [place], [address]  P: [phone]  F: [phone]              Sacroiliac Joint Injection     Patient Name: Nicole Wade : 1960  Date:25  Physician: Nicholas Corona DO        Nicole Wade is here today for interventional pain management.    Preoperatively, the patient presents with SI joint mediated pain, as per history and exam. Patient has failed NSAIDs, PT, and conservative treatment. Patient has significant psychological and functional impairment due to this condition.    Discussed the risks including but not limited to bleeding, infection, worsened pain, damage to surrounding structures, side effects, toxicity, allergic reactions to medications used, need for surgery, abdominal and visceral injury, as well as catastrophic injury such as vision loss, paralysis, spinal cord or plexus injury, stroke, bowel or bladder incontinence, chest tube insertion, ventilator dependence, and death. Discussed the risks, benefits, and alternatives to the procedure including no procedure at all. Discussed that we cannot undo any permanent neurologic or orthopaedic damage or change the course of any underlying disease. After thorough discussion, patient expressed understanding and willingness to proceed. Written consent was obtained and is in the chart. Verbal consent to proceed was obtained.    Standard ASI guidelines were followed and sterile technique used.  Area was cleaned with ChloraPrep.  Informed consent was obtained.  Fluoroscopic guidance was used for this procedure.    S.I. JOINT:  Bilateral  Appropriate length spinal needle (22g 3.5in) was advanced to the S.I. Joint.  Negative aspiration was achieved. Injection of contrast dye demonstrated limited spread and was free of any intravascular

## 2025-01-27 DIAGNOSIS — J44.9 CHRONIC OBSTRUCTIVE PULMONARY DISEASE, UNSPECIFIED COPD TYPE (HCC): ICD-10-CM

## 2025-01-27 NOTE — TELEPHONE ENCOUNTER
Comments:     Last Office Visit (last PCP visit):   10/25/2024    Next Visit Date:  Future Appointments   Date Time Provider Department Center   2/3/2025  2:15 PM Nicholas Corona, DO [facility]       **If hasn't been seen in over a year OR hasn't followed up according to last diabetes/ADHD visit, make appointment for patient before sending refill to provider.    Rx requested:  Requested Prescriptions     Pending Prescriptions Disp Refills    albuterol sulfate HFA (PROVENTIL;VENTOLIN;PROAIR) 108 (90 Base) MCG/ACT inhaler 25.5 g 1     Sig: inhale 2 (TWO) puffs into the lungs EVERY 4 HOURS if needed           \

## 2025-01-28 RX ORDER — ALBUTEROL SULFATE 90 UG/1
INHALANT RESPIRATORY (INHALATION)
Qty: 25.5 G | Refills: 1 | Status: SHIPPED | OUTPATIENT
Start: 2025-01-28

## 2025-01-29 DIAGNOSIS — J44.9 CHRONIC OBSTRUCTIVE PULMONARY DISEASE, UNSPECIFIED COPD TYPE (HCC): ICD-10-CM

## 2025-01-29 NOTE — TELEPHONE ENCOUNTER
Comments:     Last Office Visit (last PCP visit):   10/25/2024    Next Visit Date:  Future Appointments   Date Time Provider Department Center   2/3/2025  2:15 PM Nicholas Corona, DO [facility]       **If hasn't been seen in over a year OR hasn't followed up according to last diabetes/ADHD visit, make appointment for patient before sending refill to provider.    Rx requested:  Requested Prescriptions     Pending Prescriptions Disp Refills    fluticasone-umeclidin-vilant (TRELEGY ELLIPTA) 200-62.5-25 MCG/ACT AEPB inhaler 1 each 3     Sig: Inhale 1 puff into the lungs daily

## 2025-01-31 RX ORDER — FLUTICASONE FUROATE, UMECLIDINIUM BROMIDE AND VILANTEROL TRIFENATATE 200; 62.5; 25 UG/1; UG/1; UG/1
1 POWDER RESPIRATORY (INHALATION) DAILY
Qty: 1 EACH | Refills: 3 | Status: SHIPPED | OUTPATIENT
Start: 2025-01-31

## 2025-02-03 ENCOUNTER — OFFICE VISIT (OUTPATIENT)
Age: 65
End: 2025-02-03
Payer: MEDICARE

## 2025-02-03 VITALS — HEIGHT: 60 IN | WEIGHT: 130 LBS | BODY MASS INDEX: 25.52 KG/M2 | TEMPERATURE: 97.1 F

## 2025-02-03 DIAGNOSIS — M53.3 SACROILIAC JOINT PAIN: ICD-10-CM

## 2025-02-03 DIAGNOSIS — M48.062 SPINAL STENOSIS OF LUMBAR REGION WITH NEUROGENIC CLAUDICATION: Primary | ICD-10-CM

## 2025-02-03 DIAGNOSIS — M54.12 CERVICAL RADICULOPATHY: ICD-10-CM

## 2025-02-03 DIAGNOSIS — M54.81 BILATERAL OCCIPITAL NEURALGIA: ICD-10-CM

## 2025-02-03 DIAGNOSIS — M47.816 LUMBAR SPONDYLOSIS: ICD-10-CM

## 2025-02-03 PROCEDURE — G8427 DOCREV CUR MEDS BY ELIG CLIN: HCPCS | Performed by: STUDENT IN AN ORGANIZED HEALTH CARE EDUCATION/TRAINING PROGRAM

## 2025-02-03 PROCEDURE — G8400 PT W/DXA NO RESULTS DOC: HCPCS | Performed by: STUDENT IN AN ORGANIZED HEALTH CARE EDUCATION/TRAINING PROGRAM

## 2025-02-03 PROCEDURE — 1036F TOBACCO NON-USER: CPT | Performed by: STUDENT IN AN ORGANIZED HEALTH CARE EDUCATION/TRAINING PROGRAM

## 2025-02-03 PROCEDURE — 1123F ACP DISCUSS/DSCN MKR DOCD: CPT | Performed by: STUDENT IN AN ORGANIZED HEALTH CARE EDUCATION/TRAINING PROGRAM

## 2025-02-03 PROCEDURE — 3017F COLORECTAL CA SCREEN DOC REV: CPT | Performed by: STUDENT IN AN ORGANIZED HEALTH CARE EDUCATION/TRAINING PROGRAM

## 2025-02-03 PROCEDURE — 1090F PRES/ABSN URINE INCON ASSESS: CPT | Performed by: STUDENT IN AN ORGANIZED HEALTH CARE EDUCATION/TRAINING PROGRAM

## 2025-02-03 PROCEDURE — G8419 CALC BMI OUT NRM PARAM NOF/U: HCPCS | Performed by: STUDENT IN AN ORGANIZED HEALTH CARE EDUCATION/TRAINING PROGRAM

## 2025-02-03 PROCEDURE — 99214 OFFICE O/P EST MOD 30 MIN: CPT | Performed by: STUDENT IN AN ORGANIZED HEALTH CARE EDUCATION/TRAINING PROGRAM

## 2025-02-03 PROCEDURE — G2211 COMPLEX E/M VISIT ADD ON: HCPCS | Performed by: STUDENT IN AN ORGANIZED HEALTH CARE EDUCATION/TRAINING PROGRAM

## 2025-02-03 RX ORDER — TRAMADOL HYDROCHLORIDE 50 MG/1
50 TABLET ORAL 2 TIMES DAILY PRN
Qty: 60 TABLET | Refills: 0 | Status: SHIPPED | OUTPATIENT
Start: 2025-02-03 | End: 2025-03-05

## 2025-02-03 RX ORDER — ONDANSETRON 4 MG/1
4 TABLET, FILM COATED ORAL DAILY PRN
Qty: 30 TABLET | Refills: 0 | Status: SHIPPED | OUTPATIENT
Start: 2025-02-03

## 2025-02-03 NOTE — PROGRESS NOTES
HPI  Location: Back Pain and Leg Pain (bilateral)   Patient states that her pain is at a 7 at rest and a 9 with activity on the pain scale.   Patient states that she received 20% of pain relief after Bilateral SI Joint Injection on 01/07/2025.  Patient is currently prescribed meloxicam (MOBIC) 7.5 MG tablet. Patient states she receives some relief from medication.  Patient is currently prescribed Baclofen (LIORESAL) 5 MG tablet . Patient states she receives some relief from medication.

## 2025-02-03 NOTE — PROGRESS NOTES
[facility] [place] [facility], [facility]  [address] [address]  Dept: [phone]  Dept Fax: [phone]  Loc: [phone]     2/3/2025    Visit type: Established     Reason for Visit: Back Pain and Leg Pain (bilateral)       ASSESSMENT/PLAN   1. Spinal stenosis of lumbar region with neurogenic claudication  Overview:  Chronic illness with a severe exacerbation and/or progression which affects ADL's. Pain has been present for multiple years and is expected to last at least a year. Patient is unable to sleep, transfer, nor ambulate. Goals of care include pain relief >50% and ability to perform ADLs with less pain.     64-year-old female history of chronic low back pain with radiation into both lower extremities for multiple years.  Pain is extremely debilitating affects ADLs.  Pain score 6 or greater with activity.  Pain limits mobility and functionality.  Patient also complains of severe numbness and tingling left arm which causes her to drop items.     Of note, patient states she is trialed multiple medications and injections in the past.  She is currently not on any pain medication therapy.  She has trialed gabapentin and Lyrica with negative effect.    Lumbar MRI:  FINDINGS:  BONES/ALIGNMENT: Lumbar spine alignment is anatomic.  Bone marrow signal  within normal limits.  There is loss of T2 bright signal intensity at all  disc levels compatible with desiccation.  Vertebral body axial heights  maintained.     SPINAL CORD: The conus terminates normally.     SOFT TISSUES: No paraspinal mass identified.     L1-L2: There is no significant disc herniation, spinal canal stenosis or  neural foraminal narrowing.     L2-L3: Minimal circumferential disc bulge.  Mild facet arthropathy.  No  significant central canal or neural foraminal compromise.     L3-L4: Mild circumferential disc bulge and mild facet arthropathy.  No  significant central canal

## 2025-02-04 ENCOUNTER — PREP FOR PROCEDURE (OUTPATIENT)
Age: 65
End: 2025-02-04

## 2025-02-04 ENCOUNTER — TELEPHONE (OUTPATIENT)
Age: 65
End: 2025-02-04

## 2025-02-04 ASSESSMENT — ENCOUNTER SYMPTOMS: BACK PAIN: 1

## 2025-02-04 NOTE — TELEPHONE ENCOUNTER
Tramadol (Ultram) 50 mg tablet prior authorization request submitted online ([employer] to [employer]), clinical uploaded, auth pending.       (Key: [id number])  PA Case ID #: [id number]  Rx #: [id number]

## 2025-02-04 NOTE — TELEPHONE ENCOUNTER
Per [email or web address], Tramadol (Ultram) 50 mg tablet approved.       Approved today by [employer] 2017 NCP[employer]  Request Reference Number: [id number]. TRAMADOL HCL TAB 50MG is approved through 03/06/2025. Your patient may now fill this prescription and it will be covered.  Authorization Expiration Date: 3/6/2025

## 2025-02-04 NOTE — ASSESSMENT & PLAN NOTE
-left L4-L5 and L5-S1 MBB ordered  -Will begin low dose Tramadol. 50mg BID prn  -After careful consideration, treatment with opioid medications was recommended.  The patient has severe pain that has not been responsive to non-opioid analgesics. Discussed the risks, side effects, and symptoms that would warrant urgent or emergent physician evaluation.   Discussed the principles of opioid tolerance as well as the concerns regarding opioid diversion, misuse, and abuse.   Discussed the elevated risks of excessive sedation while on pain medications. Advised her against driving or operating heavy machinery or performing any activities where she may harm herself or others while on pain medications. Particular caution was emphasized especially during dose adjustments and medication changes. Discussed the elevated risks of respiratory depression and death while on opioid medications, especially when combined with other sedative substances. Discussed side effects of opioids including, but not limited to, itching, constipation, nausea, and vomiting. The patient does not demonstrate any overt signs of alcohol or drug abuse, and there are no potential contraindications to the use of controlled substances. The relevant previous medical records were reviewed. The patient displays understanding and will attempt to make good-catracho efforts to reduce and eliminate use of opioids through our comprehensive multidisciplinary pain treatment program.  Discussed the clinic's controlled substance agreement, Narcotic Drug Policy (NDP), in great detail. All questions were answered. Pt expressed understanding and explicit agreement. She will sign this form today.    Will obtain a urine drug screen today. Pt denies any illicit substances. Pt has not used any controlled substances within the last week.  OARRS will be obtained. Naloxone provided.

## 2025-02-20 ENCOUNTER — APPOINTMENT (OUTPATIENT)
Dept: GENERAL RADIOLOGY | Age: 65
End: 2025-02-20
Attending: STUDENT IN AN ORGANIZED HEALTH CARE EDUCATION/TRAINING PROGRAM
Payer: MEDICARE

## 2025-02-20 ENCOUNTER — HOSPITAL ENCOUNTER (OUTPATIENT)
Age: 65
Setting detail: OUTPATIENT SURGERY
Discharge: HOME | End: 2025-02-20
Attending: STUDENT IN AN ORGANIZED HEALTH CARE EDUCATION/TRAINING PROGRAM | Admitting: STUDENT IN AN ORGANIZED HEALTH CARE EDUCATION/TRAINING PROGRAM
Payer: MEDICARE

## 2025-02-20 VITALS
RESPIRATION RATE: 14 BRPM | OXYGEN SATURATION: 96 % | HEART RATE: 99 BPM | HEIGHT: 60 IN | SYSTOLIC BLOOD PRESSURE: 150 MMHG | WEIGHT: 118 LBS | DIASTOLIC BLOOD PRESSURE: 72 MMHG | TEMPERATURE: 98.3 F | BODY MASS INDEX: 23.16 KG/M2

## 2025-02-20 DIAGNOSIS — M48.062 SPINAL STENOSIS OF LUMBAR REGION WITH NEUROGENIC CLAUDICATION: Primary | ICD-10-CM

## 2025-02-20 DIAGNOSIS — M47.816 LUMBAR SPONDYLOSIS: ICD-10-CM

## 2025-02-20 DIAGNOSIS — M53.3 SACROILIAC JOINT PAIN: ICD-10-CM

## 2025-02-20 DIAGNOSIS — M54.81 BILATERAL OCCIPITAL NEURALGIA: ICD-10-CM

## 2025-02-20 DIAGNOSIS — M54.12 CERVICAL RADICULOPATHY: ICD-10-CM

## 2025-02-20 PROCEDURE — 2709999900 HC NON-CHARGEABLE SUPPLY: Performed by: STUDENT IN AN ORGANIZED HEALTH CARE EDUCATION/TRAINING PROGRAM

## 2025-02-20 PROCEDURE — 6360000002 HC RX W HCPCS: Performed by: STUDENT IN AN ORGANIZED HEALTH CARE EDUCATION/TRAINING PROGRAM

## 2025-02-20 PROCEDURE — 7100000010 HC PHASE II RECOVERY - FIRST 15 MIN: Performed by: STUDENT IN AN ORGANIZED HEALTH CARE EDUCATION/TRAINING PROGRAM

## 2025-02-20 PROCEDURE — 6370000000 HC RX 637 (ALT 250 FOR IP): Performed by: STUDENT IN AN ORGANIZED HEALTH CARE EDUCATION/TRAINING PROGRAM

## 2025-02-20 PROCEDURE — 3600000002 HC SURGERY LEVEL 2 BASE: Performed by: STUDENT IN AN ORGANIZED HEALTH CARE EDUCATION/TRAINING PROGRAM

## 2025-02-20 RX ORDER — DEXAMETHASONE SODIUM PHOSPHATE 10 MG/ML
INJECTION, SOLUTION INTRA-ARTICULAR; INTRALESIONAL; INTRAMUSCULAR; INTRAVENOUS; SOFT TISSUE PRN
Status: DISCONTINUED | OUTPATIENT
Start: 2025-02-20 | End: 2025-02-20 | Stop reason: ALTCHOICE

## 2025-02-20 RX ORDER — SODIUM CHLORIDE 9 MG/ML
20 INJECTION, SOLUTION INTRAMUSCULAR; INTRAVENOUS; SUBCUTANEOUS
Status: DISCONTINUED | OUTPATIENT
Start: 2025-02-20 | End: 2025-02-20 | Stop reason: HOSPADM

## 2025-02-20 RX ORDER — METHYLPREDNISOLONE ACETATE 80 MG/ML
80 INJECTION, SUSPENSION INTRA-ARTICULAR; INTRALESIONAL; INTRAMUSCULAR; SOFT TISSUE
Status: DISCONTINUED | OUTPATIENT
Start: 2025-02-20 | End: 2025-02-20 | Stop reason: HOSPADM

## 2025-02-20 RX ORDER — LIDOCAINE HYDROCHLORIDE 10 MG/ML
INJECTION, SOLUTION EPIDURAL; INFILTRATION; INTRACAUDAL; PERINEURAL PRN
Status: DISCONTINUED | OUTPATIENT
Start: 2025-02-20 | End: 2025-02-20 | Stop reason: ALTCHOICE

## 2025-02-20 RX ORDER — BUPIVACAINE HYDROCHLORIDE 2.5 MG/ML
10 INJECTION, SOLUTION EPIDURAL; INFILTRATION; INTRACAUDAL
Status: DISCONTINUED | OUTPATIENT
Start: 2025-02-20 | End: 2025-02-20 | Stop reason: HOSPADM

## 2025-02-20 RX ORDER — LORAZEPAM 1 MG/1
1 TABLET ORAL
Status: COMPLETED | OUTPATIENT
Start: 2025-02-20 | End: 2025-02-20

## 2025-02-20 RX ORDER — TRAMADOL HYDROCHLORIDE 50 MG/1
50 TABLET ORAL 2 TIMES DAILY PRN
Qty: 60 TABLET | Refills: 0 | Status: SHIPPED | OUTPATIENT
Start: 2025-03-03 | End: 2025-04-02

## 2025-02-20 RX ORDER — LIDOCAINE HYDROCHLORIDE 10 MG/ML
15 INJECTION, SOLUTION EPIDURAL; INFILTRATION; INTRACAUDAL; PERINEURAL
Status: DISCONTINUED | OUTPATIENT
Start: 2025-02-20 | End: 2025-02-20 | Stop reason: HOSPADM

## 2025-02-20 RX ORDER — LIDOCAINE HYDROCHLORIDE 20 MG/ML
INJECTION, SOLUTION EPIDURAL; INFILTRATION; INTRACAUDAL; PERINEURAL PRN
Status: DISCONTINUED | OUTPATIENT
Start: 2025-02-20 | End: 2025-02-20 | Stop reason: ALTCHOICE

## 2025-02-20 RX ORDER — LIDOCAINE HYDROCHLORIDE 20 MG/ML
10 INJECTION, SOLUTION EPIDURAL; INFILTRATION; INTRACAUDAL; PERINEURAL
Status: DISCONTINUED | OUTPATIENT
Start: 2025-02-20 | End: 2025-02-20 | Stop reason: HOSPADM

## 2025-02-20 RX ORDER — DEXAMETHASONE SODIUM PHOSPHATE 10 MG/ML
10 INJECTION, SOLUTION INTRAMUSCULAR; INTRAVENOUS
Status: DISCONTINUED | OUTPATIENT
Start: 2025-02-20 | End: 2025-02-20 | Stop reason: HOSPADM

## 2025-02-20 RX ADMIN — LORAZEPAM 1 MG: 1 TABLET ORAL at 09:57

## 2025-02-20 ASSESSMENT — PAIN SCALES - GENERAL: PAINLEVEL_OUTOF10: 7

## 2025-02-20 ASSESSMENT — PAIN - FUNCTIONAL ASSESSMENT: PAIN_FUNCTIONAL_ASSESSMENT: 0-10

## 2025-02-20 NOTE — PROCEDURES
PROCEDURE NOTE  Date: 2025   Name: Nicole Wade  YOB: 1960                          [facility]  Neurosurgery and Pain Management Center  [address] [place] [address], [address]  [place], [place], [address]  P: [phone]  F: [phone]          DIAGNOSTIC LUMBAR/SACRAL MEDIAL BRANCH BLOCK      Patient Name: Nicole Wade : 1960  Date:25  Physician: Nicholas Corona DO       Nicole Wade is here today for interventional pain management.      Discussed the risks including but not limited to bleeding, infection, worsened pain, damage to surrounding structures, side effects, toxicity, allergic reactions to medications used, need for surgery, pneumothorax, abdominal and visceral injury, as well as catastrophic injury such as vision loss, paralysis, spinal cord or plexus injury, stroke, bowel or bladder incontinence, chest tube insertion, ventilator dependence, and death. Discussed the risks, benefits, and alternatives to the procedure including no procedure at all. Discussed that we cannot undo any permanent neurologic or orthopaedic damage or change the course of any underlying disease. After thorough discussion, patient expressed understanding and willingness to proceed. Written consent was obtained and is in the chart. Verbal consent to proceed was obtained.    Standard ASIPP guidelines were followed and sterile technique used.  Area was cleaned with Chloraprep.  Informed consent was obtained.  Fluoroscopic guidance was used for this procedure. Junction of superior articular process and transverse process was identified. For L5 dorsal primary ramus groove of sacral ala and SAP of S1 was identified. Appropriate length spinal needle (22g 3.5 in)was used and advanced to correct anatomic location. Negative aspiration was achieved.  At each site approximately 1/2cc of 2% preservative free Lidocaine was injected without difficulty.    Patient tolerated the procedure well, no obvious

## 2025-02-20 NOTE — PROGRESS NOTES
Pt received from procedure room via wheelchair. Report received from OR nurse. Pt transferred and ambulating without difficulty.  Neuro intact.no numbness or tingling.  Pt tolerated procedure well. Dressing dry and intact x 2.. Reviewed written discharge instructions with patient. Copy of written discharge instructions given to patient.   Pain 7/10  Neuro Assessment wnl  Discharge via ambulation/

## 2025-02-20 NOTE — PROGRESS NOTES
PDMP reviewed. Sending Tramadol 50mg BID prn refill for 3/3/2025. Saw patient for procedure today.    Nicholas Corona, DO

## 2025-02-28 ENCOUNTER — PREP FOR PROCEDURE (OUTPATIENT)
Age: 65
End: 2025-02-28

## 2025-03-26 ENCOUNTER — TELEPHONE (OUTPATIENT)
Dept: INTERNAL MEDICINE | Age: 65
End: 2025-03-26

## 2025-04-11 ENCOUNTER — TELEPHONE (OUTPATIENT)
Dept: INTERNAL MEDICINE | Age: 65
End: 2025-04-11

## 2025-04-29 DIAGNOSIS — M48.062 SPINAL STENOSIS OF LUMBAR REGION WITH NEUROGENIC CLAUDICATION: ICD-10-CM

## 2025-04-29 DIAGNOSIS — M54.12 CERVICAL RADICULOPATHY: ICD-10-CM

## 2025-04-29 DIAGNOSIS — M53.3 SACROILIAC JOINT PAIN: ICD-10-CM

## 2025-04-29 RX ORDER — BACLOFEN 10 MG/1
TABLET ORAL
Qty: 90 TABLET | Refills: 3 | Status: SHIPPED | OUTPATIENT
Start: 2025-04-29

## 2025-04-29 NOTE — TELEPHONE ENCOUNTER
Requested Renewals     Name from pharmacy: BACLOFEN 10 MG TABLET         Will file in chart as: baclofen (LIORESAL) 10 MG tablet    Sig: TAKE 1 TABLET BY MOUTH 3 TIMES DAILY AS NEEDED FOR SPASMS    Disp: 90 tablet    Refills: 1    Start: 4/29/2025    Class: Normal    Non-formulary For: Spinal stenosis of lumbar region with neurogenic claudication, Sacroiliac joint pain, Cervical radiculopathy    Last ordered: 6 months ago (10/28/2024) by Nicholas Corona DO    Last refill: 4/2/2025    Rx #: [id number]       To be filled at: [facility]/pharmacy #[id number] - [place], [place] - [address], - P [phone] - F [phone]        Requested Prescriptions     Pending Prescriptions Disp Refills    baclofen (LIORESAL) 10 MG tablet [Pharmacy Med Name: BACLOFEN 10 MG TABLET] 90 tablet 1     Sig: TAKE 1 TABLET BY MOUTH 3 TIMES DAILY AS NEEDED FOR SPASMS       Patient last seen on:  04/01/2025    Date of last refill:  04/02/2025    Next office visit date: Visit date not found    Other, Adhesive tape, and Codeine

## 2025-06-01 DIAGNOSIS — J44.9 CHRONIC OBSTRUCTIVE PULMONARY DISEASE, UNSPECIFIED COPD TYPE (HCC): ICD-10-CM

## 2025-06-02 NOTE — TELEPHONE ENCOUNTER
Comments:     Last Office Visit (last PCP visit):   10/25/2024    Next Visit Date:  No future appointments.    **If hasn't been seen in over a year OR hasn't followed up according to last diabetes/ADHD visit, make appointment for patient before sending refill to provider.    Rx requested:  Requested Prescriptions     Pending Prescriptions Disp Refills    TRELEGY ELLIPTA 200-62.5-25 MCG/ACT AEPB inhaler [Pharmacy Med Name: TRELEGY ELLIPTA 200-62.5-25] 180 each      Sig: INHALE 1 PUFF INTO THE LUNGS DAILY

## 2025-06-03 RX ORDER — FLUTICASONE FUROATE, UMECLIDINIUM BROMIDE AND VILANTEROL TRIFENATATE 200; 62.5; 25 UG/1; UG/1; UG/1
1 POWDER RESPIRATORY (INHALATION) DAILY
Qty: 180 EACH | Refills: 2 | Status: SHIPPED | OUTPATIENT
Start: 2025-06-03

## 2025-06-16 ENCOUNTER — TELEPHONE (OUTPATIENT)
Age: 65
End: 2025-06-16

## 2025-06-16 ENCOUNTER — PATIENT MESSAGE (OUTPATIENT)
Age: 65
End: 2025-06-16

## 2025-06-16 DIAGNOSIS — M47.816 LUMBAR SPONDYLOSIS: Primary | ICD-10-CM

## 2025-06-16 NOTE — TELEPHONE ENCOUNTER
Patient had first set of MBB's done on 02/20/25 but it looks like she cancelled the second set and never rescheduled. Could patient retry the second set or would the process need to be restarted?

## 2025-06-16 NOTE — TELEPHONE ENCOUNTER
Should patient schedule in person visit with you or should I see if the second set of MBB's can be resubmitted for insurance approval?

## 2025-06-16 NOTE — TELEPHONE ENCOUNTER
Attempted to call patient to inform, per Chloe LAURA; New order for INJ DX/THERE AGNT PARAVERT FACET JOINT, LUMBAR/SAC, 2ND LEVEL has been placed. Unable to leave message due to voicemail box full.

## 2025-06-17 ENCOUNTER — PREP FOR PROCEDURE (OUTPATIENT)
Age: 65
End: 2025-06-17

## 2025-08-05 DIAGNOSIS — M54.12 CERVICAL RADICULOPATHY: ICD-10-CM

## 2025-08-05 DIAGNOSIS — M53.3 SACROILIAC JOINT PAIN: ICD-10-CM

## 2025-08-05 DIAGNOSIS — M48.062 SPINAL STENOSIS OF LUMBAR REGION WITH NEUROGENIC CLAUDICATION: ICD-10-CM

## 2025-08-06 RX ORDER — BACLOFEN 10 MG/1
TABLET ORAL
Qty: 270 TABLET | Refills: 1 | Status: SHIPPED | OUTPATIENT
Start: 2025-08-06

## 2025-08-18 DIAGNOSIS — M48.062 SPINAL STENOSIS OF LUMBAR REGION WITH NEUROGENIC CLAUDICATION: ICD-10-CM

## 2025-08-18 DIAGNOSIS — Z87.891 PERSONAL HISTORY OF TOBACCO USE, PRESENTING HAZARDS TO HEALTH: ICD-10-CM

## 2025-08-18 DIAGNOSIS — M53.3 SACROILIAC JOINT PAIN: ICD-10-CM

## 2025-08-18 DIAGNOSIS — M54.12 CERVICAL RADICULOPATHY: ICD-10-CM

## 2025-08-18 RX ORDER — MELOXICAM 7.5 MG/1
7.5 TABLET ORAL 2 TIMES DAILY PRN
Qty: 120 TABLET | Refills: 1 | Status: SHIPPED | OUTPATIENT
Start: 2025-08-18

## 2025-08-19 RX ORDER — VARENICLINE TARTRATE 1 MG/1
1 TABLET, FILM COATED ORAL 2 TIMES DAILY
Qty: 180 TABLET | Refills: 1 | OUTPATIENT
Start: 2025-08-19

## (undated) DEVICE — BINDER ABD UNISX 9IN 62IN L AND XL UNIV

## (undated) DEVICE — APPLICATOR MEDICATED 26 CC SOLUTION HI LT ORNG CHLORAPREP

## (undated) DEVICE — GLOVE ORANGE PI 7   MSG9070

## (undated) DEVICE — TOWEL,OR,DSP,ST,BLUE,STD,4/PK,20PK/CS: Brand: MEDLINE

## (undated) DEVICE — NEPTUNE E-SEP SMOKE EVACUATION PENCIL, COATED, 70MM BLADE, PUSH BUTTON SWITCH: Brand: NEPTUNE E-SEP

## (undated) DEVICE — SUTURE MCRYL SZ 4-0 L27IN ABSRB UD L19MM PS-2 1/2 CIR PRIM Y426H

## (undated) DEVICE — DRAPE, LAVH, STERILE: Brand: MEDLINE

## (undated) DEVICE — SUTURE PERMAHAND SZ 0 L30IN NONABSORBABLE BLK FSL L30MM 3/8 680H

## (undated) DEVICE — YANKAUER,BULB TIP,W/O VENT,RIGID,STERILE: Brand: MEDLINE

## (undated) DEVICE — MARKER SURG SKIN GENTIAN VLT REG TIP W/ 6IN RUL DYNJSM01

## (undated) DEVICE — COVER LT HNDL BLU PLAS

## (undated) DEVICE — 4-PORT MANIFOLD: Brand: NEPTUNE 2

## (undated) DEVICE — PENCIL ES L3M BTTN SWCH HOLSTER W/ BLDE ELECTRD EDGE

## (undated) DEVICE — SUTURE PROL SZ 0 L30IN NONABSORBABLE BLU L36MM CT-1 1/2 CIR 8424H

## (undated) DEVICE — HYPODERMIC SAFETY NEEDLE: Brand: MAGELLAN

## (undated) DEVICE — SYRINGE MED 8ML PLAS LOSS OF RESISTANCE SYR LUERSLIP

## (undated) DEVICE — WARMER SCP 2 ANTIFOG LAP DISP

## (undated) DEVICE — SYRINGE MEDICAL 3ML CLEAR PLASTIC STANDARD NON CONTROL LUERLOCK TIP DISPOSABLE

## (undated) DEVICE — UNIT DRNGE SGL COLL W/ INLINE CONN EXPR

## (undated) DEVICE — TTL1LYR 16FR10ML 100%SIL TMPST TR: Brand: MEDLINE

## (undated) DEVICE — TUBING, SUCTION, 9/32" X 12', STRAIGHT: Brand: MEDLINE INDUSTRIES, INC.

## (undated) DEVICE — DRAPE,LAP,CHOLE,W/TROUGHS,STERILE: Brand: MEDLINE

## (undated) DEVICE — BLADE,CARBON-STEEL,10,STRL,DISPOSABLE,TB: Brand: MEDLINE

## (undated) DEVICE — BLADE ES L6IN ELASTOMERIC COAT INSUL DURABLE BEND UPTO

## (undated) DEVICE — COUNTER NDL 40 COUNT HLD 70 FOAM BLK ADH W/ MAG

## (undated) DEVICE — APPLICATOR MEDICATED 10.5 CC SOLUTION HI LT ORNG CHLORAPREP

## (undated) DEVICE — CONTAINER,SPECIMEN,OR STERILE,4OZ: Brand: MEDLINE

## (undated) DEVICE — CONNECTOR TBNG WHT PLAS SUCT STR 5IN1 LTWT W/ M CONN

## (undated) DEVICE — SYRINGE MED 10ML LUERLOCK TIP W/O SFTY DISP

## (undated) DEVICE — LABEL MED MINI W/ MARKER

## (undated) DEVICE — NEEDLE EPI L3.5IN OD20GA CLR POLYCARB HUB WNG N DEHP TUOHY

## (undated) DEVICE — SYRINGE MED 5ML STD CLR PLAS LUERLOCK TIP N CTRL DISP

## (undated) DEVICE — GLOVE ORANGE PI 8   MSG9080

## (undated) DEVICE — SUTURE VCRL SZ 0 L36IN ABSRB UD L36MM CT-1 1/2 CIR J946H

## (undated) DEVICE — SPONGE,LAP,18"X18",DLX,XR,ST,5/PK,40/PK: Brand: MEDLINE

## (undated) DEVICE — SPONGE LAP W18XL18IN WHT COT 4 PLY FLD STRUNG RADPQ DISP ST

## (undated) DEVICE — STAPLER INT L30MM THCK TISS GRN B FRM 8 FIRING 2 ROW AUTO

## (undated) DEVICE — DRESSING COMP W4XL4IN N ADH PD W2.5XL2.5IN GZ BORDERED ADH

## (undated) DEVICE — NEEDLE HYPO 25GA L1.5IN BLU POLYPR HUB S STL REG BVL STR

## (undated) DEVICE — Device

## (undated) DEVICE — SHEET,DRAPE,53X77,STERILE: Brand: MEDLINE

## (undated) DEVICE — BANDAGE ADH W0.75XL3IN UNIV WVN FAB NAT GEN USE STRP N ADH

## (undated) DEVICE — STAPLER EXT 65MM S STL AUTO DISP PURSTRING

## (undated) DEVICE — STAPLER INT CUT LN 40MM STPL 51MM GRN CRV HD B FRM

## (undated) DEVICE — ELECTRODE PT RET AD L9FT HI MOIST COND ADH HYDRGEL CORDED

## (undated) DEVICE — SPONGE GZ W4XL4IN RAYON POLY CVR W/NONWOVEN FAB STRL 2/PK

## (undated) DEVICE — TAPE,ELASTIC,FOAM,CURAD,3"X5.5YD,LF: Brand: CURAD

## (undated) DEVICE — BANDAGE ADH W2XL4IN NITRL FAB STRP CURAD

## (undated) DEVICE — PACK,BASIC: Brand: MEDLINE

## (undated) DEVICE — MINOR: Brand: MEDLINE INDUSTRIES, INC.

## (undated) DEVICE — CHLORAPREP 26ML ORANGE

## (undated) DEVICE — GAUZE,SPONGE,FLUFF,6"X6.75",STRL,10/TRAY: Brand: MEDLINE

## (undated) DEVICE — INTENDED FOR TISSUE SEPARATION, AND OTHER PROCEDURES THAT REQUIRE A SHARP SURGICAL BLADE TO PUNCTURE OR CUT.: Brand: BARD-PARKER ® CARBON RIB-BACK BLADES

## (undated) DEVICE — STAPLER INT L34CM 60MM LNG ENDOSCP ARTC PWR + ECHELON FLX

## (undated) DEVICE — SKIN PREP TRAY 4 COMPARTM TRAY: Brand: MEDLINE INDUSTRIES, INC.

## (undated) DEVICE — GAUZE,SPONGE,4"X4",16PLY,XRAY,STRL,LF: Brand: MEDLINE

## (undated) DEVICE — SUTURE VCRL SZ 4-0 L18IN ABSRB UD L19MM PS-2 3/8 CIR PRIM J496H

## (undated) DEVICE — FLEXIBLE ENDOSCOPE: Brand: ASCOPE™ 4 BRONCHO REGULAR 5.0/2.2

## (undated) DEVICE — ENDO CARRY-ON PROCEDURE KIT INCLUDES LUBRICANT, DEFENDO OLYMPUS AIR, WATER, SUCTION, BIOPSY VALVE KIT, ENZYMATIC SPONGE, AND BASIN.: Brand: ENDO CARRY-ON PROCEDURE KIT

## (undated) DEVICE — SINGLE PORT MANIFOLD: Brand: NEPTUNE 2

## (undated) DEVICE — SPONGE GZ W4XL4IN COT 12 PLY TYP VII WVN C FLD DSGN STERILE

## (undated) DEVICE — E-Z CLEAN, NON-STICK, PTFE COATED, ELECTROSURGICAL BLADE ELECTRODE, MODIFIED EXTENDED INSULATION, 2.5 INCH (6.35 CM): Brand: MEGADYNE

## (undated) DEVICE — GOWN,SIRUS,NONRNF,SETINSLV,2XL,18/CS: Brand: MEDLINE

## (undated) DEVICE — DRAIN SURG 19FR 0.25IN SIL RND W/ TRCR INDIC DOT RADPQ FULL

## (undated) DEVICE — BLADE ES ELASTOMERIC COAT INSUL DURABLE BEND UPTO 90DEG

## (undated) DEVICE — DRESSING GZ W1XL8IN COT XRFRM N ADH OVERWRAP CURAD

## (undated) DEVICE — DRAPE EQUIP TRNSPRT CONTAINMENT FOR BK TAB

## (undated) DEVICE — RESERVOIR,SUCTION,100CC,SILICONE: Brand: MEDLINE

## (undated) DEVICE — GLOVE ORANGE PI 7 1/2   MSG9075

## (undated) DEVICE — Z DISCONTINUED PER MEDLINE USE 2741943 DRESSING AQUACEL 10 IN ALG W9XL25CM SIL CVR WTRPRF VIR BACT BARR ANTIMIC

## (undated) DEVICE — SUTURE ETHBND EXCEL SZ 0 L30IN NONABSORBABLE GRN CT1 L36MM X424H

## (undated) DEVICE — PAD,NON-ADHERENT,3X8,STERILE,LF,1/PK: Brand: MEDLINE

## (undated) DEVICE — NEEDLE SPINAL 22GA L3.5IN SPINOCAN

## (undated) DEVICE — 450 ML BOTTLE OF 0.05% CHLORHEXIDINE GLUCONATE IN 99.95% STERILE WATER FOR IRRIGATION, USP AND APPLICATOR.: Brand: IRRISEPT ANTIMICROBIAL WOUND LAVAGE

## (undated) DEVICE — SYRINGE IRRIG 60ML SFT PLIABLE BLB EZ TO GRP 1 HND USE W/

## (undated) DEVICE — ECHELON 60MM REINFORCEMENT: Brand: ECHLEON

## (undated) DEVICE — MAGNETIC INSTR DRAPE 20X16: Brand: MEDLINE INDUSTRIES, INC.

## (undated) DEVICE — RELOAD STPL 40MM H1.5-4.7MM WIRE 0.2MM REG TISS GRN CRV

## (undated) DEVICE — GOWN,SIRUS,POLYRNF,BRTHSLV,XLN/XL,20/CS: Brand: MEDLINE

## (undated) DEVICE — SUTURE PERMA-HAND SZ 3-0 L18IN NONABSORBABLE BLK SH-1 L22MM C003D

## (undated) DEVICE — SET EXTN L85IN TBNG STD BOR PRSS RATE PUR STRP MAXPLUS CLR

## (undated) DEVICE — 3M™ IOBAN™ 2 ANTIMICROBIAL INCISE DRAPE 6650EZ: Brand: IOBAN™ 2

## (undated) DEVICE — E-Z CLEAN, NON-STICK, PTFE COATED, ELECTROSURGICAL BLADE ELECTRODE, 6.5 INCH (16.5 CM): Brand: MEGADYNE

## (undated) DEVICE — DRAPE,MEDI-SLUSH,STERILE: Brand: MEDLINE

## (undated) DEVICE — KIT,ANTI FOG,W/SPONGE & FLUID,SOFT PACK: Brand: MEDLINE

## (undated) DEVICE — CATHETER THOR 20FR L22IN PVC 4 EYELET STR ATRAUM

## (undated) DEVICE — SPONGE DRN W4XL4IN RAYON/POLYESTER 6 PLY NONWOVEN PRECUT 2 PER PK

## (undated) DEVICE — GOWN,SIRUS,POLYRNF,BRTHSLV,LG,30/CS: Brand: MEDLINE

## (undated) DEVICE — STAPLER INT L28CM DIA29MM CLS STPL H10-2.5MM OPN LEG L5.5MM

## (undated) DEVICE — SUTURE ABSORBABLE BRAIDED 0 CT-1 8X27 IN UD VICRYL JJ41G

## (undated) DEVICE — TROCAR THORACIC SOFT THORACOPORT 12MM

## (undated) DEVICE — SUTURE VCRL + SZ 2-0 L36IN ABSRB UD L36MM CT-1 1/2 CIR VCP945H

## (undated) DEVICE — 3M™ TEGADERM™ TRANSPARENT FILM DRESSING FRAME STYLE, 1626, 4 IN X 4-3/4 IN (10 CM X 12 CM), 50/CT 4CT/CASE: Brand: 3M™ TEGADERM™

## (undated) DEVICE — NEEDLE HYPO 25GA L1.5IN BVL ORIENTED ECLIPSE

## (undated) DEVICE — YANKAUER,POOLE TIP,STERILE,50/CS: Brand: MEDLINE

## (undated) DEVICE — SUTURE NONABSORBABLE MONOFILAMENT 3-0 PS-1 18 IN BLK ETHILON 1663H

## (undated) DEVICE — SUTURE VCRL SZ 3-0 L27IN ABSRB UD L26MM SH 1/2 CIR J416H

## (undated) DEVICE — RELOAD STPL L60MM H1.5-3.6MM REG TISS BLU GRIPPING SURF B

## (undated) DEVICE — TOWEL,OR,DSP,ST,GREEN,DLX,XR,4/PK,20PK/C: Brand: MEDLINE

## (undated) DEVICE — GOWN,AURORA,NONREINFORCED,LARGE: Brand: MEDLINE

## (undated) DEVICE — SEALER ENDOSCP NANO COAT OPN DIV CRV L JAW LIGASURE IMPACT